# Patient Record
Sex: FEMALE | Race: WHITE | NOT HISPANIC OR LATINO | Employment: OTHER | URBAN - METROPOLITAN AREA
[De-identification: names, ages, dates, MRNs, and addresses within clinical notes are randomized per-mention and may not be internally consistent; named-entity substitution may affect disease eponyms.]

---

## 2013-02-03 LAB — HBA1C MFR BLD: 5.8 % (ref 4.2–6.1)

## 2017-01-13 ENCOUNTER — COMMUNICATION - HEALTHEAST (OUTPATIENT)
Dept: FAMILY MEDICINE | Facility: CLINIC | Age: 77
End: 2017-01-13

## 2017-03-22 ENCOUNTER — COMMUNICATION - HEALTHEAST (OUTPATIENT)
Dept: FAMILY MEDICINE | Facility: CLINIC | Age: 77
End: 2017-03-22

## 2017-03-22 DIAGNOSIS — M85.80 OSTEOPENIA: ICD-10-CM

## 2017-05-01 ENCOUNTER — COMMUNICATION - HEALTHEAST (OUTPATIENT)
Dept: FAMILY MEDICINE | Facility: CLINIC | Age: 77
End: 2017-05-01

## 2017-05-01 DIAGNOSIS — G47.00 INSOMNIA: ICD-10-CM

## 2017-05-24 ENCOUNTER — OFFICE VISIT - HEALTHEAST (OUTPATIENT)
Dept: FAMILY MEDICINE | Facility: CLINIC | Age: 77
End: 2017-05-24

## 2017-05-24 DIAGNOSIS — J06.9 VIRAL UPPER RESPIRATORY TRACT INFECTION: ICD-10-CM

## 2017-05-24 ASSESSMENT — MIFFLIN-ST. JEOR: SCORE: 1198

## 2017-05-26 ENCOUNTER — RECORDS - HEALTHEAST (OUTPATIENT)
Dept: ADMINISTRATIVE | Facility: OTHER | Age: 77
End: 2017-05-26

## 2017-06-10 ENCOUNTER — COMMUNICATION - HEALTHEAST (OUTPATIENT)
Dept: FAMILY MEDICINE | Facility: CLINIC | Age: 77
End: 2017-06-10

## 2017-06-10 DIAGNOSIS — M85.80 OSTEOPENIA: ICD-10-CM

## 2017-06-26 ENCOUNTER — COMMUNICATION - HEALTHEAST (OUTPATIENT)
Dept: FAMILY MEDICINE | Facility: CLINIC | Age: 77
End: 2017-06-26

## 2017-06-26 DIAGNOSIS — Z79.890 POSTMENOPAUSAL HRT (HORMONE REPLACEMENT THERAPY): ICD-10-CM

## 2017-06-29 ENCOUNTER — HOSPITAL ENCOUNTER (OUTPATIENT)
Dept: MAMMOGRAPHY | Facility: CLINIC | Age: 77
Discharge: HOME | End: 2017-06-29
Attending: FAMILY MEDICINE

## 2017-06-29 DIAGNOSIS — Z12.31 VISIT FOR SCREENING MAMMOGRAM: ICD-10-CM

## 2017-07-06 ENCOUNTER — COMMUNICATION - HEALTHEAST (OUTPATIENT)
Dept: FAMILY MEDICINE | Facility: CLINIC | Age: 77
End: 2017-07-06

## 2017-07-06 ENCOUNTER — HOSPITAL ENCOUNTER (OUTPATIENT)
Dept: MAMMOGRAPHY | Facility: CLINIC | Age: 77
Discharge: HOME | End: 2017-07-06
Attending: FAMILY MEDICINE

## 2017-07-06 ENCOUNTER — HOSPITAL ENCOUNTER (OUTPATIENT)
Dept: ULTRASOUND IMAGING | Facility: CLINIC | Age: 77
Discharge: HOME | End: 2017-07-06
Attending: FAMILY MEDICINE

## 2017-07-06 DIAGNOSIS — N64.89 BREAST ASYMMETRY: ICD-10-CM

## 2017-07-11 ENCOUNTER — COMMUNICATION - HEALTHEAST (OUTPATIENT)
Dept: FAMILY MEDICINE | Facility: CLINIC | Age: 77
End: 2017-07-11

## 2017-07-12 ENCOUNTER — HOSPITAL ENCOUNTER (OUTPATIENT)
Dept: ULTRASOUND IMAGING | Facility: CLINIC | Age: 77
Discharge: HOME | End: 2017-07-12
Attending: FAMILY MEDICINE

## 2017-07-12 ENCOUNTER — HOSPITAL ENCOUNTER (OUTPATIENT)
Dept: MAMMOGRAPHY | Facility: CLINIC | Age: 77
Discharge: HOME | End: 2017-07-12
Attending: FAMILY MEDICINE

## 2017-07-12 DIAGNOSIS — N63.10 BREAST MASS, RIGHT: ICD-10-CM

## 2017-07-13 ENCOUNTER — COMMUNICATION - HEALTHEAST (OUTPATIENT)
Dept: MAMMOGRAPHY | Facility: HOSPITAL | Age: 77
End: 2017-07-13

## 2017-07-14 ENCOUNTER — COMMUNICATION - HEALTHEAST (OUTPATIENT)
Dept: MAMMOGRAPHY | Facility: HOSPITAL | Age: 77
End: 2017-07-14

## 2017-07-14 LAB
LAB AP CHARGES (HE HISTORICAL CONVERSION): NORMAL
LAB AP IHC ER/PR AND HER2/NEU REPORT,ADDENDUM (HE HISTORICAL CONVERSION): NORMAL
PATH REPORT.COMMENTS IMP SPEC: NORMAL
PATH REPORT.COMMENTS IMP SPEC: NORMAL
PATH REPORT.FINAL DX SPEC: NORMAL
PATH REPORT.GROSS SPEC: NORMAL
PATH REPORT.MICROSCOPIC SPEC OTHER STN: NORMAL
PATH REPORT.RELEVANT HX SPEC: NORMAL
RESULT FLAG (HE HISTORICAL CONVERSION): NORMAL

## 2017-07-28 ENCOUNTER — COMMUNICATION - HEALTHEAST (OUTPATIENT)
Dept: FAMILY MEDICINE | Facility: CLINIC | Age: 77
End: 2017-07-28

## 2017-08-01 ENCOUNTER — OFFICE VISIT - HEALTHEAST (OUTPATIENT)
Dept: SURGERY | Facility: CLINIC | Age: 77
End: 2017-08-01

## 2017-08-01 DIAGNOSIS — C50.919 BREAST CANCER (H): ICD-10-CM

## 2017-08-01 ASSESSMENT — MIFFLIN-ST. JEOR: SCORE: 1181.22

## 2017-08-07 ENCOUNTER — AMBULATORY - HEALTHEAST (OUTPATIENT)
Dept: SURGERY | Facility: CLINIC | Age: 77
End: 2017-08-07

## 2017-08-08 ENCOUNTER — COMMUNICATION - HEALTHEAST (OUTPATIENT)
Dept: FAMILY MEDICINE | Facility: CLINIC | Age: 77
End: 2017-08-08

## 2017-08-08 ENCOUNTER — OFFICE VISIT - HEALTHEAST (OUTPATIENT)
Dept: FAMILY MEDICINE | Facility: CLINIC | Age: 77
End: 2017-08-08

## 2017-08-08 DIAGNOSIS — G47.00 INSOMNIA: ICD-10-CM

## 2017-08-08 DIAGNOSIS — J45.40 MODERATE PERSISTENT ASTHMA WITHOUT COMPLICATION: ICD-10-CM

## 2017-08-08 DIAGNOSIS — G43.909 MIGRAINE: ICD-10-CM

## 2017-08-08 DIAGNOSIS — C50.911 INVASIVE DUCTAL CARCINOMA OF RIGHT BREAST (H): ICD-10-CM

## 2017-08-08 DIAGNOSIS — Z01.818 PREOPERATIVE EXAMINATION: ICD-10-CM

## 2017-08-08 DIAGNOSIS — D64.9 ANEMIA: ICD-10-CM

## 2017-08-08 LAB
ATRIAL RATE - MUSE: 64 BPM
DIASTOLIC BLOOD PRESSURE - MUSE: NORMAL MMHG
INTERPRETATION ECG - MUSE: NORMAL
P AXIS - MUSE: 13 DEGREES
PR INTERVAL - MUSE: 146 MS
QRS DURATION - MUSE: 80 MS
QT - MUSE: 370 MS
QTC - MUSE: 381 MS
R AXIS - MUSE: -8 DEGREES
SYSTOLIC BLOOD PRESSURE - MUSE: NORMAL MMHG
T AXIS - MUSE: 29 DEGREES
VENTRICULAR RATE- MUSE: 64 BPM

## 2017-08-08 ASSESSMENT — MIFFLIN-ST. JEOR: SCORE: 1192.57

## 2017-08-09 ENCOUNTER — RECORDS - HEALTHEAST (OUTPATIENT)
Dept: ADMINISTRATIVE | Facility: OTHER | Age: 77
End: 2017-08-09

## 2017-08-10 ENCOUNTER — HOSPITAL ENCOUNTER (OUTPATIENT)
Dept: MAMMOGRAPHY | Facility: HOSPITAL | Age: 77
Discharge: HOME | End: 2017-08-10
Attending: SPECIALIST

## 2017-08-10 ENCOUNTER — RECORDS - HEALTHEAST (OUTPATIENT)
Dept: ADMINISTRATIVE | Facility: OTHER | Age: 77
End: 2017-08-10

## 2017-08-10 ENCOUNTER — HOSPITAL ENCOUNTER (OUTPATIENT)
Dept: NUCLEAR MEDICINE | Facility: HOSPITAL | Age: 77
Discharge: HOME | End: 2017-08-10
Attending: SPECIALIST

## 2017-08-10 DIAGNOSIS — C50.911 MALIGNANT NEOPLASM OF RIGHT FEMALE BREAST, UNSPECIFIED SITE OF BREAST: ICD-10-CM

## 2017-08-10 DIAGNOSIS — C50.919 BREAST CANCER (H): ICD-10-CM

## 2017-08-11 ENCOUNTER — COMMUNICATION - HEALTHEAST (OUTPATIENT)
Dept: FAMILY MEDICINE | Facility: CLINIC | Age: 77
End: 2017-08-11

## 2017-08-22 ENCOUNTER — OFFICE VISIT - HEALTHEAST (OUTPATIENT)
Dept: SURGERY | Facility: CLINIC | Age: 77
End: 2017-08-22

## 2017-08-22 DIAGNOSIS — C50.411 MALIGNANT NEOPLASM OF UPPER-OUTER QUADRANT OF RIGHT FEMALE BREAST (H): ICD-10-CM

## 2017-08-23 ENCOUNTER — COMMUNICATION - HEALTHEAST (OUTPATIENT)
Dept: ONCOLOGY | Facility: HOSPITAL | Age: 77
End: 2017-08-23

## 2017-08-24 ENCOUNTER — COMMUNICATION - HEALTHEAST (OUTPATIENT)
Dept: ONCOLOGY | Facility: HOSPITAL | Age: 77
End: 2017-08-24

## 2017-09-04 ENCOUNTER — COMMUNICATION - HEALTHEAST (OUTPATIENT)
Dept: SCHEDULING | Facility: CLINIC | Age: 77
End: 2017-09-04

## 2017-09-08 ENCOUNTER — OFFICE VISIT - HEALTHEAST (OUTPATIENT)
Dept: ONCOLOGY | Facility: HOSPITAL | Age: 77
End: 2017-09-08

## 2017-09-08 ENCOUNTER — AMBULATORY - HEALTHEAST (OUTPATIENT)
Dept: INFUSION THERAPY | Facility: HOSPITAL | Age: 77
End: 2017-09-08

## 2017-09-08 ENCOUNTER — AMBULATORY - HEALTHEAST (OUTPATIENT)
Dept: ONCOLOGY | Facility: HOSPITAL | Age: 77
End: 2017-09-08

## 2017-09-08 ENCOUNTER — COMMUNICATION - HEALTHEAST (OUTPATIENT)
Dept: FAMILY MEDICINE | Facility: CLINIC | Age: 77
End: 2017-09-08

## 2017-09-08 DIAGNOSIS — M85.9 DISORDER OF BONE DENSITY AND STRUCTURE, UNSPECIFIED: ICD-10-CM

## 2017-09-08 DIAGNOSIS — M89.9 DISORDER OF BONE: ICD-10-CM

## 2017-09-08 DIAGNOSIS — M85.9 LOW BONE DENSITY: ICD-10-CM

## 2017-09-08 DIAGNOSIS — R45.86 MOOD CHANGES: ICD-10-CM

## 2017-09-08 DIAGNOSIS — D50.9 MICROCYTIC ANEMIA: ICD-10-CM

## 2017-09-08 DIAGNOSIS — C50.411 MALIGNANT NEOPLASM OF UPPER-OUTER QUADRANT OF RIGHT FEMALE BREAST (H): ICD-10-CM

## 2017-09-08 LAB
BASOPHILS # BLD AUTO: 0.1 THOU/UL (ref 0–0.2)
BASOPHILS NFR BLD AUTO: 1 % (ref 0–2)
EOSINOPHIL # BLD AUTO: 0.5 THOU/UL (ref 0–0.4)
EOSINOPHIL NFR BLD AUTO: 5 % (ref 0–6)
ERYTHROCYTE [DISTWIDTH] IN BLOOD BY AUTOMATED COUNT: 17.1 % (ref 11–14.5)
HCT VFR BLD AUTO: 30.2 % (ref 35–47)
HGB BLD-MCNC: 9.2 G/DL (ref 12–16)
LAB AP CHARGES (HE HISTORICAL CONVERSION): NORMAL
LYMPHOCYTES # BLD AUTO: 1.7 THOU/UL (ref 0.8–4.4)
LYMPHOCYTES NFR BLD AUTO: 19 % (ref 20–40)
MCH RBC QN AUTO: 23.1 PG (ref 27–34)
MCHC RBC AUTO-ENTMCNC: 30.5 G/DL (ref 32–36)
MCV RBC AUTO: 76 FL (ref 80–100)
MONOCYTES # BLD AUTO: 0.7 THOU/UL (ref 0–0.9)
MONOCYTES NFR BLD AUTO: 9 % (ref 2–10)
NEUTROPHILS # BLD AUTO: 5.8 THOU/UL (ref 2–7.7)
NEUTROPHILS NFR BLD AUTO: 66 % (ref 50–70)
PATH REPORT.COMMENTS IMP SPEC: NORMAL
PATH REPORT.COMMENTS IMP SPEC: NORMAL
PATH REPORT.FINAL DX SPEC: NORMAL
PATH REPORT.MICROSCOPIC SPEC OTHER STN: ABNORMAL
PATH REPORT.MICROSCOPIC SPEC OTHER STN: NORMAL
PATH REPORT.RELEVANT HX SPEC: NORMAL
PLATELET # BLD AUTO: 468 THOU/UL (ref 140–440)
PMV BLD AUTO: 8.4 FL (ref 8.5–12.5)
RBC # BLD AUTO: 3.99 MILL/UL (ref 3.8–5.4)
WBC: 8.7 THOU/UL (ref 4–11)

## 2017-09-08 ASSESSMENT — MIFFLIN-ST. JEOR: SCORE: 1199.36

## 2017-09-11 ENCOUNTER — COMMUNICATION - HEALTHEAST (OUTPATIENT)
Dept: ONCOLOGY | Facility: CLINIC | Age: 77
End: 2017-09-11

## 2017-09-11 ENCOUNTER — AMBULATORY - HEALTHEAST (OUTPATIENT)
Dept: ONCOLOGY | Facility: HOSPITAL | Age: 77
End: 2017-09-11

## 2017-09-11 DIAGNOSIS — D50.9 IRON DEFICIENCY ANEMIA: ICD-10-CM

## 2017-09-13 ENCOUNTER — COMMUNICATION - HEALTHEAST (OUTPATIENT)
Dept: ONCOLOGY | Facility: HOSPITAL | Age: 77
End: 2017-09-13

## 2017-09-13 ENCOUNTER — COMMUNICATION - HEALTHEAST (OUTPATIENT)
Dept: ONCOLOGY | Facility: CLINIC | Age: 77
End: 2017-09-13

## 2017-09-15 ENCOUNTER — COMMUNICATION - HEALTHEAST (OUTPATIENT)
Dept: ONCOLOGY | Facility: HOSPITAL | Age: 77
End: 2017-09-15

## 2017-09-18 ENCOUNTER — COMMUNICATION - HEALTHEAST (OUTPATIENT)
Dept: ONCOLOGY | Facility: CLINIC | Age: 77
End: 2017-09-18

## 2017-09-18 ENCOUNTER — COMMUNICATION - HEALTHEAST (OUTPATIENT)
Dept: ONCOLOGY | Facility: HOSPITAL | Age: 77
End: 2017-09-18

## 2017-09-19 ENCOUNTER — COMMUNICATION - HEALTHEAST (OUTPATIENT)
Dept: ONCOLOGY | Facility: CLINIC | Age: 77
End: 2017-09-19

## 2017-09-20 ENCOUNTER — COMMUNICATION - HEALTHEAST (OUTPATIENT)
Dept: ONCOLOGY | Facility: CLINIC | Age: 77
End: 2017-09-20

## 2017-09-21 ENCOUNTER — RECORDS - HEALTHEAST (OUTPATIENT)
Dept: BONE DENSITY | Facility: CLINIC | Age: 77
End: 2017-09-21

## 2017-09-21 ENCOUNTER — RECORDS - HEALTHEAST (OUTPATIENT)
Dept: ADMINISTRATIVE | Facility: OTHER | Age: 77
End: 2017-09-21

## 2017-09-21 DIAGNOSIS — C50.411 MALIGNANT NEOPLASM OF UPPER-OUTER QUADRANT OF RIGHT FEMALE BREAST (H): ICD-10-CM

## 2017-09-21 DIAGNOSIS — M85.80 OTHER SPECIFIED DISORDERS OF BONE DENSITY AND STRUCTURE, UNSPECIFIED SITE: ICD-10-CM

## 2017-09-21 DIAGNOSIS — M85.9 DISORDER OF BONE DENSITY AND STRUCTURE, UNSPECIFIED: ICD-10-CM

## 2017-09-21 DIAGNOSIS — M89.9 DISORDER OF BONE, UNSPECIFIED: ICD-10-CM

## 2017-09-25 ENCOUNTER — COMMUNICATION - HEALTHEAST (OUTPATIENT)
Dept: FAMILY MEDICINE | Facility: CLINIC | Age: 77
End: 2017-09-25

## 2017-09-25 DIAGNOSIS — M85.80 OSTEOPENIA: ICD-10-CM

## 2017-10-05 ENCOUNTER — AMBULATORY - HEALTHEAST (OUTPATIENT)
Dept: ONCOLOGY | Facility: CLINIC | Age: 77
End: 2017-10-05

## 2017-10-05 ENCOUNTER — OFFICE VISIT - HEALTHEAST (OUTPATIENT)
Dept: ONCOLOGY | Facility: CLINIC | Age: 77
End: 2017-10-05

## 2017-10-05 DIAGNOSIS — Z17.0 MALIGNANT NEOPLASM OF UPPER-OUTER QUADRANT OF RIGHT BREAST IN FEMALE, ESTROGEN RECEPTOR POSITIVE (H): ICD-10-CM

## 2017-10-05 DIAGNOSIS — C50.411 MALIGNANT NEOPLASM OF UPPER-OUTER QUADRANT OF RIGHT BREAST IN FEMALE, ESTROGEN RECEPTOR POSITIVE (H): ICD-10-CM

## 2017-10-24 ENCOUNTER — OFFICE VISIT - HEALTHEAST (OUTPATIENT)
Dept: FAMILY MEDICINE | Facility: CLINIC | Age: 77
End: 2017-10-24

## 2017-10-24 DIAGNOSIS — J45.909 ASTHMA: ICD-10-CM

## 2017-10-24 DIAGNOSIS — C50.411 MALIGNANT NEOPLASM OF UPPER-OUTER QUADRANT OF RIGHT BREAST IN FEMALE, ESTROGEN RECEPTOR POSITIVE (H): ICD-10-CM

## 2017-10-24 DIAGNOSIS — Z17.0 MALIGNANT NEOPLASM OF UPPER-OUTER QUADRANT OF RIGHT BREAST IN FEMALE, ESTROGEN RECEPTOR POSITIVE (H): ICD-10-CM

## 2017-10-24 DIAGNOSIS — M94.9 DISORDER OF BONE AND CARTILAGE: ICD-10-CM

## 2017-10-24 DIAGNOSIS — R05.9 COUGH: ICD-10-CM

## 2017-10-24 DIAGNOSIS — M89.9 DISORDER OF BONE AND CARTILAGE: ICD-10-CM

## 2017-10-24 DIAGNOSIS — F32.5 MAJOR DEPRESSIVE DISORDER, SINGLE EPISODE IN FULL REMISSION (H): ICD-10-CM

## 2017-10-27 ENCOUNTER — RECORDS - HEALTHEAST (OUTPATIENT)
Dept: ADMINISTRATIVE | Facility: OTHER | Age: 77
End: 2017-10-27

## 2017-11-08 ENCOUNTER — RECORDS - HEALTHEAST (OUTPATIENT)
Dept: ADMINISTRATIVE | Facility: OTHER | Age: 77
End: 2017-11-08

## 2017-11-14 ENCOUNTER — OFFICE VISIT - HEALTHEAST (OUTPATIENT)
Dept: ONCOLOGY | Facility: CLINIC | Age: 77
End: 2017-11-14

## 2017-11-14 DIAGNOSIS — C50.411 MALIGNANT NEOPLASM OF UPPER-OUTER QUADRANT OF RIGHT BREAST IN FEMALE, ESTROGEN RECEPTOR POSITIVE (H): ICD-10-CM

## 2017-11-14 DIAGNOSIS — D50.9 IRON DEFICIENCY ANEMIA, UNSPECIFIED IRON DEFICIENCY ANEMIA TYPE: ICD-10-CM

## 2017-11-14 DIAGNOSIS — Z17.0 MALIGNANT NEOPLASM OF UPPER-OUTER QUADRANT OF RIGHT BREAST IN FEMALE, ESTROGEN RECEPTOR POSITIVE (H): ICD-10-CM

## 2017-11-15 ENCOUNTER — AMBULATORY - HEALTHEAST (OUTPATIENT)
Dept: ONCOLOGY | Facility: CLINIC | Age: 77
End: 2017-11-15

## 2017-11-15 DIAGNOSIS — D50.9 IRON DEFICIENCY ANEMIA, UNSPECIFIED IRON DEFICIENCY ANEMIA TYPE: ICD-10-CM

## 2017-11-17 ENCOUNTER — OFFICE VISIT - HEALTHEAST (OUTPATIENT)
Dept: FAMILY MEDICINE | Facility: CLINIC | Age: 77
End: 2017-11-17

## 2017-11-17 DIAGNOSIS — R30.0 DYSURIA: ICD-10-CM

## 2017-11-17 DIAGNOSIS — N30.01 ACUTE CYSTITIS WITH HEMATURIA: ICD-10-CM

## 2017-11-19 ENCOUNTER — AMBULATORY - HEALTHEAST (OUTPATIENT)
Dept: FAMILY MEDICINE | Facility: CLINIC | Age: 77
End: 2017-11-19

## 2017-11-19 ENCOUNTER — COMMUNICATION - HEALTHEAST (OUTPATIENT)
Dept: FAMILY MEDICINE | Facility: CLINIC | Age: 77
End: 2017-11-19

## 2017-11-19 DIAGNOSIS — R30.0 DYSURIA: ICD-10-CM

## 2017-11-24 ENCOUNTER — COMMUNICATION - HEALTHEAST (OUTPATIENT)
Dept: ONCOLOGY | Facility: CLINIC | Age: 77
End: 2017-11-24

## 2017-11-29 ENCOUNTER — COMMUNICATION - HEALTHEAST (OUTPATIENT)
Dept: ONCOLOGY | Facility: CLINIC | Age: 77
End: 2017-11-29

## 2017-12-06 ENCOUNTER — COMMUNICATION - HEALTHEAST (OUTPATIENT)
Dept: ONCOLOGY | Facility: CLINIC | Age: 77
End: 2017-12-06

## 2017-12-14 ENCOUNTER — RECORDS - HEALTHEAST (OUTPATIENT)
Dept: ADMINISTRATIVE | Facility: OTHER | Age: 77
End: 2017-12-14

## 2017-12-19 ENCOUNTER — COMMUNICATION - HEALTHEAST (OUTPATIENT)
Dept: ONCOLOGY | Facility: CLINIC | Age: 77
End: 2017-12-19

## 2018-01-04 ENCOUNTER — AMBULATORY - HEALTHEAST (OUTPATIENT)
Dept: FAMILY MEDICINE | Facility: CLINIC | Age: 78
End: 2018-01-04

## 2018-01-04 ENCOUNTER — COMMUNICATION - HEALTHEAST (OUTPATIENT)
Dept: FAMILY MEDICINE | Facility: CLINIC | Age: 78
End: 2018-01-04

## 2018-01-05 ENCOUNTER — OFFICE VISIT - HEALTHEAST (OUTPATIENT)
Dept: FAMILY MEDICINE | Facility: CLINIC | Age: 78
End: 2018-01-05

## 2018-01-05 DIAGNOSIS — B02.9 SHINGLES: ICD-10-CM

## 2018-01-05 ASSESSMENT — MIFFLIN-ST. JEOR: SCORE: 1193.01

## 2018-01-08 LAB
HSV 1, PCR - HISTORICAL: NEGATIVE
HSV TYPE 2 PCR: NEGATIVE
SPECIMEN SOURCE: NORMAL

## 2018-01-10 ENCOUNTER — COMMUNICATION - HEALTHEAST (OUTPATIENT)
Dept: SCHEDULING | Facility: CLINIC | Age: 78
End: 2018-01-10

## 2018-01-16 ENCOUNTER — COMMUNICATION - HEALTHEAST (OUTPATIENT)
Dept: SCHEDULING | Facility: CLINIC | Age: 78
End: 2018-01-16

## 2018-01-26 ENCOUNTER — COMMUNICATION - HEALTHEAST (OUTPATIENT)
Dept: FAMILY MEDICINE | Facility: CLINIC | Age: 78
End: 2018-01-26

## 2018-02-12 ENCOUNTER — COMMUNICATION - HEALTHEAST (OUTPATIENT)
Dept: FAMILY MEDICINE | Facility: CLINIC | Age: 78
End: 2018-02-12

## 2018-02-20 ENCOUNTER — COMMUNICATION - HEALTHEAST (OUTPATIENT)
Dept: FAMILY MEDICINE | Facility: CLINIC | Age: 78
End: 2018-02-20

## 2018-02-20 ENCOUNTER — OFFICE VISIT - HEALTHEAST (OUTPATIENT)
Dept: ONCOLOGY | Facility: CLINIC | Age: 78
End: 2018-02-20

## 2018-02-20 DIAGNOSIS — T50.995A ADVERSE EFFECT OF OTHER DRUGS, MEDICAMENTS AND BIOLOGICAL SUBSTANCES, INITIAL ENCOUNTER: ICD-10-CM

## 2018-02-20 DIAGNOSIS — C50.411 MALIGNANT NEOPLASM OF UPPER-OUTER QUADRANT OF RIGHT BREAST IN FEMALE, ESTROGEN RECEPTOR POSITIVE (H): ICD-10-CM

## 2018-02-20 DIAGNOSIS — Z17.0 MALIGNANT NEOPLASM OF UPPER-OUTER QUADRANT OF RIGHT BREAST IN FEMALE, ESTROGEN RECEPTOR POSITIVE (H): ICD-10-CM

## 2018-02-20 DIAGNOSIS — D50.0 IRON DEFICIENCY ANEMIA DUE TO CHRONIC BLOOD LOSS: ICD-10-CM

## 2018-02-20 DIAGNOSIS — G47.00 INSOMNIA: ICD-10-CM

## 2018-02-23 ENCOUNTER — INFUSION - HEALTHEAST (OUTPATIENT)
Dept: INFUSION THERAPY | Facility: CLINIC | Age: 78
End: 2018-02-23

## 2018-02-23 DIAGNOSIS — K25.7: ICD-10-CM

## 2018-02-23 DIAGNOSIS — D50.9 IRON DEFICIENCY ANEMIA, UNSPECIFIED IRON DEFICIENCY ANEMIA TYPE: ICD-10-CM

## 2018-02-23 LAB
ERYTHROCYTE [DISTWIDTH] IN BLOOD BY AUTOMATED COUNT: 17 % (ref 11–14.5)
HCT VFR BLD AUTO: 33 % (ref 35–47)
HGB BLD-MCNC: 10.7 G/DL (ref 12–16)
MCH RBC QN AUTO: 25.7 PG (ref 27–34)
MCHC RBC AUTO-ENTMCNC: 32.4 G/DL (ref 32–36)
MCV RBC AUTO: 79 FL (ref 80–100)
PLATELET # BLD AUTO: 444 THOU/UL (ref 140–440)
PMV BLD AUTO: 8.4 FL (ref 8.5–12.5)
RBC # BLD AUTO: 4.16 MILL/UL (ref 3.8–5.4)
WBC: 10 THOU/UL (ref 4–11)

## 2018-03-02 ENCOUNTER — INFUSION - HEALTHEAST (OUTPATIENT)
Dept: INFUSION THERAPY | Facility: CLINIC | Age: 78
End: 2018-03-02

## 2018-03-02 DIAGNOSIS — K25.7: ICD-10-CM

## 2018-03-02 DIAGNOSIS — D50.9 IRON DEFICIENCY ANEMIA, UNSPECIFIED IRON DEFICIENCY ANEMIA TYPE: ICD-10-CM

## 2018-03-02 LAB
ERYTHROCYTE [DISTWIDTH] IN BLOOD BY AUTOMATED COUNT: 17.2 % (ref 11–14.5)
HCT VFR BLD AUTO: 34.7 % (ref 35–47)
HGB BLD-MCNC: 11.1 G/DL (ref 12–16)
MCH RBC QN AUTO: 26.3 PG (ref 27–34)
MCHC RBC AUTO-ENTMCNC: 32 G/DL (ref 32–36)
MCV RBC AUTO: 82 FL (ref 80–100)
PLATELET # BLD AUTO: 443 THOU/UL (ref 140–440)
PMV BLD AUTO: 8.9 FL (ref 8.5–12.5)
RBC # BLD AUTO: 4.22 MILL/UL (ref 3.8–5.4)
WBC: 10.5 THOU/UL (ref 4–11)

## 2018-03-06 ENCOUNTER — COMMUNICATION - HEALTHEAST (OUTPATIENT)
Dept: ONCOLOGY | Facility: CLINIC | Age: 78
End: 2018-03-06

## 2018-03-26 ENCOUNTER — COMMUNICATION - HEALTHEAST (OUTPATIENT)
Dept: FAMILY MEDICINE | Facility: CLINIC | Age: 78
End: 2018-03-26

## 2018-04-18 ENCOUNTER — RECORDS - HEALTHEAST (OUTPATIENT)
Dept: ADMINISTRATIVE | Facility: OTHER | Age: 78
End: 2018-04-18

## 2018-04-26 ENCOUNTER — AMBULATORY - HEALTHEAST (OUTPATIENT)
Dept: FAMILY MEDICINE | Facility: CLINIC | Age: 78
End: 2018-04-26

## 2018-04-26 ENCOUNTER — AMBULATORY - HEALTHEAST (OUTPATIENT)
Dept: INFUSION THERAPY | Facility: CLINIC | Age: 78
End: 2018-04-26

## 2018-04-26 DIAGNOSIS — D50.0 IRON DEFICIENCY ANEMIA DUE TO CHRONIC BLOOD LOSS: ICD-10-CM

## 2018-04-26 LAB
ERYTHROCYTE [DISTWIDTH] IN BLOOD BY AUTOMATED COUNT: 15.4 % (ref 11–14.5)
FERRITIN SERPL-MCNC: 73 NG/ML (ref 10–130)
HCT VFR BLD AUTO: 40.8 % (ref 35–47)
HGB BLD-MCNC: 13.5 G/DL (ref 12–16)
MCH RBC QN AUTO: 29.3 PG (ref 27–34)
MCHC RBC AUTO-ENTMCNC: 33.1 G/DL (ref 32–36)
MCV RBC AUTO: 89 FL (ref 80–100)
PLATELET # BLD AUTO: 376 THOU/UL (ref 140–440)
PMV BLD AUTO: 8.8 FL (ref 8.5–12.5)
RBC # BLD AUTO: 4.6 MILL/UL (ref 3.8–5.4)
WBC: 8.1 THOU/UL (ref 4–11)

## 2018-04-30 ENCOUNTER — COMMUNICATION - HEALTHEAST (OUTPATIENT)
Dept: ONCOLOGY | Facility: CLINIC | Age: 78
End: 2018-04-30

## 2018-05-01 ENCOUNTER — OFFICE VISIT - HEALTHEAST (OUTPATIENT)
Dept: FAMILY MEDICINE | Facility: CLINIC | Age: 78
End: 2018-05-01

## 2018-05-01 ENCOUNTER — AMBULATORY - HEALTHEAST (OUTPATIENT)
Dept: FAMILY MEDICINE | Facility: CLINIC | Age: 78
End: 2018-05-01

## 2018-05-01 DIAGNOSIS — B02.9 SHINGLES: ICD-10-CM

## 2018-05-01 DIAGNOSIS — K25.7: ICD-10-CM

## 2018-05-01 DIAGNOSIS — F32.5 MAJOR DEPRESSIVE DISORDER, SINGLE EPISODE IN FULL REMISSION (H): ICD-10-CM

## 2018-05-01 DIAGNOSIS — D50.9 IRON DEFICIENCY ANEMIA, UNSPECIFIED IRON DEFICIENCY ANEMIA TYPE: ICD-10-CM

## 2018-05-01 DIAGNOSIS — M89.9 DISORDER OF BONE AND CARTILAGE: ICD-10-CM

## 2018-05-01 DIAGNOSIS — M94.9 DISORDER OF BONE AND CARTILAGE: ICD-10-CM

## 2018-05-16 ENCOUNTER — COMMUNICATION - HEALTHEAST (OUTPATIENT)
Dept: ONCOLOGY | Facility: CLINIC | Age: 78
End: 2018-05-16

## 2018-06-19 ENCOUNTER — AMBULATORY - HEALTHEAST (OUTPATIENT)
Dept: INFUSION THERAPY | Facility: CLINIC | Age: 78
End: 2018-06-19

## 2018-06-19 ENCOUNTER — OFFICE VISIT - HEALTHEAST (OUTPATIENT)
Dept: ONCOLOGY | Facility: CLINIC | Age: 78
End: 2018-06-19

## 2018-06-19 DIAGNOSIS — D50.9 IRON DEFICIENCY ANEMIA, UNSPECIFIED IRON DEFICIENCY ANEMIA TYPE: ICD-10-CM

## 2018-06-19 LAB
ALBUMIN SERPL-MCNC: 3.3 G/DL (ref 3.5–5)
ALP SERPL-CCNC: 80 U/L (ref 45–120)
ALT SERPL W P-5'-P-CCNC: 29 U/L (ref 0–45)
ANION GAP SERPL CALCULATED.3IONS-SCNC: 10 MMOL/L (ref 5–18)
AST SERPL W P-5'-P-CCNC: 19 U/L (ref 0–40)
BASOPHILS # BLD AUTO: 0.1 THOU/UL (ref 0–0.2)
BASOPHILS NFR BLD AUTO: 2 % (ref 0–2)
BILIRUB SERPL-MCNC: 0.5 MG/DL (ref 0–1)
BUN SERPL-MCNC: 14 MG/DL (ref 8–28)
CALCIUM SERPL-MCNC: 9.2 MG/DL (ref 8.5–10.5)
CHLORIDE BLD-SCNC: 107 MMOL/L (ref 98–107)
CO2 SERPL-SCNC: 23 MMOL/L (ref 22–31)
CREAT SERPL-MCNC: 0.92 MG/DL (ref 0.6–1.1)
EOSINOPHIL # BLD AUTO: 0.8 THOU/UL (ref 0–0.4)
EOSINOPHIL NFR BLD AUTO: 8 % (ref 0–6)
ERYTHROCYTE [DISTWIDTH] IN BLOOD BY AUTOMATED COUNT: 12.5 % (ref 11–14.5)
FERRITIN SERPL-MCNC: 107 NG/ML (ref 10–130)
GFR SERPL CREATININE-BSD FRML MDRD: 59 ML/MIN/1.73M2
GLUCOSE BLD-MCNC: 112 MG/DL (ref 70–125)
HCT VFR BLD AUTO: 39.4 % (ref 35–47)
HGB BLD-MCNC: 13.3 G/DL (ref 12–16)
LYMPHOCYTES # BLD AUTO: 1.2 THOU/UL (ref 0.8–4.4)
LYMPHOCYTES NFR BLD AUTO: 13 % (ref 20–40)
MCH RBC QN AUTO: 30.2 PG (ref 27–34)
MCHC RBC AUTO-ENTMCNC: 33.8 G/DL (ref 32–36)
MCV RBC AUTO: 89 FL (ref 80–100)
MONOCYTES # BLD AUTO: 0.7 THOU/UL (ref 0–0.9)
MONOCYTES NFR BLD AUTO: 8 % (ref 2–10)
NEUTROPHILS # BLD AUTO: 6.3 THOU/UL (ref 2–7.7)
NEUTROPHILS NFR BLD AUTO: 69 % (ref 50–70)
PLATELET # BLD AUTO: 424 THOU/UL (ref 140–440)
PMV BLD AUTO: 8.5 FL (ref 8.5–12.5)
POTASSIUM BLD-SCNC: 4.2 MMOL/L (ref 3.5–5)
PROT SERPL-MCNC: 6.3 G/DL (ref 6–8)
RBC # BLD AUTO: 4.41 MILL/UL (ref 3.8–5.4)
RETICS # AUTO: 0.12 MILL/UL (ref 0.01–0.11)
SODIUM SERPL-SCNC: 140 MMOL/L (ref 136–145)
WBC: 9.2 THOU/UL (ref 4–11)

## 2018-07-17 ENCOUNTER — HOSPITAL ENCOUNTER (OUTPATIENT)
Dept: MAMMOGRAPHY | Facility: CLINIC | Age: 78
Discharge: HOME | End: 2018-07-17
Attending: SPECIALIST

## 2018-07-17 DIAGNOSIS — C50.411 MALIGNANT NEOPLASM OF UPPER-OUTER QUADRANT OF RIGHT FEMALE BREAST (H): ICD-10-CM

## 2018-08-27 ENCOUNTER — COMMUNICATION - HEALTHEAST (OUTPATIENT)
Dept: FAMILY MEDICINE | Facility: CLINIC | Age: 78
End: 2018-08-27

## 2018-08-27 ENCOUNTER — COMMUNICATION - HEALTHEAST (OUTPATIENT)
Dept: PHARMACY | Facility: CLINIC | Age: 78
End: 2018-08-27

## 2018-08-27 DIAGNOSIS — G47.00 INSOMNIA: ICD-10-CM

## 2018-09-11 ENCOUNTER — OFFICE VISIT - HEALTHEAST (OUTPATIENT)
Dept: FAMILY MEDICINE | Facility: CLINIC | Age: 78
End: 2018-09-11

## 2018-09-11 DIAGNOSIS — M54.9 BACK PAIN: ICD-10-CM

## 2018-09-11 DIAGNOSIS — M19.041 OSTEOARTHRITIS OF BOTH HANDS, UNSPECIFIED OSTEOARTHRITIS TYPE: ICD-10-CM

## 2018-09-11 DIAGNOSIS — M79.672 LEFT FOOT PAIN: ICD-10-CM

## 2018-09-11 DIAGNOSIS — G43.909 MIGRAINE HEADACHE: ICD-10-CM

## 2018-09-11 DIAGNOSIS — M19.042 OSTEOARTHRITIS OF BOTH HANDS, UNSPECIFIED OSTEOARTHRITIS TYPE: ICD-10-CM

## 2018-09-27 ENCOUNTER — OFFICE VISIT - HEALTHEAST (OUTPATIENT)
Dept: PHARMACY | Facility: CLINIC | Age: 78
End: 2018-09-27

## 2018-09-27 DIAGNOSIS — Z17.0 MALIGNANT NEOPLASM OF UPPER-OUTER QUADRANT OF RIGHT BREAST IN FEMALE, ESTROGEN RECEPTOR POSITIVE (H): ICD-10-CM

## 2018-09-27 DIAGNOSIS — G43.109 MIGRAINE WITH AURA AND WITHOUT STATUS MIGRAINOSUS, NOT INTRACTABLE: ICD-10-CM

## 2018-09-27 DIAGNOSIS — F32.5 MAJOR DEPRESSIVE DISORDER, SINGLE EPISODE IN FULL REMISSION (H): ICD-10-CM

## 2018-09-27 DIAGNOSIS — J45.41 MODERATE PERSISTENT ASTHMA WITH ACUTE EXACERBATION: ICD-10-CM

## 2018-09-27 DIAGNOSIS — M94.9 DISORDER OF BONE AND CARTILAGE: ICD-10-CM

## 2018-09-27 DIAGNOSIS — M89.9 DISORDER OF BONE AND CARTILAGE: ICD-10-CM

## 2018-09-27 DIAGNOSIS — K21.9 GASTROESOPHAGEAL REFLUX DISEASE WITHOUT ESOPHAGITIS: ICD-10-CM

## 2018-09-27 DIAGNOSIS — M19.90 ARTHRITIS: ICD-10-CM

## 2018-09-27 DIAGNOSIS — C50.411 MALIGNANT NEOPLASM OF UPPER-OUTER QUADRANT OF RIGHT BREAST IN FEMALE, ESTROGEN RECEPTOR POSITIVE (H): ICD-10-CM

## 2018-09-28 ENCOUNTER — COMMUNICATION - HEALTHEAST (OUTPATIENT)
Dept: FAMILY MEDICINE | Facility: CLINIC | Age: 78
End: 2018-09-28

## 2018-10-02 ENCOUNTER — COMMUNICATION - HEALTHEAST (OUTPATIENT)
Dept: FAMILY MEDICINE | Facility: CLINIC | Age: 78
End: 2018-10-02

## 2018-10-04 ENCOUNTER — OFFICE VISIT - HEALTHEAST (OUTPATIENT)
Dept: FAMILY MEDICINE | Facility: CLINIC | Age: 78
End: 2018-10-04

## 2018-10-04 DIAGNOSIS — M70.62 TROCHANTERIC BURSITIS OF LEFT HIP: ICD-10-CM

## 2018-10-04 DIAGNOSIS — F10.21 HISTORY OF ALCOHOLISM (H): ICD-10-CM

## 2018-10-04 DIAGNOSIS — G43.109 MIGRAINE WITH AURA AND WITHOUT STATUS MIGRAINOSUS, NOT INTRACTABLE: ICD-10-CM

## 2018-10-05 ENCOUNTER — COMMUNICATION - HEALTHEAST (OUTPATIENT)
Dept: ONCOLOGY | Facility: CLINIC | Age: 78
End: 2018-10-05

## 2018-10-10 ENCOUNTER — COMMUNICATION - HEALTHEAST (OUTPATIENT)
Dept: SCHEDULING | Facility: CLINIC | Age: 78
End: 2018-10-10

## 2018-10-12 ENCOUNTER — OFFICE VISIT - HEALTHEAST (OUTPATIENT)
Dept: FAMILY MEDICINE | Facility: CLINIC | Age: 78
End: 2018-10-12

## 2018-10-12 DIAGNOSIS — M70.62 TROCHANTERIC BURSITIS OF LEFT HIP: ICD-10-CM

## 2018-10-19 ENCOUNTER — COMMUNICATION - HEALTHEAST (OUTPATIENT)
Dept: SCHEDULING | Facility: CLINIC | Age: 78
End: 2018-10-19

## 2018-10-22 ENCOUNTER — RECORDS - HEALTHEAST (OUTPATIENT)
Dept: GENERAL RADIOLOGY | Facility: CLINIC | Age: 78
End: 2018-10-22

## 2018-10-22 ENCOUNTER — OFFICE VISIT - HEALTHEAST (OUTPATIENT)
Dept: FAMILY MEDICINE | Facility: CLINIC | Age: 78
End: 2018-10-22

## 2018-10-22 DIAGNOSIS — M25.552 HIP PAIN, LEFT: ICD-10-CM

## 2018-10-22 DIAGNOSIS — M25.552 PAIN IN LEFT HIP: ICD-10-CM

## 2018-10-29 ENCOUNTER — RECORDS - HEALTHEAST (OUTPATIENT)
Dept: ADMINISTRATIVE | Facility: OTHER | Age: 78
End: 2018-10-29

## 2018-11-01 ENCOUNTER — RECORDS - HEALTHEAST (OUTPATIENT)
Dept: ADMINISTRATIVE | Facility: OTHER | Age: 78
End: 2018-11-01

## 2018-11-10 ENCOUNTER — RECORDS - HEALTHEAST (OUTPATIENT)
Dept: ADMINISTRATIVE | Facility: OTHER | Age: 78
End: 2018-11-10

## 2018-11-13 ENCOUNTER — COMMUNICATION - HEALTHEAST (OUTPATIENT)
Dept: FAMILY MEDICINE | Facility: CLINIC | Age: 78
End: 2018-11-13

## 2018-11-13 ENCOUNTER — RECORDS - HEALTHEAST (OUTPATIENT)
Dept: ADMINISTRATIVE | Facility: OTHER | Age: 78
End: 2018-11-13

## 2018-11-15 ENCOUNTER — COMMUNICATION - HEALTHEAST (OUTPATIENT)
Dept: FAMILY MEDICINE | Facility: CLINIC | Age: 78
End: 2018-11-15

## 2018-11-19 ENCOUNTER — RECORDS - HEALTHEAST (OUTPATIENT)
Dept: ADMINISTRATIVE | Facility: OTHER | Age: 78
End: 2018-11-19
Payer: COMMERCIAL

## 2018-12-05 ENCOUNTER — COMMUNICATION - HEALTHEAST (OUTPATIENT)
Dept: FAMILY MEDICINE | Facility: CLINIC | Age: 78
End: 2018-12-05

## 2018-12-05 DIAGNOSIS — F32.5 MAJOR DEPRESSIVE DISORDER, SINGLE EPISODE IN FULL REMISSION (H): ICD-10-CM

## 2018-12-06 ENCOUNTER — OFFICE VISIT - HEALTHEAST (OUTPATIENT)
Dept: PHARMACY | Facility: CLINIC | Age: 78
End: 2018-12-06

## 2018-12-06 ENCOUNTER — COMMUNICATION - HEALTHEAST (OUTPATIENT)
Dept: FAMILY MEDICINE | Facility: CLINIC | Age: 78
End: 2018-12-06

## 2018-12-06 DIAGNOSIS — F32.5 MAJOR DEPRESSIVE DISORDER, SINGLE EPISODE IN FULL REMISSION (H): ICD-10-CM

## 2018-12-06 DIAGNOSIS — J45.41 MODERATE PERSISTENT ASTHMA WITH ACUTE EXACERBATION: ICD-10-CM

## 2018-12-06 DIAGNOSIS — M19.90 ARTHRITIS: ICD-10-CM

## 2018-12-11 ENCOUNTER — OFFICE VISIT - HEALTHEAST (OUTPATIENT)
Dept: ONCOLOGY | Facility: CLINIC | Age: 78
End: 2018-12-11

## 2018-12-11 DIAGNOSIS — M85.89 OSTEOPENIA OF MULTIPLE SITES: ICD-10-CM

## 2018-12-11 DIAGNOSIS — Z51.81 ENCOUNTER FOR MONITORING TAMOXIFEN THERAPY: ICD-10-CM

## 2018-12-11 DIAGNOSIS — Z17.0 MALIGNANT NEOPLASM OF UPPER-OUTER QUADRANT OF RIGHT BREAST IN FEMALE, ESTROGEN RECEPTOR POSITIVE (H): ICD-10-CM

## 2018-12-11 DIAGNOSIS — C50.411 MALIGNANT NEOPLASM OF UPPER-OUTER QUADRANT OF RIGHT BREAST IN FEMALE, ESTROGEN RECEPTOR POSITIVE (H): ICD-10-CM

## 2018-12-11 DIAGNOSIS — Z79.810 ENCOUNTER FOR MONITORING TAMOXIFEN THERAPY: ICD-10-CM

## 2018-12-11 DIAGNOSIS — D50.9 IRON DEFICIENCY ANEMIA, UNSPECIFIED IRON DEFICIENCY ANEMIA TYPE: ICD-10-CM

## 2018-12-18 ENCOUNTER — COMMUNICATION - HEALTHEAST (OUTPATIENT)
Dept: ONCOLOGY | Facility: CLINIC | Age: 78
End: 2018-12-18

## 2018-12-28 ENCOUNTER — RECORDS - HEALTHEAST (OUTPATIENT)
Dept: BONE DENSITY | Facility: CLINIC | Age: 78
End: 2018-12-28

## 2018-12-28 ENCOUNTER — RECORDS - HEALTHEAST (OUTPATIENT)
Dept: ADMINISTRATIVE | Facility: OTHER | Age: 78
End: 2018-12-28

## 2018-12-28 DIAGNOSIS — M85.89 OTHER SPECIFIED DISORDERS OF BONE DENSITY AND STRUCTURE, MULTIPLE SITES: ICD-10-CM

## 2018-12-31 ENCOUNTER — COMMUNICATION - HEALTHEAST (OUTPATIENT)
Dept: FAMILY MEDICINE | Facility: CLINIC | Age: 78
End: 2018-12-31

## 2019-01-10 ENCOUNTER — OFFICE VISIT - HEALTHEAST (OUTPATIENT)
Dept: PHARMACY | Facility: CLINIC | Age: 79
End: 2019-01-10

## 2019-01-10 DIAGNOSIS — F32.5 MAJOR DEPRESSIVE DISORDER, SINGLE EPISODE IN FULL REMISSION (H): ICD-10-CM

## 2019-01-10 DIAGNOSIS — C50.411 MALIGNANT NEOPLASM OF UPPER-OUTER QUADRANT OF RIGHT BREAST IN FEMALE, ESTROGEN RECEPTOR POSITIVE (H): ICD-10-CM

## 2019-01-10 DIAGNOSIS — G47.00 INSOMNIA: ICD-10-CM

## 2019-01-10 DIAGNOSIS — M19.90 ARTHRITIS: ICD-10-CM

## 2019-01-10 DIAGNOSIS — Z17.0 MALIGNANT NEOPLASM OF UPPER-OUTER QUADRANT OF RIGHT BREAST IN FEMALE, ESTROGEN RECEPTOR POSITIVE (H): ICD-10-CM

## 2019-01-10 DIAGNOSIS — J45.41 MODERATE PERSISTENT ASTHMA WITH ACUTE EXACERBATION: ICD-10-CM

## 2019-01-10 DIAGNOSIS — G43.109 MIGRAINE WITH AURA AND WITHOUT STATUS MIGRAINOSUS, NOT INTRACTABLE: ICD-10-CM

## 2019-01-10 DIAGNOSIS — K21.9 GASTROESOPHAGEAL REFLUX DISEASE WITHOUT ESOPHAGITIS: ICD-10-CM

## 2019-01-10 DIAGNOSIS — M94.9 DISORDER OF BONE AND CARTILAGE: ICD-10-CM

## 2019-01-10 DIAGNOSIS — M89.9 DISORDER OF BONE AND CARTILAGE: ICD-10-CM

## 2019-01-10 DIAGNOSIS — L30.1 DYSHIDROTIC ECZEMA: ICD-10-CM

## 2019-01-25 ENCOUNTER — COMMUNICATION - HEALTHEAST (OUTPATIENT)
Dept: FAMILY MEDICINE | Facility: CLINIC | Age: 79
End: 2019-01-25

## 2019-02-12 ENCOUNTER — RECORDS - HEALTHEAST (OUTPATIENT)
Dept: RADIOLOGY | Facility: CLINIC | Age: 79
End: 2019-02-12

## 2019-02-22 ENCOUNTER — HOSPITAL ENCOUNTER (OUTPATIENT)
Dept: CARDIOLOGY | Facility: CLINIC | Age: 79
Discharge: HOME | End: 2019-02-22
Attending: INTERNAL MEDICINE

## 2019-02-22 DIAGNOSIS — Z51.81 ENCOUNTER FOR MONITORING TAMOXIFEN THERAPY: ICD-10-CM

## 2019-02-22 DIAGNOSIS — Z79.810 ENCOUNTER FOR MONITORING TAMOXIFEN THERAPY: ICD-10-CM

## 2019-02-22 LAB
ATRIAL RATE - MUSE: 94 BPM
DIASTOLIC BLOOD PRESSURE - MUSE: NORMAL MMHG
INTERPRETATION ECG - MUSE: NORMAL
P AXIS - MUSE: 8 DEGREES
PR INTERVAL - MUSE: 162 MS
QRS DURATION - MUSE: 78 MS
QT - MUSE: 326 MS
QTC - MUSE: 407 MS
R AXIS - MUSE: -7 DEGREES
SYSTOLIC BLOOD PRESSURE - MUSE: NORMAL MMHG
T AXIS - MUSE: 13 DEGREES
VENTRICULAR RATE- MUSE: 94 BPM

## 2019-05-06 ENCOUNTER — OFFICE VISIT - HEALTHEAST (OUTPATIENT)
Dept: PHARMACY | Facility: CLINIC | Age: 79
End: 2019-05-06

## 2019-05-06 DIAGNOSIS — F32.5 MAJOR DEPRESSIVE DISORDER, SINGLE EPISODE IN FULL REMISSION (H): ICD-10-CM

## 2019-05-06 DIAGNOSIS — J45.909 ASTHMA: ICD-10-CM

## 2019-05-06 DIAGNOSIS — M89.9 DISORDER OF BONE AND CARTILAGE: ICD-10-CM

## 2019-05-06 DIAGNOSIS — J45.909 UNCOMPLICATED ASTHMA, UNSPECIFIED ASTHMA SEVERITY, UNSPECIFIED WHETHER PERSISTENT: ICD-10-CM

## 2019-05-06 DIAGNOSIS — C50.411 MALIGNANT NEOPLASM OF UPPER-OUTER QUADRANT OF RIGHT BREAST IN FEMALE, ESTROGEN RECEPTOR POSITIVE (H): ICD-10-CM

## 2019-05-06 DIAGNOSIS — M94.9 DISORDER OF BONE AND CARTILAGE: ICD-10-CM

## 2019-05-06 DIAGNOSIS — M19.90 ARTHRITIS: ICD-10-CM

## 2019-05-06 DIAGNOSIS — Z17.0 MALIGNANT NEOPLASM OF UPPER-OUTER QUADRANT OF RIGHT BREAST IN FEMALE, ESTROGEN RECEPTOR POSITIVE (H): ICD-10-CM

## 2019-05-07 ENCOUNTER — COMMUNICATION - HEALTHEAST (OUTPATIENT)
Dept: ONCOLOGY | Facility: CLINIC | Age: 79
End: 2019-05-07

## 2019-06-03 ENCOUNTER — AMBULATORY - HEALTHEAST (OUTPATIENT)
Dept: LAB | Facility: CLINIC | Age: 79
End: 2019-06-03

## 2019-06-03 DIAGNOSIS — D50.9 IRON DEFICIENCY ANEMIA, UNSPECIFIED IRON DEFICIENCY ANEMIA TYPE: ICD-10-CM

## 2019-06-03 LAB
ERYTHROCYTE [DISTWIDTH] IN BLOOD BY AUTOMATED COUNT: 11 % (ref 11–14.5)
FERRITIN SERPL-MCNC: 32 NG/ML (ref 10–130)
HCT VFR BLD AUTO: 40.1 % (ref 35–47)
HGB BLD-MCNC: 13.5 G/DL (ref 12–16)
MCH RBC QN AUTO: 30.3 PG (ref 27–34)
MCHC RBC AUTO-ENTMCNC: 33.6 G/DL (ref 32–36)
MCV RBC AUTO: 90 FL (ref 80–100)
PLATELET # BLD AUTO: 410 THOU/UL (ref 140–440)
PMV BLD AUTO: 7.3 FL (ref 7–10)
RBC # BLD AUTO: 4.44 MILL/UL (ref 3.8–5.4)
WBC: 7.7 THOU/UL (ref 4–11)

## 2019-06-19 ENCOUNTER — OFFICE VISIT - HEALTHEAST (OUTPATIENT)
Dept: ONCOLOGY | Facility: CLINIC | Age: 79
End: 2019-06-19

## 2019-06-19 DIAGNOSIS — D50.0 IRON DEFICIENCY ANEMIA DUE TO CHRONIC BLOOD LOSS: ICD-10-CM

## 2019-06-19 DIAGNOSIS — M85.89 OSTEOPENIA OF MULTIPLE SITES: ICD-10-CM

## 2019-06-19 DIAGNOSIS — Z12.31 ENCOUNTER FOR SCREENING MAMMOGRAM FOR BREAST CANCER: ICD-10-CM

## 2019-06-19 DIAGNOSIS — C50.411 MALIGNANT NEOPLASM OF UPPER-OUTER QUADRANT OF RIGHT BREAST IN FEMALE, ESTROGEN RECEPTOR POSITIVE (H): ICD-10-CM

## 2019-06-19 DIAGNOSIS — Z17.0 MALIGNANT NEOPLASM OF UPPER-OUTER QUADRANT OF RIGHT BREAST IN FEMALE, ESTROGEN RECEPTOR POSITIVE (H): ICD-10-CM

## 2019-06-29 ENCOUNTER — COMMUNICATION - HEALTHEAST (OUTPATIENT)
Dept: FAMILY MEDICINE | Facility: CLINIC | Age: 79
End: 2019-06-29

## 2019-06-29 DIAGNOSIS — K21.9 GASTROESOPHAGEAL REFLUX DISEASE WITHOUT ESOPHAGITIS: ICD-10-CM

## 2019-06-29 DIAGNOSIS — Z17.0 MALIGNANT NEOPLASM OF UPPER-OUTER QUADRANT OF RIGHT BREAST IN FEMALE, ESTROGEN RECEPTOR POSITIVE (H): ICD-10-CM

## 2019-06-29 DIAGNOSIS — J45.41 MODERATE PERSISTENT ASTHMA WITH ACUTE EXACERBATION: ICD-10-CM

## 2019-06-29 DIAGNOSIS — F32.5 MAJOR DEPRESSIVE DISORDER, SINGLE EPISODE IN FULL REMISSION (H): ICD-10-CM

## 2019-06-29 DIAGNOSIS — C50.411 MALIGNANT NEOPLASM OF UPPER-OUTER QUADRANT OF RIGHT BREAST IN FEMALE, ESTROGEN RECEPTOR POSITIVE (H): ICD-10-CM

## 2019-06-29 DIAGNOSIS — G47.00 INSOMNIA: ICD-10-CM

## 2019-07-12 ENCOUNTER — OFFICE VISIT - HEALTHEAST (OUTPATIENT)
Dept: PHARMACY | Facility: CLINIC | Age: 79
End: 2019-07-12

## 2019-07-12 DIAGNOSIS — L29.9 ITCHING: ICD-10-CM

## 2019-07-12 DIAGNOSIS — Z17.0 MALIGNANT NEOPLASM OF UPPER-OUTER QUADRANT OF RIGHT BREAST IN FEMALE, ESTROGEN RECEPTOR POSITIVE (H): ICD-10-CM

## 2019-07-12 DIAGNOSIS — F32.5 MAJOR DEPRESSIVE DISORDER, SINGLE EPISODE IN FULL REMISSION (H): ICD-10-CM

## 2019-07-12 DIAGNOSIS — J45.41 MODERATE PERSISTENT ASTHMA WITH ACUTE EXACERBATION: ICD-10-CM

## 2019-07-12 DIAGNOSIS — C50.411 MALIGNANT NEOPLASM OF UPPER-OUTER QUADRANT OF RIGHT BREAST IN FEMALE, ESTROGEN RECEPTOR POSITIVE (H): ICD-10-CM

## 2019-07-15 ENCOUNTER — COMMUNICATION - HEALTHEAST (OUTPATIENT)
Dept: PHARMACY | Facility: CLINIC | Age: 79
End: 2019-07-15

## 2019-07-24 ENCOUNTER — COMMUNICATION - HEALTHEAST (OUTPATIENT)
Dept: PHARMACY | Facility: CLINIC | Age: 79
End: 2019-07-24

## 2019-08-12 ENCOUNTER — COMMUNICATION - HEALTHEAST (OUTPATIENT)
Dept: FAMILY MEDICINE | Facility: CLINIC | Age: 79
End: 2019-08-12

## 2019-08-13 ENCOUNTER — COMMUNICATION - HEALTHEAST (OUTPATIENT)
Dept: FAMILY MEDICINE | Facility: CLINIC | Age: 79
End: 2019-08-13

## 2019-08-13 ENCOUNTER — RECORDS - HEALTHEAST (OUTPATIENT)
Dept: ADMINISTRATIVE | Facility: OTHER | Age: 79
End: 2019-08-13

## 2019-08-21 ENCOUNTER — COMMUNICATION - HEALTHEAST (OUTPATIENT)
Dept: FAMILY MEDICINE | Facility: CLINIC | Age: 79
End: 2019-08-21

## 2019-09-10 ENCOUNTER — RECORDS - HEALTHEAST (OUTPATIENT)
Dept: ADMINISTRATIVE | Facility: OTHER | Age: 79
End: 2019-09-10

## 2019-09-24 ENCOUNTER — COMMUNICATION - HEALTHEAST (OUTPATIENT)
Dept: FAMILY MEDICINE | Facility: CLINIC | Age: 79
End: 2019-09-24

## 2019-09-27 ENCOUNTER — COMMUNICATION - HEALTHEAST (OUTPATIENT)
Dept: FAMILY MEDICINE | Facility: CLINIC | Age: 79
End: 2019-09-27

## 2019-09-27 DIAGNOSIS — K21.9 GASTROESOPHAGEAL REFLUX DISEASE WITHOUT ESOPHAGITIS: ICD-10-CM

## 2019-09-27 DIAGNOSIS — F32.5 MAJOR DEPRESSIVE DISORDER, SINGLE EPISODE IN FULL REMISSION (H): ICD-10-CM

## 2019-09-27 DIAGNOSIS — G47.00 INSOMNIA: ICD-10-CM

## 2019-10-01 ENCOUNTER — OFFICE VISIT - HEALTHEAST (OUTPATIENT)
Dept: FAMILY MEDICINE | Facility: CLINIC | Age: 79
End: 2019-10-01

## 2019-10-01 ENCOUNTER — COMMUNICATION - HEALTHEAST (OUTPATIENT)
Dept: FAMILY MEDICINE | Facility: CLINIC | Age: 79
End: 2019-10-01

## 2019-10-01 ENCOUNTER — AMBULATORY - HEALTHEAST (OUTPATIENT)
Dept: FAMILY MEDICINE | Facility: CLINIC | Age: 79
End: 2019-10-01

## 2019-10-01 DIAGNOSIS — M70.62 TROCHANTERIC BURSITIS OF LEFT HIP: ICD-10-CM

## 2019-10-01 DIAGNOSIS — G43.109 MIGRAINE WITH AURA AND WITHOUT STATUS MIGRAINOSUS, NOT INTRACTABLE: ICD-10-CM

## 2019-10-01 DIAGNOSIS — M54.50 CHRONIC BILATERAL LOW BACK PAIN WITHOUT SCIATICA: ICD-10-CM

## 2019-10-01 DIAGNOSIS — G89.29 CHRONIC BILATERAL LOW BACK PAIN WITHOUT SCIATICA: ICD-10-CM

## 2019-10-01 DIAGNOSIS — N39.0 URINARY TRACT INFECTION WITHOUT HEMATURIA, SITE UNSPECIFIED: ICD-10-CM

## 2019-10-06 ENCOUNTER — RECORDS - HEALTHEAST (OUTPATIENT)
Dept: ADMINISTRATIVE | Facility: OTHER | Age: 79
End: 2019-10-06

## 2019-10-07 ENCOUNTER — COMMUNICATION - HEALTHEAST (OUTPATIENT)
Dept: ONCOLOGY | Facility: CLINIC | Age: 79
End: 2019-10-07

## 2019-10-29 ENCOUNTER — COMMUNICATION - HEALTHEAST (OUTPATIENT)
Dept: FAMILY MEDICINE | Facility: CLINIC | Age: 79
End: 2019-10-29

## 2019-11-01 ENCOUNTER — AMBULATORY - HEALTHEAST (OUTPATIENT)
Dept: FAMILY MEDICINE | Facility: CLINIC | Age: 79
End: 2019-11-01

## 2019-11-01 ENCOUNTER — OFFICE VISIT - HEALTHEAST (OUTPATIENT)
Dept: FAMILY MEDICINE | Facility: CLINIC | Age: 79
End: 2019-11-01

## 2019-11-01 DIAGNOSIS — M54.50 CHRONIC BILATERAL LOW BACK PAIN WITHOUT SCIATICA: ICD-10-CM

## 2019-11-01 DIAGNOSIS — J45.40 MODERATE PERSISTENT ASTHMA WITHOUT COMPLICATION: ICD-10-CM

## 2019-11-01 DIAGNOSIS — H61.23 BILATERAL IMPACTED CERUMEN: ICD-10-CM

## 2019-11-01 DIAGNOSIS — Z87.440 HISTORY OF RECURRENT UTI (URINARY TRACT INFECTION): ICD-10-CM

## 2019-11-01 DIAGNOSIS — G89.29 CHRONIC BILATERAL LOW BACK PAIN WITHOUT SCIATICA: ICD-10-CM

## 2019-12-26 ENCOUNTER — COMMUNICATION - HEALTHEAST (OUTPATIENT)
Dept: FAMILY MEDICINE | Facility: CLINIC | Age: 79
End: 2019-12-26

## 2019-12-26 DIAGNOSIS — G47.00 INSOMNIA: ICD-10-CM

## 2019-12-26 DIAGNOSIS — K21.9 GASTROESOPHAGEAL REFLUX DISEASE WITHOUT ESOPHAGITIS: ICD-10-CM

## 2019-12-26 DIAGNOSIS — F32.5 MAJOR DEPRESSIVE DISORDER, SINGLE EPISODE IN FULL REMISSION (H): ICD-10-CM

## 2019-12-26 DIAGNOSIS — J45.41 MODERATE PERSISTENT ASTHMA WITH ACUTE EXACERBATION: ICD-10-CM

## 2020-01-27 ENCOUNTER — COMMUNICATION - HEALTHEAST (OUTPATIENT)
Dept: FAMILY MEDICINE | Facility: CLINIC | Age: 80
End: 2020-01-27

## 2020-01-27 DIAGNOSIS — L30.1 DYSHIDROTIC ECZEMA: ICD-10-CM

## 2020-01-27 DIAGNOSIS — J45.41 MODERATE PERSISTENT ASTHMA WITH ACUTE EXACERBATION: ICD-10-CM

## 2020-01-27 DIAGNOSIS — J45.40 MODERATE PERSISTENT ASTHMA WITHOUT COMPLICATION: ICD-10-CM

## 2020-02-18 ENCOUNTER — RECORDS - HEALTHEAST (OUTPATIENT)
Dept: ADMINISTRATIVE | Facility: OTHER | Age: 80
End: 2020-02-18

## 2020-02-18 ENCOUNTER — COMMUNICATION - HEALTHEAST (OUTPATIENT)
Dept: TELEHEALTH | Facility: CLINIC | Age: 80
End: 2020-02-18

## 2020-02-18 ENCOUNTER — HOSPITAL ENCOUNTER (OUTPATIENT)
Dept: MAMMOGRAPHY | Facility: CLINIC | Age: 80
Setting detail: RADIATION/ONCOLOGY SERIES
Discharge: STILL ADMITTED OR ADMINISTRATIVE | End: 2020-02-18
Attending: INTERNAL MEDICINE

## 2020-02-18 ENCOUNTER — RECORDS - HEALTHEAST (OUTPATIENT)
Dept: BONE DENSITY | Facility: CLINIC | Age: 80
End: 2020-02-18

## 2020-02-18 DIAGNOSIS — Z12.31 ENCOUNTER FOR SCREENING MAMMOGRAM FOR BREAST CANCER: ICD-10-CM

## 2020-02-18 DIAGNOSIS — M85.89 OTHER SPECIFIED DISORDERS OF BONE DENSITY AND STRUCTURE, MULTIPLE SITES: ICD-10-CM

## 2020-03-03 ENCOUNTER — OFFICE VISIT - HEALTHEAST (OUTPATIENT)
Dept: ONCOLOGY | Facility: CLINIC | Age: 80
End: 2020-03-03

## 2020-03-03 DIAGNOSIS — D50.0 IRON DEFICIENCY ANEMIA DUE TO CHRONIC BLOOD LOSS: ICD-10-CM

## 2020-03-03 DIAGNOSIS — C50.411 MALIGNANT NEOPLASM OF UPPER-OUTER QUADRANT OF RIGHT BREAST IN FEMALE, ESTROGEN RECEPTOR POSITIVE (H): ICD-10-CM

## 2020-03-03 DIAGNOSIS — Z17.0 MALIGNANT NEOPLASM OF UPPER-OUTER QUADRANT OF RIGHT BREAST IN FEMALE, ESTROGEN RECEPTOR POSITIVE (H): ICD-10-CM

## 2020-03-03 LAB
ALBUMIN SERPL-MCNC: 3.4 G/DL (ref 3.5–5)
ALP SERPL-CCNC: 67 U/L (ref 45–120)
ALT SERPL W P-5'-P-CCNC: 9 U/L (ref 0–45)
ANION GAP SERPL CALCULATED.3IONS-SCNC: 7 MMOL/L (ref 5–18)
AST SERPL W P-5'-P-CCNC: 13 U/L (ref 0–40)
BILIRUB SERPL-MCNC: 0.4 MG/DL (ref 0–1)
BUN SERPL-MCNC: 16 MG/DL (ref 8–28)
CALCIUM SERPL-MCNC: 9 MG/DL (ref 8.5–10.5)
CHLORIDE BLD-SCNC: 107 MMOL/L (ref 98–107)
CO2 SERPL-SCNC: 27 MMOL/L (ref 22–31)
CREAT SERPL-MCNC: 1.06 MG/DL (ref 0.6–1.1)
ERYTHROCYTE [DISTWIDTH] IN BLOOD BY AUTOMATED COUNT: 12.9 % (ref 11–14.5)
FERRITIN SERPL-MCNC: 11 NG/ML (ref 10–130)
GFR SERPL CREATININE-BSD FRML MDRD: 50 ML/MIN/1.73M2
GLUCOSE BLD-MCNC: 91 MG/DL (ref 70–125)
HCT VFR BLD AUTO: 38.2 % (ref 35–47)
HGB BLD-MCNC: 12.3 G/DL (ref 12–16)
IRON SATN MFR SERPL: 22 % (ref 20–50)
IRON SERPL-MCNC: 79 UG/DL (ref 42–175)
MCH RBC QN AUTO: 29 PG (ref 27–34)
MCHC RBC AUTO-ENTMCNC: 32.2 G/DL (ref 32–36)
MCV RBC AUTO: 90 FL (ref 80–100)
PLATELET # BLD AUTO: 351 THOU/UL (ref 140–440)
PMV BLD AUTO: 8.8 FL (ref 8.5–12.5)
POTASSIUM BLD-SCNC: 4.1 MMOL/L (ref 3.5–5)
PROT SERPL-MCNC: 6.2 G/DL (ref 6–8)
RBC # BLD AUTO: 4.24 MILL/UL (ref 3.8–5.4)
SODIUM SERPL-SCNC: 141 MMOL/L (ref 136–145)
TIBC SERPL-MCNC: 366 UG/DL (ref 313–563)
TRANSFERRIN SERPL-MCNC: 292 MG/DL (ref 212–360)
WBC: 9.7 THOU/UL (ref 4–11)

## 2020-03-05 ENCOUNTER — COMMUNICATION - HEALTHEAST (OUTPATIENT)
Dept: ONCOLOGY | Facility: CLINIC | Age: 80
End: 2020-03-05

## 2020-04-29 ENCOUNTER — OFFICE VISIT - HEALTHEAST (OUTPATIENT)
Dept: FAMILY MEDICINE | Facility: CLINIC | Age: 80
End: 2020-04-29

## 2020-04-29 ENCOUNTER — COMMUNICATION - HEALTHEAST (OUTPATIENT)
Dept: SCHEDULING | Facility: CLINIC | Age: 80
End: 2020-04-29

## 2020-04-29 DIAGNOSIS — N30.00 ACUTE CYSTITIS WITHOUT HEMATURIA: ICD-10-CM

## 2020-05-04 ENCOUNTER — AMBULATORY - HEALTHEAST (OUTPATIENT)
Dept: PHARMACY | Facility: CLINIC | Age: 80
End: 2020-05-04

## 2020-05-04 DIAGNOSIS — C50.411 MALIGNANT NEOPLASM OF UPPER-OUTER QUADRANT OF RIGHT BREAST IN FEMALE, ESTROGEN RECEPTOR POSITIVE (H): ICD-10-CM

## 2020-05-04 DIAGNOSIS — L29.9 ITCHING: ICD-10-CM

## 2020-05-04 DIAGNOSIS — M89.9 DISORDER OF BONE AND CARTILAGE: ICD-10-CM

## 2020-05-04 DIAGNOSIS — J45.41 MODERATE PERSISTENT ASTHMA WITH ACUTE EXACERBATION: ICD-10-CM

## 2020-05-04 DIAGNOSIS — M94.9 DISORDER OF BONE AND CARTILAGE: ICD-10-CM

## 2020-05-04 DIAGNOSIS — D50.9 IRON DEFICIENCY ANEMIA, UNSPECIFIED IRON DEFICIENCY ANEMIA TYPE: ICD-10-CM

## 2020-05-04 DIAGNOSIS — N39.0 URINARY TRACT INFECTION WITHOUT HEMATURIA, SITE UNSPECIFIED: ICD-10-CM

## 2020-05-04 DIAGNOSIS — Z17.0 MALIGNANT NEOPLASM OF UPPER-OUTER QUADRANT OF RIGHT BREAST IN FEMALE, ESTROGEN RECEPTOR POSITIVE (H): ICD-10-CM

## 2020-05-04 DIAGNOSIS — F33.3 SEVERE RECURRENT MAJOR DEPRESSIVE DISORDER WITH PSYCHOTIC FEATURES (H): ICD-10-CM

## 2020-05-19 ENCOUNTER — COMMUNICATION - HEALTHEAST (OUTPATIENT)
Dept: FAMILY MEDICINE | Facility: CLINIC | Age: 80
End: 2020-05-19

## 2020-05-19 DIAGNOSIS — C50.411 MALIGNANT NEOPLASM OF UPPER-OUTER QUADRANT OF RIGHT BREAST IN FEMALE, ESTROGEN RECEPTOR POSITIVE (H): ICD-10-CM

## 2020-05-19 DIAGNOSIS — Z17.0 MALIGNANT NEOPLASM OF UPPER-OUTER QUADRANT OF RIGHT BREAST IN FEMALE, ESTROGEN RECEPTOR POSITIVE (H): ICD-10-CM

## 2020-05-20 ENCOUNTER — COMMUNICATION - HEALTHEAST (OUTPATIENT)
Dept: ONCOLOGY | Facility: CLINIC | Age: 80
End: 2020-05-20

## 2020-05-26 ENCOUNTER — AMBULATORY - HEALTHEAST (OUTPATIENT)
Dept: PHARMACY | Facility: CLINIC | Age: 80
End: 2020-05-26

## 2020-05-26 DIAGNOSIS — F32.5 MAJOR DEPRESSIVE DISORDER, SINGLE EPISODE IN FULL REMISSION (H): ICD-10-CM

## 2020-05-26 DIAGNOSIS — M94.9 DISORDER OF BONE AND CARTILAGE: ICD-10-CM

## 2020-05-26 DIAGNOSIS — E55.9 VITAMIN D DEFICIENCY: ICD-10-CM

## 2020-05-26 DIAGNOSIS — M89.9 DISORDER OF BONE AND CARTILAGE: ICD-10-CM

## 2020-05-26 PROCEDURE — 99606 MTMS BY PHARM EST 15 MIN: CPT | Performed by: PHARMACIST

## 2020-07-04 ENCOUNTER — COMMUNICATION - HEALTHEAST (OUTPATIENT)
Dept: FAMILY MEDICINE | Facility: CLINIC | Age: 80
End: 2020-07-04

## 2020-07-04 DIAGNOSIS — J45.41 MODERATE PERSISTENT ASTHMA WITH ACUTE EXACERBATION: ICD-10-CM

## 2020-07-06 ENCOUNTER — RECORDS - HEALTHEAST (OUTPATIENT)
Dept: ADMINISTRATIVE | Facility: OTHER | Age: 80
End: 2020-07-06

## 2020-07-17 ENCOUNTER — COMMUNICATION - HEALTHEAST (OUTPATIENT)
Dept: FAMILY MEDICINE | Facility: CLINIC | Age: 80
End: 2020-07-17

## 2020-07-17 DIAGNOSIS — J45.41 MODERATE PERSISTENT ASTHMA WITH ACUTE EXACERBATION: ICD-10-CM

## 2020-07-17 DIAGNOSIS — J45.40 MODERATE PERSISTENT ASTHMA WITHOUT COMPLICATION: ICD-10-CM

## 2020-07-21 ENCOUNTER — COMMUNICATION - HEALTHEAST (OUTPATIENT)
Dept: ADMINISTRATIVE | Facility: HOSPITAL | Age: 80
End: 2020-07-21

## 2020-07-27 ENCOUNTER — COMMUNICATION - HEALTHEAST (OUTPATIENT)
Dept: SCHEDULING | Facility: CLINIC | Age: 80
End: 2020-07-27

## 2020-07-28 ENCOUNTER — OFFICE VISIT - HEALTHEAST (OUTPATIENT)
Dept: FAMILY MEDICINE | Facility: CLINIC | Age: 80
End: 2020-07-28

## 2020-07-28 DIAGNOSIS — R19.7 DIARRHEA, UNSPECIFIED TYPE: ICD-10-CM

## 2020-07-29 ENCOUNTER — AMBULATORY - HEALTHEAST (OUTPATIENT)
Dept: LAB | Facility: CLINIC | Age: 80
End: 2020-07-29

## 2020-07-29 DIAGNOSIS — R19.7 DIARRHEA, UNSPECIFIED TYPE: ICD-10-CM

## 2020-07-29 LAB
ALBUMIN SERPL-MCNC: 3.1 G/DL (ref 3.5–5)
ALP SERPL-CCNC: 64 U/L (ref 45–120)
ALT SERPL W P-5'-P-CCNC: 15 U/L (ref 0–45)
ANION GAP SERPL CALCULATED.3IONS-SCNC: 8 MMOL/L (ref 5–18)
AST SERPL W P-5'-P-CCNC: 17 U/L (ref 0–40)
BASOPHILS # BLD AUTO: 0 THOU/UL (ref 0–0.2)
BASOPHILS NFR BLD AUTO: 0 % (ref 0–2)
BILIRUB SERPL-MCNC: 0.4 MG/DL (ref 0–1)
BUN SERPL-MCNC: 11 MG/DL (ref 8–28)
CALCIUM SERPL-MCNC: 8.6 MG/DL (ref 8.5–10.5)
CHLORIDE BLD-SCNC: 105 MMOL/L (ref 98–107)
CO2 SERPL-SCNC: 28 MMOL/L (ref 22–31)
CREAT SERPL-MCNC: 1.03 MG/DL (ref 0.6–1.1)
EOSINOPHIL # BLD AUTO: 0.8 THOU/UL (ref 0–0.4)
EOSINOPHIL NFR BLD AUTO: 9 % (ref 0–6)
ERYTHROCYTE [DISTWIDTH] IN BLOOD BY AUTOMATED COUNT: 12.2 % (ref 11–14.5)
GFR SERPL CREATININE-BSD FRML MDRD: 52 ML/MIN/1.73M2
GLUCOSE BLD-MCNC: 122 MG/DL (ref 70–125)
HCT VFR BLD AUTO: 35.1 % (ref 35–47)
HGB BLD-MCNC: 11.8 G/DL (ref 12–16)
LYMPHOCYTES # BLD AUTO: 1.5 THOU/UL (ref 0.8–4.4)
LYMPHOCYTES NFR BLD AUTO: 17 % (ref 20–40)
MCH RBC QN AUTO: 29.8 PG (ref 27–34)
MCHC RBC AUTO-ENTMCNC: 33.6 G/DL (ref 32–36)
MCV RBC AUTO: 89 FL (ref 80–100)
MONOCYTES # BLD AUTO: 0.5 THOU/UL (ref 0–0.9)
MONOCYTES NFR BLD AUTO: 6 % (ref 2–10)
NEUTROPHILS # BLD AUTO: 6.1 THOU/UL (ref 2–7.7)
NEUTROPHILS NFR BLD AUTO: 68 % (ref 50–70)
PLATELET # BLD AUTO: 436 THOU/UL (ref 140–440)
PMV BLD AUTO: 6.7 FL (ref 7–10)
POTASSIUM BLD-SCNC: 4.1 MMOL/L (ref 3.5–5)
PROT SERPL-MCNC: 5.7 G/DL (ref 6–8)
RBC # BLD AUTO: 3.96 MILL/UL (ref 3.8–5.4)
SODIUM SERPL-SCNC: 141 MMOL/L (ref 136–145)
TSH SERPL DL<=0.005 MIU/L-ACNC: 2.61 UIU/ML (ref 0.3–5)
WBC: 8.9 THOU/UL (ref 4–11)

## 2020-07-31 ENCOUNTER — AMBULATORY - HEALTHEAST (OUTPATIENT)
Dept: LAB | Facility: CLINIC | Age: 80
End: 2020-07-31

## 2020-07-31 DIAGNOSIS — R19.7 DIARRHEA, UNSPECIFIED TYPE: ICD-10-CM

## 2020-07-31 LAB
GLIADIN IGA SER-ACNC: 1.7 U/ML
GLIADIN IGG SER-ACNC: <0.4 U/ML
IGA SERPL-MCNC: 183 MG/DL (ref 65–400)
TTG IGA SER-ACNC: 0.4 U/ML
TTG IGG SER-ACNC: <0.6 U/ML

## 2020-08-01 LAB
SHIGA TOXIN 1: NEGATIVE
SHIGA TOXIN 2: NEGATIVE

## 2020-08-03 LAB
BACTERIA SPEC CULT: NORMAL
G LAMBLIA AG STL QL IA: NORMAL
O+P STL MICRO: NORMAL

## 2020-08-04 ENCOUNTER — COMMUNICATION - HEALTHEAST (OUTPATIENT)
Dept: FAMILY MEDICINE | Facility: CLINIC | Age: 80
End: 2020-08-04

## 2020-08-04 DIAGNOSIS — F33.3 SEVERE RECURRENT MAJOR DEPRESSIVE DISORDER WITH PSYCHOTIC FEATURES (H): ICD-10-CM

## 2020-08-21 ENCOUNTER — COMMUNICATION - HEALTHEAST (OUTPATIENT)
Dept: FAMILY MEDICINE | Facility: CLINIC | Age: 80
End: 2020-08-21

## 2020-08-25 ENCOUNTER — OFFICE VISIT - HEALTHEAST (OUTPATIENT)
Dept: FAMILY MEDICINE | Facility: CLINIC | Age: 80
End: 2020-08-25

## 2020-08-25 DIAGNOSIS — Z87.440 HISTORY OF RECURRENT UTI (URINARY TRACT INFECTION): ICD-10-CM

## 2020-08-25 DIAGNOSIS — Z01.818 PREOP GENERAL PHYSICAL EXAM: ICD-10-CM

## 2020-08-25 DIAGNOSIS — G89.29 CHRONIC BILATERAL LOW BACK PAIN WITHOUT SCIATICA: ICD-10-CM

## 2020-08-25 DIAGNOSIS — Z85.3 HX: BREAST CANCER: ICD-10-CM

## 2020-08-25 DIAGNOSIS — G43.109 MIGRAINE WITH AURA AND WITHOUT STATUS MIGRAINOSUS, NOT INTRACTABLE: ICD-10-CM

## 2020-08-25 DIAGNOSIS — F32.5 MAJOR DEPRESSIVE DISORDER, SINGLE EPISODE IN FULL REMISSION (H): ICD-10-CM

## 2020-08-25 DIAGNOSIS — D80.3 IGG SUBCLASS DEFICIENCY (H): ICD-10-CM

## 2020-08-25 DIAGNOSIS — J45.40 MODERATE PERSISTENT ASTHMA WITHOUT COMPLICATION: ICD-10-CM

## 2020-08-25 DIAGNOSIS — S62.101A WRIST FRACTURE, RIGHT, CLOSED, INITIAL ENCOUNTER: ICD-10-CM

## 2020-08-25 DIAGNOSIS — M54.50 CHRONIC BILATERAL LOW BACK PAIN WITHOUT SCIATICA: ICD-10-CM

## 2020-08-25 LAB
ERYTHROCYTE [DISTWIDTH] IN BLOOD BY AUTOMATED COUNT: 12.2 % (ref 11–14.5)
HCT VFR BLD AUTO: 35.3 % (ref 35–47)
HGB BLD-MCNC: 11.7 G/DL (ref 12–16)
MCH RBC QN AUTO: 29.2 PG (ref 27–34)
MCHC RBC AUTO-ENTMCNC: 33.3 G/DL (ref 32–36)
MCV RBC AUTO: 88 FL (ref 80–100)
PLATELET # BLD AUTO: 381 THOU/UL (ref 140–440)
PMV BLD AUTO: 7.8 FL (ref 7–10)
RBC # BLD AUTO: 4.03 MILL/UL (ref 3.8–5.4)
WBC: 8.9 THOU/UL (ref 4–11)

## 2020-08-25 ASSESSMENT — MIFFLIN-ST. JEOR: SCORE: 1260.6

## 2020-08-30 ENCOUNTER — COMMUNICATION - HEALTHEAST (OUTPATIENT)
Dept: FAMILY MEDICINE | Facility: CLINIC | Age: 80
End: 2020-08-30

## 2020-09-03 ENCOUNTER — COMMUNICATION - HEALTHEAST (OUTPATIENT)
Dept: FAMILY MEDICINE | Facility: CLINIC | Age: 80
End: 2020-09-03

## 2020-09-03 DIAGNOSIS — F32.5 MAJOR DEPRESSIVE DISORDER, SINGLE EPISODE IN FULL REMISSION (H): ICD-10-CM

## 2020-09-06 ENCOUNTER — COMMUNICATION - HEALTHEAST (OUTPATIENT)
Dept: FAMILY MEDICINE | Facility: CLINIC | Age: 80
End: 2020-09-06

## 2020-09-06 DIAGNOSIS — J45.41 MODERATE PERSISTENT ASTHMA WITH ACUTE EXACERBATION: ICD-10-CM

## 2020-10-19 ENCOUNTER — RECORDS - HEALTHEAST (OUTPATIENT)
Dept: ADMINISTRATIVE | Facility: OTHER | Age: 80
End: 2020-10-19

## 2020-10-21 ENCOUNTER — AMBULATORY - HEALTHEAST (OUTPATIENT)
Dept: INFUSION THERAPY | Facility: CLINIC | Age: 80
End: 2020-10-21

## 2020-10-21 ENCOUNTER — OFFICE VISIT - HEALTHEAST (OUTPATIENT)
Dept: ONCOLOGY | Facility: CLINIC | Age: 80
End: 2020-10-21

## 2020-10-21 DIAGNOSIS — Z17.0 MALIGNANT NEOPLASM OF UPPER-OUTER QUADRANT OF RIGHT BREAST IN FEMALE, ESTROGEN RECEPTOR POSITIVE (H): ICD-10-CM

## 2020-10-21 DIAGNOSIS — C50.411 MALIGNANT NEOPLASM OF UPPER-OUTER QUADRANT OF RIGHT BREAST IN FEMALE, ESTROGEN RECEPTOR POSITIVE (H): ICD-10-CM

## 2020-10-21 DIAGNOSIS — Z12.31 VISIT FOR SCREENING MAMMOGRAM: ICD-10-CM

## 2020-12-04 ENCOUNTER — COMMUNICATION - HEALTHEAST (OUTPATIENT)
Dept: FAMILY MEDICINE | Facility: CLINIC | Age: 80
End: 2020-12-04

## 2020-12-04 DIAGNOSIS — K21.9 GASTROESOPHAGEAL REFLUX DISEASE WITHOUT ESOPHAGITIS: ICD-10-CM

## 2020-12-04 DIAGNOSIS — G47.00 INSOMNIA: ICD-10-CM

## 2021-01-07 ENCOUNTER — HOSPITAL ENCOUNTER (INPATIENT)
Facility: CLINIC | Age: 81
LOS: 3 days | Discharge: HOME | DRG: 247 | End: 2021-01-10
Attending: EMERGENCY MEDICINE | Admitting: INTERNAL MEDICINE
Payer: COMMERCIAL

## 2021-01-07 ENCOUNTER — APPOINTMENT (OUTPATIENT)
Dept: CARDIOLOGY | Facility: CLINIC | Age: 81
DRG: 247 | End: 2021-01-07
Attending: INTERNAL MEDICINE
Payer: COMMERCIAL

## 2021-01-07 ENCOUNTER — APPOINTMENT (OUTPATIENT)
Dept: GENERAL RADIOLOGY | Facility: CLINIC | Age: 81
DRG: 247 | End: 2021-01-07
Attending: EMERGENCY MEDICINE
Payer: COMMERCIAL

## 2021-01-07 ENCOUNTER — TRANSFERRED RECORDS (OUTPATIENT)
Dept: HEALTH INFORMATION MANAGEMENT | Facility: CLINIC | Age: 81
End: 2021-01-07

## 2021-01-07 DIAGNOSIS — Z11.52 ENCOUNTER FOR SCREENING LABORATORY TESTING FOR SEVERE ACUTE RESPIRATORY SYNDROME CORONAVIRUS 2 (SARS-COV-2): ICD-10-CM

## 2021-01-07 DIAGNOSIS — N39.0 URINARY TRACT INFECTION WITHOUT HEMATURIA, SITE UNSPECIFIED: ICD-10-CM

## 2021-01-07 DIAGNOSIS — I21.21 ST ELEVATION MYOCARDIAL INFARCTION INVOLVING LEFT CIRCUMFLEX CORONARY ARTERY (H): Primary | ICD-10-CM

## 2021-01-07 DIAGNOSIS — I21.3 ST ELEVATION MI (STEMI) (H): ICD-10-CM

## 2021-01-07 LAB
ALBUMIN SERPL-MCNC: 3.2 G/DL (ref 3.4–5)
ALP SERPL-CCNC: 86 U/L (ref 40–150)
ALT SERPL W P-5'-P-CCNC: 16 U/L (ref 0–50)
ANION GAP SERPL CALCULATED.3IONS-SCNC: 7 MMOL/L (ref 3–14)
APTT PPP: 28 SEC (ref 22–37)
APTT PPP: 53 SEC (ref 22–37)
AST SERPL W P-5'-P-CCNC: 23 U/L (ref 0–45)
BASOPHILS # BLD AUTO: 0.1 10E9/L (ref 0–0.2)
BASOPHILS NFR BLD AUTO: 1.1 %
BILIRUB DIRECT SERPL-MCNC: 0.1 MG/DL (ref 0–0.2)
BILIRUB SERPL-MCNC: 0.4 MG/DL (ref 0.2–1.3)
BUN SERPL-MCNC: 17 MG/DL (ref 7–30)
CALCIUM SERPL-MCNC: 9 MG/DL (ref 8.5–10.1)
CHLORIDE SERPL-SCNC: 109 MMOL/L (ref 94–109)
CHOLEST SERPL-MCNC: 158 MG/DL
CHOLEST SERPL-MCNC: 160 MG/DL
CHOLEST SERPL-MCNC: 186 MG/DL
CO2 SERPL-SCNC: 24 MMOL/L (ref 20–32)
CREAT SERPL-MCNC: 1.32 MG/DL (ref 0.52–1.04)
DIFFERENTIAL METHOD BLD: ABNORMAL
EOSINOPHIL # BLD AUTO: 0.4 10E9/L (ref 0–0.7)
EOSINOPHIL NFR BLD AUTO: 3.5 %
ERYTHROCYTE [DISTWIDTH] IN BLOOD BY AUTOMATED COUNT: 12.5 % (ref 10–15)
ERYTHROCYTE [DISTWIDTH] IN BLOOD BY AUTOMATED COUNT: 12.5 % (ref 10–15)
ERYTHROCYTE [DISTWIDTH] IN BLOOD BY AUTOMATED COUNT: 12.6 % (ref 10–15)
GFR SERPL CREATININE-BSD FRML MDRD: 38 ML/MIN/{1.73_M2}
GLUCOSE BLDC GLUCOMTR-MCNC: 90 MG/DL (ref 70–99)
GLUCOSE SERPL-MCNC: 114 MG/DL (ref 70–99)
HCT VFR BLD AUTO: 34.7 % (ref 35–47)
HCT VFR BLD AUTO: 35 % (ref 35–47)
HCT VFR BLD AUTO: 38.5 % (ref 35–47)
HDLC SERPL-MCNC: 62 MG/DL
HDLC SERPL-MCNC: 67 MG/DL
HDLC SERPL-MCNC: 73 MG/DL
HGB BLD-MCNC: 10.7 G/DL (ref 11.7–15.7)
HGB BLD-MCNC: 10.8 G/DL (ref 11.7–15.7)
HGB BLD-MCNC: 12.1 G/DL (ref 11.7–15.7)
IMM GRANULOCYTES # BLD: 0.1 10E9/L (ref 0–0.4)
IMM GRANULOCYTES NFR BLD: 0.4 %
INR PPP: 0.98 (ref 0.86–1.14)
INTERPRETATION ECG - MUSE: NORMAL
KCT BLD-ACNC: 148 SEC (ref 75–150)
KCT BLD-ACNC: 266 SEC (ref 75–150)
LABORATORY COMMENT REPORT: NORMAL
LDLC SERPL CALC-MCNC: 61 MG/DL
LDLC SERPL CALC-MCNC: 75 MG/DL
LDLC SERPL CALC-MCNC: 83 MG/DL
LYMPHOCYTES # BLD AUTO: 1.7 10E9/L (ref 0.8–5.3)
LYMPHOCYTES NFR BLD AUTO: 14.1 %
MCH RBC QN AUTO: 29.2 PG (ref 26.5–33)
MCH RBC QN AUTO: 29.5 PG (ref 26.5–33)
MCH RBC QN AUTO: 29.5 PG (ref 26.5–33)
MCHC RBC AUTO-ENTMCNC: 30.6 G/DL (ref 31.5–36.5)
MCHC RBC AUTO-ENTMCNC: 31.1 G/DL (ref 31.5–36.5)
MCHC RBC AUTO-ENTMCNC: 31.4 G/DL (ref 31.5–36.5)
MCV RBC AUTO: 93 FL (ref 78–100)
MCV RBC AUTO: 95 FL (ref 78–100)
MCV RBC AUTO: 96 FL (ref 78–100)
MONOCYTES # BLD AUTO: 0.7 10E9/L (ref 0–1.3)
MONOCYTES NFR BLD AUTO: 6.2 %
NEUTROPHILS # BLD AUTO: 8.7 10E9/L (ref 1.6–8.3)
NEUTROPHILS NFR BLD AUTO: 74.7 %
NONHDLC SERPL-MCNC: 113 MG/DL
NONHDLC SERPL-MCNC: 92 MG/DL
NONHDLC SERPL-MCNC: 99 MG/DL
NRBC # BLD AUTO: 0 10*3/UL
NRBC BLD AUTO-RTO: 0 /100
NT-PROBNP SERPL-MCNC: 1564 PG/ML (ref 0–1800)
PLATELET # BLD AUTO: 286 10E9/L (ref 150–450)
PLATELET # BLD AUTO: 302 10E9/L (ref 150–450)
PLATELET # BLD AUTO: 362 10E9/L (ref 150–450)
POTASSIUM SERPL-SCNC: 4 MMOL/L (ref 3.4–5.3)
PROT SERPL-MCNC: 6.6 G/DL (ref 6.8–8.8)
RBC # BLD AUTO: 3.63 10E12/L (ref 3.8–5.2)
RBC # BLD AUTO: 3.66 10E12/L (ref 3.8–5.2)
RBC # BLD AUTO: 4.14 10E12/L (ref 3.8–5.2)
SARS-COV-2 RNA RESP QL NAA+PROBE: NEGATIVE
SODIUM SERPL-SCNC: 140 MMOL/L (ref 133–144)
SPECIMEN SOURCE: NORMAL
TRIGL SERPL-MCNC: 149 MG/DL
TRIGL SERPL-MCNC: 188 MG/DL
TRIGL SERPL-MCNC: 83 MG/DL
TROPONIN I BLD-MCNC: 0.16 UG/L (ref 0–0.08)
TROPONIN I SERPL-MCNC: 0.3 UG/L (ref 0–0.04)
TROPONIN I SERPL-MCNC: >200 UG/L (ref 0–0.04)
WBC # BLD AUTO: 11.7 10E9/L (ref 4–11)
WBC # BLD AUTO: 12.7 10E9/L (ref 4–11)
WBC # BLD AUTO: 13.4 10E9/L (ref 4–11)

## 2021-01-07 PROCEDURE — 80048 BASIC METABOLIC PNL TOTAL CA: CPT | Performed by: EMERGENCY MEDICINE

## 2021-01-07 PROCEDURE — 999N001017 HC STATISTIC GLUCOSE BY METER IP

## 2021-01-07 PROCEDURE — 99285 EMERGENCY DEPT VISIT HI MDM: CPT | Mod: 25 | Performed by: INTERNAL MEDICINE

## 2021-01-07 PROCEDURE — 36415 COLL VENOUS BLD VENIPUNCTURE: CPT | Performed by: INTERNAL MEDICINE

## 2021-01-07 PROCEDURE — 250N000013 HC RX MED GY IP 250 OP 250 PS 637: Performed by: INTERNAL MEDICINE

## 2021-01-07 PROCEDURE — 83880 ASSAY OF NATRIURETIC PEPTIDE: CPT | Performed by: EMERGENCY MEDICINE

## 2021-01-07 PROCEDURE — 92941 PRQ TRLML REVSC TOT OCCL AMI: CPT | Mod: LC | Performed by: INTERNAL MEDICINE

## 2021-01-07 PROCEDURE — 80061 LIPID PANEL: CPT | Performed by: INTERNAL MEDICINE

## 2021-01-07 PROCEDURE — 71045 X-RAY EXAM CHEST 1 VIEW: CPT | Mod: 26 | Performed by: RADIOLOGY

## 2021-01-07 PROCEDURE — 93005 ELECTROCARDIOGRAM TRACING: CPT | Performed by: INTERNAL MEDICINE

## 2021-01-07 PROCEDURE — 71045 X-RAY EXAM CHEST 1 VIEW: CPT

## 2021-01-07 PROCEDURE — 99291 CRITICAL CARE FIRST HOUR: CPT | Mod: 25 | Performed by: EMERGENCY MEDICINE

## 2021-01-07 PROCEDURE — 93308 TTE F-UP OR LMTD: CPT | Mod: 26 | Performed by: INTERNAL MEDICINE

## 2021-01-07 PROCEDURE — C9803 HOPD COVID-19 SPEC COLLECT: HCPCS

## 2021-01-07 PROCEDURE — 93005 ELECTROCARDIOGRAM TRACING: CPT

## 2021-01-07 PROCEDURE — 93010 ELECTROCARDIOGRAM REPORT: CPT | Performed by: EMERGENCY MEDICINE

## 2021-01-07 PROCEDURE — 85610 PROTHROMBIN TIME: CPT | Performed by: EMERGENCY MEDICINE

## 2021-01-07 PROCEDURE — 80076 HEPATIC FUNCTION PANEL: CPT | Performed by: EMERGENCY MEDICINE

## 2021-01-07 PROCEDURE — C9606 PERC D-E COR REVASC W AMI S: HCPCS | Mod: LC | Performed by: INTERNAL MEDICINE

## 2021-01-07 PROCEDURE — U0003 INFECTIOUS AGENT DETECTION BY NUCLEIC ACID (DNA OR RNA); SEVERE ACUTE RESPIRATORY SYNDROME CORONAVIRUS 2 (SARS-COV-2) (CORONAVIRUS DISEASE [COVID-19]), AMPLIFIED PROBE TECHNIQUE, MAKING USE OF HIGH THROUGHPUT TECHNOLOGIES AS DESCRIBED BY CMS-2020-01-R: HCPCS | Performed by: EMERGENCY MEDICINE

## 2021-01-07 PROCEDURE — B2111ZZ FLUOROSCOPY OF MULTIPLE CORONARY ARTERIES USING LOW OSMOLAR CONTRAST: ICD-10-PCS | Performed by: INTERNAL MEDICINE

## 2021-01-07 PROCEDURE — 84484 ASSAY OF TROPONIN QUANT: CPT | Performed by: STUDENT IN AN ORGANIZED HEALTH CARE EDUCATION/TRAINING PROGRAM

## 2021-01-07 PROCEDURE — C1769 GUIDE WIRE: HCPCS | Performed by: INTERNAL MEDICINE

## 2021-01-07 PROCEDURE — 93454 CORONARY ARTERY ANGIO S&I: CPT | Mod: 26 | Performed by: INTERNAL MEDICINE

## 2021-01-07 PROCEDURE — 85027 COMPLETE CBC AUTOMATED: CPT | Performed by: STUDENT IN AN ORGANIZED HEALTH CARE EDUCATION/TRAINING PROGRAM

## 2021-01-07 PROCEDURE — 99153 MOD SED SAME PHYS/QHP EA: CPT | Performed by: INTERNAL MEDICINE

## 2021-01-07 PROCEDURE — 99291 CRITICAL CARE FIRST HOUR: CPT | Mod: 25 | Performed by: INTERNAL MEDICINE

## 2021-01-07 PROCEDURE — 80061 LIPID PANEL: CPT | Performed by: STUDENT IN AN ORGANIZED HEALTH CARE EDUCATION/TRAINING PROGRAM

## 2021-01-07 PROCEDURE — 99152 MOD SED SAME PHYS/QHP 5/>YRS: CPT | Mod: 59 | Performed by: INTERNAL MEDICINE

## 2021-01-07 PROCEDURE — 85347 COAGULATION TIME ACTIVATED: CPT

## 2021-01-07 PROCEDURE — 84484 ASSAY OF TROPONIN QUANT: CPT

## 2021-01-07 PROCEDURE — 85730 THROMBOPLASTIN TIME PARTIAL: CPT | Performed by: STUDENT IN AN ORGANIZED HEALTH CARE EDUCATION/TRAINING PROGRAM

## 2021-01-07 PROCEDURE — C1760 CLOSURE DEV, VASC: HCPCS | Performed by: INTERNAL MEDICINE

## 2021-01-07 PROCEDURE — 85025 COMPLETE CBC W/AUTO DIFF WBC: CPT | Performed by: EMERGENCY MEDICINE

## 2021-01-07 PROCEDURE — 93308 TTE F-UP OR LMTD: CPT

## 2021-01-07 PROCEDURE — 80061 LIPID PANEL: CPT | Performed by: EMERGENCY MEDICINE

## 2021-01-07 PROCEDURE — 36415 COLL VENOUS BLD VENIPUNCTURE: CPT | Performed by: STUDENT IN AN ORGANIZED HEALTH CARE EDUCATION/TRAINING PROGRAM

## 2021-01-07 PROCEDURE — 200N000002 HC R&B ICU UMMC

## 2021-01-07 PROCEDURE — C1887 CATHETER, GUIDING: HCPCS | Performed by: INTERNAL MEDICINE

## 2021-01-07 PROCEDURE — C1874 STENT, COATED/COV W/DEL SYS: HCPCS | Performed by: INTERNAL MEDICINE

## 2021-01-07 PROCEDURE — 250N000011 HC RX IP 250 OP 636: Performed by: INTERNAL MEDICINE

## 2021-01-07 PROCEDURE — 027034Z DILATION OF CORONARY ARTERY, ONE ARTERY WITH DRUG-ELUTING INTRALUMINAL DEVICE, PERCUTANEOUS APPROACH: ICD-10-PCS | Performed by: INTERNAL MEDICINE

## 2021-01-07 PROCEDURE — 250N000009 HC RX 250: Performed by: INTERNAL MEDICINE

## 2021-01-07 PROCEDURE — 99152 MOD SED SAME PHYS/QHP 5/>YRS: CPT | Performed by: INTERNAL MEDICINE

## 2021-01-07 PROCEDURE — C1725 CATH, TRANSLUMIN NON-LASER: HCPCS | Performed by: INTERNAL MEDICINE

## 2021-01-07 PROCEDURE — 85730 THROMBOPLASTIN TIME PARTIAL: CPT | Performed by: EMERGENCY MEDICINE

## 2021-01-07 PROCEDURE — 99285 EMERGENCY DEPT VISIT HI MDM: CPT | Mod: 25

## 2021-01-07 PROCEDURE — 85027 COMPLETE CBC AUTOMATED: CPT | Performed by: INTERNAL MEDICINE

## 2021-01-07 PROCEDURE — 84484 ASSAY OF TROPONIN QUANT: CPT | Performed by: EMERGENCY MEDICINE

## 2021-01-07 PROCEDURE — 250N000011 HC RX IP 250 OP 636: Performed by: STUDENT IN AN ORGANIZED HEALTH CARE EDUCATION/TRAINING PROGRAM

## 2021-01-07 PROCEDURE — 272N000001 HC OR GENERAL SUPPLY STERILE: Performed by: INTERNAL MEDICINE

## 2021-01-07 PROCEDURE — 96374 THER/PROPH/DIAG INJ IV PUSH: CPT | Performed by: INTERNAL MEDICINE

## 2021-01-07 PROCEDURE — 93010 ELECTROCARDIOGRAM REPORT: CPT | Mod: 76 | Performed by: INTERNAL MEDICINE

## 2021-01-07 PROCEDURE — 250N000013 HC RX MED GY IP 250 OP 250 PS 637: Performed by: STUDENT IN AN ORGANIZED HEALTH CARE EDUCATION/TRAINING PROGRAM

## 2021-01-07 PROCEDURE — C1894 INTRO/SHEATH, NON-LASER: HCPCS | Performed by: INTERNAL MEDICINE

## 2021-01-07 PROCEDURE — 93454 CORONARY ARTERY ANGIO S&I: CPT | Performed by: INTERNAL MEDICINE

## 2021-01-07 PROCEDURE — U0005 INFEC AGEN DETEC AMPLI PROBE: HCPCS | Performed by: EMERGENCY MEDICINE

## 2021-01-07 PROCEDURE — C9803 HOPD COVID-19 SPEC COLLECT: HCPCS | Performed by: INTERNAL MEDICINE

## 2021-01-07 PROCEDURE — 250N000011 HC RX IP 250 OP 636

## 2021-01-07 DEVICE — STENT SYNERGY DRUG ELUTING 2.75X28MM  H7493926028270: Type: IMPLANTABLE DEVICE | Status: FUNCTIONAL

## 2021-01-07 RX ORDER — ACETAMINOPHEN 650 MG/1
650 SUPPOSITORY RECTAL EVERY 4 HOURS PRN
Status: DISCONTINUED | OUTPATIENT
Start: 2021-01-07 | End: 2021-01-07

## 2021-01-07 RX ORDER — HEPARIN SODIUM 1000 [USP'U]/ML
INJECTION, SOLUTION INTRAVENOUS; SUBCUTANEOUS
Status: DISCONTINUED | OUTPATIENT
Start: 2021-01-07 | End: 2021-01-07

## 2021-01-07 RX ORDER — FLUMAZENIL 0.1 MG/ML
0.2 INJECTION, SOLUTION INTRAVENOUS
Status: ACTIVE | OUTPATIENT
Start: 2021-01-07 | End: 2021-01-07

## 2021-01-07 RX ORDER — FENTANYL CITRATE 50 UG/ML
25-50 INJECTION, SOLUTION INTRAMUSCULAR; INTRAVENOUS
Status: ACTIVE | OUTPATIENT
Start: 2021-01-07 | End: 2021-01-07

## 2021-01-07 RX ORDER — ONDANSETRON 2 MG/ML
4 INJECTION INTRAMUSCULAR; INTRAVENOUS EVERY 6 HOURS PRN
Status: DISCONTINUED | OUTPATIENT
Start: 2021-01-07 | End: 2021-01-07 | Stop reason: CLARIF

## 2021-01-07 RX ORDER — SODIUM CHLORIDE 9 MG/ML
INJECTION, SOLUTION INTRAVENOUS CONTINUOUS
Status: DISCONTINUED | OUTPATIENT
Start: 2021-01-07 | End: 2021-01-07

## 2021-01-07 RX ORDER — ONDANSETRON 2 MG/ML
4 INJECTION INTRAMUSCULAR; INTRAVENOUS EVERY 6 HOURS PRN
Status: DISCONTINUED | OUTPATIENT
Start: 2021-01-07 | End: 2021-01-10 | Stop reason: HOSPADM

## 2021-01-07 RX ORDER — ACETAMINOPHEN 325 MG/1
650 TABLET ORAL EVERY 4 HOURS PRN
Status: DISCONTINUED | OUTPATIENT
Start: 2021-01-07 | End: 2021-01-10 | Stop reason: HOSPADM

## 2021-01-07 RX ORDER — NALOXONE HYDROCHLORIDE 0.4 MG/ML
0.2 INJECTION, SOLUTION INTRAMUSCULAR; INTRAVENOUS; SUBCUTANEOUS
Status: DISCONTINUED | OUTPATIENT
Start: 2021-01-07 | End: 2021-01-10 | Stop reason: HOSPADM

## 2021-01-07 RX ORDER — FENTANYL CITRATE 50 UG/ML
INJECTION, SOLUTION INTRAMUSCULAR; INTRAVENOUS
Status: DISCONTINUED | OUTPATIENT
Start: 2021-01-07 | End: 2021-01-07

## 2021-01-07 RX ORDER — NALOXONE HYDROCHLORIDE 0.4 MG/ML
0.4 INJECTION, SOLUTION INTRAMUSCULAR; INTRAVENOUS; SUBCUTANEOUS
Status: DISCONTINUED | OUTPATIENT
Start: 2021-01-07 | End: 2021-01-07

## 2021-01-07 RX ORDER — ROSUVASTATIN CALCIUM 40 MG/1
40 TABLET, COATED ORAL DAILY
Status: DISCONTINUED | OUTPATIENT
Start: 2021-01-07 | End: 2021-01-10 | Stop reason: HOSPADM

## 2021-01-07 RX ORDER — PROTAMINE SULFATE 10 MG/ML
INJECTION, SOLUTION INTRAVENOUS
Status: DISCONTINUED | OUTPATIENT
Start: 2021-01-07 | End: 2021-01-07

## 2021-01-07 RX ORDER — NITROGLYCERIN 0.4 MG/1
0.4 TABLET SUBLINGUAL EVERY 5 MIN PRN
Status: DISCONTINUED | OUTPATIENT
Start: 2021-01-07 | End: 2021-01-07 | Stop reason: CLARIF

## 2021-01-07 RX ORDER — ASPIRIN 81 MG/1
81 TABLET, CHEWABLE ORAL ONCE
Status: DISCONTINUED | OUTPATIENT
Start: 2021-01-07 | End: 2021-01-07

## 2021-01-07 RX ORDER — NALOXONE HYDROCHLORIDE 0.4 MG/ML
0.2 INJECTION, SOLUTION INTRAMUSCULAR; INTRAVENOUS; SUBCUTANEOUS
Status: DISCONTINUED | OUTPATIENT
Start: 2021-01-07 | End: 2021-01-07

## 2021-01-07 RX ORDER — ONDANSETRON 4 MG/1
4 TABLET, ORALLY DISINTEGRATING ORAL EVERY 6 HOURS PRN
Status: DISCONTINUED | OUTPATIENT
Start: 2021-01-07 | End: 2021-01-07 | Stop reason: CLARIF

## 2021-01-07 RX ORDER — AMOXICILLIN 250 MG
2 CAPSULE ORAL 2 TIMES DAILY
Status: DISCONTINUED | OUTPATIENT
Start: 2021-01-07 | End: 2021-01-10 | Stop reason: HOSPADM

## 2021-01-07 RX ORDER — AMOXICILLIN 250 MG
1 CAPSULE ORAL 2 TIMES DAILY
Status: DISCONTINUED | OUTPATIENT
Start: 2021-01-07 | End: 2021-01-10 | Stop reason: HOSPADM

## 2021-01-07 RX ORDER — ASPIRIN 81 MG/1
81 TABLET ORAL DAILY
Status: DISCONTINUED | OUTPATIENT
Start: 2021-01-08 | End: 2021-01-07

## 2021-01-07 RX ORDER — ATROPINE SULFATE 0.1 MG/ML
0.5 INJECTION INTRAVENOUS
Status: ACTIVE | OUTPATIENT
Start: 2021-01-07 | End: 2021-01-07

## 2021-01-07 RX ORDER — METOPROLOL SUCCINATE 25 MG/1
25 TABLET, EXTENDED RELEASE ORAL DAILY
Status: DISCONTINUED | OUTPATIENT
Start: 2021-01-07 | End: 2021-01-10 | Stop reason: HOSPADM

## 2021-01-07 RX ORDER — OXYCODONE HYDROCHLORIDE 5 MG/1
5 TABLET ORAL EVERY 4 HOURS PRN
Status: DISCONTINUED | OUTPATIENT
Start: 2021-01-07 | End: 2021-01-08

## 2021-01-07 RX ORDER — HYDRALAZINE HYDROCHLORIDE 20 MG/ML
10 INJECTION INTRAMUSCULAR; INTRAVENOUS EVERY 4 HOURS PRN
Status: DISCONTINUED | OUTPATIENT
Start: 2021-01-07 | End: 2021-01-08

## 2021-01-07 RX ORDER — NALOXONE HYDROCHLORIDE 0.4 MG/ML
0.4 INJECTION, SOLUTION INTRAMUSCULAR; INTRAVENOUS; SUBCUTANEOUS
Status: DISCONTINUED | OUTPATIENT
Start: 2021-01-07 | End: 2021-01-10 | Stop reason: HOSPADM

## 2021-01-07 RX ORDER — ATROPINE SULFATE 0.1 MG/ML
0.5 INJECTION INTRAVENOUS
Status: DISCONTINUED | OUTPATIENT
Start: 2021-01-07 | End: 2021-01-07

## 2021-01-07 RX ORDER — METOPROLOL TARTRATE 1 MG/ML
5-10 INJECTION, SOLUTION INTRAVENOUS
Status: DISCONTINUED | OUTPATIENT
Start: 2021-01-07 | End: 2021-01-08

## 2021-01-07 RX ORDER — ASPIRIN 81 MG/1
81 TABLET ORAL DAILY
Status: DISCONTINUED | OUTPATIENT
Start: 2021-01-08 | End: 2021-01-10 | Stop reason: HOSPADM

## 2021-01-07 RX ORDER — OXYCODONE HYDROCHLORIDE 10 MG/1
10 TABLET ORAL EVERY 4 HOURS PRN
Status: DISCONTINUED | OUTPATIENT
Start: 2021-01-07 | End: 2021-01-08

## 2021-01-07 RX ORDER — POLYETHYLENE GLYCOL 3350 17 G/17G
17 POWDER, FOR SOLUTION ORAL DAILY
Status: DISCONTINUED | OUTPATIENT
Start: 2021-01-07 | End: 2021-01-10 | Stop reason: HOSPADM

## 2021-01-07 RX ORDER — NITROGLYCERIN 0.4 MG/1
0.4 TABLET SUBLINGUAL EVERY 5 MIN PRN
Status: DISCONTINUED | OUTPATIENT
Start: 2021-01-07 | End: 2021-01-10 | Stop reason: HOSPADM

## 2021-01-07 RX ORDER — ONDANSETRON 2 MG/ML
4 INJECTION INTRAMUSCULAR; INTRAVENOUS ONCE
Status: DISCONTINUED | OUTPATIENT
Start: 2021-01-07 | End: 2021-01-07 | Stop reason: CLARIF

## 2021-01-07 RX ORDER — IOPAMIDOL 755 MG/ML
INJECTION, SOLUTION INTRAVASCULAR
Status: DISCONTINUED | OUTPATIENT
Start: 2021-01-07 | End: 2021-01-10 | Stop reason: HOSPADM

## 2021-01-07 RX ORDER — ONDANSETRON 4 MG/1
4 TABLET, ORALLY DISINTEGRATING ORAL EVERY 6 HOURS PRN
Status: DISCONTINUED | OUTPATIENT
Start: 2021-01-07 | End: 2021-01-10 | Stop reason: HOSPADM

## 2021-01-07 RX ORDER — LIDOCAINE 40 MG/G
CREAM TOPICAL
Status: DISCONTINUED | OUTPATIENT
Start: 2021-01-07 | End: 2021-01-10 | Stop reason: HOSPADM

## 2021-01-07 RX ORDER — MAGNESIUM HYDROXIDE/ALUMINUM HYDROXICE/SIMETHICONE 120; 1200; 1200 MG/30ML; MG/30ML; MG/30ML
30 SUSPENSION ORAL EVERY 4 HOURS PRN
Status: DISCONTINUED | OUTPATIENT
Start: 2021-01-07 | End: 2021-01-10 | Stop reason: HOSPADM

## 2021-01-07 RX ADMIN — OXYCODONE HYDROCHLORIDE 5 MG: 5 TABLET ORAL at 22:36

## 2021-01-07 RX ADMIN — METOPROLOL SUCCINATE 25 MG: 25 TABLET, EXTENDED RELEASE ORAL at 20:01

## 2021-01-07 RX ADMIN — ROSUVASTATIN CALCIUM 40 MG: 40 TABLET, FILM COATED ORAL at 19:54

## 2021-01-07 RX ADMIN — ACETAMINOPHEN 650 MG: 325 TABLET, FILM COATED ORAL at 19:58

## 2021-01-07 RX ADMIN — ONDANSETRON 4 MG: 2 INJECTION INTRAMUSCULAR; INTRAVENOUS at 16:58

## 2021-01-07 ASSESSMENT — ENCOUNTER SYMPTOMS
SHORTNESS OF BREATH: 0
NUMBNESS: 1
NAUSEA: 1
BACK PAIN: 0
DIAPHORESIS: 1
ABDOMINAL PAIN: 0
LIGHT-HEADEDNESS: 1

## 2021-01-07 ASSESSMENT — ACTIVITIES OF DAILY LIVING (ADL)
ADLS_ACUITY_SCORE: 18
ADLS_ACUITY_SCORE: 19

## 2021-01-07 ASSESSMENT — MIFFLIN-ST. JEOR: SCORE: 1215.24

## 2021-01-07 NOTE — H&P
Critical Care Cardiology   History and Physical  Krystal Virgen MRN: [id number]  Age: 80 year old, : 1940  Date: 2021      History of Present Illness   Krystal Virgen is a 80 year old female who's being admitted on 2021.    The patient is a 80/F with CV risk factors of HLD/obesity, PMHx of asthma, anemia and chronic sinusitis. She presented to the ER on 2021 with complaints of chest pressure since this AM (~11 am) and left upper limb tingling for the past 2 days. She reported radiation of the chest pressure to the jaw, reported worsening w/ activity and association with nausea. She denied SOB, pedal edema, PND, orthopnea. No reported palpitations, presyncope or syncope.     On arrival to the ER, patient hemodynamically stable. Stat EKG concerning for ST elevation in I, aVL w/ reciprocal changes. Troponin 0.3. Cath lab emergently activated for STEMI >> revealed 100% stenosis in the LCx >> s/p deployment of JUAN in prox LCx. Intra-procedure, concern for coronary perforation, for which a stat TTE was done. TTE revealed LVEF 55-60% with inferolateral hypokinesis, normal RV function and no pericardial effusion. The patient was also noted to have mild bleeding w/ a small hematoma development in the right groin, for which manual pressure was held and protamine was administered. Thereafter, she was transferred in a hemodynamically stable condition to the CICU for further monitoring.        Medications   I have reviewed this patient's current medications    Past Medical History:   Diagnosis Date     Asthma      Chronic sinusitis      Depression      GERD (gastroesophageal reflux disease)      Migraines      Osteopenia      Overactive bladder      Pneumococcal infection      Spinal headache        No family history on file.  Social History     Socioeconomic History     Marital status:      Spouse name: Not on file     Number of children: Not on file     Years of education: Not on file     Highest  "education level: Not on file   Occupational History     Not on file   Social Needs     Financial resource strain: Not on file     Food insecurity     Worry: Not on file     Inability: Not on file     Transportation needs     Medical: Not on file     Non-medical: Not on file   Tobacco Use     Smoking status: Former Smoker     Smokeless tobacco: Never Used   Substance and Sexual Activity     Alcohol use: No     Drug use: No     Sexual activity: Not on file   Lifestyle     Physical activity     Days per week: Not on file     Minutes per session: Not on file     Stress: Not on file   Relationships     Social connections     Talks on phone: Not on file     Gets together: Not on file     Attends Christian service: Not on file     Active member of club or organization: Not on file     Attends meetings of clubs or organizations: Not on file     Relationship status: Not on file     Intimate partner violence     Fear of current or ex partner: Not on file     Emotionally abused: Not on file     Physically abused: Not on file     Forced sexual activity: Not on file   Other Topics Concern     Parent/sibling w/ CABG, MI or angioplasty before 65F 55M? Not Asked   Social History Narrative     Not on file          Physical Exam   @Vitals: /74   Pulse 80   Temp 97.7  F (36.5  C) (Axillary)   Resp 16   Ht 1.6 m (5' 3\")   Wt 77.1 kg (170 lb)   SpO2 95%   BMI 30.11 kg/m    BMI= Body mass index is 30.11 kg/m .   GENERAL APPEARANCE: Well appearing.  HEENT: No icterus, PERRA  CARDIOVASCULAR: Regular rate and rhythm, normal S1/S2, no S3/S4 and no murmur, click or rub. Normal PMI.   RESP: Clear air entry bilaterally.  EXTREMITIES: Warm, no edema.  NEURO: AO times three.     Resp: 16 SpO2: 95 % O2 Device: None (Room air)      Arterial Blood Gas: No lab results found in last 7 days.    Vitals:    01/07/21 1348   Weight: 77.1 kg (170 lb)   No intake/output data recorded.  Recent Labs   Lab 01/07/21  1400      POTASSIUM 4.0 "   CHLORIDE 109   CO2 24   ANIONGAP 7   *   BUN 17   CR 1.32*   ALINE 9.0     No components found for: URINE   No lab results found in last 7 days.  Temp: 97.7  F (36.5  C) Temp src: AxillaryTemp  Min: 97.7  F (36.5  C)  Max: 97.7  F (36.5  C)   Recent Labs   Lab 01/07/21  1400   WBC 11.7*   HGB 12.1   HCT 38.5   MCV 93   RDW 12.6        Recent Labs   Lab 01/07/21  1400   INR 0.98   PTT 28     Recent Labs   Lab 01/07/21  1400   *       Assessment and Plan   Krystal Virgen is a 80 year old female who was admitted on 1/7/2021.    Neurology:   No acute issues.      Cardiovascular:   # Lateral STEMI s/p JUAN to LCx:     TTE 1/7/21: LVEF 55-60% with inferolateral hypokinesis, normal RV function and no pericardial effusion.     Plan:  -continue ASA and ticagrelor.  -start rosuva 40 nightly.  -start toprol XL 25 daily.   -hold ACE/ARB given mildly reduced renal fxn, will try to initiate tomorrow.    Pulmonary:  On RA, no acute pathology.     GI and Nutrition:   Plan:   -monitor daily LFTs  -On PO diet.    Renal, Fluid, and Electrolytes:   Creatinine 1.32 mg/dL, BUN 17.   Plan:   -monitor urine output  -maintain K>4 and Mg>2    Infectious Disease:   WBC 11-12k, afebrile. No signs of infection. Leukocytosis c/w arrest.   Plan:   -continue to monitor temp and leukocytosis.    Hematology and Oncology:   # Right groin hematoma    Hgb 12 >> 10.8, plt 300k, INR WNL. Receiving ASA/ticagrelor for JUAN.     Plan:   -q6 CBC. Monitor hematoma size.   -transfuse for hgb <8  -DVT prophylaxis: heparin subcut.    Endocrine:   No known medical history. BG could get possibly elevated in setting of critical illness.  Plan:   -POC glucose checks.     Lines:   PIVs    Code Status: Full    The pt was discussed and evaluated with Dr. Almeida, Steven Philip, *, attending physician, who agrees with the assessment and plan above.     Stacie Lopes MD,   Cardiovascular Disease Fellow  Pager [phone]

## 2021-01-07 NOTE — Clinical Note
The first balloon was inserted into the circumflex.Max pressure = 12 queenie. Total duration = 10 seconds.

## 2021-01-07 NOTE — PROGRESS NOTES
Admitted/transferred from: cath lab at 1600  Reason for admission/transfer: STEMI  Patient status upon admission/transfer: vss, 2LPM NC  Interventions & Plan: EKG, CXR, f/u labs  2 RN skin assessment: completed by Wendi MARTÍNEZ  Result of skin assessment and interventions/actions: groin hematoma on R groin - MDs aware and looked at hematoma with cardiac cath lab RN  Height, weight, drug calc weight: Done  Patient belongings (see Flowsheet - Adult Profile for details): cell phone, jewelry, wallet at pt bedside  MDRO education (if applicable): done

## 2021-01-07 NOTE — ED PROVIDER NOTES
[place] EMERGENCY DEPARTMENT ([facility])  January 7, 2021  History     Chief Complaint   Patient presents with     Chest Pain     Chest pressure 4/10. NSR 12-lead EKG. 168/92. ASA 324mg and nitro given. Independent living resident.     HPI  Krystal Virgen is a 80 year old female with a PMH of moderate persistent asthma, COPD, GERD, migraines, pneumococcal infection, S/P cholecystectomy, chronic John lesion, diverticulosis, hiatal hernia, diaphragmatic hernia, obesity, h/o breast cancer, anemia, and chronic rhinitis she presents the ED today complaining of chest pain.  Patient denies a history of heart problems, diabetes, or high blood pressure.  Patient reports her symptoms started 2 days ago with left hand tingling, and chest pressure starting this morning around 11 AM.  Patient states it is more of a pressure than a pain, but does endorse it radiating up into her left jaw.  Patient reports that she first noticed the pressure while in the bathroom this morning, went back to bed, felt nauseous but did not vomit, and went back to bed again.  Patient reports he lives independently.  She reports that while in the bathroom this morning she felt lightheaded, had cold sweats, and felt as though she was going to faint.  She denies the pain being worse with activity.  She states that her pressure is now better after receiving nitroglycerin, stating it is a 2 or 3 out of 10 on the pain scale.  Patient denies use of blood thinners, abdominal pain, back pain, leg swelling, or shortness of breath.    I have reviewed the Medications, Allergies, Past Medical and Surgical History, and Social History in the [facility] system.  PAST MEDICAL HISTORY:   Past Medical History:   Diagnosis Date     Asthma      Chronic sinusitis      Depression      GERD (gastroesophageal reflux disease)      Migraines      Osteopenia      Overactive bladder      Pneumococcal infection      Spinal headache        PAST SURGICAL HISTORY:  "  Past Surgical History:   Procedure Laterality Date     ARTHRODESIS FOOT  11/11/2011    Procedure:ARTHRODESIS FOOT; Right First Metatarsophalangeal Joint Fusion with Second and Third Clawtoe Repairs; Surgeon:SARAH CASTRO; Location:[facility] OR     CHOLECYSTECTOMY       ENT SURGERY      sinus surgery x1     GYN SURGERY      benign hysterectomy age 43     ORTHOPEDIC SURGERY      fusion of grest toe right       Past medical history, past surgical history, medications, and allergies were reviewed with the patient. Additional pertinent items: {NONE:005929::\"None\"}    FAMILY HISTORY: No family history on file.    SOCIAL HISTORY:   Social History     Tobacco Use     Smoking status: Former Smoker     Smokeless tobacco: Never Used   Substance Use Topics     Alcohol use: No     Social history was reviewed with the patient. Additional pertinent items: {NONE:423082::\"None\"}      Patient's Medications   New Prescriptions    No medications on file   Previous Medications    ALBUTEROL (PROAIR HFA) 108 (90 BASE) MCG/ACT INHALER    Inhale  into the lungs.    BUDESONIDE-FORMOTEROL (SYMBICORT) 160-4.5 MCG/ACT INHALER    Inhale 2 puffs into the lungs 2 times daily.    BUPROPION (WELLBUTRIN SR) 100 MG 12 HR TABLET    Take 100 mg by mouth. Per H&P, no information  given     CITALOPRAM (CELEXA) 20 MG TABLET    Take 20 mg by mouth daily.    ESTRADIOL (VIVELLE-DOT) 0.05 MG/24HR PATCH    Place 1 patch onto the skin twice a week.    FLUTICASONE (FLONASE) 50 MCG/ACT NASAL SPRAY        MONTELUKAST (SINGULAIR) 10 MG TABLET    10 mg.    NAPROXEN SODIUM (ANAPROX) 550 MG TABLET    Take 550 mg by mouth 2 times daily (with meals).    OMEPRAZOLE (PRILOSEC PO)    Take 1 tablet by mouth 2 times daily.    OXYCODONE-ACETAMINOPHEN (PERCOCET) 5-325 MG PER TABLET    Take 1-2 tablets by mouth every 4 hours as needed.    OXYCODONE-ACETAMINOPHEN (PERCOCET) 5-325 MG PER TABLET    Take 1 tablet by mouth every 6 hours as needed.    SUMATRIPTAN (IMITREX) 100 MG " "TABLET    Take 100 mg by mouth at onset of headache. May repeat in 2 hours if needed: max 2/day; average number of headaches monthly ***     TRAZODONE (DESYREL) 50 MG TABLET    Take 50 mg by mouth 2 times daily.   Modified Medications    No medications on file   Discontinued Medications    No medications on file          Allergies   Allergen Reactions     Penicillins      Amoxicillin doesn't work for her     Sulfa Drugs         Review of Systems   Constitutional: Positive for diaphoresis (cold).   Respiratory: Negative for shortness of breath.    Cardiovascular: Positive for chest pain (pressure, radites up into left jaw).   Gastrointestinal: Positive for nausea. Negative for abdominal pain.   Musculoskeletal: Negative for back pain.   Neurological: Positive for light-headedness and numbness (left hand \"tingling\").   All other systems reviewed and are negative.    {Complete vs limited ROS:814142::\"A complete review of systems was performed with pertinent positives and negatives noted in the HPI, and all other systems negative.\"}    Physical Exam   BP: (!) 142/90  Pulse: 80  Temp: 97.7  F (36.5  C)  Resp: 16  Height: 160 cm (5' 3\")  Weight: 77.1 kg (170 lb)  SpO2: 95 %      Physical Exam    ED Course   2:00 PM  The patient was seen and examined by Ramila Jenkins MD in Room ED03.        Procedures        {EKG done?:897044::\" \"}    {ed addendum:591028::\" \"}  {trauma activation or Fall?:465274::\" \"}  {Sepsis/Septic Shock/Stemi/Stroke:586572::\" \"}       No results found for this or any previous visit (from the past 24 hour(s)).  Medications - No data to display          Assessments & Plan (with Medical Decision Making)   ***    I have reviewed the nursing notes.    I have reviewed the findings, diagnosis, plan and need for follow up with the patient.    New Prescriptions    No medications on file       Final diagnoses:   None   I, Brent Forde, am serving as a trained medical scribe to document services personally performed " by Ramila Jenkins MD, based on the provider's statements to me.     I, Ramila Jenkins MD, was physically present and have reviewed and verified the accuracy of this note documented by Brent Forde.      1/7/2021   [facility] EMERGENCY DEPARTMENT

## 2021-01-07 NOTE — ED TRIAGE NOTES
Chest pressure 4/10. NSR 12-lead EKG. 168/92. ASA 324mg and nitro given. Independent living resident.

## 2021-01-08 ENCOUNTER — APPOINTMENT (OUTPATIENT)
Dept: CARDIOLOGY | Facility: CLINIC | Age: 81
DRG: 247 | End: 2021-01-08
Attending: INTERNAL MEDICINE
Payer: COMMERCIAL

## 2021-01-08 LAB
ALBUMIN SERPL-MCNC: 2.8 G/DL (ref 3.4–5)
ALP SERPL-CCNC: 74 U/L (ref 40–150)
ALT SERPL W P-5'-P-CCNC: 63 U/L (ref 0–50)
ANION GAP SERPL CALCULATED.3IONS-SCNC: 9 MMOL/L (ref 3–14)
AST SERPL W P-5'-P-CCNC: 430 U/L (ref 0–45)
BILIRUB SERPL-MCNC: 0.4 MG/DL (ref 0.2–1.3)
BUN SERPL-MCNC: 16 MG/DL (ref 7–30)
CALCIUM SERPL-MCNC: 8.7 MG/DL (ref 8.5–10.1)
CHLORIDE SERPL-SCNC: 108 MMOL/L (ref 94–109)
CO2 SERPL-SCNC: 22 MMOL/L (ref 20–32)
CREAT SERPL-MCNC: 1.33 MG/DL (ref 0.52–1.04)
ERYTHROCYTE [DISTWIDTH] IN BLOOD BY AUTOMATED COUNT: 12.6 % (ref 10–15)
GFR SERPL CREATININE-BSD FRML MDRD: 38 ML/MIN/{1.73_M2}
GLUCOSE SERPL-MCNC: 134 MG/DL (ref 70–99)
HBA1C MFR BLD: 5.3 % (ref 0–5.6)
HCT VFR BLD AUTO: 35.3 % (ref 35–47)
HGB BLD-MCNC: 11.1 G/DL (ref 11.7–15.7)
INTERPRETATION ECG - MUSE: NORMAL
INTERPRETATION ECG - MUSE: NORMAL
MAGNESIUM SERPL-MCNC: 2.2 MG/DL (ref 1.6–2.3)
MCH RBC QN AUTO: 29.8 PG (ref 26.5–33)
MCHC RBC AUTO-ENTMCNC: 31.4 G/DL (ref 31.5–36.5)
MCV RBC AUTO: 95 FL (ref 78–100)
PLATELET # BLD AUTO: 351 10E9/L (ref 150–450)
POTASSIUM SERPL-SCNC: 4.2 MMOL/L (ref 3.4–5.3)
PROT SERPL-MCNC: 5.8 G/DL (ref 6.8–8.8)
RBC # BLD AUTO: 3.72 10E12/L (ref 3.8–5.2)
SODIUM SERPL-SCNC: 138 MMOL/L (ref 133–144)
WBC # BLD AUTO: 11.6 10E9/L (ref 4–11)

## 2021-01-08 PROCEDURE — 36415 COLL VENOUS BLD VENIPUNCTURE: CPT | Performed by: INTERNAL MEDICINE

## 2021-01-08 PROCEDURE — 80053 COMPREHEN METABOLIC PANEL: CPT | Performed by: INTERNAL MEDICINE

## 2021-01-08 PROCEDURE — 999N000208 ECHOCARDIOGRAM COMPLETE

## 2021-01-08 PROCEDURE — 255N000002 HC RX 255 OP 636: Performed by: INTERNAL MEDICINE

## 2021-01-08 PROCEDURE — 83036 HEMOGLOBIN GLYCOSYLATED A1C: CPT | Performed by: INTERNAL MEDICINE

## 2021-01-08 PROCEDURE — 85027 COMPLETE CBC AUTOMATED: CPT | Performed by: INTERNAL MEDICINE

## 2021-01-08 PROCEDURE — 250N000013 HC RX MED GY IP 250 OP 250 PS 637: Performed by: STUDENT IN AN ORGANIZED HEALTH CARE EDUCATION/TRAINING PROGRAM

## 2021-01-08 PROCEDURE — 93306 TTE W/DOPPLER COMPLETE: CPT | Mod: 26 | Performed by: INTERNAL MEDICINE

## 2021-01-08 PROCEDURE — 200N000002 HC R&B ICU UMMC

## 2021-01-08 PROCEDURE — 250N000011 HC RX IP 250 OP 636: Performed by: INTERNAL MEDICINE

## 2021-01-08 PROCEDURE — 93005 ELECTROCARDIOGRAM TRACING: CPT

## 2021-01-08 PROCEDURE — 99233 SBSQ HOSP IP/OBS HIGH 50: CPT | Mod: GC | Performed by: INTERNAL MEDICINE

## 2021-01-08 PROCEDURE — 83735 ASSAY OF MAGNESIUM: CPT | Performed by: INTERNAL MEDICINE

## 2021-01-08 PROCEDURE — 250N000013 HC RX MED GY IP 250 OP 250 PS 637: Performed by: INTERNAL MEDICINE

## 2021-01-08 RX ORDER — OXYCODONE AND ACETAMINOPHEN 10; 325 MG/1; MG/1
1 TABLET ORAL EVERY 4 HOURS PRN
Status: DISCONTINUED | OUTPATIENT
Start: 2021-01-08 | End: 2021-01-10 | Stop reason: HOSPADM

## 2021-01-08 RX ORDER — MONTELUKAST SODIUM 10 MG/1
10 TABLET ORAL AT BEDTIME
Status: DISCONTINUED | OUTPATIENT
Start: 2021-01-08 | End: 2021-01-10

## 2021-01-08 RX ORDER — TRAZODONE HYDROCHLORIDE 50 MG/1
50 TABLET, FILM COATED ORAL 2 TIMES DAILY
Status: DISCONTINUED | OUTPATIENT
Start: 2021-01-08 | End: 2021-01-10 | Stop reason: HOSPADM

## 2021-01-08 RX ORDER — OXYCODONE AND ACETAMINOPHEN 5; 325 MG/1; MG/1
1 TABLET ORAL EVERY 4 HOURS PRN
Status: DISCONTINUED | OUTPATIENT
Start: 2021-01-08 | End: 2021-01-10 | Stop reason: HOSPADM

## 2021-01-08 RX ORDER — ALBUTEROL SULFATE 90 UG/1
1 AEROSOL, METERED RESPIRATORY (INHALATION) EVERY 6 HOURS PRN
Status: DISCONTINUED | OUTPATIENT
Start: 2021-01-08 | End: 2021-01-10 | Stop reason: HOSPADM

## 2021-01-08 RX ORDER — CALCIUM CARBONATE 500 MG/1
500 TABLET, CHEWABLE ORAL 2 TIMES DAILY PRN
Status: DISCONTINUED | OUTPATIENT
Start: 2021-01-08 | End: 2021-01-09

## 2021-01-08 RX ADMIN — MONTELUKAST 10 MG: 10 TABLET, FILM COATED ORAL at 21:10

## 2021-01-08 RX ADMIN — POLYETHYLENE GLYCOL 3350 17 G: 17 POWDER, FOR SOLUTION ORAL at 08:02

## 2021-01-08 RX ADMIN — ONDANSETRON 4 MG: 4 TABLET, ORALLY DISINTEGRATING ORAL at 21:10

## 2021-01-08 RX ADMIN — ACETAMINOPHEN 650 MG: 325 TABLET, FILM COATED ORAL at 10:32

## 2021-01-08 RX ADMIN — TRAZODONE HYDROCHLORIDE 50 MG: 50 TABLET ORAL at 21:10

## 2021-01-08 RX ADMIN — DOCUSATE SODIUM 50 MG AND SENNOSIDES 8.6 MG 1 TABLET: 8.6; 5 TABLET, FILM COATED ORAL at 08:03

## 2021-01-08 RX ADMIN — TICAGRELOR 90 MG: 90 TABLET ORAL at 03:41

## 2021-01-08 RX ADMIN — ACETAMINOPHEN 650 MG: 325 TABLET, FILM COATED ORAL at 03:41

## 2021-01-08 RX ADMIN — ASPIRIN 81 MG: 81 TABLET, DELAYED RELEASE ORAL at 08:02

## 2021-01-08 RX ADMIN — OMEPRAZOLE 40 MG: 20 CAPSULE, DELAYED RELEASE ORAL at 18:37

## 2021-01-08 RX ADMIN — HUMAN ALBUMIN MICROSPHERES AND PERFLUTREN 6 ML: 10; .22 INJECTION, SOLUTION INTRAVENOUS at 08:00

## 2021-01-08 RX ADMIN — ROSUVASTATIN CALCIUM 40 MG: 40 TABLET, FILM COATED ORAL at 09:32

## 2021-01-08 RX ADMIN — OXYCODONE HYDROCHLORIDE AND ACETAMINOPHEN 1 TABLET: 10; 325 TABLET ORAL at 18:15

## 2021-01-08 RX ADMIN — TICAGRELOR 90 MG: 90 TABLET ORAL at 16:48

## 2021-01-08 RX ADMIN — METOPROLOL SUCCINATE 25 MG: 25 TABLET, EXTENDED RELEASE ORAL at 08:03

## 2021-01-08 RX ADMIN — OXYCODONE HYDROCHLORIDE 10 MG: 10 TABLET ORAL at 10:32

## 2021-01-08 ASSESSMENT — ACTIVITIES OF DAILY LIVING (ADL)
ADLS_ACUITY_SCORE: 21
ADLS_ACUITY_SCORE: 17
ADLS_ACUITY_SCORE: 21

## 2021-01-08 ASSESSMENT — MIFFLIN-ST. JEOR: SCORE: 1235.13

## 2021-01-08 NOTE — PROGRESS NOTES
CLINICAL NUTRITION SERVICES    Reason for Assessment:  Heart-healthy nutrition education, received consult.    Diet History:  Pt reports no history of receiving heart-healthy nutrition education in the past.      Nutrition Diagnosis:  Food- and nutrition-related knowledge deficit r/t no previous knowledge of heart-healthy diet AEB pt report of no previous formal heart-healthy nutrition education.    Nutrition Prescription/Recs:  Continue heart-healthy diet.      Interventions:  Nutrition Education:    1.  Provided verbal instruction on a heart-healthy diet.  2.  Provided handouts:  Heart Health Guidelines (includes Heart Healthy Food Choices), Your Heart-Healthy Diet (Words You Will Hear), 10 Tips for Heart-Healthy Cooking, Dining Out With Your Heart In Mind, Recipe Changes for Heart-Healthy Cooking,  How to Read Nutrition Labels, Low-Sodium Foods and Drinks, Tips for a Low-Sodium Diet, and Seasoning Your Foods Without Adding Salt.      Goals:    1.  Pt will verbalize at least four foods high in saturated or trans-fats and five foods high in sodium.    2.  Pt will list at least two interventions to make current meal plan more heart-healthy.     Follow-up:   Patient to ask any further nutrition-related questions before discharge.  In addition, pt may request outpatient RD appointment      Rosario Michel RD, MS, LD  SICU: [phone]

## 2021-01-08 NOTE — ED PROVIDER NOTES
[place] EMERGENCY DEPARTMENT ([facility])  January 7, 2021  History     Chief Complaint   Patient presents with     Chest Pain     Chest pressure 4/10. NSR 12-lead EKG. 168/92. ASA 324mg and nitro given. Independent living resident.     HPI  Krystal Virgen is a 80 year old female with a PMH of moderate persistent asthma, COPD, GERD, migraines, pneumococcal infection, S/P cholecystectomy, chronic John lesion, diverticulosis, hiatal hernia, diaphragmatic hernia, obesity, h/o breast cancer, anemia, and chronic rhinitis she presents the ED today complaining of chest pain.  Patient denies a history of heart problems, diabetes, or high blood pressure.  Patient reports her symptoms started 2 days ago with left hand tingling, and chest pressure starting this morning around 11 AM.  Patient states it is more of a pressure than a pain, but does endorse it radiating up into her left jaw.  Patient reports that she first noticed the pressure while in the bathroom this morning, went back to bed, felt nauseous but did not vomit, and went back to bed again.  Patient reports he lives independently.  She reports that while in the bathroom this morning she felt lightheaded, had cold sweats, and felt as though she was going to faint.  She denies the pain being worse with activity.  She states that her pressure is now better after receiving nitroglycerin, stating it is a 2 or 3 out of 10 on the pain scale.  Patient denies use of blood thinners, abdominal pain, back pain, leg swelling, or shortness of breath.    I have reviewed the Medications, Allergies, Past Medical and Surgical History, and Social History in the [facility] system.  PAST MEDICAL HISTORY:   Past Medical History:   Diagnosis Date     Asthma      Chronic sinusitis      Depression      GERD (gastroesophageal reflux disease)      Migraines      Osteopenia      Overactive bladder      Pneumococcal infection      Spinal headache        PAST SURGICAL HISTORY:    Past Surgical History:   Procedure Laterality Date     ARTHRODESIS FOOT  11/11/2011    Procedure:ARTHRODESIS FOOT; Right First Metatarsophalangeal Joint Fusion with Second and Third Clawtoe Repairs; Surgeon:SARAH CASTRO; Location:[facility] OR     CHOLECYSTECTOMY       ENT SURGERY      sinus surgery x1     GYN SURGERY      benign hysterectomy age 43     ORTHOPEDIC SURGERY      fusion of grest toe right       Past medical history, past surgical history, medications, and allergies were reviewed with the patient. Additional pertinent items: None    FAMILY HISTORY: No family history on file.    SOCIAL HISTORY:   Social History     Tobacco Use     Smoking status: Former Smoker     Smokeless tobacco: Never Used   Substance Use Topics     Alcohol use: No     Social history was reviewed with the patient. Additional pertinent items: None      Current Discharge Medication List      CONTINUE these medications which have NOT CHANGED    Details   albuterol (PROAIR HFA) 108 (90 BASE) MCG/ACT inhaler Inhale  into the lungs.      budesonide-formoterol (SYMBICORT) 160-4.5 MCG/ACT inhaler Inhale 2 puffs into the lungs 2 times daily.      buPROPion (WELLBUTRIN SR) 100 MG 12 hr tablet Take 100 mg by mouth. Per H&P, no information  given       citalopram (CELEXA) 20 MG tablet Take 20 mg by mouth daily.      montelukast (SINGULAIR) 10 MG tablet 10 mg.      Omeprazole (PRILOSEC PO) Take 1 tablet by mouth 2 times daily.      TRAZodone (DESYREL) 50 MG tablet Take 50 mg by mouth 2 times daily.      estradiol (VIVELLE-DOT) 0.05 MG/24HR patch Place 1 patch onto the skin twice a week.      fluticasone (FLONASE) 50 MCG/ACT nasal spray       naproxen sodium (ANAPROX) 550 MG tablet Take 550 mg by mouth 2 times daily (with meals).      !! oxycodone-acetaminophen (PERCOCET) 5-325 MG per tablet Take 1-2 tablets by mouth every 4 hours as needed.  Qty: 60 tablet, Refills: 0    Associated Diagnoses: Hallux rigidus      !! oxycodone-acetaminophen  "(PERCOCET) 5-325 MG per tablet Take 1 tablet by mouth every 6 hours as needed.  Qty: 30 tablet    Associated Diagnoses: Hallux rigidus      sumatriptan (IMITREX) 100 MG tablet Take 100 mg by mouth at onset of headache. May repeat in 2 hours if needed: max 2/day; average number of headaches monthly        !! - Potential duplicate medications found. Please discuss with provider.             Allergies   Allergen Reactions     Penicillins      Amoxicillin doesn't work for her     Sulfa Drugs         Review of Systems   Constitutional: Positive for diaphoresis (cold).   Respiratory: Negative for shortness of breath.    Cardiovascular: Positive for chest pain (pressure, radites up into left jaw).   Gastrointestinal: Positive for nausea. Negative for abdominal pain.   Musculoskeletal: Negative for back pain.   Neurological: Positive for light-headedness and numbness (left hand \"tingling\").   All other systems reviewed and are negative.    A complete review of systems was performed with pertinent positives and negatives noted in the HPI, and all other systems negative.    Physical Exam   BP: (!) 142/90  Pulse: 80  Temp: 97.7  F (36.5  C)  Resp: 16  Height: 160 cm (5' 3\")  Weight: 77.1 kg (170 lb)  SpO2: 95 %    Physical Exam  Vitals signs and nursing note reviewed.   Constitutional:       General: She is not in acute distress.     Appearance: She is well-developed. She is obese. She is not ill-appearing or diaphoretic.   HENT:      Head: Normocephalic and atraumatic.      Nose: Nose normal.   Eyes:      General: No scleral icterus.     Conjunctiva/sclera: Conjunctivae normal.   Neck:      Musculoskeletal: Normal range of motion and neck supple. No neck rigidity.   Cardiovascular:      Rate and Rhythm: Normal rate and regular rhythm.   Pulmonary:      Effort: Pulmonary effort is normal. No respiratory distress.      Breath sounds: No stridor.   Abdominal:      General: There is no distension.      Palpations: Abdomen is " soft.      Tenderness: There is no abdominal tenderness.   Musculoskeletal: Normal range of motion.         General: No deformity or signs of injury.      Right lower leg: No edema.      Left lower leg: No edema.   Skin:     General: Skin is warm and dry.      Coloration: Skin is not jaundiced or pale.   Neurological:      General: No focal deficit present.      Mental Status: She is alert and oriented to person, place, and time.   Psychiatric:         Attention and Perception: She is inattentive.         Mood and Affect: Mood is anxious. Affect is labile.         Behavior: Behavior normal.           ED Course   2:00 PM  The patient was seen and examined by Ramila Jenkins MD in Room ED03.        Procedures             EKG Interpretation:      Interpreted by Ramila Jenkins MD  Time reviewed: 1355  Symptoms at time of EKG: Chest pain  Rhythm: normal sinus   Rate: Normal  Axis: Left Axis Deviation  Ectopy: premature ventricular contractions (unifocal)  Conduction: normal  ST Segments/ T Waves: ST Segement Elevation I and aVL and ST Segment Depression III, aVF, V2, V3, V5 and V6  Q Waves: none  Comparison to prior: No old EKG available    Clinical Impression: myocardial infarction  posterior and lateral                   Critical Care Addendum    My initial assessment, based on my review of nursing observations, review of vital signs, focused history, physical exam, review of cardiac rhythm monitor and 12 lead ECG analysis, established that Krystal Virgen has and STEMI, which requires immediate intervention, and therefore she is critically ill.     After the initial assessment, the care team initiated multiple lab tests, initiated IV fluid administration, initiated medication therapy with Brilinta, ASA and consulted with Cath Lab to provide stabilization care. Due to the critical nature of this patient, I reassessed nursing observations, vital signs, physical exam, review of cardiac rhythm monitor, mental status and  respiratory status multiple times prior to her disposition.     Time also spent performing documentation, reviewing test results, discussion with consultants and coordination of care.     Critical care time (excluding teaching time and procedures): 37 minutes.      The patient has a STEMI     The patient was evaluated at 1350    Cath lab transfer at 1425 for cardiac intervention   ASA given in the ER                     Results for orders placed or performed during the hospital encounter of 01/07/21 (from the past 24 hour(s))   EKG 12 lead   Result Value Ref Range    Interpretation ECG Click View Image link to view waveform and result    CBC with platelets differential   Result Value Ref Range    WBC 11.7 (H) 4.0 - 11.0 10e9/L    RBC Count 4.14 3.8 - 5.2 10e12/L    Hemoglobin 12.1 11.7 - 15.7 g/dL    Hematocrit 38.5 35.0 - 47.0 %    MCV 93 78 - 100 fl    MCH 29.2 26.5 - 33.0 pg    MCHC 31.4 (L) 31.5 - 36.5 g/dL    RDW 12.6 10.0 - 15.0 %    Platelet Count 362 150 - 450 10e9/L    Diff Method Automated Method     % Neutrophils 74.7 %    % Lymphocytes 14.1 %    % Monocytes 6.2 %    % Eosinophils 3.5 %    % Basophils 1.1 %    % Immature Granulocytes 0.4 %    Nucleated RBCs 0 0 /100    Absolute Neutrophil 8.7 (H) 1.6 - 8.3 10e9/L    Absolute Lymphocytes 1.7 0.8 - 5.3 10e9/L    Absolute Monocytes 0.7 0.0 - 1.3 10e9/L    Absolute Eosinophils 0.4 0.0 - 0.7 10e9/L    Absolute Basophils 0.1 0.0 - 0.2 10e9/L    Abs Immature Granulocytes 0.1 0 - 0.4 10e9/L    Absolute Nucleated RBC 0.0    INR   Result Value Ref Range    INR 0.98 0.86 - 1.14   Partial thromboplastin time   Result Value Ref Range    PTT 28 22 - 37 sec   Basic metabolic panel   Result Value Ref Range    Sodium 140 133 - 144 mmol/L    Potassium 4.0 3.4 - 5.3 mmol/L    Chloride 109 94 - 109 mmol/L    Carbon Dioxide 24 20 - 32 mmol/L    Anion Gap 7 3 - 14 mmol/L    Glucose 114 (H) 70 - 99 mg/dL    Urea Nitrogen 17 7 - 30 mg/dL    Creatinine 1.32 (H) 0.52 - 1.04 mg/dL     GFR Estimate 38 (L) >60 mL/min/[1.73_m2]    GFR Estimate If Black 44 (L) >60 mL/min/[1.73_m2]    Calcium 9.0 8.5 - 10.1 mg/dL   Troponin I   Result Value Ref Range    Troponin I ES 0.304 (HH) 0.000 - 0.045 ug/L   Hepatic panel   Result Value Ref Range    Bilirubin Direct 0.1 0.0 - 0.2 mg/dL    Bilirubin Total 0.4 0.2 - 1.3 mg/dL    Albumin 3.2 (L) 3.4 - 5.0 g/dL    Protein Total 6.6 (L) 6.8 - 8.8 g/dL    Alkaline Phosphatase 86 40 - 150 U/L    ALT 16 0 - 50 U/L    AST 23 0 - 45 U/L   Lipid panel reflex to direct LDL   Result Value Ref Range    Cholesterol 186 <200 mg/dL    Triglycerides 149 <150 mg/dL    HDL Cholesterol 73 >49 mg/dL    LDL Cholesterol Calculated 83 <100 mg/dL    Non HDL Cholesterol 113 <130 mg/dL   Nt probnp inpatient   Result Value Ref Range    N-Terminal Pro BNP Inpatient 1,564 0 - 1,800 pg/mL   Asymptomatic SARS-CoV-2 COVID-19 Virus (Coronavirus) by PCR    Specimen: Nasopharyngeal   Result Value Ref Range    SARS-CoV-2 Virus Specimen Source Nasopharyngeal     SARS-CoV-2 PCR Result NEGATIVE     SARS-CoV-2 PCR Comment       Testing was performed using the Xpert Xpress SARS-CoV-2 Assay on the Cepheid Gene-Xpert   Instrument Systems. Additional information about this Emergency Use Authorization (EUA)   assay can be found via the Lab Guide.     Troponin POCT   Result Value Ref Range    Troponin I 0.16 (HH) 0.00 - 0.08 ug/L   Activated clotting time POCT   Result Value Ref Range    Activated Clot Time 266 (H) 75 - 150 sec   Activated clotting time POCT   Result Value Ref Range    Activated Clot Time 148 75 - 150 sec   Echo Limited    Narrative    [id number]  [id number]  [id number]  [id number]^JUAN ALBERTO^[facility]OSSt. [facility],[place]  [facility]  [address] [place] [id number]     Name: MERY VAUGHN  MRN: [id number]  : 1940  Study Date: 2021 03:07 PM  Age: 80 yrs  Gender: Female  Patient Location: [facility]  Reason For Study: Pericardial  Effusion  Ordering Physician: CALOS AVENDANO  Performed By: OLEKSANDR Gil     BSA: 1.8 m2  Height: 63 in  Weight: 170 lb  _____________________________________________________________________________  __        Procedure  Limited Echocardiogram with portions of two-dimensional, color and spectral  Doppler performed.  _____________________________________________________________________________  __        Interpretation Summary  Limited Echocardiogram.  No pericardial effusion is present.  Global right ventricular function is normal.  Left ventricular function is normal.The EF is 55-60%.  Inferolateral (posterior) wall akinesis is present.  There is no prior study for direct comparison.  _____________________________________________________________________________  __        Left Ventricle  Left ventricular function is normal.The EF is 55-60%. Inferolateral  (posterior) wall akinesis is present.     Right Ventricle  The right ventricle is normal size. Global right ventricular function is  normal.     Pericardium  No pericardial effusion is present.        Compared to Previous Study  There is no prior study for direct comparison.  _____________________________________________________________________________  __                          Report approved by: Hermann Dowd 01/07/2021 04:22 PM              _____________________________________________________________________________  __      Cardiac Catheterization    Narrative      Ost Cx to Prox Cx lesion is 100% stenosed.     Posterior STEMI with successful PCI and stent placement in the proximal   LCx.   Initial contained small wire perforation but without any evidence of   pericardial accumulation on ECHO.  Moderate  right groin hematoma contained and stable.      Glucose by meter   Result Value Ref Range    Glucose 90 70 - 99 mg/dL   EKG 12-lead, tracing only    Result Value Ref Range    Interpretation ECG Click View Image link to view waveform and result     XR Chest Port 1 View    Narrative         Impression    IMPRESSION:  1. Enlarged cardiac silhouette with perihilar prominence and  interstitial opacities likely represents vascular congestion/edema.  2. Probable hiatal hernia.   Lipid Profile   Result Value Ref Range    Cholesterol 158 <200 mg/dL    Triglycerides 83 <150 mg/dL    HDL Cholesterol 67 >49 mg/dL    LDL Cholesterol Calculated 75 <100 mg/dL    Non HDL Cholesterol 92 <130 mg/dL   CBC with platelets   Result Value Ref Range    WBC 12.7 (H) 4.0 - 11.0 10e9/L    RBC Count 3.66 (L) 3.8 - 5.2 10e12/L    Hemoglobin 10.8 (L) 11.7 - 15.7 g/dL    Hematocrit 34.7 (L) 35.0 - 47.0 %    MCV 95 78 - 100 fl    MCH 29.5 26.5 - 33.0 pg    MCHC 31.1 (L) 31.5 - 36.5 g/dL    RDW 12.5 10.0 - 15.0 %    Platelet Count 302 150 - 450 10e9/L   Partial thromboplastin time   Result Value Ref Range    PTT 53 (H) 22 - 37 sec   Troponin I   Result Value Ref Range    Troponin I ES >200.000 (HH) 0.000 - 0.045 ug/L     Medications   lidocaine 1 % (10 mLs  Given 1/7/21 1436)   lidocaine 1 % 0.1-1 mL (has no administration in time range)   lidocaine (LMX4) cream (has no administration in time range)   sodium chloride (PF) 0.9% PF flush 3 mL (3 mLs Intracatheter Not Given 1/7/21 1548)   sodium chloride (PF) 0.9% PF flush 3 mL (has no administration in time range)   medication instruction (has no administration in time range)   nitroGLYcerin (NITROSTAT) sublingual tablet 0.4 mg (has no administration in time range)   alum & mag hydroxide-simethicone (MAALOX) suspension 30 mL (has no administration in time range)   senna-docusate (SENOKOT-S/PERICOLACE) 8.6-50 MG per tablet 1 tablet (has no administration in time range)     Or   senna-docusate (SENOKOT-S/PERICOLACE) 8.6-50 MG per tablet 2 tablet (has no administration in time range)   polyethylene glycol (MIRALAX) Packet 17 g (17 g Oral Not Given 1/7/21 6227)   acetaminophen (TYLENOL) tablet 650 mg (has no administration in time range)   0.9%  sodium chloride BOLUS (has no administration in time range)   atropine injection 0.5 mg (has no administration in time range)   fentaNYL (PF) (SUBLIMAZE) injection 25-50 mcg (has no administration in time range)   midazolam (VERSED) injection 0.5-1 mg (has no administration in time range)   naloxone (NARCAN) injection 0.2 mg (has no administration in time range)     Or   naloxone (NARCAN) injection 0.4 mg (has no administration in time range)     Or   naloxone (NARCAN) injection 0.2 mg (has no administration in time range)     Or   naloxone (NARCAN) injection 0.4 mg (has no administration in time range)   flumazenil (ROMAZICON) injection 0.2 mg (has no administration in time range)   HOLD:  Metformin and metformin containing medications if patient received IV contrast with acute kidney injury or severe chronic kidney disease (stage IV or stage V; i.e., eGFR less than 30) (has no administration in time range)   Patient is already receiving anticoagulation with heparin, enoxaparin (LOVENOX), warfarin (COUMADIN)  or other anticoagulant medication (has no administration in time range)   aspirin EC tablet 81 mg (has no administration in time range)   ondansetron (ZOFRAN-ODT) ODT tab 4 mg ( Oral See Alternative 1/7/21 1703)     Or   ondansetron (ZOFRAN) injection 4 mg ( Intravenous Canceled Entry 1/7/21 1703)   iopamidol (ISOVUE-370) solution (130 mLs Intravenous Given 1/7/21 1515)   0.9% sodium chloride BOLUS (has no administration in time range)   fentaNYL (PF) (SUBLIMAZE) injection 25-50 mcg (has no administration in time range)   flumazenil (ROMAZICON) injection 0.2 mg (has no administration in time range)   hydrALAZINE (APRESOLINE) injection 10 mg (has no administration in time range)   metoprolol (LOPRESSOR) injection 5-10 mg (has no administration in time range)   oxyCODONE (ROXICODONE) tablet 5 mg (has no administration in time range)     Or   oxyCODONE (ROXICODONE) tablet 10 mg (has no administration in time  range)   Percutaneous Coronary Intervention orders placed (this is information for BPA alerting) (has no administration in time range)   ticagrelor (BRILINTA) tablet 90 mg (has no administration in time range)   Continuing beta blocker from home medication list OR beta blocker order already placed during this visit (has no administration in time range)   Continuing ACE inhibitor/ARB/ARNI from home medication list OR ACE inhibitor/ARB order already placed during this visit (has no administration in time range)   Continuing statin from home medication list OR statin order already placed during this visit (has no administration in time range)   rosuvastatin (CRESTOR) tablet 40 mg (has no administration in time range)   metoprolol succinate ER (TOPROL-XL) 24 hr tablet 25 mg (has no administration in time range)             Assessments & Plan (with Medical Decision Making)   Krsytal Virgen is a 80 year old female with a PMH of moderate persistent asthma, COPD, GERD, migraines, pneumococcal infection, S/P cholecystectomy, chronic John lesion, diverticulosis, hiatal hernia, diaphragmatic hernia, obesity, h/o breast cancer, anemia, and chronic rhinitis she presents the ED today complaining of chest pain.    Ddx: Posterior lateral STEMI, CAD, CHF, GERD, PE    EKG consistent with STEMI.  Given aspirin and nitro in the triage area.  She does report improvement of the chest pressure after nitro.  Point-of-care troponin 0.16.  Cath Lab activated.  Cath Lab staff agreed with giving patient Brilinta.  Patient transferred to Cath Lab for definitive management.    I have reviewed the nursing notes.    I have reviewed the findings, diagnosis, plan and need for follow up with the patient.    Current Discharge Medication List          Final diagnoses:   ST elevation MI (STEMI) (H)   IBrent, am serving as a trained medical scribe to document services personally performed by Ramila Jenkins MD, based on the provider's  statements to me.     I, Ramila Jenkins MD, was physically present and have reviewed and verified the accuracy of this note documented by Brent Forde.      1/7/2021   [facility] EMERGENCY DEPARTMENT     Ramila Jenkins MD  01/07/21 1931

## 2021-01-08 NOTE — PROGRESS NOTES
Critical Care Cardiology   Progress Note  Krystal Virgen MRN: [id number]  Age: 80 year old, : 1940      History of Present Illness   Krystal Virgen is a 80 year old female who was admitted on 2021 for chest pain; EKG concerning for lateral STEMI; s/p JUAN to prox LCx.     Subjective/Interval Events   - No recurrent episodes of chest pain.   - Infrequent ectopics seen on tele; no NSVT/VT episodes.  - Complained of mild groin pain. No significant induration, clinical exam improved since yday.    Assessment and Plan   Today's plan:    - Continue DAPT & statin.  - Continue Toprol Xl 25 mg daily, uptitrate as hemodynamically tolerated.   - Consider starting ACEI tomorrow.   - Transfer out of the ICU.     Neurology:   No acute issues.       Cardiovascular:   # Lateral STEMI s/p JUAN to LCx:      TTE 21: LVEF 55-60% with inferolateral hypokinesis, normal RV function and no pericardial effusion.      Plan:  -continue ASA and ticagrelor.  -continue rosuva 40 nightly.  -continue toprol XL 25 daily.   -hold ACE/ARB given mildly reduced renal fxn, will try to initiate tomorrow.     Pulmonary:  On RA, no acute pathology.      GI and Nutrition:   Plan:   -monitor daily LFTs  -On PO diet.     Renal, Fluid, and Electrolytes:   Creatinine 1.3 mg/dL, BUN 16.   Plan:   -monitor urine output  -maintain K>4 and Mg>2     Infectious Disease:   WBC 11-12k, afebrile. No signs of infection. Leukocytosis c/w arrest.   Plan:   -continue to monitor temp and leukocytosis.     Hematology and Oncology:   # Right groin hematoma     Hgb 12 >> 10.8 >> 11.6, plt 350k, INR WNL. Receiving ASA/ticagrelor for JUAN.      Plan:   -daily CBC. Monitor hematoma size.   -transfuse for hgb <8  -DVT prophylaxis: heparin subcut.     Endocrine:   No known medical history. BG could get possibly elevated in setting of critical illness.  Plan:   -POC glucose checks.      Lines:   PIVs     Code Status: Full     The pt was discussed and evaluated with  "Dr. Almeida, Steven Philip, attending physician, who agrees with the assessment and plan above.     Stacie Lopes MD,   Cardiovascular Disease Fellow  Pager [phone]       Objective     /63   Pulse 74   Temp 98.7  F (37.1  C) (Oral)   Resp 17   Ht 1.6 m (5' 3\")   Wt 79.1 kg (174 lb 6.1 oz)   SpO2 94%   BMI 30.89 kg/m    Temp:  [97.7  F (36.5  C)-98.7  F (37.1  C)] 98.7  F (37.1  C)  Pulse:  [66-86] 74  Resp:  [12-21] 17  BP: ()/(59-96) 103/63  SpO2:  [92 %-98 %] 94 %  Wt Readings from Last 2 Encounters:   01/08/21 79.1 kg (174 lb 6.1 oz)   11/11/11 83.7 kg (184 lb 6.6 oz)     I/O last 3 completed shifts:  In: 420 [P.O.:420]  Out: 675 [Urine:675]    GENERAL APPEARANCE: Well appearing.  HEENT: No icterus, PERRA  CARDIOVASCULAR: Regular rate and rhythm, normal S1/S2, no S3/S4 and no murmur, click or rub. Normal PMI.   RESP: Clear air entry bilaterally.  EXTREMITIES: Warm, no edema.  Right groin: superficial bruising noted, stable. No induration. Stable/improving hematoma (margins marked).   NEURO: AO times three.           Data     Vent Settings:  Resp: 17 SpO2: 94 % O2 Device: None (Room air) Oxygen Delivery: 2 LPM    Arterial Blood Gas: No lab results found in last 7 days.    Vitals:    01/07/21 1348 01/08/21 0000   Weight: 77.1 kg (170 lb) 79.1 kg (174 lb 6.1 oz)   I/O last 3 completed shifts:  In: 420 [P.O.:420]  Out: 675 [Urine:675]  Recent Labs   Lab 01/08/21  0351 01/07/21  1400    140   POTASSIUM 4.2 4.0   CHLORIDE 108 109   CO2 22 24   ANIONGAP 9 7   * 114*   BUN 16 17   CR 1.33* 1.32*   ALINE 8.7 9.0     No components found for: URINE   Recent Labs   Lab 01/08/21  0351 01/07/21  1400   * 23   ALT 63* 16   BILITOTAL 0.4 0.4   ALBUMIN 2.8* 3.2*   PROTTOTAL 5.8* 6.6*   ALKPHOS 74 86     Temp: 98.7  F (37.1  C) Temp src: OralTemp  Min: 97.7  F (36.5  C)  Max: 98.7  F (37.1  C)   Recent Labs   Lab 01/08/21  0351 01/07/21 2015 01/07/21  1642 01/07/21  1400   WBC 11.6* 13.4* " 12.7* 11.7*   HGB 11.1* 10.7* 10.8* 12.1   HCT 35.3 35.0 34.7* 38.5   MCV 95 96 95 93   RDW 12.6 12.5 12.5 12.6    286 302 362     Recent Labs   Lab 21  1642 21  1400   INR  --  0.98   PTT 53* 28     Recent Labs   Lab 21  0351 21  1400   * 114*       All imaging personally reviewed:  Recent Results (from the past 24 hour(s))   Echo Limited    Narrative    [id number]  [id number]  [id number]  [id number]^JUAN ALBERTO^CALOS           [facility]  Echocardiography Laboratory  [address] [place] [id number]     Name: MERY VAUGHN  MRN: [id number]  : 1940  Study Date: 2021 03:07 PM  Age: 80 yrs  Gender: Female  Patient Location: [facility]  Reason For Study: Pericardial Effusion  Ordering Physician: CALOS AVENDANO  Performed By: OLEKSANDR Gil     BSA: 1.8 m2  Height: 63 in  Weight: 170 lb  _____________________________________________________________________________  __        Procedure  Limited Echocardiogram with portions of two-dimensional, color and spectral  Doppler performed.  _____________________________________________________________________________  __        Interpretation Summary  Limited Echocardiogram.  No pericardial effusion is present.  Global right ventricular function is normal.  Left ventricular function is normal.The EF is 55-60%.  Inferolateral (posterior) wall akinesis is present.  There is no prior study for direct comparison.  _____________________________________________________________________________  __        Left Ventricle  Left ventricular function is normal.The EF is 55-60%. Inferolateral  (posterior) wall akinesis is present.     Right Ventricle  The right ventricle is normal size. Global right ventricular function is  normal.     Pericardium  No pericardial effusion is present.        Compared to Previous Study  There is no prior study for direct  comparison.  _____________________________________________________________________________  __                          Report approved by: Hermann Dowd 01/07/2021 04:22 PM              _____________________________________________________________________________  __      Cardiac Catheterization    Narrative      Ost Cx to Prox Cx lesion is 100% stenosed.     Posterior STEMI with successful PCI and stent placement in the proximal   LCx.   Initial contained small wire perforation but without any evidence of   pericardial accumulation on ECHO.  Moderate  right groin hematoma contained and stable.      XR Chest Port 1 View    Narrative    EXAM: XR CHEST PORT 1 VW  1/7/2021 4:38 PM     HISTORY:  Chest Pain       COMPARISON:  None    FINDINGS:     The trachea is midline. The cardiomediastinal silhouette is enlarged.  The pulmonary vasculature are obscured.     The included osseous thorax, soft tissues and upper abdomen and are  within normal limits.     Bilateral perihilar prominence/opacities. Retrocardiac lucency likely  represents hiatal hernia. Peripheral basilar interstitial opacities.         Impression    IMPRESSION:  1. Enlarged cardiac silhouette with perihilar prominence and  interstitial opacities likely represents vascular congestion/edema.  Infection is also in the differential.  2. Probable hiatal hernia.    I have personally reviewed the examination and initial interpretation  and I agree with the findings.    MAYKEL HUGHES MD          Medications     Current Facility-Administered Medications   Medication     acetaminophen (TYLENOL) tablet 650 mg     alum & mag hydroxide-simethicone (MAALOX) suspension 30 mL     aspirin EC tablet 81 mg     Continuing beta blocker from home medication list OR beta blocker order already placed during this visit     Continuing statin from home medication list OR statin order already placed during this visit     HOLD:  Metformin and metformin containing medications if  patient received IV contrast with acute kidney injury or severe chronic kidney disease (stage IV or stage V; i.e., eGFR less than 30)     hydrALAZINE (APRESOLINE) injection 10 mg     iopamidol (ISOVUE-370) solution     lidocaine (LMX4) cream     lidocaine 1 % 0.1-1 mL     lidocaine 1 %     medication instruction     metoprolol (LOPRESSOR) injection 5-10 mg     metoprolol succinate ER (TOPROL-XL) 24 hr tablet 25 mg     midazolam (VERSED) injection 0.5-1 mg     naloxone (NARCAN) injection 0.2 mg    Or     naloxone (NARCAN) injection 0.4 mg    Or     naloxone (NARCAN) injection 0.2 mg    Or     naloxone (NARCAN) injection 0.4 mg     nitroGLYcerin (NITROSTAT) sublingual tablet 0.4 mg     ondansetron (ZOFRAN-ODT) ODT tab 4 mg    Or     ondansetron (ZOFRAN) injection 4 mg     oxyCODONE (ROXICODONE) tablet 5 mg    Or     oxyCODONE (ROXICODONE) tablet 10 mg     Percutaneous Coronary Intervention orders placed (this is information for BPA alerting)     polyethylene glycol (MIRALAX) Packet 17 g     rosuvastatin (CRESTOR) tablet 40 mg     senna-docusate (SENOKOT-S/PERICOLACE) 8.6-50 MG per tablet 1 tablet    Or     senna-docusate (SENOKOT-S/PERICOLACE) 8.6-50 MG per tablet 2 tablet     sodium chloride (PF) 0.9% PF flush 3 mL     sodium chloride (PF) 0.9% PF flush 3 mL     ticagrelor (BRILINTA) tablet 90 mg

## 2021-01-08 NOTE — PLAN OF CARE
Neuro: Pt A&Ox4. Pt c/o R groin pain and a headache - PRN Tylenol given x2 and PRN Oxycodone given x1. Bedrest completed at 2200. Pt up to bedside commode with SBA.    Cardiac: SR. HR 60s-80s. MAPS>65.    Respiratory: On RA. Lung sounds clear.    GI/: Denied nausea. Pt incontinent/dribbles urine and uses bedside commode. LBM 1/4.     Incisons: R groin hematoma remains unchanged and tender to palpation.    Lines: PIVx1.     Pt updated daughter; Daughter did not call RN for update.  Continue to monitor and update MD as needed. Continue plan of care.       Problem: Adult Inpatient Plan of Care  Goal: Plan of Care Review  Outcome: Improving

## 2021-01-09 ENCOUNTER — APPOINTMENT (OUTPATIENT)
Dept: OCCUPATIONAL THERAPY | Facility: CLINIC | Age: 81
DRG: 247 | End: 2021-01-09
Payer: COMMERCIAL

## 2021-01-09 LAB
ALBUMIN SERPL-MCNC: 2.5 G/DL (ref 3.4–5)
ALBUMIN UR-MCNC: 30 MG/DL
ALP SERPL-CCNC: 62 U/L (ref 40–150)
ALT SERPL W P-5'-P-CCNC: 43 U/L (ref 0–50)
ANION GAP SERPL CALCULATED.3IONS-SCNC: 6 MMOL/L (ref 3–14)
APPEARANCE UR: CLEAR
AST SERPL W P-5'-P-CCNC: 184 U/L (ref 0–45)
BACTERIA #/AREA URNS HPF: ABNORMAL /HPF
BASOPHILS # BLD AUTO: 0.1 10E9/L (ref 0–0.2)
BASOPHILS # BLD AUTO: 0.1 10E9/L (ref 0–0.2)
BASOPHILS NFR BLD AUTO: 0.7 %
BASOPHILS NFR BLD AUTO: 0.8 %
BILIRUB SERPL-MCNC: 0.4 MG/DL (ref 0.2–1.3)
BILIRUB UR QL STRIP: NEGATIVE
BUN SERPL-MCNC: 18 MG/DL (ref 7–30)
CALCIUM SERPL-MCNC: 8.6 MG/DL (ref 8.5–10.1)
CHLORIDE SERPL-SCNC: 110 MMOL/L (ref 94–109)
CO2 SERPL-SCNC: 24 MMOL/L (ref 20–32)
COLOR UR AUTO: YELLOW
CREAT SERPL-MCNC: 1.47 MG/DL (ref 0.52–1.04)
DIFFERENTIAL METHOD BLD: ABNORMAL
DIFFERENTIAL METHOD BLD: ABNORMAL
EOSINOPHIL # BLD AUTO: 0.3 10E9/L (ref 0–0.7)
EOSINOPHIL # BLD AUTO: 0.3 10E9/L (ref 0–0.7)
EOSINOPHIL NFR BLD AUTO: 2.7 %
EOSINOPHIL NFR BLD AUTO: 2.7 %
ERYTHROCYTE [DISTWIDTH] IN BLOOD BY AUTOMATED COUNT: 12.6 % (ref 10–15)
ERYTHROCYTE [DISTWIDTH] IN BLOOD BY AUTOMATED COUNT: 12.7 % (ref 10–15)
GFR SERPL CREATININE-BSD FRML MDRD: 34 ML/MIN/{1.73_M2}
GLUCOSE SERPL-MCNC: 103 MG/DL (ref 70–99)
GLUCOSE UR STRIP-MCNC: NEGATIVE MG/DL
HCT VFR BLD AUTO: 30 % (ref 35–47)
HCT VFR BLD AUTO: 30.2 % (ref 35–47)
HGB BLD-MCNC: 9.5 G/DL (ref 11.7–15.7)
HGB BLD-MCNC: 9.5 G/DL (ref 11.7–15.7)
HGB UR QL STRIP: NEGATIVE
HYALINE CASTS #/AREA URNS LPF: 4 /LPF (ref 0–2)
IMM GRANULOCYTES # BLD: 0.1 10E9/L (ref 0–0.4)
IMM GRANULOCYTES # BLD: 0.1 10E9/L (ref 0–0.4)
IMM GRANULOCYTES NFR BLD: 0.5 %
IMM GRANULOCYTES NFR BLD: 0.5 %
KETONES UR STRIP-MCNC: NEGATIVE MG/DL
LEUKOCYTE ESTERASE UR QL STRIP: ABNORMAL
LYMPHOCYTES # BLD AUTO: 1.3 10E9/L (ref 0.8–5.3)
LYMPHOCYTES # BLD AUTO: 1.4 10E9/L (ref 0.8–5.3)
LYMPHOCYTES NFR BLD AUTO: 13.2 %
LYMPHOCYTES NFR BLD AUTO: 13.5 %
MCH RBC QN AUTO: 29.3 PG (ref 26.5–33)
MCH RBC QN AUTO: 29.7 PG (ref 26.5–33)
MCHC RBC AUTO-ENTMCNC: 31.5 G/DL (ref 31.5–36.5)
MCHC RBC AUTO-ENTMCNC: 31.7 G/DL (ref 31.5–36.5)
MCV RBC AUTO: 93 FL (ref 78–100)
MCV RBC AUTO: 94 FL (ref 78–100)
MONOCYTES # BLD AUTO: 1 10E9/L (ref 0–1.3)
MONOCYTES # BLD AUTO: 1.1 10E9/L (ref 0–1.3)
MONOCYTES NFR BLD AUTO: 11.5 %
MONOCYTES NFR BLD AUTO: 9.5 %
MUCOUS THREADS #/AREA URNS LPF: PRESENT /LPF
NEUTROPHILS # BLD AUTO: 6.6 10E9/L (ref 1.6–8.3)
NEUTROPHILS # BLD AUTO: 7.5 10E9/L (ref 1.6–8.3)
NEUTROPHILS NFR BLD AUTO: 71 %
NEUTROPHILS NFR BLD AUTO: 73.4 %
NITRATE UR QL: NEGATIVE
NRBC # BLD AUTO: 0 10*3/UL
NRBC # BLD AUTO: 0 10*3/UL
NRBC BLD AUTO-RTO: 0 /100
NRBC BLD AUTO-RTO: 0 /100
PH UR STRIP: 5.5 PH (ref 5–7)
PLATELET # BLD AUTO: 288 10E9/L (ref 150–450)
PLATELET # BLD AUTO: 313 10E9/L (ref 150–450)
POTASSIUM SERPL-SCNC: 4 MMOL/L (ref 3.4–5.3)
PROT SERPL-MCNC: 5.1 G/DL (ref 6.8–8.8)
RBC # BLD AUTO: 3.2 10E12/L (ref 3.8–5.2)
RBC # BLD AUTO: 3.24 10E12/L (ref 3.8–5.2)
RBC #/AREA URNS AUTO: 1 /HPF (ref 0–2)
SODIUM SERPL-SCNC: 140 MMOL/L (ref 133–144)
SOURCE: ABNORMAL
SP GR UR STRIP: 1.02 (ref 1–1.03)
SQUAMOUS #/AREA URNS AUTO: 3 /HPF (ref 0–1)
UROBILINOGEN UR STRIP-MCNC: NORMAL MG/DL (ref 0–2)
WBC # BLD AUTO: 10.2 10E9/L (ref 4–11)
WBC # BLD AUTO: 9.3 10E9/L (ref 4–11)
WBC #/AREA URNS AUTO: 14 /HPF (ref 0–5)

## 2021-01-09 PROCEDURE — 200N000002 HC R&B ICU UMMC

## 2021-01-09 PROCEDURE — 87186 SC STD MICRODIL/AGAR DIL: CPT | Performed by: INTERNAL MEDICINE

## 2021-01-09 PROCEDURE — 250N000013 HC RX MED GY IP 250 OP 250 PS 637: Performed by: INTERNAL MEDICINE

## 2021-01-09 PROCEDURE — 81001 URINALYSIS AUTO W/SCOPE: CPT | Performed by: STUDENT IN AN ORGANIZED HEALTH CARE EDUCATION/TRAINING PROGRAM

## 2021-01-09 PROCEDURE — 36415 COLL VENOUS BLD VENIPUNCTURE: CPT | Performed by: INTERNAL MEDICINE

## 2021-01-09 PROCEDURE — 85025 COMPLETE CBC W/AUTO DIFF WBC: CPT | Performed by: STUDENT IN AN ORGANIZED HEALTH CARE EDUCATION/TRAINING PROGRAM

## 2021-01-09 PROCEDURE — 36415 COLL VENOUS BLD VENIPUNCTURE: CPT | Performed by: STUDENT IN AN ORGANIZED HEALTH CARE EDUCATION/TRAINING PROGRAM

## 2021-01-09 PROCEDURE — 97530 THERAPEUTIC ACTIVITIES: CPT | Mod: GO

## 2021-01-09 PROCEDURE — 250N000013 HC RX MED GY IP 250 OP 250 PS 637: Performed by: STUDENT IN AN ORGANIZED HEALTH CARE EDUCATION/TRAINING PROGRAM

## 2021-01-09 PROCEDURE — 99233 SBSQ HOSP IP/OBS HIGH 50: CPT | Mod: GC | Performed by: INTERNAL MEDICINE

## 2021-01-09 PROCEDURE — 87088 URINE BACTERIA CULTURE: CPT | Performed by: INTERNAL MEDICINE

## 2021-01-09 PROCEDURE — 85025 COMPLETE CBC W/AUTO DIFF WBC: CPT | Performed by: INTERNAL MEDICINE

## 2021-01-09 PROCEDURE — 80053 COMPREHEN METABOLIC PANEL: CPT | Performed by: INTERNAL MEDICINE

## 2021-01-09 PROCEDURE — 87086 URINE CULTURE/COLONY COUNT: CPT | Performed by: INTERNAL MEDICINE

## 2021-01-09 PROCEDURE — 97165 OT EVAL LOW COMPLEX 30 MIN: CPT | Mod: GO

## 2021-01-09 RX ORDER — NITROFURANTOIN 25; 75 MG/1; MG/1
100 CAPSULE ORAL EVERY 12 HOURS SCHEDULED
Status: DISCONTINUED | OUTPATIENT
Start: 2021-01-09 | End: 2021-01-09

## 2021-01-09 RX ORDER — CIPROFLOXACIN 250 MG/1
250 TABLET, FILM COATED ORAL EVERY 12 HOURS SCHEDULED
Status: DISCONTINUED | OUTPATIENT
Start: 2021-01-09 | End: 2021-01-10 | Stop reason: HOSPADM

## 2021-01-09 RX ADMIN — ACETAMINOPHEN 325 MG: 325 TABLET, FILM COATED ORAL at 02:20

## 2021-01-09 RX ADMIN — TICAGRELOR 90 MG: 90 TABLET ORAL at 17:05

## 2021-01-09 RX ADMIN — OXYCODONE HYDROCHLORIDE AND ACETAMINOPHEN 1 TABLET: 10; 325 TABLET ORAL at 08:25

## 2021-01-09 RX ADMIN — METOPROLOL SUCCINATE 25 MG: 25 TABLET, EXTENDED RELEASE ORAL at 08:25

## 2021-01-09 RX ADMIN — MONTELUKAST 10 MG: 10 TABLET, FILM COATED ORAL at 22:19

## 2021-01-09 RX ADMIN — TRAZODONE HYDROCHLORIDE 50 MG: 50 TABLET ORAL at 20:30

## 2021-01-09 RX ADMIN — ROSUVASTATIN CALCIUM 40 MG: 40 TABLET, FILM COATED ORAL at 12:30

## 2021-01-09 RX ADMIN — ASPIRIN 81 MG: 81 TABLET, DELAYED RELEASE ORAL at 08:25

## 2021-01-09 RX ADMIN — TICAGRELOR 90 MG: 90 TABLET ORAL at 04:26

## 2021-01-09 RX ADMIN — OMEPRAZOLE 40 MG: 20 CAPSULE, DELAYED RELEASE ORAL at 08:25

## 2021-01-09 RX ADMIN — ACETAMINOPHEN 650 MG: 325 TABLET, FILM COATED ORAL at 20:30

## 2021-01-09 RX ADMIN — CIPROFLOXACIN 250 MG: 250 TABLET, FILM COATED ORAL at 20:30

## 2021-01-09 ASSESSMENT — ACTIVITIES OF DAILY LIVING (ADL)
ADLS_ACUITY_SCORE: 23

## 2021-01-09 ASSESSMENT — MIFFLIN-ST. JEOR: SCORE: 1245.13

## 2021-01-09 NOTE — PROGRESS NOTES
Critical Care Cardiology   Progress Note  Krystal Virgen MRN: [id number]  Age: 80 year old, : 1940      History of Present Illness   Krystal Virgen is a 80 year old female who was admitted on 2021 for chest pain; EKG concerning for lateral STEMI; s/p JUAN to prox LCx.     Subjective/Interval Events   - NAEON  - Mild dysuria     Assessment and Plan   Today's plan:  - transfer to floor  - PT/OT for conditioning  - If muscle pain, worsens can obtain CPK  - Dysuria-->will obtain UA-->If + can start nitrofurtantoin      Neurology:   No acute issues.       Cardiovascular:   # Lateral STEMI s/p JUAN to LCx:      TTE 21: LVEF 55-60% with inferolateral hypokinesis, normal RV function and no pericardial effusion.      Plan:  -continue ASA and ticagrelor.  -continue rosuva 40 nightly.  -continue toprol XL 25 daily.   -hold ACE/ARB given mild RAMON      Pulmonary:  On RA, no acute pathology.      GI and Nutrition:   Plan:   -monitor daily LFTs  -On PO diet.     Renal, Fluid, and Electrolytes:   Creatinine 1.47 today  Plan:   -monitor urine output  -maintain K>4 and Mg>2     Infectious Disease:   WBC 11-12k, afebrile. No signs of infection. Leukocytosis c/w arrest.   Plan:   -continue to monitor temp and leukocytosis.    Dysuria, suspcion for UTI  Complains of dysuria this morning  - Obtain UA/UC  - If +, will start nitrofurtantoin      Hematology and Oncology:   # Right groin hematoma     HGB:9.3 today, continues to slowly downtrend. Receiving ASA/ticagrelor for JUAN.      Plan:   -daily CBC. Monitor hematoma size.   -transfuse for hgb <8  -DVT prophylaxis: heparin subcut.     Endocrine:   No known medical history. BG could get possibly elevated in setting of critical illness.  Plan:   -POC glucose checks.      Lines:   PIVs     Code Status: Full     The pt was discussed and evaluated with Steven Perry, attending physician, who agrees with the assessment and plan above.     Tone Agarwal,  "MD  Cardiology Fellow  PGY5       Objective     /77   Pulse 68   Temp 98.1  F (36.7  C) (Oral)   Resp 17   Ht 1.6 m (5' 3\")   Wt 80.1 kg (176 lb 9.4 oz)   SpO2 94%   BMI 31.28 kg/m    Temp:  [98  F (36.7  C)-98.5  F (36.9  C)] 98.1  F (36.7  C)  Pulse:  [60-87] 68  Resp:  [12-27] 17  BP: ()/(54-80) 122/77  SpO2:  [84 %-98 %] 94 %  Wt Readings from Last 2 Encounters:   01/09/21 80.1 kg (176 lb 9.4 oz)   11/11/11 83.7 kg (184 lb 6.6 oz)     I/O last 3 completed shifts:  In: 885 [P.O.:885]  Out: -     GENERAL APPEARANCE: Well appearing.  HEENT: No icterus, PERRA  CARDIOVASCULAR: Regular rate and rhythm, normal S1/S2, no S3/S4 and no murmur, click or rub. Normal PMI.   RESP: Clear air entry bilaterally.  EXTREMITIES: Warm, no edema.  Right groin: superficial bruising noted, stable. No induration. Stable/improving hematoma (margins marked).   NEURO: AO times three.           Data     Vent Settings:  Resp: 17 SpO2: 94 % O2 Device: None (Room air)      Arterial Blood Gas: No lab results found in last 7 days.    Vitals:    01/07/21 1348 01/08/21 0000 01/09/21 0100   Weight: 77.1 kg (170 lb) 79.1 kg (174 lb 6.1 oz) 80.1 kg (176 lb 9.4 oz)   I/O last 3 completed shifts:  In: 885 [P.O.:885]  Out: -   Recent Labs   Lab 01/09/21  0501 01/08/21  0351    138   POTASSIUM 4.0 4.2   CHLORIDE 110* 108   CO2 24 22   ANIONGAP 6 9   * 134*   BUN 18 16   CR 1.47* 1.33*   ALINE 8.6 8.7     No components found for: URINE   Recent Labs   Lab 01/09/21  0501 01/08/21  0351 01/07/21  1400   * 430* 23   ALT 43 63* 16   BILITOTAL 0.4 0.4 0.4   ALBUMIN 2.5* 2.8* 3.2*   PROTTOTAL 5.1* 5.8* 6.6*   ALKPHOS 62 74 86     Temp: 98.1  F (36.7  C) Temp src: OralTemp  Min: 98  F (36.7  C)  Max: 98.5  F (36.9  C)   Recent Labs   Lab 01/09/21  1414 01/09/21  0501 01/08/21  0351 01/07/21 2015 01/07/21  1642   WBC 9.3 10.2 11.6* 13.4* 12.7*   HGB 9.5* 9.5* 11.1* 10.7* 10.8*   HCT 30.2* 30.0* 35.3 35.0 34.7*   MCV 93 94 95 " 96 95   RDW 12.7 12.6 12.6 12.5 12.5    288 351 286 302     Recent Labs   Lab 21  1642 21  1400   INR  --  0.98   PTT 53* 28     Recent Labs   Lab 21  0501 21  0351 21  1400   * 134* 114*       All imaging personally reviewed:  Recent Results (from the past 24 hour(s))   Echo Limited    Narrative    [id number]  [id number]  [id number]  [id number]^JUAN ALBERTO^CALOS           [facility],[place]  Echocardiography Laboratory  [address] [place] [address]     Name: MERY VAUGHN  MRN: [id number]  : 1940  Study Date: 2021 03:07 PM  Age: 80 yrs  Gender: Female  Patient [facility]: [facility]  Reason For Study: Pericardial Effusion  Ordering Physician: CALOS AVENDANO  Performed By: OLEKSANDR Gil     BSA: 1.8 m2  Height: 63 in  Weight: 170 lb  _____________________________________________________________________________  __        Procedure  Limited Echocardiogram with portions of two-dimensional, color and spectral  Doppler performed.  _____________________________________________________________________________  __        Interpretation Summary  Limited Echocardiogram.  No pericardial effusion is present.  Global right ventricular function is normal.  Left ventricular function is normal.The EF is 55-60%.  Inferolateral (posterior) wall akinesis is present.  There is no prior study for direct comparison.  _____________________________________________________________________________  __        Left Ventricle  Left ventricular function is normal.The EF is 55-60%. Inferolateral  (posterior) wall akinesis is present.     Right Ventricle  The right ventricle is normal size. Global right ventricular function is  normal.     Pericardium  No pericardial effusion is present.        Compared to Previous Study  There is no prior study for direct comparison.  _____________________________________________________________________________  __                           Report approved by: Hermann Dowd 01/07/2021 04:22 PM              _____________________________________________________________________________  __      Cardiac Catheterization    Narrative      Ost Cx to Prox Cx lesion is 100% stenosed.     Posterior STEMI with successful PCI and stent placement in the proximal   LCx.   Initial contained small wire perforation but without any evidence of   pericardial accumulation on ECHO.  Moderate  right groin hematoma contained and stable.      XR Chest Port 1 View    Narrative    EXAM: XR CHEST PORT 1 VW  1/7/2021 4:38 PM     HISTORY:  Chest Pain       COMPARISON:  None    FINDINGS:     The trachea is midline. The cardiomediastinal silhouette is enlarged.  The pulmonary vasculature are obscured.     The included osseous thorax, soft tissues and upper abdomen and are  within normal limits.     Bilateral perihilar prominence/opacities. Retrocardiac lucency likely  represents hiatal hernia. Peripheral basilar interstitial opacities.         Impression    IMPRESSION:  1. Enlarged cardiac silhouette with perihilar prominence and  interstitial opacities likely represents vascular congestion/edema.  Infection is also in the differential.  2. Probable hiatal hernia.    I have personally reviewed the examination and initial interpretation  and I agree with the findings.    MAYKEL HUGHES MD          Medications     Current Facility-Administered Medications   Medication     acetaminophen (TYLENOL) tablet 650 mg     albuterol (PROAIR HFA/PROVENTIL HFA/VENTOLIN HFA) 108 (90 Base) MCG/ACT inhaler 1 puff     alum & mag hydroxide-simethicone (MAALOX) suspension 30 mL     aspirin EC tablet 81 mg     Continuing beta blocker from home medication list OR beta blocker order already placed during this visit     Continuing statin from home medication list OR statin order already placed during this visit     fluticasone-vilanterol (BREO ELLIPTA) 200-25 MCG/INH inhaler 1 puff      HOLD:  Metformin and metformin containing medications if patient received IV contrast with acute kidney injury or severe chronic kidney disease (stage IV or stage V; i.e., eGFR less than 30)     iopamidol (ISOVUE-370) solution     lidocaine (LMX4) cream     lidocaine 1 % 0.1-1 mL     medication instruction     metoprolol succinate ER (TOPROL-XL) 24 hr tablet 25 mg     midazolam (VERSED) injection 0.5-1 mg     montelukast (SINGULAIR) tablet 10 mg     naloxone (NARCAN) injection 0.2 mg    Or     naloxone (NARCAN) injection 0.4 mg    Or     naloxone (NARCAN) injection 0.2 mg    Or     naloxone (NARCAN) injection 0.4 mg     nitroGLYcerin (NITROSTAT) sublingual tablet 0.4 mg     omeprazole (priLOSEC) CR capsule 40 mg     ondansetron (ZOFRAN-ODT) ODT tab 4 mg    Or     ondansetron (ZOFRAN) injection 4 mg     oxyCODONE-acetaminophen (PERCOCET) 5-325 MG per tablet 1 tablet    Or     oxyCODONE-acetaminophen (PERCOCET)  MG per tablet 1 tablet     Percutaneous Coronary Intervention orders placed (this is information for BPA alerting)     polyethylene glycol (MIRALAX) Packet 17 g     rosuvastatin (CRESTOR) tablet 40 mg     senna-docusate (SENOKOT-S/PERICOLACE) 8.6-50 MG per tablet 1 tablet    Or     senna-docusate (SENOKOT-S/PERICOLACE) 8.6-50 MG per tablet 2 tablet     sodium chloride (PF) 0.9% PF flush 3 mL     sodium chloride (PF) 0.9% PF flush 3 mL     ticagrelor (BRILINTA) tablet 90 mg     traZODone (DESYREL) tablet 50 mg

## 2021-01-09 NOTE — PROGRESS NOTES
01/09/21 1400   Quick Adds   Type of Visit Initial Occupational Therapy Evaluation   Living Environment   People in home alone   Current Living Arrangements independent living facility   Home Accessibility no concerns   Transportation Anticipated family or friend will provide;agency;car, drives self   Living Environment Comments Pt lives alone at [facility], pt reports supportive neighbors and staff - daughter assists with IADLs as needed. walk in shower present, elevator access   Self-Care   Usual Activity Tolerance good   Current Activity Tolerance fair   Regular Exercise No   Equipment Currently Used at Home grab bar, toilet;grab bar, tub/shower;raised toilet seat   Activity/Exercise/Self-Care Comment Pt reports IND with ADLs and functional mobility   Disability/Function   Hearing Difficulty or Deaf no   Wear Glasses or Blind yes   Vision Management glasses   Concentrating, Remembering or Making Decisions Difficulty no   Difficulty Communicating no   Difficulty Eating/Swallowing no   Walking or Climbing Stairs Difficulty no   Dressing/Bathing Difficulty no   Toileting issues no   Doing Errands Independently Difficulty (such as shopping) no   Fall history within last six months yes   Number of times patient has fallen within last six months 1   Change in Functional Status Since Onset of Current Illness/Injury yes   General Information   Onset of Illness/Injury or Date of Surgery 01/07/21   Referring Physician Von Luevano MD   Patient/Family Therapy Goal Statement (OT) to gain endurance and go home   Additional Occupational Profile Info/Pertinent History of Current Problem Krystal Virgen is a 80 year old female who was admitted on 1/7/2021 for chest pain; EKG concerning for lateral STEMI; s/p JUAN to prox LCx.    Performance Patterns (Routines, Roles, Habits) Pt lives alone at [facility], enjoys reading in freetime.    Existing Precautions/Restrictions cardiac;fall   Left Upper Extremity (Weight-bearing Status) full  weight-bearing (FWB)   Right Upper Extremity (Weight-bearing Status) full weight-bearing (FWB)   Left Lower Extremity (Weight-bearing Status) full weight-bearing (FWB)   Right Lower Extremity (Weight-bearing Status) full weight-bearing (FWB)   Heart Disease Risk Factors Age;Medical history;Lack of physical activity   General Observations and Info Activity: ambulate   Cognitive Status Examination   Orientation Status orientation to person, place and time   Affect/Mental Status (Cognitive) WNL   Follows Commands WNL   Cognitive Status Comments Pt oriented x 4, alert and appropriate during conversation. No deficits identified   Visual Perception   Visual Impairment/Limitations corrective lenses full-time   Sensory   Sensory Quick Adds No deficits were identified   Pain Assessment   Patient Currently in Pain No   Range of Motion Comprehensive   General Range of Motion no range of motion deficits identified   Strength Comprehensive (MMT)   Comment, General Manual Muscle Testing (MMT) Assessment  B UEs grossly 4/5   Instrumental Activities of Daily Living (IADL)   Previous Responsibilities meal prep;housekeeping;laundry;finances;medication management;driving   IADL Comments Pt IND with IADLs at baseline, daughter and staff help with cleaning and meal prep as needed   Clinical Impression   Criteria for Skilled Therapeutic Interventions Met (OT) yes;meets criteria   OT Diagnosis decreased IND with ADL/IADLs   OT Problem List-Impairments impacting ADL problems related to;activity tolerance impaired;strength   Assessment of Occupational Performance 1-3 Performance Deficits   Identified Performance Deficits home management, bathing, functional mobility   Planned Therapy Interventions (OT) ADL retraining;IADL retraining;risk factor education;progressive activity/exercise;home program guidelines;strengthening   Clinical Decision Making Complexity (OT) low complexity   Therapy Frequency (OT) 5x/week   Predicted Duration of  Therapy 1 week   Anticipated Equipment Needs Upon Discharge (OT)   (TBD)   Risks and Benefits of Treatment have been explained. Yes   Patient, Family & other staff in agreement with plan of care Yes   OT Discharge Planning    OT Discharge Recommendation (DC Rec) Home with assist   OT Rationale for DC Rec Pt limited mostly by activity tolerance, anticipate will be safe to d/c home with assist for heavy ADL/IADLs as needed   OT Brief overview of current status  SBA for functional mobility and transfers   Total Evaluation Time (Minutes)   Total Evaluation Time (Minutes) 5

## 2021-01-09 NOTE — PLAN OF CARE
D: Jody states she has had past UTI's and feels she may have one, urgency and frequency with urination, specimen collected, await UA/UC order.     Neuro: Alert and oriented x 4, tongue midline, pupils 3/brisk/tracks/conjugate . CERON strongly, all warm/normal pulses/normal sensation except for L 3rd,4th fingertips.   VS: Afeb, RR 18-24, HR 72-96, BP /60-79, maps 71-90.  CV: Afib at start of shift, SR after an hour with frequent PAC/PVC's.   Pulm: LS clear throughout. Sats O2 > 91% on room air.   GI: BS +, last liquid stool 1/8.   : voids frequently in small amounts. Bladder scan for 4 ml.   Lines/Gtts:  L AC piv saline locked.   Skin:   Intact with large, painful hematoma to the right groin.   Drains: 0  Labs: pending  P: Maintain safety with activity, urgency to void. Continue to attempt to obtain disposable briefs. Monitor cardiac function, plan for discharge. Pt states she has been in her independent living home, lives alone. Cell phone at bedside.

## 2021-01-10 ENCOUNTER — APPOINTMENT (OUTPATIENT)
Dept: ULTRASOUND IMAGING | Facility: CLINIC | Age: 81
DRG: 247 | End: 2021-01-10
Attending: PHYSICIAN ASSISTANT
Payer: COMMERCIAL

## 2021-01-10 ENCOUNTER — PATIENT OUTREACH (OUTPATIENT)
Dept: CARE COORDINATION | Facility: CLINIC | Age: 81
End: 2021-01-10

## 2021-01-10 ENCOUNTER — APPOINTMENT (OUTPATIENT)
Dept: OCCUPATIONAL THERAPY | Facility: CLINIC | Age: 81
DRG: 247 | End: 2021-01-10
Payer: COMMERCIAL

## 2021-01-10 VITALS
TEMPERATURE: 97.8 F | HEIGHT: 63 IN | OXYGEN SATURATION: 92 % | RESPIRATION RATE: 28 BRPM | SYSTOLIC BLOOD PRESSURE: 111 MMHG | WEIGHT: 177.47 LBS | HEART RATE: 81 BPM | BODY MASS INDEX: 31.45 KG/M2 | DIASTOLIC BLOOD PRESSURE: 78 MMHG

## 2021-01-10 LAB
ALBUMIN SERPL-MCNC: 2.4 G/DL (ref 3.4–5)
ALP SERPL-CCNC: 65 U/L (ref 40–150)
ALT SERPL W P-5'-P-CCNC: 34 U/L (ref 0–50)
ANION GAP SERPL CALCULATED.3IONS-SCNC: 5 MMOL/L (ref 3–14)
AST SERPL W P-5'-P-CCNC: 97 U/L (ref 0–45)
BACTERIA SPEC CULT: ABNORMAL
BILIRUB DIRECT SERPL-MCNC: 0.1 MG/DL (ref 0–0.2)
BILIRUB SERPL-MCNC: 0.3 MG/DL (ref 0.2–1.3)
BUN SERPL-MCNC: 18 MG/DL (ref 7–30)
CALCIUM SERPL-MCNC: 8.7 MG/DL (ref 8.5–10.1)
CHLORIDE SERPL-SCNC: 112 MMOL/L (ref 94–109)
CO2 SERPL-SCNC: 25 MMOL/L (ref 20–32)
CREAT SERPL-MCNC: 1.41 MG/DL (ref 0.52–1.04)
ERYTHROCYTE [DISTWIDTH] IN BLOOD BY AUTOMATED COUNT: 12.7 % (ref 10–15)
GFR SERPL CREATININE-BSD FRML MDRD: 35 ML/MIN/{1.73_M2}
GLUCOSE SERPL-MCNC: 95 MG/DL (ref 70–99)
HCT VFR BLD AUTO: 28.3 % (ref 35–47)
HGB BLD-MCNC: 8.6 G/DL (ref 11.7–15.7)
HGB BLD-MCNC: 9 G/DL (ref 11.7–15.7)
Lab: ABNORMAL
MAGNESIUM SERPL-MCNC: 2.2 MG/DL (ref 1.6–2.3)
MCH RBC QN AUTO: 28.6 PG (ref 26.5–33)
MCHC RBC AUTO-ENTMCNC: 30.4 G/DL (ref 31.5–36.5)
MCV RBC AUTO: 94 FL (ref 78–100)
PHOSPHATE SERPL-MCNC: 3.8 MG/DL (ref 2.5–4.5)
PLATELET # BLD AUTO: 279 10E9/L (ref 150–450)
POTASSIUM SERPL-SCNC: 4 MMOL/L (ref 3.4–5.3)
PROT SERPL-MCNC: 5.6 G/DL (ref 6.8–8.8)
RBC # BLD AUTO: 3.01 10E12/L (ref 3.8–5.2)
SODIUM SERPL-SCNC: 142 MMOL/L (ref 133–144)
SPECIMEN SOURCE: ABNORMAL
WBC # BLD AUTO: 8.4 10E9/L (ref 4–11)

## 2021-01-10 PROCEDURE — 36415 COLL VENOUS BLD VENIPUNCTURE: CPT | Performed by: INTERNAL MEDICINE

## 2021-01-10 PROCEDURE — 93926 LOWER EXTREMITY STUDY: CPT | Mod: RT

## 2021-01-10 PROCEDURE — 99239 HOSP IP/OBS DSCHRG MGMT >30: CPT | Performed by: INTERNAL MEDICINE

## 2021-01-10 PROCEDURE — 999N000147 HC STATISTIC PT IP EVAL DEFER

## 2021-01-10 PROCEDURE — 250N000013 HC RX MED GY IP 250 OP 250 PS 637: Performed by: PHYSICIAN ASSISTANT

## 2021-01-10 PROCEDURE — 85027 COMPLETE CBC AUTOMATED: CPT | Performed by: INTERNAL MEDICINE

## 2021-01-10 PROCEDURE — 84100 ASSAY OF PHOSPHORUS: CPT | Performed by: INTERNAL MEDICINE

## 2021-01-10 PROCEDURE — 93926 LOWER EXTREMITY STUDY: CPT | Mod: 26 | Performed by: RADIOLOGY

## 2021-01-10 PROCEDURE — 80076 HEPATIC FUNCTION PANEL: CPT | Performed by: PHYSICIAN ASSISTANT

## 2021-01-10 PROCEDURE — 97530 THERAPEUTIC ACTIVITIES: CPT | Mod: GO

## 2021-01-10 PROCEDURE — 85018 HEMOGLOBIN: CPT | Performed by: PHYSICIAN ASSISTANT

## 2021-01-10 PROCEDURE — 250N000013 HC RX MED GY IP 250 OP 250 PS 637: Performed by: STUDENT IN AN ORGANIZED HEALTH CARE EDUCATION/TRAINING PROGRAM

## 2021-01-10 PROCEDURE — 99207 PR APP CREDIT; MD BILLING SHARED VISIT: CPT | Performed by: PHYSICIAN ASSISTANT

## 2021-01-10 PROCEDURE — 80048 BASIC METABOLIC PNL TOTAL CA: CPT | Performed by: INTERNAL MEDICINE

## 2021-01-10 PROCEDURE — 83735 ASSAY OF MAGNESIUM: CPT | Performed by: INTERNAL MEDICINE

## 2021-01-10 PROCEDURE — 36415 COLL VENOUS BLD VENIPUNCTURE: CPT | Performed by: PHYSICIAN ASSISTANT

## 2021-01-10 PROCEDURE — 80076 HEPATIC FUNCTION PANEL: CPT | Performed by: INTERNAL MEDICINE

## 2021-01-10 PROCEDURE — 250N000013 HC RX MED GY IP 250 OP 250 PS 637: Performed by: INTERNAL MEDICINE

## 2021-01-10 RX ORDER — MONTELUKAST SODIUM 10 MG/1
10 TABLET ORAL AT BEDTIME
Status: DISCONTINUED | OUTPATIENT
Start: 2021-01-10 | End: 2021-01-10 | Stop reason: HOSPADM

## 2021-01-10 RX ORDER — CITALOPRAM HYDROBROMIDE 20 MG/1
20 TABLET ORAL DAILY
Status: DISCONTINUED | OUTPATIENT
Start: 2021-01-10 | End: 2021-01-10 | Stop reason: HOSPADM

## 2021-01-10 RX ORDER — METOPROLOL SUCCINATE 25 MG/1
25 TABLET, EXTENDED RELEASE ORAL DAILY
Qty: 90 TABLET | Refills: 3 | Status: SHIPPED | OUTPATIENT
Start: 2021-01-11 | End: 2021-01-21

## 2021-01-10 RX ORDER — BUPROPION HYDROCHLORIDE 100 MG/1
100 TABLET, EXTENDED RELEASE ORAL DAILY
Status: DISCONTINUED | OUTPATIENT
Start: 2021-01-10 | End: 2021-01-10 | Stop reason: HOSPADM

## 2021-01-10 RX ORDER — NITROGLYCERIN 0.4 MG/1
TABLET SUBLINGUAL
Qty: 25 TABLET | Refills: 0 | Status: SHIPPED | OUTPATIENT
Start: 2021-01-10

## 2021-01-10 RX ORDER — ROSUVASTATIN CALCIUM 40 MG/1
40 TABLET, COATED ORAL DAILY
Qty: 90 TABLET | Refills: 1 | Status: SHIPPED | OUTPATIENT
Start: 2021-01-11 | End: 2021-01-21

## 2021-01-10 RX ORDER — NITROFURANTOIN 25; 75 MG/1; MG/1
100 CAPSULE ORAL 2 TIMES DAILY
Qty: 9 CAPSULE | Refills: 0 | Status: SHIPPED | OUTPATIENT
Start: 2021-01-10 | End: 2021-01-15

## 2021-01-10 RX ADMIN — ROSUVASTATIN CALCIUM 40 MG: 40 TABLET, FILM COATED ORAL at 09:24

## 2021-01-10 RX ADMIN — METOPROLOL SUCCINATE 25 MG: 25 TABLET, EXTENDED RELEASE ORAL at 07:36

## 2021-01-10 RX ADMIN — TICAGRELOR 90 MG: 90 TABLET ORAL at 04:34

## 2021-01-10 RX ADMIN — BUPROPION HYDROCHLORIDE 100 MG: 100 TABLET, FILM COATED, EXTENDED RELEASE ORAL at 09:25

## 2021-01-10 RX ADMIN — ASPIRIN 81 MG: 81 TABLET, DELAYED RELEASE ORAL at 07:36

## 2021-01-10 RX ADMIN — TICAGRELOR 90 MG: 90 TABLET ORAL at 17:29

## 2021-01-10 RX ADMIN — CIPROFLOXACIN 250 MG: 250 TABLET, FILM COATED ORAL at 07:36

## 2021-01-10 RX ADMIN — OMEPRAZOLE 40 MG: 20 CAPSULE, DELAYED RELEASE ORAL at 07:36

## 2021-01-10 ASSESSMENT — ACTIVITIES OF DAILY LIVING (ADL)
ADLS_ACUITY_SCORE: 19

## 2021-01-10 ASSESSMENT — MIFFLIN-ST. JEOR: SCORE: 1249.13

## 2021-01-10 NOTE — PLAN OF CARE
Some discomfort to right groin site, declined any pain meds. Right groin site intact. Numbness/tingling to jaya fingers. Ambulated with therapy. Vitally stable. Room air. Cardiac diet with good appetite. Ultra sound down to RLE. Cleared to go home by CSI. Discharge ppwrk signed and reviewed with pt. Provider saw pt prior to discharge. All personal belongings and medications sent with pt. Daughter picking up pt tonight.

## 2021-01-10 NOTE — CONSULTS
Care Management Initial Consult    General Information  Assessment completed with: Patient,    Type of CM/SW Visit: CM Role Introduction    Primary Care Provider verified and updated as needed:     Readmission within the last 30 days:        Reason for Consult: insurance concerns  Advance Care Planning: Advance Care Planning Reviewed: questions answered     General Information Comments: Medicare questions    Communication Assessment  Patient's communication style: spoken language (English or Bilingual)    Hearing Difficulty or Deaf: no   Wear Glasses or Blind: yes    Cognitive  Cognitive/Neuro/Behavioral: WDL  Level of Consciousness: alert  Arousal Level: opens eyes spontaneously  Orientation: oriented x 4  Mood/Behavior: cooperative, behavior appropriate to situation  Best Language: 0 - No aphasia  Speech: clear, spontaneous, logical    Living Environment:   People in home: alone     Current living Arrangements: apartment(Senior independent apartment)      Able to return to prior arrangements: yes       Family/Social Support:  Care provided by: self  Provides care for: no one  Marital Status: Single  Children, Sibling(s), Other (specify)(Other residents at [facility])          Description of Support System: Supportive, Involved    Support Assessment: Adequate family and caregiver support, Adequate social supports    Current Resources:   Skilled Home Care Services:    Community Resources:    Equipment currently used at home: grab bar, toilet, grab bar, tub/shower, raised toilet seat  Supplies currently used at home:      Employment/Financial:  Employment Status: retired        Financial Concerns: No concerns identified   Pt. Voiced questions about Medicare upon admission - pt. Stated she called a [facility] senior helpline and was able to get questions answered  Referral to Financial Counselor: No       Lifestyle & Psychosocial Needs:        Socioeconomic History     Marital status:      Spouse name:  "Not on file     Number of children: Not on file     Years of education: Not on file     Highest education level: Not on file     Tobacco Use     Smoking status: Former Smoker     Smokeless tobacco: Never Used   Substance and Sexual Activity     Alcohol use: No     Drug use: No         Mental Health Status:  Mental Health Status: No Current Concerns       Chemical Dependency Status:  Chemical Dependency Status: No Current Concerns             Values/Beliefs:  Spiritual, Cultural Beliefs, Confucianist Practices, Values that affect care: other (see comments)(did not discuss)               Additional Information:  SW called pt. In room and she was agreeable to talk. Pt.'s biggest concern was wanting to discharge home. She feels she will recover better at home. She is able to have 1 visitor a day in her apt. D/t covid restrictions where she lives. It can be a different person each day. Pt. stated she has 2 daughters close by and a sister who will \"hover.\" Also, the other residents at the apt. Building will check on her. Pt. Had no concerns about her safety and ability to be at home at this time. This writer passed this onto the RN Care Coordinator to f/u. Pt. Stated she has voiced this to her nurse but also stated she has not seen a doctor since Friday to be able to discuss this with.      PATRICK Pinon, LORESW  Text paging available through [employer] - search SOCIAL WORK   ON CALL PAGER [phone]   UNITS 4A, 4C, 4E, 5A, 5B [phone]  UNITS 6A, 6B, 6C, 6D [phone]  UNITS 7A, 7B, 7C, 7D, 5C [phone]  ON CALL COVERAGE AFTER 1600 [phone]        "

## 2021-01-10 NOTE — PLAN OF CARE
Major Shift Events:  Alert and oriented times 4, pleasant and cooperative, complains of some minor pain in right groin, controlled adequately with tylenol. Patient stated she is trying to avoid narcotic pain management. States having bilateral numbness and tingling in fingers. PERR, 3mm. Remains in sinus rhythm with frequent PAC's. Normotensive. Afebrile. Pulses 2+ throughout. Lung sounds clear, remains on room air and maintaining sats. Tolerating cardiac sodium diet with fair appetite. Urgency when urinating and voids small amounts in commode. No BM overnight. Up with a standby assist, appears a bit weaker on RLE.   Plan: Anticipate transfer to floor with telemetry. Anticipate discharge home.   For vital signs and complete assessments, please see documentation flowsheets.

## 2021-01-10 NOTE — PLAN OF CARE
Major shift events: AOX4. Pain to groin site more tolerable today than yesterday. Anxious to go home and easily irritable. Occasionally afib. Occasional PVCs and PACs. Normotensive. Reg diet. Fair appetite. Voiding. BMx1. Pt feels like she has a UTI. Increase urgency. UA sent. Pt requesting home meds. Refer to sticky note.     Plan: Order home meds per pt request. Continue POC. Notify MD for any changes or concern.

## 2021-01-10 NOTE — DISCHARGE INSTRUCTIONS
Medication Changes:  1. Start 81mg aspirin daily. Take this every day  2. Please begin taking 90mg Brilinta twice daily.  This is a stent to maintain the patency of your stent. If you start to experience worsening shortness of breath, please call our clinic before stopping this medication  3. Please start taking 40mg Crestor daily.  This is to lower your cholesterol.  4. Please start 25mg metoprolol succinate daily.  This is to help heal your heart after the heart attack.  5. We have given you a prescription for sublingual nitroglycerin.  This is an as needed medication if you were to begin experiencing chest pain.    UTI: I have given you 9 additional doses of Macrobid.  If you urinary frequency, pain worsens, please contact your PCP.    Follow Up:  1. We will plan to see you in clinic in about 1 week.  We would like you to obtain labs prior to this visit. The clinic will reach out to schedule this appointment with you.  If you have not heard from them by Wednesday, please call the number below to ensure that this gets scheduled.  2. We will plan to see you once more in about 1 month in clinic with one of our cardiology MDs      Going Home after Coronary Angioplasty or Stent Placement  FOR 24 HOURS:         Have an adult stay with you for 24 hours.         Relax and take it easy.         Drink plenty of fluids.         Do NOT make any important or legal decisions.         Do NOT drive or operate machines at home or at work.         Do NOT drink alcohol.     PROCEDURE SITE:  Care of groin site:         Remove the Band-Aid after 24 hours. If there is minor oozing, apply another Band-aid and remove it after 12 hours.          Do NOT take a bath, or use a hot tub or pool for at least 3 days. You may shower.          It is normal to have a small bruise or lump at the site.         Do not scrub the site.         Do not use lotion or powder near the puncture site for 3 days.         For the first 2 days: Do not stoop or  squat. When you cough, sneeze or move your bowels, hold your hand over the puncture site and press gently.         Do not lift more than 10 pounds for at least 3 to 5 days.         For 2 days, do NOT have sex or do any heavy exercise.     If you start bleeding from the site in your groin:  Lie down flat and press firmly on the site.  Call your physician immediately, or, come to the emergency room.  Call 911 right away if you have bleeding that is heavy or does not stop.      MEDICATIONS:   1. You have begun Brilinta (ticagrelor), do not stop taking it until you talk to your heart doctor (cardiologist). This medication is essential for your stents. Do not stop it without speaking first to your heart doctor or their nurse- this includes if you have concerns about side effects or cost. Also, if another health provider tells you to stop it, let them know they need to discuss it with your heart doctor.   2.If you are on metformin (Glucophage), do not restart it until you have blood tests (within 2 to 3 days after discharge). When your doctor tells you it is safe, you may restart the metformin.   3. If you have not been continued on other medications you were previously taking at home it is probably for reason though please discuss your concerns with any of your doctors.     CARDIAC REHAB:  After the initial healing process of the procedure site, we recommend cardiac rehabilitation for all heart attack and stent patients. Cardiac rehabilitation will help you:  - Rebuild stamina, strength and balance.  - Learn how to participate in activities safely, as well as help you regain confidence to do so.  - Return to activities of daily living and leisure.  You should receive a call from them within the next week, but if you do not or you would like to call you can contact their central scheduling at [phone]    DIET:  We recommend a diet low in saturated fat, trans fat and cholesterol. In addition it will be helpful to be  cautious of sodium intake, sugar and carbohydrates. Try to increase the amount of lean meats you eat like fish and chicken, but avoid frying; and reduce the amount of red meat you eat. Eat more fresh fruits and vegetables and try to avoid canned and processed food. Please reference the handouts you received for more specific information.    CALL YOUR DOCTOR IF:  -You have a large or growing lump/bump around the procedure site  -The site is red, swollen, hot, tender or has drainage  -You have hives, a rash or unusual itching  -You have increasing or worsening shortness of breath or chest pain    Should you need to contact us:  Cardiology clinic for scheduling or triage nurse questions/concerns:  [phone]

## 2021-01-10 NOTE — DISCHARGE SUMMARY
[facility]   Cardiology Discharge Summary    Date of Admission:  1/7/2021  Date of Discharge:  1/10/2021   Discharging Provider: Maryjane Mauricio  Date of Service: 1/10/2021     Primary Care     Juan C Fairchild  [address] [place] [id number]  [facility] [id number]      Identification and Chief Complaint: Krystal Virgen is a 80 year old female who presented on 1/7/2021 with complaint of chest pain.    Discharge Diagnoses     ST elevation myocardial infarction involving left circumflex coronary artery (H)    UTI    Discharge Disposition   Discharged to home    Discharge Orders      Comprehensive metabolic panel     CBC with platelets    Last Lab Result: Hemoglobin (g/dL)       Date                     Value                 01/10/2021               9.0 (L)          ----------     Follow-Up with Cardiac Advanced Practice Provider      Follow-Up with Cardiac Advanced Practice Provider      CARDIAC REHAB REFERRAL      Reason for your hospital stay    Heart attack     Follow Up and recommended labs and tests    Follow up in 1 week with labs (CMP, CBC)  Follow up in 1 month with cardiology MD     Activity    Your activity upon discharge: activity as tolerated     Full Code     Diet    Follow this diet upon discharge: Orders Placed This Encounter      Low Saturated Fat Na <2400 mg     Discharge Medications   Current Discharge Medication List      START taking these medications    Details   aspirin (ASA) 81 MG EC tablet Take 1 tablet (81 mg) by mouth daily  Qty:      Associated Diagnoses: ST elevation myocardial infarction involving left circumflex coronary artery (H)      metoprolol succinate ER (TOPROL-XL) 25 MG 24 hr tablet Take 1 tablet (25 mg) by mouth daily  Qty: 90 tablet, Refills: 3    Associated Diagnoses: ST elevation myocardial infarction involving left circumflex coronary artery (H)      nitroglycerin (NITROSTAT) 0.4 MG sublingual tablet For chest pain place 1 tablet under the tongue  every 5 minutes for 3 doses. If symptoms persist 5 minutes after 1st dose call 911.  Qty: 25 tablet, Refills: 0    Associated Diagnoses: ST elevation myocardial infarction involving left circumflex coronary artery (H)      rosuvastatin (CRESTOR) 40 MG tablet Take 1 tablet (40 mg) by mouth daily  Qty: 90 tablet, Refills: 1    Associated Diagnoses: ST elevation myocardial infarction involving left circumflex coronary artery (H)      ticagrelor (BRILINTA) 90 MG tablet Take 1 tablet (90 mg) by mouth every 12 hours  Qty: 180 tablet, Refills: 3    Associated Diagnoses: ST elevation myocardial infarction involving left circumflex coronary artery (H)         CONTINUE these medications which have NOT CHANGED    Details   albuterol (PROAIR HFA) 108 (90 BASE) MCG/ACT inhaler Inhale  into the lungs.      budesonide-formoterol (SYMBICORT) 160-4.5 MCG/ACT inhaler Inhale 2 puffs into the lungs 2 times daily.      buPROPion (WELLBUTRIN SR) 100 MG 12 hr tablet Take 100 mg by mouth. Per H&P, no information  given       citalopram (CELEXA) 20 MG tablet Take 20 mg by mouth daily.      montelukast (SINGULAIR) 10 MG tablet 10 mg.      Omeprazole (PRILOSEC PO) Take 1 tablet by mouth 2 times daily.      TRAZodone (DESYREL) 50 MG tablet Take 50 mg by mouth 2 times daily.      estradiol (VIVELLE-DOT) 0.05 MG/24HR patch Place 1 patch onto the skin twice a week.      fluticasone (FLONASE) 50 MCG/ACT nasal spray       naproxen sodium (ANAPROX) 550 MG tablet Take 550 mg by mouth 2 times daily (with meals).      !! oxycodone-acetaminophen (PERCOCET) 5-325 MG per tablet Take 1-2 tablets by mouth every 4 hours as needed.  Qty: 60 tablet, Refills: 0    Associated Diagnoses: Hallux rigidus      !! oxycodone-acetaminophen (PERCOCET) 5-325 MG per tablet Take 1 tablet by mouth every 6 hours as needed.  Qty: 30 tablet    Associated Diagnoses: Hallux rigidus      sumatriptan (IMITREX) 100 MG tablet Take 100 mg by mouth at onset of headache. May repeat in  2 hours if needed: max 2/day; average number of headaches monthly         !! - Potential duplicate medications found. Please discuss with provider.        Allergies   Allergies   Allergen Reactions     Penicillins      Amoxicillin doesn't work for her     Sulfa Drugs        Consultations This Hospital Stay  PHARMACY IP CONSULT  NUTRITION SERVICES ADULT IP CONSULT  CARDIAC REHAB IP CONSULT  SOCIAL WORK IP CONSULT  PHYSICAL THERAPY ADULT IP CONSULT  VASCULAR ACCESS CARE ADULT IP CONSULT  SMOKING CESSATION PROGRAM IP CONSULT    Significant Results and Procedures   Procedures:  Coronary Angiogram:    Ost Cx to Prox Cx lesion is 100% stenosed.  Posterior STEMI with successful PCI and stent placement in the proximal LCx.   Initial contained small wire perforation but without any evidence of pericardial accumulation on ECHO.  Moderate  right groin hematoma contained and stable.      Imaging:  Echocardiogram:  Borderline (EF 50-55%) reduced left ventricular function is present.  Hypokinesis of inferolateral and base to mid anterolateral walls noted.  Global right ventricular function is normal.  IVC is normal.  No significant valve disease.  This study was compared with the study from 1/7/2021 .  There has been no change in regional wall motion. EF appear unchanged side to  side comparison.    History of Present Illness   (From H&P) Krystal Virgen is a 80 year old female who's being admitted on 1/7/2021.     The patient is a 80/F with CV risk factors of HLD/obesity, PMHx of asthma, anemia and chronic sinusitis. She presented to the ER on 1/7/2021 with complaints of chest pressure since this AM (~11 am) and left upper limb tingling for the past 2 days. She reported radiation of the chest pressure to the jaw, reported worsening w/ activity and association with nausea. She denied SOB, pedal edema, PND, orthopnea. No reported palpitations, presyncope or syncope.      On arrival to the ER, patient hemodynamically stable. Stat  EKG concerning for ST elevation in I, aVL w/ reciprocal changes. Troponin 0.3. Cath lab emergently activated for STEMI >> revealed 100% stenosis in the LCx >> s/p deployment of JUAN in prox LCx. Intra-procedure, concern for coronary perforation, for which a stat TTE was done. TTE revealed LVEF 55-60% with inferolateral hypokinesis, normal RV function and no pericardial effusion. The patient was also noted to have mild bleeding w/ a small hematoma development in the right groin, for which manual pressure was held and protamine was administered. Thereafter, she was transferred in a hemodynamically stable condition to the CICU for further monitoring.    Hospital Course   Krystal Virgen was admitted on 1/7/2021.  The following problems were addressed during her hospitalization:    # Lateral STEMI s/p JUAN to LCx c/b perforation  TTE 1/7/21: LVEF 55-60% with inferolateral hypokinesis, normal RV function and no pericardial effusion.  Bedside TTE 1/10 without effusion  -continue ASA and ticagrelor.  -continue rosuva 40 nightly.  -continue toprol XL 25 daily.   -hold ACE/ARB given mild RAMON     # Right groin hematoma  Hbg stabilized ~9. WIll discharge with in person follow up and labs in approximately 1 week. US on day of discharge did not show pseudoaneurysm.    # ?RAMON  Creatinine 1.3 on discharge.  Will obtain labs in 1 week.  Should follow up with PCP    # UTI  Urine culture growing > 100,000 gram negative rods.  Will discharge home with Rx for 4 additional days of antibiotic (Macrobid) therapy    Pending Results   Unresulted Labs Ordered in the Past 30 Days of this Admission     Date and Time Order Name Status Description    1/9/2021 1752 Urine Culture Aerobic Bacterial Preliminary           Physical Exam   Temp:  [97.6  F (36.4  C)-98.7  F (37.1  C)] 97.8  F (36.6  C)  Pulse:  [50-88] 81  Resp:  [16-28] 28  BP: (111-132)/(59-78) 111/78  SpO2:  [91 %-96 %] 92 %    Constitutional: alert and oriented  CV: Regular  rate/rhythm. No murmur, rub, gallop  Respiratory: CTA bilaterally  GI: Soft, nontender  Skin: Warm and dry  Right Groin: large ecchymosis. Notable hematoma subcutaneous. Tender to palpation.     The discharge plan was discussed with the patient and patient agrees to plan    Total time on this discharge was greater than 30 minutes.    Maryjane Mauricio PA-C  Cardiology - [facility]

## 2021-01-11 ENCOUNTER — TELEPHONE (OUTPATIENT)
Dept: CARDIOLOGY | Facility: CLINIC | Age: 81
End: 2021-01-11

## 2021-01-11 DIAGNOSIS — N30.00 ACUTE CYSTITIS WITHOUT HEMATURIA: ICD-10-CM

## 2021-01-11 RX ORDER — CIPROFLOXACIN 250 MG/1
250 TABLET, FILM COATED ORAL 2 TIMES DAILY
Qty: 6 TABLET | Refills: 0 | Status: SHIPPED | OUTPATIENT
Start: 2021-01-11 | End: 2021-01-14

## 2021-01-11 NOTE — PLAN OF CARE
Occupational Therapy Discharge Summary    Reason for therapy discharge:    Discharged to home.    Progress towards therapy goal(s). See goals on Care Plan in [facility] electronic health record for goal details.  Goals partially met.  Barriers to achieving goals:   discharge from facility.    Therapy recommendation(s):    Recommend discharge to home with assist for higher level ADL/IADLs.

## 2021-01-11 NOTE — PROGRESS NOTES
The patient was contacted by another PA-C for post-hospital follow up. Will close encounter at this time.    Jennifer Farrar CMA, [facility]  Post Hospital Discharge Team

## 2021-01-11 NOTE — TELEPHONE ENCOUNTER
[facility]  Brief Cardiology Note  Date of Service: 1/11/2021       Patient discharged yesterday afternoon.  Given Rx of Macrobid (4 days therapy for total of 5 days therapy) for treatment of UTI.  Yesterday evening, sensitivities returned and bacterium is resistant to Macrobid.  As such, will transition to cipro 250mg BID x3 days.    Called patient and informed her of this change.  Rx sent to [facility].      Maryjane Mauricio PA-C

## 2021-01-12 ENCOUNTER — COMMUNICATION - HEALTHEAST (OUTPATIENT)
Dept: FAMILY MEDICINE | Facility: CLINIC | Age: 81
End: 2021-01-12

## 2021-01-15 ENCOUNTER — OFFICE VISIT - HEALTHEAST (OUTPATIENT)
Dept: FAMILY MEDICINE | Facility: CLINIC | Age: 81
End: 2021-01-15

## 2021-01-15 DIAGNOSIS — I25.5 ISCHEMIC CARDIOMYOPATHY: ICD-10-CM

## 2021-01-15 DIAGNOSIS — I21.21 ST ELEVATION MYOCARDIAL INFARCTION INVOLVING LEFT CIRCUMFLEX CORONARY ARTERY (H): ICD-10-CM

## 2021-01-15 DIAGNOSIS — J45.40 MODERATE PERSISTENT ASTHMA WITHOUT COMPLICATION: ICD-10-CM

## 2021-01-15 DIAGNOSIS — D80.3 IGG SUBCLASS DEFICIENCY (H): ICD-10-CM

## 2021-01-15 DIAGNOSIS — N39.0 URINARY TRACT INFECTION WITHOUT HEMATURIA, SITE UNSPECIFIED: ICD-10-CM

## 2021-01-15 DIAGNOSIS — Z85.3 HX: BREAST CANCER: ICD-10-CM

## 2021-01-15 ASSESSMENT — PATIENT HEALTH QUESTIONNAIRE - PHQ9: SUM OF ALL RESPONSES TO PHQ QUESTIONS 1-9: 4

## 2021-01-20 NOTE — PROGRESS NOTES
"  [facility]alth Cardiology - [facility]  Cardiovascular Clinic Note      Referring Provider: Referred Self   Primary Care Provider: Juan C Fairchild     Patient Name: Krystal Virgen   MRN: [id number]       History of Present Illness:  Krystal Virgen is a 80 year old female with PMH of asthma, and HLD who was recently presented to [facility] ED and was found to have STEMI.      Patient presented to the hospital with chest pressure; on EKG found to have STEMI.  Underwent coronary angiogram.  Hospital course was complicated by right groin hematoma, contained perforation of LCx.  Additionally, was found to have a UTI and was discharged to home with antibiotics.  Since discharge, the patient notes that she has been doing \"okay\".  She has developed persistent shortness of breath that she feels is new status post discharge, but \"perhaps\" began while at the hospital.  She notes very mild/sometimes unnoticeable shortness of breath at rest, but notes that with any degree of exertion, she has profound worsened shortness of breath.  She states that she had some shortness of breath at baseline due to her diagnosis of asthma, but that this is certainly worsened.  She denies chest pain.  She denies paroxysmal nocturnal dyspnea, new lower extremity swelling, palpitations, and dizziness/lightheadedness.  She has been compliant with her medications.  She does not check her blood pressure regularly.    In regards to the patient's UTI symptoms, she feels that things were getting better.  She wonders if they may be \"getting worse again\", but will discuss with her primary care if she is concerned.    The patient had a small right groin hematoma at discharge.  This is grossly unchanged in size.  Ecchymoses has spread to the superficial skin.  Hematoma is mobile and non pulsatile.  She notes that there is mild tenderness with palpation, but that her groin is otherwise nontender.  She feels that her pain and the size of her hematoma are " slowly improving.    Pertinent Cardiac Medications:  81mg ASA  25mg metoprolol succinate ER  40mg Crestor daily  PRN nitroglycerin  90mg Brilinta BID      Past Medical History:  Pertinent past medical history as above.      Past Surgical History:  Past Surgical History:   Procedure Laterality Date     ARTHRODESIS FOOT  11/11/2011    Procedure:ARTHRODESIS FOOT; Right First Metatarsophalangeal Joint Fusion with Second and Third Clawtoe Repairs; Surgeon:SARAH CASTRO; Location:[facility] OR     CHOLECYSTECTOMY       CV CORONARY ANGIOGRAM N/A 1/7/2021    Procedure: Coronary Angiogram;  Surgeon: Slade Yu MD;  Location: [facility] CARDIAC CATH LAB     CV PCI STENT DRUG ELUTING N/A 1/7/2021    Procedure: Percutaneous Coronary Intervention Stent Drug Eluting;  Surgeon: Slade Yu MD;  Location: [facility] CARDIAC CATH LAB     ENT SURGERY      sinus surgery x1     GYN SURGERY      benign hysterectomy age 43     ORTHOPEDIC SURGERY      fusion of grest toe right         Family History:  Noncontributory      Current Medications:  Current Outpatient Medications   Medication Sig Dispense Refill     albuterol (PROAIR HFA) 108 (90 BASE) MCG/ACT inhaler Inhale  into the lungs.       aspirin (ASA) 81 MG EC tablet Take 1 tablet (81 mg) by mouth daily       budesonide-formoterol (SYMBICORT) 160-4.5 MCG/ACT inhaler Inhale 2 puffs into the lungs 2 times daily.       buPROPion (WELLBUTRIN SR) 100 MG 12 hr tablet Take 100 mg by mouth. Per H&P, no information  given        citalopram (CELEXA) 20 MG tablet Take 20 mg by mouth daily.       estradiol (VIVELLE-DOT) 0.05 MG/24HR patch Place 1 patch onto the skin twice a week.       fluticasone (FLONASE) 50 MCG/ACT nasal spray        metoprolol succinate ER (TOPROL-XL) 25 MG 24 hr tablet Take 1 tablet (25 mg) by mouth daily 90 tablet 3     montelukast (SINGULAIR) 10 MG tablet 10 mg.       naproxen sodium (ANAPROX) 550 MG tablet Take 550 mg by mouth 2 times daily (with meals).    "    nitroglycerin (NITROSTAT) 0.4 MG sublingual tablet For chest pain place 1 tablet under the tongue every 5 minutes for 3 doses. If symptoms persist 5 minutes after 1st dose call 911. 25 tablet 0     Omeprazole (PRILOSEC PO) Take 1 tablet by mouth 2 times daily.       oxycodone-acetaminophen (PERCOCET) 5-325 MG per tablet Take 1-2 tablets by mouth every 4 hours as needed. 60 tablet 0     oxycodone-acetaminophen (PERCOCET) 5-325 MG per tablet Take 1 tablet by mouth every 6 hours as needed. 30 tablet      rosuvastatin (CRESTOR) 40 MG tablet Take 1 tablet (40 mg) by mouth daily 90 tablet 1     sumatriptan (IMITREX) 100 MG tablet Take 100 mg by mouth at onset of headache. May repeat in 2 hours if needed: max 2/day; average number of headaches monthly         ticagrelor (BRILINTA) 90 MG tablet Take 1 tablet (90 mg) by mouth every 12 hours 180 tablet 3     TRAZodone (DESYREL) 50 MG tablet Take 50 mg by mouth 2 times daily.           Social History:  Recently moved to the [facility].  She has multiple friends in the area that help her.  Living facility.      Review of Systems:  A comprehensive review of systems was performed and negative unless otherwise noted in the HPI above.      Physical Exam:  /80 (BP Location: Left arm, Patient Position: Chair, Cuff Size: Adult Regular)   Pulse 74   Ht 1.6 m (5' 3\")   Wt 80.3 kg (177 lb)   SpO2 97%   BMI 31.35 kg/m    Body mass index is 31.35 kg/m .  Wt Readings from Last 2 Encounters:   01/21/21 80.3 kg (177 lb)   01/10/21 80.5 kg (177 lb 7.5 oz)     Constitutional: no acute distress, pleasant and cooperative, appears overall well.  Eyes: sclera white, conjunctiva clear, without icterus or pallor   Cardiovascular: RRR. Normal S1/S2. JVP not elevated. Extremities with no edema or cyanosis  Respiratory: clear to auscultation posteriorly  Gastrointestinal: soft, nontender, non distended  Musculoskeletal: normal muscle bulk and tone, joints   Skin: normal skin appearance " without worrisome lesions.   Neurologic: AOx3, gait smooth and symmetric  Psychiatric: appropriate affect, eye contact, intact thought and speech  Right Groin: Small hematoma measuring approximately 3 cm x 4 cm.  Mild tenderness to palpation.  Hematoma is non pulsatile.  Superficial skin with diffuse ecchymoses; grossly nontender.      Laboratory Data:  LIPID RESULTS:  Recent Labs   Lab Test 01/07/21 2015 01/07/21  1642   CHOL 160 158   HDL 62 67   LDL 61 75   TRIG 188* 83        LIVER ENZYME RESULTS:  Recent Labs   Lab Test 01/10/21  0531 01/09/21  0501   AST 97* 184*   ALT 34 43       CBC RESULTS:  Recent Labs   Lab Test 01/10/21  0957 01/10/21  0531 01/09/21  1414   WBC  --  8.4 9.3   HGB 9.0* 8.6* 9.5*   HCT  --  28.3* 30.2*   PLT  --  279 313       BMP RESULTS:  Recent Labs   Lab Test 01/10/21  0531 01/09/21  0501    140   POTASSIUM 4.0 4.0   CHLORIDE 112* 110*   CO2 25 24   ANIONGAP 5 6   GLC 95 103*   BUN 18 18   CR 1.41* 1.47*   ALINE 8.7 8.6       A1C RESULTS:  Lab Results   Component Value Date    A1C 5.3 01/08/2021       INR RESULTS:  Recent Labs   Lab Test 01/07/21  1400   INR 0.98         Pertinent Procedures/Imaging:  Echocardiogram 1/7/2021.  Borderline (EF 50-55%) reduced left ventricular function is present.  Hypokinesis of inferolateral and base to mid anterolateral walls noted.  Global right ventricular function is normal.  IVC is normal.  No significant valve disease.  This study was compared with the study from 1/7/2021 .  There has been no change in regional wall motion. EF appear unchanged side to  side comparison.    Coronary Angiogram 1/7/2021    Ost Cx to Prox Cx lesion is 100% stenosed.  Posterior STEMI with successful PCI and stent placement in the proximal LCx.   Initial contained small wire perforation but without any evidence of pericardial accumulation on ECHO.  Moderate  right groin hematoma contained and stable.        Assessment:  Krystal Virgen is a 80 year old female with  PMH of asthma, and HLD who was recently presented to [facility] ED and was found to have STEMI.      80-year-old female with recent STEMI complicated by coronary artery perforation (small wire perforation) and right groin hematoma.  Hematoma appears stable and is slowly improving (per clinical assessment).  Has persistent shortness of breath without hypoxemia; question whether or not this may be secondary to Brilinta use.  EKG grossly unchanged as compared to post angiogram EKG     Plan:  # STEMI, s/p PCI to LCx c/b small wire perforation  # Right Groin Hematoma  # HLD  LDL 61 1/2021.  No symptoms of pericardial tamponade at present.  -New prescription sent for Plavix; will plan to load with 300 mg dose followed by 75 mg Plavix once daily.  Patient will continue 81 mg aspirin daily.  Continue 81mg ASA, 90mg Brilinta BID until patient is in possession of Plavix  -Continue 40 mg Crestor daily  -Continue 25 mg metoprolol succinate daily  -Additional referral for cardiac rehab sent today as no one has contacted the patient about this at this point in time  -No indication for repeat echo at this point in time; will consider in the future.  -Patient instructed that should right groin become more tender or if hematoma begins to expand in size, she must seek emergency medical treatment by ED.      Follow-up: February with Dr. Steven Amleida    A total of 15 minutes spent face to face with patient.  An additional 20 minutes was spent for chart review and coronation of care.    Maryjane Mauricio PA-C  Interventional/Critical Care Cardiology  [phone]        CC  Patient Care Team:  Juan C Fairchild, APRN CNP as PCP - General  SELF, REFERRED

## 2021-01-21 ENCOUNTER — OFFICE VISIT (OUTPATIENT)
Dept: CARDIOLOGY | Facility: CLINIC | Age: 81
End: 2021-01-21
Attending: PHYSICIAN ASSISTANT
Payer: COMMERCIAL

## 2021-01-21 VITALS
HEIGHT: 63 IN | BODY MASS INDEX: 31.36 KG/M2 | WEIGHT: 177 LBS | HEART RATE: 74 BPM | SYSTOLIC BLOOD PRESSURE: 137 MMHG | OXYGEN SATURATION: 97 % | DIASTOLIC BLOOD PRESSURE: 80 MMHG

## 2021-01-21 DIAGNOSIS — I21.21 ST ELEVATION MYOCARDIAL INFARCTION INVOLVING LEFT CIRCUMFLEX CORONARY ARTERY (H): ICD-10-CM

## 2021-01-21 DIAGNOSIS — E78.5 HYPERLIPIDEMIA, UNSPECIFIED HYPERLIPIDEMIA TYPE: ICD-10-CM

## 2021-01-21 DIAGNOSIS — T14.8XXA HEMATOMA OF SKIN: ICD-10-CM

## 2021-01-21 DIAGNOSIS — I21.21 ST ELEVATION MYOCARDIAL INFARCTION INVOLVING LEFT CIRCUMFLEX CORONARY ARTERY (H): Primary | ICD-10-CM

## 2021-01-21 LAB
ALBUMIN SERPL-MCNC: 3 G/DL (ref 3.4–5)
ALP SERPL-CCNC: 81 U/L (ref 40–150)
ALT SERPL W P-5'-P-CCNC: 13 U/L (ref 0–50)
ANION GAP SERPL CALCULATED.3IONS-SCNC: 4 MMOL/L (ref 3–14)
AST SERPL W P-5'-P-CCNC: 10 U/L (ref 0–45)
BILIRUB SERPL-MCNC: 0.5 MG/DL (ref 0.2–1.3)
BUN SERPL-MCNC: 19 MG/DL (ref 7–30)
CALCIUM SERPL-MCNC: 8.8 MG/DL (ref 8.5–10.1)
CHLORIDE SERPL-SCNC: 111 MMOL/L (ref 94–109)
CO2 SERPL-SCNC: 24 MMOL/L (ref 20–32)
CREAT SERPL-MCNC: 1.29 MG/DL (ref 0.52–1.04)
ERYTHROCYTE [DISTWIDTH] IN BLOOD BY AUTOMATED COUNT: 13.2 % (ref 10–15)
GFR SERPL CREATININE-BSD FRML MDRD: 39 ML/MIN/{1.73_M2}
GLUCOSE SERPL-MCNC: 85 MG/DL (ref 70–99)
HCT VFR BLD AUTO: 33.1 % (ref 35–47)
HGB BLD-MCNC: 10.4 G/DL (ref 11.7–15.7)
MCH RBC QN AUTO: 29.4 PG (ref 26.5–33)
MCHC RBC AUTO-ENTMCNC: 31.4 G/DL (ref 31.5–36.5)
MCV RBC AUTO: 94 FL (ref 78–100)
PLATELET # BLD AUTO: 501 10E9/L (ref 150–450)
POTASSIUM SERPL-SCNC: 4.3 MMOL/L (ref 3.4–5.3)
PROT SERPL-MCNC: 6.6 G/DL (ref 6.8–8.8)
RBC # BLD AUTO: 3.54 10E12/L (ref 3.8–5.2)
SODIUM SERPL-SCNC: 138 MMOL/L (ref 133–144)
WBC # BLD AUTO: 10.3 10E9/L (ref 4–11)

## 2021-01-21 PROCEDURE — 80053 COMPREHEN METABOLIC PANEL: CPT | Performed by: PATHOLOGY

## 2021-01-21 PROCEDURE — 99214 OFFICE O/P EST MOD 30 MIN: CPT | Performed by: PHYSICIAN ASSISTANT

## 2021-01-21 PROCEDURE — 36415 COLL VENOUS BLD VENIPUNCTURE: CPT | Performed by: PATHOLOGY

## 2021-01-21 PROCEDURE — 85027 COMPLETE CBC AUTOMATED: CPT | Performed by: PATHOLOGY

## 2021-01-21 PROCEDURE — 93005 ELECTROCARDIOGRAM TRACING: CPT

## 2021-01-21 PROCEDURE — G0463 HOSPITAL OUTPT CLINIC VISIT: HCPCS

## 2021-01-21 RX ORDER — CLOPIDOGREL BISULFATE 75 MG/1
300 TABLET ORAL ONCE
Qty: 4 TABLET | Refills: 0 | Status: SHIPPED | OUTPATIENT
Start: 2021-01-21 | End: 2021-02-10

## 2021-01-21 RX ORDER — METOPROLOL SUCCINATE 25 MG/1
25 TABLET, EXTENDED RELEASE ORAL DAILY
Qty: 90 TABLET | Refills: 3 | Status: SHIPPED | OUTPATIENT
Start: 2021-01-21 | End: 2021-08-24

## 2021-01-21 RX ORDER — TAMOXIFEN CITRATE 20 MG/1
TABLET ORAL
COMMUNITY
Start: 2020-05-20 | End: 2021-08-16

## 2021-01-21 RX ORDER — ROSUVASTATIN CALCIUM 40 MG/1
40 TABLET, COATED ORAL DAILY
Qty: 90 TABLET | Refills: 3 | Status: SHIPPED | OUTPATIENT
Start: 2021-01-21 | End: 2022-03-25

## 2021-01-21 RX ORDER — CLOPIDOGREL BISULFATE 75 MG/1
75 TABLET ORAL DAILY
Qty: 90 TABLET | Refills: 3 | Status: SHIPPED | OUTPATIENT
Start: 2021-01-21 | End: 2022-01-28

## 2021-01-21 ASSESSMENT — PAIN SCALES - GENERAL: PAINLEVEL: NO PAIN (0)

## 2021-01-21 ASSESSMENT — MIFFLIN-ST. JEOR: SCORE: 1247

## 2021-01-21 NOTE — LETTER
"1/21/2021      RE: Krystal Virgen  [address]  [place] [address]       Dear Colleague,    Thank you for the opportunity to participate in the care of your patient, Krystal Virgen, at the [facility] HEART [facility]. Please see a copy of my visit note below.      ealth Cardiology - [facility]  Cardiovascular Clinic Note      Referring Provider: Referred Self   Primary Care Provider: Juan C Fairchild     Patient Name: Krystal Virgen   MRN: [id number]       History of Present Illness:  Krystal Virgen is a 80 year old female with PMH of asthma, and HLD who was recently presented to [facility] ED and was found to have STEMI.      Patient presented to the hospital with chest pressure; on EKG found to have STEMI.  Underwent coronary angiogram.  Hospital course was complicated by right groin hematoma, contained perforation of LCx.  Additionally, was found to have a UTI and was discharged to home with antibiotics.  Since discharge, the patient notes that she has been doing \"okay\".  She has developed persistent shortness of breath that she feels is new status post discharge, but \"perhaps\" began while at the hospital.  She notes very mild/sometimes unnoticeable shortness of breath at rest, but notes that with any degree of exertion, she has profound worsened shortness of breath.  She states that she had some shortness of breath at baseline due to her diagnosis of asthma, but that this is certainly worsened.  She denies chest pain.  She denies paroxysmal nocturnal dyspnea, new lower extremity swelling, palpitations, and dizziness/lightheadedness.  She has been compliant with her medications.  She does not check her blood pressure regularly.    In regards to the patient's UTI symptoms, she feels that things were getting better.  She wonders if they may be \"getting worse again\", but will discuss with her primary care if she is " concerned.    The patient had a small right groin hematoma at discharge.  This is grossly unchanged in size.  Ecchymoses has spread to the superficial skin.  Hematoma is mobile and non pulsatile.  She notes that there is mild tenderness with palpation, but that her groin is otherwise nontender.  She feels that her pain and the size of her hematoma are slowly improving.    Pertinent Cardiac Medications:  81mg ASA  25mg metoprolol succinate ER  40mg Crestor daily  PRN nitroglycerin  90mg Brilinta BID      Past Medical History:  Pertinent past medical history as above.      Past Surgical History:  Past Surgical History:   Procedure Laterality Date     ARTHRODESIS FOOT  11/11/2011    Procedure:ARTHRODESIS FOOT; Right First Metatarsophalangeal Joint Fusion with Second and Third Clawtoe Repairs; Surgeon:SARAH CASTRO; Location:[facility] OR     CHOLECYSTECTOMY       CV CORONARY ANGIOGRAM N/A 1/7/2021    Procedure: Coronary Angiogram;  Surgeon: Slade Yu MD;  Location: [facility] CARDIAC CATH LAB     CV PCI STENT DRUG ELUTING N/A 1/7/2021    Procedure: Percutaneous Coronary Intervention Stent Drug Eluting;  Surgeon: Slade Yu MD;  Location: [facility] CARDIAC CATH LAB     ENT SURGERY      sinus surgery x1     GYN SURGERY      benign hysterectomy age 43     ORTHOPEDIC SURGERY      fusion of grest toe right         Family History:  Noncontributory      Current Medications:  Current Outpatient Medications   Medication Sig Dispense Refill     albuterol (PROAIR HFA) 108 (90 BASE) MCG/ACT inhaler Inhale  into the lungs.       aspirin (ASA) 81 MG EC tablet Take 1 tablet (81 mg) by mouth daily       budesonide-formoterol (SYMBICORT) 160-4.5 MCG/ACT inhaler Inhale 2 puffs into the lungs 2 times daily.       buPROPion (WELLBUTRIN SR) 100 MG 12 hr tablet Take 100 mg by mouth. Per H&P, no information  given        citalopram (CELEXA) 20 MG tablet Take 20 mg by mouth daily.       estradiol (VIVELLE-DOT) 0.05 MG/24HR  "patch Place 1 patch onto the skin twice a week.       fluticasone (FLONASE) 50 MCG/ACT nasal spray        metoprolol succinate ER (TOPROL-XL) 25 MG 24 hr tablet Take 1 tablet (25 mg) by mouth daily 90 tablet 3     montelukast (SINGULAIR) 10 MG tablet 10 mg.       naproxen sodium (ANAPROX) 550 MG tablet Take 550 mg by mouth 2 times daily (with meals).       nitroglycerin (NITROSTAT) 0.4 MG sublingual tablet For chest pain place 1 tablet under the tongue every 5 minutes for 3 doses. If symptoms persist 5 minutes after 1st dose call 911. 25 tablet 0     Omeprazole (PRILOSEC PO) Take 1 tablet by mouth 2 times daily.       oxycodone-acetaminophen (PERCOCET) 5-325 MG per tablet Take 1-2 tablets by mouth every 4 hours as needed. 60 tablet 0     oxycodone-acetaminophen (PERCOCET) 5-325 MG per tablet Take 1 tablet by mouth every 6 hours as needed. 30 tablet      rosuvastatin (CRESTOR) 40 MG tablet Take 1 tablet (40 mg) by mouth daily 90 tablet 1     sumatriptan (IMITREX) 100 MG tablet Take 100 mg by mouth at onset of headache. May repeat in 2 hours if needed: max 2/day; average number of headaches monthly         ticagrelor (BRILINTA) 90 MG tablet Take 1 tablet (90 mg) by mouth every 12 hours 180 tablet 3     TRAZodone (DESYREL) 50 MG tablet Take 50 mg by mouth 2 times daily.           Social History:  Recently moved to the [facility].  She has multiple friends in the area that help her.  Living facility.      Review of Systems:  A comprehensive review of systems was performed and negative unless otherwise noted in the HPI above.      Physical Exam:  /80 (BP Location: Left arm, Patient Position: Chair, Cuff Size: Adult Regular)   Pulse 74   Ht 1.6 m (5' 3\")   Wt 80.3 kg (177 lb)   SpO2 97%   BMI 31.35 kg/m    Body mass index is 31.35 kg/m .  Wt Readings from Last 2 Encounters:   01/21/21 80.3 kg (177 lb)   01/10/21 80.5 kg (177 lb 7.5 oz)     Constitutional: no acute distress, pleasant and cooperative, appears " overall well.  Eyes: sclera white, conjunctiva clear, without icterus or pallor   Cardiovascular: RRR. Normal S1/S2. JVP not elevated. Extremities with no edema or cyanosis  Respiratory: clear to auscultation posteriorly  Gastrointestinal: soft, nontender, non distended  Musculoskeletal: normal muscle bulk and tone, joints   Skin: normal skin appearance without worrisome lesions.   Neurologic: AOx3, gait smooth and symmetric  Psychiatric: appropriate affect, eye contact, intact thought and speech  Right Groin: Small hematoma measuring approximately 3 cm x 4 cm.  Mild tenderness to palpation.  Hematoma is non pulsatile.  Superficial skin with diffuse ecchymoses; grossly nontender.      Laboratory Data:  LIPID RESULTS:  Recent Labs   Lab Test 01/07/21  2015 01/07/21  1642   CHOL 160 158   HDL 62 67   LDL 61 75   TRIG 188* 83        LIVER ENZYME RESULTS:  Recent Labs   Lab Test 01/10/21  0531 01/09/21  0501   AST 97* 184*   ALT 34 43       CBC RESULTS:  Recent Labs   Lab Test 01/10/21  0957 01/10/21  0531 01/09/21  1414   WBC  --  8.4 9.3   HGB 9.0* 8.6* 9.5*   HCT  --  28.3* 30.2*   PLT  --  279 313       BMP RESULTS:  Recent Labs   Lab Test 01/10/21  0531 01/09/21  0501    140   POTASSIUM 4.0 4.0   CHLORIDE 112* 110*   CO2 25 24   ANIONGAP 5 6   GLC 95 103*   BUN 18 18   CR 1.41* 1.47*   ALINE 8.7 8.6       A1C RESULTS:  Lab Results   Component Value Date    A1C 5.3 01/08/2021       INR RESULTS:  Recent Labs   Lab Test 01/07/21  1400   INR 0.98         Pertinent Procedures/Imaging:  Echocardiogram 1/7/2021.  Borderline (EF 50-55%) reduced left ventricular function is present.  Hypokinesis of inferolateral and base to mid anterolateral walls noted.  Global right ventricular function is normal.  IVC is normal.  No significant valve disease.  This study was compared with the study from 1/7/2021 .  There has been no change in regional wall motion. EF appear unchanged side to  side comparison.    Coronary Angiogram  1/7/2021    Ost Cx to Prox Cx lesion is 100% stenosed.  Posterior STEMI with successful PCI and stent placement in the proximal LCx.   Initial contained small wire perforation but without any evidence of pericardial accumulation on ECHO.  Moderate  right groin hematoma contained and stable.        Assessment:  Krystal Virgen is a 80 year old female with PMH of asthma, and HLD who was recently presented to [facility] ED and was found to have STEMI.      80-year-old female with recent STEMI complicated by coronary artery perforation (small wire perforation) and right groin hematoma.  Hematoma appears stable and is slowly improving (per clinical assessment).  Has persistent shortness of breath without hypoxemia; question whether or not this may be secondary to Brilinta use.  EKG grossly unchanged as compared to post angiogram EKG     Plan:  # STEMI, s/p PCI to LCx c/b small wire perforation  # Right Groin Hematoma  # HLD  LDL 61 1/2021.  No symptoms of pericardial tamponade at present.  -New prescription sent for Plavix; will plan to load with 300 mg dose followed by 75 mg Plavix once daily.  Patient will continue 81 mg aspirin daily.  Continue 81mg ASA, 90mg Brilinta BID until patient is in possession of Plavix  -Continue 40 mg Crestor daily  -Continue 25 mg metoprolol succinate daily  -Additional referral for cardiac rehab sent today as no one has contacted the patient about this at this point in time  -No indication for repeat echo at this point in time; will consider in the future.  -Patient instructed that should right groin become more tender or if hematoma begins to expand in size, she must seek emergency medical treatment by ED.      Follow-up: February with Dr. Steven Almeida    A total of 15 minutes spent face to face with patient.  An additional 20 minutes was spent for chart review and coronation of care.    Maryjane Mauricio PA-C  Interventional/Critical Care Cardiology  [phone]        CC  Patient  Care Team:  Juan C Fairchild APRN CNP as PCP - General  SELF, REFERRED        Please do not hesitate to contact me if you have any questions/concerns.     Sincerely,     Maryjane Mauricio PA-C

## 2021-01-21 NOTE — PATIENT INSTRUCTIONS
You were seen today in the Cardiovascular Clinic at the [facility].  Today, you were seen by Maryjane Mauricio PA-C for your cardiovascular care.     Changes Made Today:  1. New prescription sent for Plavix.  Once this has arrived, please complete day's worth of Brilinta.  The following morning, you should take 300mg (or 4 tablets) of Plavix in the morning.  After that dose, start 75mg (or 1 tablet) of Plavix the following morning.  You will take this medication once per day.  2. New Rx sent for Crestor and Metoprolol to [facility]    Follow Up Appointment/Tests:  1. Follow up with Dr. Almeida on  at 2pm.      Questions and [phone].   First press #1 for the University and then press #3 for Medical Questions to reach the Cardiology triage nurse.     On Call Cardiologist for after hours or on weekends: [phone], press option #4 and ask to speak to the on-call Cardiologist.

## 2021-01-21 NOTE — NURSING NOTE
Chief Complaint   Patient presents with     Follow Up     Hospital follow up for STEMI on 1/7/21.      Britni Ku

## 2021-01-22 ENCOUNTER — COMMUNICATION - HEALTHEAST (OUTPATIENT)
Dept: FAMILY MEDICINE | Facility: CLINIC | Age: 81
End: 2021-01-22

## 2021-01-22 DIAGNOSIS — I21.3 STEMI (ST ELEVATION MYOCARDIAL INFARCTION) (H): ICD-10-CM

## 2021-01-22 LAB — INTERPRETATION ECG - MUSE: NORMAL

## 2021-01-28 ENCOUNTER — COMMUNICATION - HEALTHEAST (OUTPATIENT)
Dept: ADMINISTRATIVE | Facility: HOSPITAL | Age: 81
End: 2021-01-28

## 2021-02-01 ENCOUNTER — COMMUNICATION - HEALTHEAST (OUTPATIENT)
Dept: FAMILY MEDICINE | Facility: CLINIC | Age: 81
End: 2021-02-01

## 2021-02-01 DIAGNOSIS — I21.21 ST ELEVATION MYOCARDIAL INFARCTION INVOLVING LEFT CIRCUMFLEX CORONARY ARTERY (H): ICD-10-CM

## 2021-02-06 ENCOUNTER — COMMUNICATION - HEALTHEAST (OUTPATIENT)
Dept: FAMILY MEDICINE | Facility: CLINIC | Age: 81
End: 2021-02-06

## 2021-02-06 DIAGNOSIS — L30.1 DYSHIDROTIC ECZEMA: ICD-10-CM

## 2021-02-09 NOTE — PROGRESS NOTES
"Krystal Virgen is a 80 year old female who is being evaluated via a billable video visit.      The patient has been notified of following:   \"This video visit will be conducted via a call between you and your physician/provider. We have found that certain health care needs can be provided without the need for an in-person physical exam.  This service lets us provide the care you need with a video conversation.  If a prescription is necessary we can send it directly to your pharmacy.  If lab work is needed we can place an order for that and you can then stop by our lab to have the test done at a later time. Video visits are billed at different rates depending on your insurance coverage.  Please reach out to your insurance provider with any questions. If during the course of the call the physician/provider feels a video visit is not appropriate, you will not be charged for this service.\"    Patient has given verbal consent for Video visit? Yes      Video-Visit Details  Type of service:  Video Visit  Video start time: 147p  Video end time: 210p  Total video time: 23m  Originating Location (pt. Location):  Home  Distant Location (provider location):  Home  Mode of Communication: Video Conference via D[employer]: [phone]  ________________________________________________________________________________    Chief complaint: follow up after STEMI with JUAN to cx    HPI:   Krystal Virgen is a 80 year old female with history of asthma, HLD and recent STEMI to cx s/p JUAN.    As you well know, but for my records I will outline their known cardiac history briefly: Patient presented to the hospital with chest pressure 1/7/2021 ---> STEMI -->cath with prox Cx -->JUAN--> c/b small coronary perforation (no pericardial effusion) small right groin hematoma and UTI.   After discharge she developed mild persistent shortness of breath at rest that worsened with exertion that began while at the hospital. She was transitioned to " "plavix 1/21/21 at an out pt visit. Also notable for this visit, her groin hematoma was stable/resolving. Labs: hgb stable/increasing, cr down 1.29 (1.41).     Since transition to the Plavix, her shortness of breath has fully resolved. Today she has no complaints other than some \"brain fog\" she says all her companions have due to isolation with covid but is also peripherally worried it could be due to the statin or metoprolol. We spoke about the benefits of each and she agreed to continue for one year with re-evaluation at our next visit. Otherwise she specifically denies any chest pain, dyspnea at rest or with exertion (beyond her baseline for her asthma), PND, orthopnea, peripheral edema, palpitations, lightheadedness or syncope.       PAST MEDICAL HISTORY:  Past Medical History:   Diagnosis Date     Asthma      Chronic sinusitis      Depression      GERD (gastroesophageal reflux disease)      Migraines      Osteopenia      Overactive bladder      Pneumococcal infection      Spinal headache        CURRENT MEDICATIONS:  See chart for current meds, all reviewed with pt  Noted plavix 300mg was a one time med for loading, otherwise confirmed all other cardiac meds    PAST SURGICAL HISTORY:  Past Surgical History:   Procedure Laterality Date     ARTHRODESIS FOOT  11/11/2011    Procedure:ARTHRODESIS FOOT; Right First Metatarsophalangeal Joint Fusion with Second and Third Clawtoe Repairs; Surgeon:SARAH CASTRO; Location:[facility]     CHOLECYSTECTOMY       CV CORONARY ANGIOGRAM N/A 1/7/2021    Procedure: Coronary Angiogram;  Surgeon: Slade Yu MD;  Location:  HEART CARDIAC CATH LAB     CV PCI STENT DRUG ELUTING N/A 1/7/2021    Procedure: Percutaneous Coronary Intervention Stent Drug Eluting;  Surgeon: Slade Yu MD;  Location:  HEART CARDIAC CATH LAB     ENT SURGERY      sinus surgery x1     GYN SURGERY      benign hysterectomy age 43     ORTHOPEDIC SURGERY      fusion of grest toe right "       ALLERGIES:     Allergies   Allergen Reactions     Penicillins      Amoxicillin doesn't work for her     Sulfa Drugs        FAMILY HISTORY:  No family history of premature CAD or sudden death.    SOCIAL HISTORY:  Social History     Tobacco Use     Smoking status: Former Smoker     Smokeless tobacco: Never Used   Substance Use Topics     Alcohol use: No     Drug use: No       ROS:   A comprehensive 14 point review of systems is negative other than as mentioned in HPI.    Exam:  There were no vitals taken for this visit.  Limited exam due to phone conversation, pt oriented with robust voice.    The rest of a comprehensive physical examination is deferred due to PHE (public health emergency) video visit restrictions.    Labs:  Reviewed.   Recent Labs   Lab Test 01/21/21  1332 01/10/21  0531    142   POTASSIUM 4.3 4.0   CHLORIDE 111* 112*   CO2 24 25   BUN 19 18   CR 1.29* 1.41*     CBC RESULTS:   Recent Labs   Lab Test 01/21/21  1332   WBC 10.3   RBC 3.54*   HGB 10.4*   HCT 33.1*   MCV 94   MCH 29.4   MCHC 31.4*   RDW 13.2   *     Cholesterol   Date Value Ref Range Status   01/07/2021 160 <200 mg/dL Final   01/07/2021 158 <200 mg/dL Final     HDL Cholesterol   Date Value Ref Range Status   01/07/2021 62 >49 mg/dL Final   01/07/2021 67 >49 mg/dL Final     LDL Cholesterol Calculated   Date Value Ref Range Status   01/07/2021 61 <100 mg/dL Final     Comment:     Desirable:       <100 mg/dl   01/07/2021 75 <100 mg/dL Final     Comment:     Desirable:       <100 mg/dl     Triglycerides   Date Value Ref Range Status   01/07/2021 188 (H) <150 mg/dL Final     Comment:     Borderline high:  150-199 mg/dl  High:             200-499 mg/dl  Very high:       >499 mg/dl     01/07/2021 83 <150 mg/dL Final     No results found for: CHOLHDLRATIO  INR   Date Value Ref Range Status   01/07/2021 0.98 0.86 - 1.14 Final       No results found for: TSH  Recent Labs   Lab Test 01/08/21  0351   A1C 5.3        Testing/Procedures:  I personally reviewed all the following information:    EKG (Date 1/21/2021):  nsr with sinus arrhythmia, TWI avL and flat in I  TTE (Date 1/7/2021):  50-55% with HD of the inferolateral and anterolateral wall from base to mid, rv nl no sig valve disease.    LHC (Date 1/7/2021): 100% ostial to prox Cx s/p JUAN 0% rca and 40% LAD, thrombotic cx lesion.      Assessment and Plan:   Krystal Virgen is a 80 year old female with history of obesity, asthma HLD and stemi 1/2021 who presents to virtual cardiology clinic today for follow up after her STEMI about one month prior. Today she is feeling well with no complaints.      # STEMI s/p stent to ostial/prox circ:   -continue aspirin 81 mg life long  -continue Plavix 75 mg daily for 12m (1/2022)  -continue metoprolol XL 25 mg daily  -continue Crestor 40 mg at bedtime life long    # Prevention  -discussed recommendation of 150 min moderate intensity exercise /week   -discussed recommendation of moderation of etoh, nsaid and na use  - discussed recommendation of refrain from smoking as per above and all illicit drug    Follow up in cardiology clinic in 1 year    >50% of this 45-minute visit were spent with the patient on in-person counseling and discussion regarding the above issues.     The patient states understanding and is agreeable with plan.     Steven Almeida MD  Cardiology    CC  SELF, REFERRED

## 2021-02-10 ENCOUNTER — VIRTUAL VISIT (OUTPATIENT)
Dept: CARDIOLOGY | Facility: CLINIC | Age: 81
End: 2021-02-10
Attending: INTERNAL MEDICINE
Payer: COMMERCIAL

## 2021-02-10 DIAGNOSIS — I21.21 ST ELEVATION MYOCARDIAL INFARCTION INVOLVING LEFT CIRCUMFLEX CORONARY ARTERY (H): Primary | ICD-10-CM

## 2021-02-10 PROCEDURE — 99214 OFFICE O/P EST MOD 30 MIN: CPT | Mod: 95 | Performed by: INTERNAL MEDICINE

## 2021-02-10 RX ORDER — CLOPIDOGREL BISULFATE 75 MG/1
300 TABLET ORAL
COMMUNITY
Start: 2021-01-22 | End: 2021-08-24

## 2021-02-10 NOTE — LETTER
"2/10/2021      RE: Krystal Virgen  [address]  [place] [address]       Dear Colleague,    Thank you for the opportunity to participate in the care of your patient, Krystal Virgen, at the [facility] [place] [facility]. Please see a copy of my visit note below.    Krystal Virgen is a 80 year old female who is being evaluated via a billable video visit.      The patient has been notified of following:   \"This video visit will be conducted via a call between you and your physician/provider. We have found that certain health care needs can be provided without the need for an in-person physical exam.  This service lets us provide the care you need with a video conversation.  If a prescription is necessary we can send it directly to your pharmacy.  If lab work is needed we can place an order for that and you can then stop by our lab to have the test done at a later time. Video visits are billed at different rates depending on your insurance coverage.  Please reach out to your insurance provider with any questions. If during the course of the call the physician/provider feels a video visit is not appropriate, you will not be charged for this service.\"    Patient has given verbal consent for Video visit? Yes      Video-Visit Details  Type of service:  Video Visit  Video start time: 147p  Video end time: 210p  Total video time: 23m  Originating Location (pt. Location):  Home  Distant Location (provider location):  Home  Mode of Communication: Video Conference via Doximetry: [phone]  ________________________________________________________________________________    Chief complaint: follow up after STEMI with JUAN to cx    HPI:   Krystal Virgen is a 80 year old female with history of asthma, HLD and recent STEMI to cx s/p JUAN.    As you well know, but for my records I will outline their known cardiac history briefly: Patient " "presented to the hospital with chest pressure 1/7/2021 ---> STEMI -->cath with prox Cx -->JUAN--> c/b small coronary perforation (no pericardial effusion) small right groin hematoma and UTI.   After discharge she developed mild persistent shortness of breath at rest that worsened with exertion that began while at the hospital. She was transitioned to plavix 1/21/21 at an out pt visit. Also notable for this visit, her groin hematoma was stable/resolving. Labs: hgb stable/increasing, cr down 1.29 (1.41).     Since transition to the Plavix, her shortness of breath has fully resolved. Today she has no complaints other than some \"brain fog\" she says all her companions have due to isolation with covid but is also peripherally worried it could be due to the statin or metoprolol. We spoke about the benefits of each and she agreed to continue for one year with re-evaluation at our next visit. Otherwise she specifically denies any chest pain, dyspnea at rest or with exertion (beyond her baseline for her asthma), PND, orthopnea, peripheral edema, palpitations, lightheadedness or syncope.       PAST MEDICAL HISTORY:  Past Medical History:   Diagnosis Date     Asthma      Chronic sinusitis      Depression      GERD (gastroesophageal reflux disease)      Migraines      Osteopenia      Overactive bladder      Pneumococcal infection      Spinal headache        CURRENT MEDICATIONS:  See chart for current meds, all reviewed with pt  Noted plavix 300mg was a one time med for loading, otherwise confirmed all other cardiac meds    PAST SURGICAL HISTORY:  Past Surgical History:   Procedure Laterality Date     ARTHRODESIS FOOT  11/11/2011    Procedure:ARTHRODESIS FOOT; Right First Metatarsophalangeal Joint Fusion with Second and Third Clawtoe Repairs; Surgeon:SARAH CASTRO; Location: OR     CHOLECYSTECTOMY       CV CORONARY ANGIOGRAM N/A 1/7/2021    Procedure: Coronary Angiogram;  Surgeon: Slade Yu MD;  Location: [facility] HEART CARDIAC CATH LAB     CV PCI STENT DRUG ELUTING N/A 1/7/2021    Procedure: Percutaneous Coronary Intervention Stent Drug Eluting;  Surgeon: Slade Yu MD;  Location:  HEART CARDIAC CATH LAB     ENT SURGERY      sinus surgery x1     GYN SURGERY      benign hysterectomy age 43     ORTHOPEDIC SURGERY      fusion of grest toe right       ALLERGIES:     Allergies   Allergen Reactions     Penicillins      Amoxicillin doesn't work for her     Sulfa Drugs        FAMILY HISTORY:  No family history of premature CAD or sudden death.    SOCIAL HISTORY:  Social History     Tobacco Use     Smoking status: Former Smoker     Smokeless tobacco: Never Used   Substance Use Topics     Alcohol use: No     Drug use: No       ROS:   A comprehensive 14 point review of systems is negative other than as mentioned in HPI.    Exam:  There were no vitals taken for this visit.  Limited exam due to phone conversation, pt oriented with robust voice.    The rest of a comprehensive physical examination is deferred due to PHE (public health emergency) video visit restrictions.    Labs:  Reviewed.   Recent Labs   Lab Test 01/21/21  1332 01/10/21  0531    142   POTASSIUM 4.3 4.0   CHLORIDE 111* 112*   CO2 24 25   BUN 19 18   CR 1.29* 1.41*     CBC RESULTS:   Recent Labs   Lab Test 01/21/21  1332   WBC 10.3   RBC 3.54*   HGB 10.4*   HCT 33.1*   MCV 94   MCH 29.4   MCHC 31.4*   RDW 13.2   *     Cholesterol   Date Value Ref Range Status   01/07/2021 160 <200 mg/dL Final   01/07/2021 158 <200 mg/dL Final     HDL Cholesterol   Date Value Ref Range Status   01/07/2021 62 >49 mg/dL Final   01/07/2021 67 >49 mg/dL Final     LDL Cholesterol Calculated   Date Value Ref Range Status   01/07/2021 61 <100 mg/dL Final     Comment:     Desirable:       <100 mg/dl   01/07/2021 75 <100 mg/dL Final     Comment:     Desirable:       <100 mg/dl     Triglycerides   Date Value Ref Range Status   01/07/2021 188 (H) <150 mg/dL Final      Comment:     Borderline high:  150-199 mg/dl  High:             200-499 mg/dl  Very high:       >499 mg/dl     01/07/2021 83 <150 mg/dL Final     No results found for: CHOLHDLRATIO  INR   Date Value Ref Range Status   01/07/2021 0.98 0.86 - 1.14 Final       No results found for: TSH  Recent Labs   Lab Test 01/08/21  0351   A1C 5.3       Testing/Procedures:  I personally reviewed all the following information:    EKG (Date 1/21/2021):  nsr with sinus arrhythmia, TWI avL and flat in I  TTE (Date 1/7/2021):  50-55% with HD of the inferolateral and anterolateral wall from base to mid, rv nl no sig valve disease.    LHC (Date 1/7/2021): 100% ostial to prox Cx s/p JUAN 0% rca and 40% LAD, thrombotic cx lesion.      Assessment and Plan:   Krystal Virgen is a 80 year old female with history of obesity, asthma HLD and stemi 1/2021 who presents to virtual cardiology clinic today for follow up after her STEMI about one month prior. Today she is feeling well with no complaints.      # STEMI s/p stent to ostial/prox circ:   -continue aspirin 81 mg life long  -continue Plavix 75 mg daily for 12m (1/2022)  -continue metoprolol XL 25 mg daily  -continue Crestor 40 mg at bedtime life long    # Prevention  -discussed recommendation of 150 min moderate intensity exercise /week   -discussed recommendation of moderation of etoh, nsaid and na use  - discussed recommendation of refrain from smoking as per above and all illicit drug    Follow up in cardiology clinic in 1 year    >50% of this 45-minute visit were spent with the patient on in-person counseling and discussion regarding the above issues.     The patient states understanding and is agreeable with plan.     Steven Almeida MD  Cardiology        Please do not hesitate to contact me if you have any questions/concerns.     Sincerely,     Steven Almeida MD

## 2021-02-10 NOTE — PATIENT INSTRUCTIONS
You were seen at the [facility] clinic today.  You saw Dr. Steven Almeida in a virtual visit today:  Here are your Instructions:    1. Continue all medications as prescribed (metop, aspirin 81mg, plavix and crestor) until we speak again, your pcp can refill until that time for your convenience  2.follow up with me in 1 year (jan 2022) with tte  3. Work on increasing exercise capacity with a goal of 150 min a week (20 min a day)      If you have questions after this visit:  Send a [employer] message or contact Olga Pope LPN  Office:  [phone] option #1, then #4 & ask for Olga (nurse line)  Fax:  [phone]  After Hours:  [phone] option #4 ask to speak to he on-call Cardiologist  Appointments:  [phone] option #1, then option #1

## 2021-03-02 ENCOUNTER — AMBULATORY - HEALTHEAST (OUTPATIENT)
Dept: CARDIOLOGY | Facility: CLINIC | Age: 81
End: 2021-03-02

## 2021-03-03 ENCOUNTER — OFFICE VISIT - HEALTHEAST (OUTPATIENT)
Dept: CARDIOLOGY | Facility: CLINIC | Age: 81
End: 2021-03-03

## 2021-03-03 DIAGNOSIS — E78.5 HYPERLIPIDEMIA WITH TARGET LDL LESS THAN 70: ICD-10-CM

## 2021-03-03 DIAGNOSIS — I21.3 STEMI (ST ELEVATION MYOCARDIAL INFARCTION) (H): ICD-10-CM

## 2021-03-03 DIAGNOSIS — I25.10 CAD (CORONARY ARTERY DISEASE): ICD-10-CM

## 2021-03-03 DIAGNOSIS — I25.10 CORONARY ARTERY DISEASE INVOLVING NATIVE CORONARY ARTERY OF NATIVE HEART WITHOUT ANGINA PECTORIS: ICD-10-CM

## 2021-03-03 LAB
ATRIAL RATE - MUSE: 74 BPM
DIASTOLIC BLOOD PRESSURE - MUSE: NORMAL
INTERPRETATION ECG - MUSE: NORMAL
P AXIS - MUSE: 34 DEGREES
PR INTERVAL - MUSE: 156 MS
QRS DURATION - MUSE: 86 MS
QT - MUSE: 388 MS
QTC - MUSE: 430 MS
R AXIS - MUSE: -10 DEGREES
SYSTOLIC BLOOD PRESSURE - MUSE: NORMAL
T AXIS - MUSE: 106 DEGREES
VENTRICULAR RATE- MUSE: 74 BPM

## 2021-03-11 ENCOUNTER — COMMUNICATION - HEALTHEAST (OUTPATIENT)
Dept: CARDIOLOGY | Facility: CLINIC | Age: 81
End: 2021-03-11

## 2021-03-11 DIAGNOSIS — Z95.5 HISTORY OF CORONARY ARTERY STENT PLACEMENT: ICD-10-CM

## 2021-03-11 DIAGNOSIS — I21.21 ST ELEVATION MYOCARDIAL INFARCTION INVOLVING LEFT CIRCUMFLEX CORONARY ARTERY (H): ICD-10-CM

## 2021-03-17 ENCOUNTER — COMMUNICATION - HEALTHEAST (OUTPATIENT)
Dept: CARDIOLOGY | Facility: CLINIC | Age: 81
End: 2021-03-17

## 2021-04-09 ENCOUNTER — HOSPITAL ENCOUNTER (OUTPATIENT)
Dept: CARDIOLOGY | Facility: CLINIC | Age: 81
Discharge: HOME | End: 2021-04-09
Attending: INTERNAL MEDICINE

## 2021-04-09 DIAGNOSIS — I25.10 CAD (CORONARY ARTERY DISEASE): ICD-10-CM

## 2021-04-09 LAB
AORTIC ROOT: 2.2 CM
AORTIC VALVE MEAN VELOCITY: 84 CM/S
ASCENDING AORTA: 3.8 CM
AV DIMENSIONLESS INDEX VTI: 0.9
AV MEAN GRADIENT: 3 MMHG
AV PEAK GRADIENT: 7.4 MMHG
AV VALVE AREA: 2.5 CM2
AV VELOCITY RATIO: 0.8
BSA FOR ECHO PROCEDURE: 1.85 M2
CV BLOOD PRESSURE: ABNORMAL MMHG
CV ECHO HEIGHT: 63 IN
CV ECHO WEIGHT: 170 LBS
DOP CALC AO PEAK VEL: 136 CM/S
DOP CALC AO VTI: 26.7 CM
DOP CALC LVOT AREA: 2.83 CM2
DOP CALC LVOT DIAMETER: 1.9 CM
DOP CALC LVOT PEAK VEL: 105 CM/S
DOP CALC LVOT STROKE VOLUME: 65.7 CM3
DOP CALCLVOT PEAK VEL VTI: 23.2 CM
EJECTION FRACTION: 55 % (ref 55–75)
FRACTIONAL SHORTENING: 23.7 % (ref 28–44)
INTERVENTRICULAR SEPTUM IN END DIASTOLE: 1.16 CM (ref 0.6–0.9)
IVS/PW RATIO: 1.1
LA AREA 1: 24 CM2
LA AREA 2: 25.2 CM2
LEFT ATRIUM LENGTH: 5.87 CM
LEFT ATRIUM SIZE: 3.4 CM
LEFT ATRIUM VOLUME INDEX: 47.3 ML/M2
LEFT ATRIUM VOLUME: 87.6 ML
LEFT VENTRICLE CARDIAC INDEX: 2.3 L/MIN/M2
LEFT VENTRICLE CARDIAC OUTPUT: 4.2 L/MIN
LEFT VENTRICLE DIASTOLIC VOLUME INDEX: 65.4 CM3/M2 (ref 29–61)
LEFT VENTRICLE DIASTOLIC VOLUME: 121 CM3 (ref 46–106)
LEFT VENTRICLE HEART RATE: 64 BPM
LEFT VENTRICLE MASS INDEX: 113.8 G/M2
LEFT VENTRICLE SYSTOLIC VOLUME INDEX: 29.2 CM3/M2 (ref 8–24)
LEFT VENTRICLE SYSTOLIC VOLUME: 54 CM3 (ref 14–42)
LEFT VENTRICULAR INTERNAL DIMENSION IN DIASTOLE: 5.07 CM (ref 3.8–5.2)
LEFT VENTRICULAR INTERNAL DIMENSION IN SYSTOLE: 3.87 CM (ref 2.2–3.5)
LEFT VENTRICULAR MASS: 210.5 G
LEFT VENTRICULAR OUTFLOW TRACT MEAN GRADIENT: 2 MMHG
LEFT VENTRICULAR OUTFLOW TRACT MEAN VELOCITY: 69.7 CM/S
LEFT VENTRICULAR OUTFLOW TRACT PEAK GRADIENT: 4 MMHG
LEFT VENTRICULAR POSTERIOR WALL IN END DIASTOLE: 1.03 CM (ref 0.6–0.9)
LV STROKE VOLUME INDEX: 35.5 ML/M2
MITRAL REGURGITANT VELOCITY TIME INTEGRAL: 203 CM
MITRAL VALVE E/A RATIO: 1.2
MR MEAN GRADIENT: 80 MMHG
MR MEAN VELOCITY: 418 CM/S
MR PEAK GRADIENT: 125.4 MMHG
MV AVERAGE E/E' RATIO: 9.8 CM/S
MV DECELERATION TIME: 225 MS
MV E'TISSUE VEL-LAT: 11.4 CM/S
MV E'TISSUE VEL-MED: 8.09 CM/S
MV LATERAL E/E' RATIO: 8.4
MV MEDIAL E/E' RATIO: 11.8
MV PEAK A VELOCITY: 80.9 CM/S
MV PEAK E VELOCITY: 95.8 CM/S
NUC REST DIASTOLIC VOLUME INDEX: 2720 LBS
NUC REST SYSTOLIC VOLUME INDEX: 63 IN
PISA MR PEAK VEL: 560 CM/S
TRICUSPID REGURGITATION PEAK PRESSURE GRADIENT: 36.7 MMHG
TRICUSPID VALVE ANULAR PLANE SYSTOLIC EXCURSION: 2.8 CM
TRICUSPID VALVE PEAK REGURGITANT VELOCITY: 303 CM/S

## 2021-04-09 ASSESSMENT — MIFFLIN-ST. JEOR: SCORE: 1215.24

## 2021-04-22 ENCOUNTER — COMMUNICATION - HEALTHEAST (OUTPATIENT)
Dept: FAMILY MEDICINE | Facility: CLINIC | Age: 81
End: 2021-04-22

## 2021-04-22 DIAGNOSIS — I21.3 STEMI (ST ELEVATION MYOCARDIAL INFARCTION) (H): ICD-10-CM

## 2021-04-30 ENCOUNTER — AMBULATORY - HEALTHEAST (OUTPATIENT)
Dept: LAB | Facility: CLINIC | Age: 81
End: 2021-04-30

## 2021-04-30 ENCOUNTER — HOSPITAL ENCOUNTER (OUTPATIENT)
Dept: MAMMOGRAPHY | Facility: CLINIC | Age: 81
Setting detail: RADIATION/ONCOLOGY SERIES
Discharge: STILL ADMITTED OR ADMINISTRATIVE | End: 2021-04-30
Attending: INTERNAL MEDICINE
Payer: MEDICARE

## 2021-04-30 DIAGNOSIS — Z12.31 VISIT FOR SCREENING MAMMOGRAM: ICD-10-CM

## 2021-04-30 DIAGNOSIS — I25.10 CAD (CORONARY ARTERY DISEASE): ICD-10-CM

## 2021-04-30 LAB
ALBUMIN SERPL-MCNC: 3 G/DL (ref 3.5–5)
ALP SERPL-CCNC: 58 U/L (ref 45–120)
ALT SERPL W P-5'-P-CCNC: <9 U/L (ref 0–45)
ANION GAP SERPL CALCULATED.3IONS-SCNC: 7 MMOL/L (ref 5–18)
AST SERPL W P-5'-P-CCNC: 11 U/L (ref 0–40)
BILIRUB SERPL-MCNC: 0.5 MG/DL (ref 0–1)
BUN SERPL-MCNC: 14 MG/DL (ref 8–28)
CALCIUM SERPL-MCNC: 8.4 MG/DL (ref 8.5–10.5)
CHLORIDE BLD-SCNC: 110 MMOL/L (ref 98–107)
CHOLEST SERPL-MCNC: 120 MG/DL
CO2 SERPL-SCNC: 23 MMOL/L (ref 22–31)
CREAT SERPL-MCNC: 1.39 MG/DL (ref 0.6–1.1)
FASTING STATUS PATIENT QL REPORTED: YES
GFR SERPL CREATININE-BSD FRML MDRD: 37 ML/MIN/1.73M2
GLUCOSE BLD-MCNC: 89 MG/DL (ref 70–125)
HDLC SERPL-MCNC: 63 MG/DL
LDLC SERPL CALC-MCNC: 42 MG/DL
POTASSIUM BLD-SCNC: 4.1 MMOL/L (ref 3.5–5)
PROT SERPL-MCNC: 6.1 G/DL (ref 6–8)
SODIUM SERPL-SCNC: 140 MMOL/L (ref 136–145)
TRIGL SERPL-MCNC: 77 MG/DL

## 2021-05-04 ENCOUNTER — OFFICE VISIT - HEALTHEAST (OUTPATIENT)
Dept: ONCOLOGY | Facility: CLINIC | Age: 81
End: 2021-05-04

## 2021-05-04 DIAGNOSIS — C50.411 MALIGNANT NEOPLASM OF UPPER-OUTER QUADRANT OF RIGHT BREAST IN FEMALE, ESTROGEN RECEPTOR POSITIVE (H): ICD-10-CM

## 2021-05-04 DIAGNOSIS — Z17.0 MALIGNANT NEOPLASM OF UPPER-OUTER QUADRANT OF RIGHT BREAST IN FEMALE, ESTROGEN RECEPTOR POSITIVE (H): ICD-10-CM

## 2021-05-07 ENCOUNTER — COMMUNICATION - HEALTHEAST (OUTPATIENT)
Dept: CARDIOLOGY | Facility: CLINIC | Age: 81
End: 2021-05-07

## 2021-05-07 DIAGNOSIS — I21.3 STEMI (ST ELEVATION MYOCARDIAL INFARCTION) (H): ICD-10-CM

## 2021-05-08 ENCOUNTER — COMMUNICATION - HEALTHEAST (OUTPATIENT)
Dept: FAMILY MEDICINE | Facility: CLINIC | Age: 81
End: 2021-05-08

## 2021-05-08 DIAGNOSIS — F33.3 SEVERE RECURRENT MAJOR DEPRESSIVE DISORDER WITH PSYCHOTIC FEATURES (H): ICD-10-CM

## 2021-05-18 ENCOUNTER — COMMUNICATION - HEALTHEAST (OUTPATIENT)
Dept: FAMILY MEDICINE | Facility: CLINIC | Age: 81
End: 2021-05-18

## 2021-05-18 DIAGNOSIS — C50.411 MALIGNANT NEOPLASM OF UPPER-OUTER QUADRANT OF RIGHT BREAST IN FEMALE, ESTROGEN RECEPTOR POSITIVE (H): ICD-10-CM

## 2021-05-18 DIAGNOSIS — Z17.0 MALIGNANT NEOPLASM OF UPPER-OUTER QUADRANT OF RIGHT BREAST IN FEMALE, ESTROGEN RECEPTOR POSITIVE (H): ICD-10-CM

## 2021-05-19 ENCOUNTER — COMMUNICATION - HEALTHEAST (OUTPATIENT)
Dept: ONCOLOGY | Facility: CLINIC | Age: 81
End: 2021-05-19

## 2021-05-25 ENCOUNTER — RECORDS - HEALTHEAST (OUTPATIENT)
Dept: ADMINISTRATIVE | Facility: CLINIC | Age: 81
End: 2021-05-25

## 2021-05-26 VITALS
OXYGEN SATURATION: 97 % | TEMPERATURE: 97.8 F | SYSTOLIC BLOOD PRESSURE: 146 MMHG | DIASTOLIC BLOOD PRESSURE: 75 MMHG | HEART RATE: 97 BPM

## 2021-05-27 VITALS
HEART RATE: 50 BPM | OXYGEN SATURATION: 98 % | WEIGHT: 173.6 LBS | BODY MASS INDEX: 30.75 KG/M2 | TEMPERATURE: 98.2 F | SYSTOLIC BLOOD PRESSURE: 127 MMHG | DIASTOLIC BLOOD PRESSURE: 63 MMHG

## 2021-05-27 ASSESSMENT — PATIENT HEALTH QUESTIONNAIRE - PHQ9: SUM OF ALL RESPONSES TO PHQ QUESTIONS 1-9: 4

## 2021-05-28 ASSESSMENT — ASTHMA QUESTIONNAIRES: ACT_TOTALSCORE: 14

## 2021-05-28 NOTE — TELEPHONE ENCOUNTER
Patient returned TIFFANIE Potter's call.  She said she moved recently and couldn't remember when she was to follow-up with Dr. Zavala again.  I told her around 6/11/19 with labs (HM2, Ferritin).  She asked to schedule that appt today. I transferred her to MAGUI Frazier to isadora/JAZ Agiuar RN

## 2021-05-28 NOTE — PROGRESS NOTES
MTM Follow Up Encounter  Assessment & Plan                                                     Arthritis: Will have patient continue duloxetine and PRN medications for now as she was not interested in additional medications and is focusing on her move.      Depression: Stable. Recommended to continue current regimen.      Hx Breast Cancer: Continue tamoxifen and follow up with [facility] oncology.     Osteopenia: slight worsening in her most recent DEXA, but she is still in the osteopenia range. Last Vitamin D level WNL, but consider rechecking in the future. She will start the chewable calcium citrate from [employer]. In postmenopausal women, tamoxifen use is associated with a protective effect on BMD.     Asthma: Recommended that she let me know when she is on her last Symbicort inhaler and I will send in an equivalent.     Follow Up  As needed with MTM     Subjective & Objective                                                       Krystal Virgen is a 78 y.o. female coming in for a follow up visit for Medication Therapy Management.     Chief Complaint: is tired. Recently moved to [facility] in [place]. Planning on continuing to come to [facility] for now.     Arthritis: Reports low back pain, foot pain, hip. Reports she is only taking duloxetine right now, and nothing PRN unless pain is really pain. Therefore not taking naproxen 500 mg two times a day temporarily, diclofenac gel, scheduled APAP 1000 mg three times a day, and lidocaine 4% patch routinely.  Diclofenac gel did not work. Lidocaine patch did not do much, but used it when her pain was very bad.   Was on gabapentin for shingles - not helpful, stopped.   Chose new facility because it has a heated pool -- will plan on using and is more active now.       Depression: Currently taking duloxetine 60 mg daily  and bupropion  mg AM.  She would like to stay on her current medications. She thinks this has been a good combination.      Hx Breast  Cancer: Currently taking tamoxifen 20 mg daily -- switched from anastrozole 1 mg daily per [facility] oncology on 12/11 and still having the hot flashes. Plans on following up in June.      Osteopenia: [facility] ordered a DEXA which she completed on 12/28. T score -2.2. Continues on Vitamin D 2000 IU daily. Is having a hard time swallowing the calcium citrate petites and would prefer to go back to chewable - at last MTM appointment, different products were reviewed and she found one at [employer] that she is planning on purchasing.         Vitamin D, Total (25-Hydroxy)   Date Value Ref Range Status   06/10/2014 49.3 30.0 - 80.0 ng/mL Final      Asthma: Reports taking Symbicort 160-4.5 mcg 2 puffs two times a day. Has 2-3 inhalers left at home and was told it was not covered anymore. Reports breathing is very good and she believes taking Symbicort is why her breathing was so good this year for once.        PMH: reviewed in EPIC   Allergies/ADRs: reviewed in EPIC   Alcohol: reviewed in EPIC   Tobacco:   Social History     Tobacco Use   Smoking Status Never Smoker   Smokeless Tobacco Never Used     Today's Vitals: There were no vitals filed for this visit.  ----------------    Much or all of the text in this note was generated through the use of Dragon Dictate voice-to-text software. Errors in spelling or words which seem out of context are unintentional. Sound alike errors, in particular, may have escaped editing.    The patient declined an after visit summary    I spent 30 minutes with this patient today;   All changes were made via collaborative practice agreement with Juan C Fairchild MD. A copy of the visit note was provided to the patient's provider.     Mikala Hays, Pharm.D., [facility]  Medication Therapy Management Pharmacist  [facility]     Current Outpatient Medications   Medication Sig Dispense Refill     acetaminophen (TYLENOL) 325 MG tablet Take 325 mg by mouth as needed for pain.        albuterol (PROAIR HFA;PROVENTIL HFA;VENTOLIN HFA) 90 mcg/actuation inhaler Inhale 2 puffs every 6 (six) hours as needed. 3 Inhaler 1     albuterol (PROVENTIL) 2.5 mg /3 mL (0.083 %) nebulizer solution INHALE 1 VIAL VIA NEBULIZER Q 4 H PRF WHEEZING OR SOB  0     budesonide-formoterol (SYMBICORT) 160-4.5 mcg/actuation inhaler Inhale 2 puffs 2 (two) times a day. 3 Inhaler 1     buPROPion (WELLBUTRIN XL) 300 MG 24 hr tablet Take 1 tablet (300 mg total) by mouth every morning. 90 tablet 1     CHOLECALCIFEROL, VITAMIN D3, (VITAMIN D3 ORAL) Take 2,000 Units by mouth daily.       DULoxetine (CYMBALTA) 60 MG capsule Take 1 capsule (60 mg total) by mouth daily. 90 capsule 1     fluticasone (FLONASE) 50 mcg/actuation nasal spray 2 sprays into each nostril daily as needed.             lansoprazole (PREVACID) 30 MG capsule Take 1 capsule (30 mg total) by mouth daily. 90 capsule 1     montelukast (SINGULAIR) 10 mg tablet Take 1 tablet (10 mg total) by mouth at bedtime. 90 tablet 1     moxifloxacin (AVELOX) 400 mg tablet Take 1 tablet (400 mg total) by mouth 3 (three) times a week. 36 tablet 1     naproxen (NAPROSYN) 500 MG tablet Take 1 tablet (500 mg total) by mouth 2 (two) times a day with meals. 180 tablet 0     SUMAtriptan (IMITREX) 20 mg/actuation nasal spray 1 spray (20 mg total) into each nostril every 2 (two) hours as needed for migraine. 1 Inhaler 1     tamoxifen (NOLVADEX) 20 MG tablet Take 1 tablet (20 mg total) by mouth daily. 90 tablet 1     traZODone (DESYREL) 50 MG tablet Take 1 tablet (50 mg total) by mouth at bedtime. 90 tablet 1     triamcinolone (KENALOG) 0.5 % ointment Apply 1 application topically 2 (two) times a day as needed. Two tubes of 15 g 30 g 1     VIRTUSSIN AC  mg/5 mL liquid TK 10 ML PO Q 4 H PRN  0     No current facility-administered medications for this visit.

## 2021-05-29 NOTE — PROGRESS NOTES
[facility] Hematology and Oncology Progress Note    Patient: Krystal Virgen  MRN: [id number]  Date of Service: 6/19/2019        Reason for Visit    #.  Breast cancer, stage Ia, ER positive, HER-2 negative.  #.  Chronic microcytic anemia with iron deficiency.    Assessment and Plan  Cancer Staging  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)  Staging form: Breast, AJCC 7th Edition  - Clinical: No stage assigned - Unsigned  - Pathologic stage from 9/8/2017: Stage IA (T1c, N0, cM0) - Signed by Herbert Zavala MD on 9/8/2017  ER Status: Positive  AK Status: Positive  HER2 Status: Negative      ECOG Performance   ECOG Performance Status: 1     Distress Assessment  Distress Assessment Score: 5(life stress)    Pain  Currently in Pain: Yes  Pain Score (Initial OR Reassessment): 5  Location: muscles, joints-5/10; HA-2-3/10    #.  Invasive ductal carcinoma of the right breast.  We will open wall stage IA (pT1c, pN0, cM0), grade 1, ER/AK strongly positive, HER2/Uriel negative, with positive inferior margin. Associated DCIS. S/p right breast lumpectomy and right sentinel lymph node biopsy on 8/10/2017.  She declined reexcision or adjuvant radiation treatment.  She started anastrozole in October 2017, then switched to tamoxifen in December 2018 due to intolerable vasomotor symptoms and not to compromise her bone density.   No clear clinical evidence of breast cancer recurrence.  She has ongoing vasomotor symptoms with significant hot flushes but she decided to remain on tamoxifen.   Screening mammogram requested for July 2019.   Follow-up clinical exam in 6 months.     #.  Low bone density/osteopenia.     -DEXA scan from 9/21/2017 showed low bone density: L1-L3 T score-0.6, left femoral neck-2.1, right femoral neck-2.1.  DEXA scan from December 2018 showed L-spine T score-0.8, left hip T score-2.2, right hip T score-2.4 with significant decline from a year ago.     She was on Fosamax for about 25  years.  discontinued Fosamax in 10/2017 for a drug holiday.   Continue calcium and vitamin D.  She has not been able to participate in much activity due to her chronic cough/shortness of breath.     #.  History of microcytic anemia with iron deficiency.     Her hemoglobin from earlier this month showed normal hemoglobin with normal MCV.  Ferritin has gradually down to 32 but still in normal range. We will continue to monitor at this point.  She was getting IV iron periodically and last IV iron therapy was in March 2018.   -Colonoscopy on 11/8/17 showed diverticulosis without evidence of source of bleeding. EGD from 12/14/2017 showed a large hiatal hernia with John's erosion.    -She has a good understanding that she will need lifelong iron supplementation periodically.    Problem List    1. Encounter for screening mammogram for breast cancer  Mammo Screening Bilateral   2. Osteopenia of multiple sites  DXA Bone Density Scan   3. Malignant neoplasm of upper-outer quadrant of right breast in female, estrogen receptor positive (H)  tamoxifen (NOLVADEX) 20 MG tablet        ______________________________________________________________________________    History of Present Illness    K presents today are soft for follow-up.  She has progressive fatigue.  She has constant hot flashes, night sweats and they are not getting better with tamoxifen.  However she decided to stay on tamoxifen at this point.  She has ongoing cough and shortness of breath with exertion.  She is also exhausted as she is moving to assisted living facility and she has to part with a lot of stuff from her house and she is going to sell her house.     Pain Status  Currently in Pain: Yes    Review of Systems    Constitutional  Constitutional (WDL): Exceptions to WDL  Fatigue: Fatigue not relieved by rest - Limiting instrumental ADL(doesn't sleep well @ noc due to getting up to urinate and night sweats)  Fever: None  Chills:  None  Neurosensory  Neurosensory (WDL): Exceptions to WDL(c/o HA)  Peripheral Motor Neuropathy: None  Ataxia: None  Peripheral Sensory Neuropathy: None  Confusion: None  Syncope: None  Eye   Eye Disorder (WDL): Exceptions to WDL  Blurred Vision: None  Dry Eye: None  Eye Pain: None  Watering Eyes: Intervention indicated(left eye, constant)  Ear  Ear Disorder (WDL): All ear disorder elements are within defined limits  Cardiovascular  Cardiovascular (WDL): All cardiovascular elements are within defined limits  Pulmonary  Respiratory (WDL): Exceptions to WDL  Cough: Mild symptoms, nonprescription intervention indicated  Dyspnea: Shortness of breath with minimal exertion, limiting instrumental ADL  Hypoxia: None  Gastrointestinal  Gastrointestinal (WDL): Exceptions to WDL  Anorexia: None  Constipation: None(occ-after takes an aleve)  Diarrhea: None(occ-a couple days after taking aleve)  Dysphagia: None  Esophagitis: None  Nausea: None  Pharyngitis: None  Vomiting: None  Dysgeusia: None  Dry Mouth: Symptomatic (e.g., dry or thick saliva) without significant dietary alteration, unstimulated saliva flow >0.2 ml/min  Genitourinary  Genitourinary (WDL): Exceptions to WDL  Urinary Frequency: Present  Urinary Retention: None  Urinary Tract Pain: None  Lymphatic  Lymph (WDL): All lymph disorder elements are within defined limits  Musculoskeletal and Connective Tissue  Musculoskeletal and Connetive Tissue Disorders (WDL): Exceptions to WDL  Arthralgia: Moderate pain, limiting instrumental ADL  Bone Pain: None  Muscle Weakness : Symptomatic, perceived by patient but not evident on physical exam  Myalgia: Moderate pain, limiting instrumental ADL  Integumentary  Integumentary (WDL): All integumentary elements are within defined limits  Patient Coping  Patient Coping: Accepting;Open/discussion  Distress Assessment  Distress Assessment Score: 5(life stress)  Accompanied by  Accompanied by: Alone  Oral Chemo Adherence       Past  History  Past Medical History:   Diagnosis Date     Anemia      Asthma      Breast cancer (H)      Chronic bronchitis (H)      Osteoporosis      Pneumonia      Sinusitis        Physical Exam    Recent Vitals 6/19/2019   Weight (No Data)   BSA (m2) -   /91   Pulse 97   Temp 98.5   Temp src 1   SpO2 95   Some recent data might be hidden     General: alert, awake, not in acute distress  HEENT: Head: Normal, normocephalic, atraumatic.  Eye: Normal external eye, conjunctiva, lids cornea, RINKU.  Ears:  Non-tender.  Nose: Normal external nose, mucus membranes and septum.  Pharynx: Dental Hygiene adequate. Normal buccal mucosa. Normal pharynx.  Neck / Thyroid: Supple, no masses, nodes, nodules or enlargement.  Lymphatics: No abnormally enlarged lymph nodes.  Chest: Normal chest wall and respirations. Clear to auscultation.  Breasts: Fibroglandular breast bilaterally.  No discrete firm fixed mass in both breasts.  Heart: S1 S2 RRR, no murmur.   Abdomen: abdomen is soft without significant tenderness, masses, organomegaly or guarding  Extremities: normal strength, tone, and muscle mass  Skin: normal. no rash or abnormalities  CNS: non focal.    Lab Results    No results found for this or any previous visit (from the past 168 hour(s)).    Imaging    No results found.    Signed by: Herbert Zavala MD

## 2021-05-30 NOTE — TELEPHONE ENCOUNTER
Patient is requesting call back from Pharm D she said prescription that pharmacist wrote went to a pharmacy in [place].  Thanks!  Alexsandra Sanches, MTM coordinator,.

## 2021-05-30 NOTE — TELEPHONE ENCOUNTER
RN cannot approve Refill Request    RN can NOT refill this medication Protocol failed and NO refill given.       Janine Grajeda, [facility] Triage/Med Refill 6/29/2019    Requested Prescriptions   Pending Prescriptions Disp Refills     montelukast (SINGULAIR) 10 mg tablet [Pharmacy Med Name: MONTELUKAST  TAB 10MG] 90 tablet 1     Sig: TAKE 1 TABLET AT BEDTIME       There is no refill protocol information for this order      Signed Prescriptions Disp Refills    traZODone (DESYREL) 50 MG tablet 90 tablet 0     Sig: TAKE 1 TABLET AT BEDTIME       Tricyclics/Misc Antidepressant/Antianxiety Meds Refill Protocol Passed - 6/29/2019  1:09 AM        Passed - PCP or prescribing provider visit in last year     Last office visit with prescriber/PCP: 10/22/2018 Juan C Fairchild MD OR same dept: 10/22/2018 Juan C Fairchild MD OR same specialty: 10/22/2018 Juan C Fairchild MD  Last physical: 8/8/2017 Last MTM visit: Visit date not found   Next visit within 3 mo: Visit date not found  Next physical within 3 mo: Visit date not found  Prescriber OR PCP: Juan C Fairchild MD  Last diagnosis associated with med order: 1. Insomnia  - traZODone (DESYREL) 50 MG tablet; TAKE 1 TABLET AT BEDTIME  Dispense: 90 tablet; Refill: 0    2. Major Depression, Single Episode In Remission  - buPROPion (WELLBUTRIN XL) 300 MG 24 hr tablet; TAKE 1 TABLET EVERY MORNING  Dispense: 90 tablet; Refill: 0  - DULoxetine (CYMBALTA) 60 MG capsule; TAKE 1 CAPSULE DAILY  Dispense: 90 capsule; Refill: 0    3. Malignant neoplasm of upper-outer quadrant of right breast in female, estrogen receptor positive (H)  - tamoxifen (NOLVADEX) 20 MG tablet [Pharmacy Med Name: TAMOXIFEN TAB 20MG]; TAKE 1 TABLET DAILY  Dispense: 90 tablet; Refill: 1    4. Gastroesophageal reflux disease without esophagitis  - lansoprazole (PREVACID) 30 MG capsule; TAKE 1 CAPSULE DAILY  Dispense: 90 capsule; Refill: 0    5. Moderate persistent asthma with acute exacerbation  -  montelukast (SINGULAIR) 10 mg tablet [Pharmacy Med Name: MONTELUKAST  TAB 10MG]; TAKE 1 TABLET AT BEDTIME  Dispense: 90 tablet; Refill: 1    If protocol passes may refill for 12 months if within 3 months of last provider visit (or a total of 15 months).            buPROPion (WELLBUTRIN XL) 300 MG 24 hr tablet 90 tablet 0     Sig: TAKE 1 TABLET EVERY MORNING       Tricyclics/Misc Antidepressant/Antianxiety Meds Refill Protocol Passed - 6/29/2019  1:09 AM        Passed - PCP or prescribing provider visit in last year     Last office visit with prescriber/PCP: 10/22/2018 Juan C Fairchild MD OR same dept: 10/22/2018 Juan C Fairchild MD OR same specialty: 10/22/2018 Juan C Fairchild MD  Last physical: 8/8/2017 Last MTM visit: Visit date not found   Next visit within 3 mo: Visit date not found  Next physical within 3 mo: Visit date not found  Prescriber OR PCP: Juan C Fairchild MD  Last diagnosis associated with med order: 1. Insomnia  - traZODone (DESYREL) 50 MG tablet; TAKE 1 TABLET AT BEDTIME  Dispense: 90 tablet; Refill: 0    2. Major Depression, Single Episode In Remission  - buPROPion (WELLBUTRIN XL) 300 MG 24 hr tablet; TAKE 1 TABLET EVERY MORNING  Dispense: 90 tablet; Refill: 0  - DULoxetine (CYMBALTA) 60 MG capsule; TAKE 1 CAPSULE DAILY  Dispense: 90 capsule; Refill: 0    3. Malignant neoplasm of upper-outer quadrant of right breast in female, estrogen receptor positive (H)  - tamoxifen (NOLVADEX) 20 MG tablet [Pharmacy Med Name: TAMOXIFEN TAB 20MG]; TAKE 1 TABLET DAILY  Dispense: 90 tablet; Refill: 1    4. Gastroesophageal reflux disease without esophagitis  - lansoprazole (PREVACID) 30 MG capsule; TAKE 1 CAPSULE DAILY  Dispense: 90 capsule; Refill: 0    5. Moderate persistent asthma with acute exacerbation  - montelukast (SINGULAIR) 10 mg tablet [Pharmacy Med Name: MONTELUKAST  TAB 10MG]; TAKE 1 TABLET AT BEDTIME  Dispense: 90 tablet; Refill: 1    If protocol passes may refill for 12 months if within 3  months of last provider visit (or a total of 15 months).            lansoprazole (PREVACID) 30 MG capsule 90 capsule 0     Sig: TAKE 1 CAPSULE DAILY       GI Medications Refill Protocol Passed - 6/29/2019  1:09 AM        Passed - PCP or prescribing provider visit in last 12 or next 3 months.     Last office visit with prescriber/PCP: 10/22/2018 Juan C Fairchild MD OR same dept: 10/22/2018 Juan C Fairchild MD OR same specialty: 10/22/2018 Juan C Fairchild MD  Last physical: 8/8/2017 Last MTM visit: Visit date not found   Next visit within 3 mo: Visit date not found  Next physical within 3 mo: Visit date not found  Prescriber OR PCP: Juan C Fairchild MD  Last diagnosis associated with med order: 1. Insomnia  - traZODone (DESYREL) 50 MG tablet; TAKE 1 TABLET AT BEDTIME  Dispense: 90 tablet; Refill: 0    2. Major Depression, Single Episode In Remission  - buPROPion (WELLBUTRIN XL) 300 MG 24 hr tablet; TAKE 1 TABLET EVERY MORNING  Dispense: 90 tablet; Refill: 0  - DULoxetine (CYMBALTA) 60 MG capsule; TAKE 1 CAPSULE DAILY  Dispense: 90 capsule; Refill: 0    3. Malignant neoplasm of upper-outer quadrant of right breast in female, estrogen receptor positive (H)  - tamoxifen (NOLVADEX) 20 MG tablet [Pharmacy Med Name: TAMOXIFEN TAB 20MG]; TAKE 1 TABLET DAILY  Dispense: 90 tablet; Refill: 1    4. Gastroesophageal reflux disease without esophagitis  - lansoprazole (PREVACID) 30 MG capsule; TAKE 1 CAPSULE DAILY  Dispense: 90 capsule; Refill: 0    5. Moderate persistent asthma with acute exacerbation  - montelukast (SINGULAIR) 10 mg tablet [Pharmacy Med Name: MONTELUKAST  TAB 10MG]; TAKE 1 TABLET AT BEDTIME  Dispense: 90 tablet; Refill: 1    If protocol passes may refill for 12 months if within 3 months of last provider visit (or a total of 15 months).            DULoxetine (CYMBALTA) 60 MG capsule 90 capsule 0     Sig: TAKE 1 CAPSULE DAILY       Tricyclics/Misc Antidepressant/Antianxiety Meds Refill Protocol Passed -  6/29/2019  1:09 AM        Passed - PCP or prescribing provider visit in last year     Last office visit with prescriber/PCP: 10/22/2018 Juan C Fairchild MD OR same dept: 10/22/2018 Juan C Fairchild MD OR same specialty: 10/22/2018 Juan C Fairchild MD  Last physical: 8/8/2017 Last MTM visit: Visit date not found   Next visit within 3 mo: Visit date not found  Next physical within 3 mo: Visit date not found  Prescriber OR PCP: Juan C Fairchild MD  Last diagnosis associated with med order: 1. Insomnia  - traZODone (DESYREL) 50 MG tablet; TAKE 1 TABLET AT BEDTIME  Dispense: 90 tablet; Refill: 0    2. Major Depression, Single Episode In Remission  - buPROPion (WELLBUTRIN XL) 300 MG 24 hr tablet; TAKE 1 TABLET EVERY MORNING  Dispense: 90 tablet; Refill: 0  - DULoxetine (CYMBALTA) 60 MG capsule; TAKE 1 CAPSULE DAILY  Dispense: 90 capsule; Refill: 0    3. Malignant neoplasm of upper-outer quadrant of right breast in female, estrogen receptor positive (H)  - tamoxifen (NOLVADEX) 20 MG tablet [Pharmacy Med Name: TAMOXIFEN TAB 20MG]; TAKE 1 TABLET DAILY  Dispense: 90 tablet; Refill: 1    4. Gastroesophageal reflux disease without esophagitis  - lansoprazole (PREVACID) 30 MG capsule; TAKE 1 CAPSULE DAILY  Dispense: 90 capsule; Refill: 0    5. Moderate persistent asthma with acute exacerbation  - montelukast (SINGULAIR) 10 mg tablet [Pharmacy Med Name: MONTELUKAST  TAB 10MG]; TAKE 1 TABLET AT BEDTIME  Dispense: 90 tablet; Refill: 1    If protocol passes may refill for 12 months if within 3 months of last provider visit (or a total of 15 months).           Refused Prescriptions Disp Refills     tamoxifen (NOLVADEX) 20 MG tablet [Pharmacy Med Name: TAMOXIFEN TAB 20MG] 90 tablet 1     Sig: TAKE 1 TABLET DAILY       There is no refill protocol information for this order

## 2021-05-30 NOTE — TELEPHONE ENCOUNTER
Refill Approved    Rx renewed per Medication Renewal Policy. Medication was last renewed on 1/11/19.    Tamoxifen filled 6/19/19 90/3    Janine Grajeda, [facility] Triage/Med Refill 6/29/2019     Requested Prescriptions   Pending Prescriptions Disp Refills     traZODone (DESYREL) 50 MG tablet [Pharmacy Med Name: TRAZODONE TAB 50MG] 90 tablet 1     Sig: TAKE 1 TABLET AT BEDTIME       Tricyclics/Misc Antidepressant/Antianxiety Meds Refill Protocol Passed - 6/29/2019  1:09 AM        Passed - PCP or prescribing provider visit in last year     Last office visit with prescriber/PCP: 10/22/2018 Juan C Fairchild MD OR same dept: 10/22/2018 Juan C Fairchild MD OR same specialty: 10/22/2018 Juan C Fairchild MD  Last physical: 8/8/2017 Last MTM visit: Visit date not found   Next visit within 3 mo: Visit date not found  Next physical within 3 mo: Visit date not found  Prescriber OR PCP: Juan C Fairchild MD  Last diagnosis associated with med order: 1. Insomnia  - traZODone (DESYREL) 50 MG tablet [Pharmacy Med Name: TRAZODONE TAB 50MG]; TAKE 1 TABLET AT BEDTIME  Dispense: 90 tablet; Refill: 1    2. Major Depression, Single Episode In Remission  - buPROPion (WELLBUTRIN XL) 300 MG 24 hr tablet [Pharmacy Med Name: BUPROP 24 XL TAB 300MG]; TAKE 1 TABLET EVERY MORNING  Dispense: 90 tablet; Refill: 1  - DULoxetine (CYMBALTA) 60 MG capsule [Pharmacy Med Name: DULOXETINE CAP 60MG DR]; TAKE 1 CAPSULE DAILY  Dispense: 90 capsule; Refill: 1    3. Malignant neoplasm of upper-outer quadrant of right breast in female, estrogen receptor positive (H)  - tamoxifen (NOLVADEX) 20 MG tablet [Pharmacy Med Name: TAMOXIFEN TAB 20MG]; TAKE 1 TABLET DAILY  Dispense: 90 tablet; Refill: 1    4. Gastroesophageal reflux disease without esophagitis  - lansoprazole (PREVACID) 30 MG capsule [Pharmacy Med Name: LANSOPRAZ DR CAP 30MG RX]; TAKE 1 CAPSULE DAILY  Dispense: 90 capsule; Refill: 1    5. Moderate persistent asthma with acute exacerbation  -  montelukast (SINGULAIR) 10 mg tablet [Pharmacy Med Name: MONTELUKAST  TAB 10MG]; TAKE 1 TABLET AT BEDTIME  Dispense: 90 tablet; Refill: 1    If protocol passes may refill for 12 months if within 3 months of last provider visit (or a total of 15 months).             buPROPion (WELLBUTRIN XL) 300 MG 24 hr tablet [Pharmacy Med Name: BUPROP 24 XL TAB 300MG] 90 tablet 1     Sig: TAKE 1 TABLET EVERY MORNING       Tricyclics/Misc Antidepressant/Antianxiety Meds Refill Protocol Passed - 6/29/2019  1:09 AM        Passed - PCP or prescribing provider visit in last year     Last office visit with prescriber/PCP: 10/22/2018 Juan C Fairchild MD OR same dept: 10/22/2018 Juan C Fairchild MD OR same specialty: 10/22/2018 Juan C Fairchild MD  Last physical: 8/8/2017 Last MTM visit: Visit date not found   Next visit within 3 mo: Visit date not found  Next physical within 3 mo: Visit date not found  Prescriber OR PCP: Juan C Fairchild MD  Last diagnosis associated with med order: 1. Insomnia  - traZODone (DESYREL) 50 MG tablet [Pharmacy Med Name: TRAZODONE TAB 50MG]; TAKE 1 TABLET AT BEDTIME  Dispense: 90 tablet; Refill: 1    2. Major Depression, Single Episode In Remission  - buPROPion (WELLBUTRIN XL) 300 MG 24 hr tablet [Pharmacy Med Name: BUPROP 24 XL TAB 300MG]; TAKE 1 TABLET EVERY MORNING  Dispense: 90 tablet; Refill: 1  - DULoxetine (CYMBALTA) 60 MG capsule [Pharmacy Med Name: DULOXETINE CAP 60MG DR]; TAKE 1 CAPSULE DAILY  Dispense: 90 capsule; Refill: 1    3. Malignant neoplasm of upper-outer quadrant of right breast in female, estrogen receptor positive (H)  - tamoxifen (NOLVADEX) 20 MG tablet [Pharmacy Med Name: TAMOXIFEN TAB 20MG]; TAKE 1 TABLET DAILY  Dispense: 90 tablet; Refill: 1    4. Gastroesophageal reflux disease without esophagitis  - lansoprazole (PREVACID) 30 MG capsule [Pharmacy Med Name: LANSOPRAZ DR CAP 30MG RX]; TAKE 1 CAPSULE DAILY  Dispense: 90 capsule; Refill: 1    5. Moderate persistent asthma with  acute exacerbation  - montelukast (SINGULAIR) 10 mg tablet [Pharmacy Med Name: MONTELUKAST  TAB 10MG]; TAKE 1 TABLET AT BEDTIME  Dispense: 90 tablet; Refill: 1    If protocol passes may refill for 12 months if within 3 months of last provider visit (or a total of 15 months).             tamoxifen (NOLVADEX) 20 MG tablet [Pharmacy Med Name: TAMOXIFEN TAB 20MG] 90 tablet 1     Sig: TAKE 1 TABLET DAILY       There is no refill protocol information for this order        lansoprazole (PREVACID) 30 MG capsule [Pharmacy Med Name: LANSOPRAZ DR CAP 30MG RX] 90 capsule 1     Sig: TAKE 1 CAPSULE DAILY       GI Medications Refill Protocol Passed - 6/29/2019  1:09 AM        Passed - PCP or prescribing provider visit in last 12 or next 3 months.     Last office visit with prescriber/PCP: 10/22/2018 Juan C Fairchild MD OR same dept: 10/22/2018 Juan C Fairchild MD OR same specialty: 10/22/2018 Juan C Fairchild MD  Last physical: 8/8/2017 Last MTM visit: Visit date not found   Next visit within 3 mo: Visit date not found  Next physical within 3 mo: Visit date not found  Prescriber OR PCP: Juan C Fairchild MD  Last diagnosis associated with med order: 1. Insomnia  - traZODone (DESYREL) 50 MG tablet [Pharmacy Med Name: TRAZODONE TAB 50MG]; TAKE 1 TABLET AT BEDTIME  Dispense: 90 tablet; Refill: 1    2. Major Depression, Single Episode In Remission  - buPROPion (WELLBUTRIN XL) 300 MG 24 hr tablet [Pharmacy Med Name: BUPROP 24 XL TAB 300MG]; TAKE 1 TABLET EVERY MORNING  Dispense: 90 tablet; Refill: 1  - DULoxetine (CYMBALTA) 60 MG capsule [Pharmacy Med Name: DULOXETINE CAP 60MG DR]; TAKE 1 CAPSULE DAILY  Dispense: 90 capsule; Refill: 1    3. Malignant neoplasm of upper-outer quadrant of right breast in female, estrogen receptor positive (H)  - tamoxifen (NOLVADEX) 20 MG tablet [Pharmacy Med Name: TAMOXIFEN TAB 20MG]; TAKE 1 TABLET DAILY  Dispense: 90 tablet; Refill: 1    4. Gastroesophageal reflux disease without esophagitis  -  lansoprazole (PREVACID) 30 MG capsule [Pharmacy Med Name: LANSOPRAZ DR CAP 30MG RX]; TAKE 1 CAPSULE DAILY  Dispense: 90 capsule; Refill: 1    5. Moderate persistent asthma with acute exacerbation  - montelukast (SINGULAIR) 10 mg tablet [Pharmacy Med Name: MONTELUKAST  TAB 10MG]; TAKE 1 TABLET AT BEDTIME  Dispense: 90 tablet; Refill: 1    If protocol passes may refill for 12 months if within 3 months of last provider visit (or a total of 15 months).             DULoxetine (CYMBALTA) 60 MG capsule [Pharmacy Med Name: DULOXETINE CAP 60MG DR] 90 capsule 1     Sig: TAKE 1 CAPSULE DAILY       Tricyclics/Misc Antidepressant/Antianxiety Meds Refill Protocol Passed - 6/29/2019  1:09 AM        Passed - PCP or prescribing provider visit in last year     Last office visit with prescriber/PCP: 10/22/2018 Juan C Fairchild MD OR same dept: 10/22/2018 Juan C Fairchild MD OR same specialty: 10/22/2018 Juan C Fairchild MD  Last physical: 8/8/2017 Last MTM visit: Visit date not found   Next visit within 3 mo: Visit date not found  Next physical within 3 mo: Visit date not found  Prescriber OR PCP: Juan C Fairchild MD  Last diagnosis associated with med order: 1. Insomnia  - traZODone (DESYREL) 50 MG tablet [Pharmacy Med Name: TRAZODONE TAB 50MG]; TAKE 1 TABLET AT BEDTIME  Dispense: 90 tablet; Refill: 1    2. Major Depression, Single Episode In Remission  - buPROPion (WELLBUTRIN XL) 300 MG 24 hr tablet [Pharmacy Med Name: BUPROP 24 XL TAB 300MG]; TAKE 1 TABLET EVERY MORNING  Dispense: 90 tablet; Refill: 1  - DULoxetine (CYMBALTA) 60 MG capsule [Pharmacy Med Name: DULOXETINE CAP 60MG DR]; TAKE 1 CAPSULE DAILY  Dispense: 90 capsule; Refill: 1    3. Malignant neoplasm of upper-outer quadrant of right breast in female, estrogen receptor positive (H)  - tamoxifen (NOLVADEX) 20 MG tablet [Pharmacy Med Name: TAMOXIFEN TAB 20MG]; TAKE 1 TABLET DAILY  Dispense: 90 tablet; Refill: 1    4. Gastroesophageal reflux disease without  esophagitis  - lansoprazole (PREVACID) 30 MG capsule [Pharmacy Med Name: LANSOPRAZ DR CAP 30MG RX]; TAKE 1 CAPSULE DAILY  Dispense: 90 capsule; Refill: 1    5. Moderate persistent asthma with acute exacerbation  - montelukast (SINGULAIR) 10 mg tablet [Pharmacy Med Name: MONTELUKAST  TAB 10MG]; TAKE 1 TABLET AT BEDTIME  Dispense: 90 tablet; Refill: 1    If protocol passes may refill for 12 months if within 3 months of last provider visit (or a total of 15 months).             montelukast (SINGULAIR) 10 mg tablet [Pharmacy Med Name: MONTELUKAST  TAB 10MG] 90 tablet 1     Sig: TAKE 1 TABLET AT BEDTIME       There is no refill protocol information for this order

## 2021-05-30 NOTE — PATIENT INSTRUCTIONS - HE
Recommendations from today's PharmD medication management visit                                                       1. Things to try for itching:   At night: Benedryl 25 mg (diphenhydramine)  AND   During the day: Claritin (loratadine) OR Zyrtec (cetirizine)    If above does not work, then try ranitidine (Zantac) 150 mg at night.     2. I will send a prescription for Dulera to [facility] as a replacement to Symbicort.     Next pharmacist visit: phone call next week about Dulera    My Pharmacist's contact information:                                                       It was a pleasure seeing you today to discuss your medications!  Please feel free to contact me with any questions or concerns you have. I look forward to seeing you again to help you get the most benefit from your medications!    To reschedule your PharmD appointment, please call the clinic directly or you may call the MTM scheduling line at [phone] or toll-free at [phone]:   [facility] (available for appointments Mondays and Thursdays)  [facility] (available for appointments Tuesday, Wednesday & Friday)     Mikala Hays, PharmD, BCACP  Medication Therapy Management Pharmacist  [facility]    You may receive a survey about the MTM services you received by email and/or US Mail.  I would appreciate your feedback to help me serve you better in the future. Your comments will be anonymous.

## 2021-05-30 NOTE — PROGRESS NOTES
MTM Follow Up Encounter  Assessment & Plan                                                     Asthma: Patient would benefit from continuing controller inhaler, but Symbicort is no longer covered.  Reviewed options for her and she would prefer a HFA inhaler, therefore we will switch her to high-dose Dulera.  PLAN:   1.  I will send a prescription for Dulera to [facility] as a replacement to Symbicort.     Hives: Uncontrolled.  Recommended the patient try diphenhydramine 25 mg at night, and if not helpful then to initiate loratadine or cetirizine during the day.  Reviewed that if both of these antihistamines are not helpful, then try ranitidine 150 mg nightly.  PLAN:   1. Things to try for itching:   At night: Benedryl 25 mg (diphenhydramine)  AND   During the day: Claritin (loratadine) OR Zyrtec (cetirizine)  If above does not work, then try ranitidine (Zantac) 150 mg at night.     Depression: Uncontrolled.  Reviewed that patient has had a transition point in her life, but she is not interested right now in adjusting the medication.  Reviewed the idea of therapy, and she is interested in this.  Patient lives in [place] and I will see if [facility] therapist has availability there.  PLAN:   1. Therapy referral -- I reached out to [facility] to see about availability.     Follow Up  Phone call next week about Dulera    Subjective & Objective                                                       Krystal Virgen is a 78 y.o. female coming in for a follow up visit for Medication Therapy Management. She was referred to me from self    Chief Complaint: Needs an alternative inhaler.     Asthma: Reports taking Symbicort 160-4.5 mcg 2 puffs two times a day. Has 2 inhaler left at home and was told it was not covered anymore. Reports breathing is very good and she believes taking Symbicort is why her breathing was so good this year for once. First winter she has not gotten sick. Does not like DPI.   Sees  pulm.  Uses albuterol about once a week.     Hives: Since July 4th, she thinks she has hives -- itching all other. Nothing has changed -- no new meds, detergents, etc. Started on Sunday night. Not getting better which is why she thinks it is not bug bites. Hard to sleep because of the itching. Using calamine, but not antihistamines.    Depression: Currently taking duloxetine 60 mg daily  and bupropion  mg AM.  Reports she has been depressed, in transition since moved and still not closed on her house. Does not think her worse mood is related to decreasing bupropion. Frantic about money. Would be interested in therapy. Denies panic attacks or suicidal thoughts.       PMH: reviewed in EPIC   Allergies/ADRs: reviewed in EPIC   Alcohol: reviewed in EPIC  Tobacco:   Social History     Tobacco Use   Smoking Status Never Smoker   Smokeless Tobacco Never Used     Today's Vitals: There were no vitals filed for this visit.  ----------------    Much or all of the text in this note was generated through the use of Dragon Dictate voice-to-text software. Errors in spelling or words which seem out of context are unintentional. Sound alike errors, in particular, may have escaped editing.    The patient was given a summary of these recommendations as an after visit summary    I spent 30 minutes with this patient today;   All changes were made via collaborative practice agreement with Juan C Fairchild MD. A copy of the visit note was provided to the patient's provider.     Mikala Hays PharmSebasD., [facility] Center[facility]  Medication Therapy Management Pharmacist  [facility]     Current Outpatient Medications   Medication Sig Dispense Refill     acetaminophen (TYLENOL) 325 MG tablet Take 325 mg by mouth as needed for pain.       albuterol (PROAIR HFA;PROVENTIL HFA;VENTOLIN HFA) 90 mcg/actuation inhaler Inhale 2 puffs every 6 (six) hours as needed. 3 Inhaler 1     albuterol (PROVENTIL) 2.5 mg /3 mL (0.083 %) nebulizer solution  INHALE 1 VIAL VIA NEBULIZER Q 4 H PRF WHEEZING OR SOB  0     budesonide-formoterol (SYMBICORT) 160-4.5 mcg/actuation inhaler Inhale 2 puffs 2 (two) times a day. 3 Inhaler 1     buPROPion (WELLBUTRIN XL) 300 MG 24 hr tablet TAKE 1 TABLET EVERY MORNING 90 tablet 0     CHOLECALCIFEROL, VITAMIN D3, (VITAMIN D3 ORAL) Take 2,000 Units by mouth daily.       DULoxetine (CYMBALTA) 60 MG capsule TAKE 1 CAPSULE DAILY 90 capsule 0     fluticasone (FLONASE) 50 mcg/actuation nasal spray 2 sprays into each nostril daily as needed.             lansoprazole (PREVACID) 30 MG capsule TAKE 1 CAPSULE DAILY 90 capsule 0     montelukast (SINGULAIR) 10 mg tablet TAKE 1 TABLET AT BEDTIME 90 tablet 1     moxifloxacin (AVELOX) 400 mg tablet Take 1 tablet (400 mg total) by mouth 3 (three) times a week. 36 tablet 1     naproxen (NAPROSYN) 500 MG tablet Take 1 tablet (500 mg total) by mouth 2 (two) times a day with meals. 180 tablet 0     SUMAtriptan (IMITREX) 20 mg/actuation nasal spray 1 spray (20 mg total) into each nostril every 2 (two) hours as needed for migraine. 1 Inhaler 1     tamoxifen (NOLVADEX) 20 MG tablet Take 1 tablet (20 mg total) by mouth daily. 90 tablet 3     traZODone (DESYREL) 50 MG tablet TAKE 1 TABLET AT BEDTIME 90 tablet 0     triamcinolone (KENALOG) 0.5 % ointment Apply 1 application topically 2 (two) times a day as needed. Two tubes of 15 g 30 g 1     VIRTUSSIN AC  mg/5 mL liquid TK 10 ML PO Q 4 H PRN  0     No current facility-administered medications for this visit.

## 2021-05-30 NOTE — TELEPHONE ENCOUNTER
Chan Bach,    This patient left a message on our voicemail this morning for you to give her a call. We were having IT issues with our voicemail and could not retrieve them until now.    Thanks,  VALENCIA Ortiz Coordinator

## 2021-05-30 NOTE — TELEPHONE ENCOUNTER
Called patient. Reviewed that [place] is where the [facility] mail order is, they should arrive to her in the mail. She was understanding.

## 2021-05-31 VITALS — HEIGHT: 62 IN | BODY MASS INDEX: 30.91 KG/M2

## 2021-05-31 VITALS — HEIGHT: 62 IN | WEIGHT: 170 LBS | BODY MASS INDEX: 31.28 KG/M2

## 2021-05-31 VITALS — BODY MASS INDEX: 30.91 KG/M2 | WEIGHT: 169 LBS

## 2021-05-31 VITALS — BODY MASS INDEX: 30.18 KG/M2 | WEIGHT: 165 LBS

## 2021-05-31 VITALS — WEIGHT: 165 LBS | HEIGHT: 63 IN | BODY MASS INDEX: 29.23 KG/M2

## 2021-05-31 VITALS — BODY MASS INDEX: 30.55 KG/M2 | HEIGHT: 62 IN | WEIGHT: 166 LBS

## 2021-05-31 VITALS — HEIGHT: 62 IN | WEIGHT: 168.6 LBS | BODY MASS INDEX: 31.03 KG/M2

## 2021-05-31 VITALS — WEIGHT: 169.7 LBS | BODY MASS INDEX: 31.23 KG/M2 | HEIGHT: 62 IN

## 2021-05-31 NOTE — TELEPHONE ENCOUNTER
Orders being requested: Skilled Nursing- 2 times per week for 2 weeks and 1 time per week for 7 weeks, 1-6 PRN visits  Reason service is needed/diagnosis: Recent discharge on 8/8. Pulmonary assessment, oxygen education and medication management  When are orders needed by: Today  Where to send Orders: Phone:  [phone] Peterson  Okay to leave detailed message?  Yes

## 2021-05-31 NOTE — TELEPHONE ENCOUNTER
Reason contacted:  Orders request  Information relayed:  Notified ok for requested order per Dr. Fairchild. Please advise if this is not ok.   Additional questions:  No  Further follow-up needed:  No  Okay to leave a detailed message:  No

## 2021-05-31 NOTE — TELEPHONE ENCOUNTER
Orders being requested: Continued PT 2 x/wk for 4 wks.  Reason service is needed/diagnosis: Gait training, balance, fall prevention, and exercise  When are orders needed by: asap  Where to send Orders: Phone:  Leonila at [phone]  Okay to leave detailed message?  Yes

## 2021-05-31 NOTE — TELEPHONE ENCOUNTER
Who is calling:  [place] Nurse From [facility]  Reason for Call:  Caller states that they received referral for home care  from [facility] on 8/8/19 . They are going to see patient tomorrow 8/13/19 at noon is it ok .  Date of last appointment with primary care: 6/19/19  Okay to leave a detailed message: No

## 2021-05-31 NOTE — TELEPHONE ENCOUNTER
Reason contacted:  Orders request  Information relayed:  Notified ok to see patient on 8/13/2019 per Dr. Fairchild. Please advise if this is not ok.   Additional questions:  No  Further follow-up needed:  No  Okay to leave a detailed message:  No

## 2021-06-01 ENCOUNTER — RECORDS - HEALTHEAST (OUTPATIENT)
Dept: ADMINISTRATIVE | Facility: CLINIC | Age: 81
End: 2021-06-01

## 2021-06-01 VITALS — BODY MASS INDEX: 31.09 KG/M2 | WEIGHT: 170 LBS

## 2021-06-01 VITALS — HEIGHT: 62 IN | BODY MASS INDEX: 31.09 KG/M2

## 2021-06-01 NOTE — TELEPHONE ENCOUNTER
Reason contacted:  symptom  Information relayed:    Symptom started 3 days ago    Symptoms:  Frequent urination  Painful urination    Denies:   blood in urine, fever, flank pain, vaginal discharge       Patient currently has a prescription for Macrobid 100 mg and has 14 tablets available.        Additional questions:  No  Further follow-up needed:  Yes  Okay to leave a detailed message:  Yes

## 2021-06-01 NOTE — TELEPHONE ENCOUNTER
Based on her symptom description it would be reasonable to take the medication she has, Macrobid 100 mg twice daily.  For 5 to 7 days.  She should be sure the medication is not .  If there are further concerns let me know

## 2021-06-01 NOTE — PROGRESS NOTES
Patient ID: Krystal Virgen is a 79 y.o. female.  /74   Pulse 90   Wt 174 lb (78.9 kg) Comment: per pt did not want weight today  SpO2 98%   BMI 31.83 kg/m      Assessment/Plan:                Diagnoses and all orders for this visit:    Trochanteric bursitis of left hip    Migraine with aura and without status migrainosus, not intractable  -     SUMAtriptan (IMITREX) 20 mg/actuation nasal spray; 1 spray (20 mg total) into each nostril every 2 (two) hours as needed for migraine.  Dispense: 3 Inhaler; Refill: 3    Chronic bilateral low back pain without sciatica    Urinary tract infection without hematuria, site unspecified  -     nitrofurantoin, macrocrystal-monohydrate, (MACROBID) 100 MG capsule; Take 1 capsule (100 mg total) by mouth 2 (two) times a day for 5 days.  Dispense: 10 capsule; Refill: 1    Other orders  -     Influenza High Dose, Seasonal 65+ yrs  -     Pneumococcal polysaccharide vaccine 23-valent 1 yo or older, subq/IM      DISCUSSION  Update immunizations.  See additional discussion below.  Does get frequent urinary tract infections discussed having a prescription on hand to use.  No symptoms today to warrant any specific treatment.  She will continue to manage back pain hip pain and chronic headache pain using previous medications.  Subjective:     HPI    Krystal Virgen is a 79 y.o. female who has a history of moderate persistent asthma, chronic rhinitis, acid reflux, breast cancer, IgG 1 subclass deficiency is here today for routine follow-up.  She was hospitalized to this past summer with pneumonia and sepsis.  This was the first time in many months that she had significant issues with her respiratory system.  She has a history of recurrent respiratory infections in the past.  She sees her pulmonologist Dr. Thakkar at [facility] on a regular basis.  Recent records are reviewed from her follow-up there.  Hospital records are also reviewed.  Overall Jody reports she is doing  well  with her breathing and has no concerns.    She continues to struggle with migraine headaches.  We discussed that sumatriptan nasal spray seems to be the most effective option for managing this.    She continues to nieto hip pain but is much improved from previous.  She had previous corticosteroid injections.    Today we discussed updating immunizations given her immunodeficiency.  She should receive a Pneumovax about every 5 years and she would be due for this.  She is due for influenza vaccine.  She is also due for shingles vaccine but will obtain this at her local pharmacy.    Reviewed recent laboratory testing do not feel lab testing is indicated today.    She recently moved to assisted living.  Overall she seems to be thriving from a social standpoint.  No new or additional concerns are identified on her visit today.    Review of Systems  Complete review of systems is obtained.  Other than the specific considerations noted above complete review of systems is negative.        Objective:   Medications:  Current Outpatient Medications   Medication Sig Note     acetaminophen (TYLENOL) 325 MG tablet Take 325 mg by mouth as needed for pain.      albuterol (PROAIR HFA;PROVENTIL HFA;VENTOLIN HFA) 90 mcg/actuation inhaler Inhale 2 puffs every 6 (six) hours as needed.      albuterol (PROVENTIL) 2.5 mg /3 mL (0.083 %) nebulizer solution INHALE 1 VIAL VIA NEBULIZER Q 4 H PRF WHEEZING OR SOB 5/27/2016: Received from: External Pharmacy     budesonide-formoterol (SYMBICORT) 160-4.5 mcg/actuation inhaler Inhale 2 puffs 2 (two) times a day.      buPROPion (WELLBUTRIN XL) 300 MG 24 hr tablet TAKE 1 TABLET EVERY MORNING      CHOLECALCIFEROL, VITAMIN D3, (VITAMIN D3 ORAL) Take 2,000 Units by mouth daily.      DULoxetine (CYMBALTA) 60 MG capsule TAKE 1 CAPSULE DAILY      fluticasone (FLONASE) 50 mcg/actuation nasal spray 2 sprays into each nostril daily as needed.       4/26/2018: Received from: [facility] Received Sig: Place 2  Sprays into both nostrils daily.     lansoprazole (PREVACID) 30 MG capsule TAKE 1 CAPSULE DAILY      mometasone-formoterol (DULERA) 200-5 mcg/actuation HFAA inhaler Inhale 2 puffs 2 (two) times a day. Rinse mouth after      montelukast (SINGULAIR) 10 mg tablet TAKE 1 TABLET AT BEDTIME      naproxen (NAPROSYN) 500 MG tablet Take 1 tablet (500 mg total) by mouth 2 (two) times a day with meals.      oxyCODONE-acetaminophen (PERCOCET/ENDOCET) 5-325 mg per tablet Take 1-2 tablets by mouth. Take rare for migraine            predniSONE (DELTASONE) 10 mg tablet 40 mg daily x 3 days, then 30 mg daily x 3 days, then 20 mg daily 3 days, then 10 mg daily x 3 days      tamoxifen (NOLVADEX) 20 MG tablet Take 1 tablet (20 mg total) by mouth daily.      traZODone (DESYREL) 50 MG tablet TAKE 1 TABLET AT BEDTIME      triamcinolone (KENALOG) 0.5 % ointment Apply 1 application topically 2 (two) times a day as needed. Two tubes of 15 g      nitrofurantoin, macrocrystal-monohydrate, (MACROBID) 100 MG capsule Take 1 capsule (100 mg total) by mouth 2 (two) times a day for 5 days.      SUMAtriptan (IMITREX) 20 mg/actuation nasal spray 1 spray (20 mg total) into each nostril every 2 (two) hours as needed for migraine.      Allergies:  Allergies   Allergen Reactions     Sulfa (Sulfonamide Antibiotics) Hives     Levofloxacin Other (See Comments)     Near full body myalgias     Amoxicillin Other (See Comments)     Tobacco:   reports that she has never smoked. She has never used smokeless tobacco.     Physical Exam      /74   Pulse 90   Wt 174 lb (78.9 kg) Comment: per pt did not want weight today  SpO2 98%   BMI 31.83 kg/m      General Appearance:    Alert, cooperative, no distress   Eyes:   No scleral icterus or conjunctival irritation       Lungs:     Clear to auscultation bilaterally, respirations unlabored, no wheezes or crackles   Heart:    Regular rate and rhythm,  No murmur   Extremities:  No edema, no joint swelling or  redness, no evidence of any injuries   Skin:  No concerning skin findings, no suspicious moles, no rashes   Neurologic:  On gross examination there is no motor or sensory deficit.  Patient walks with a normal gait

## 2021-06-01 NOTE — TELEPHONE ENCOUNTER
Who is calling:  Patient  Reason for Call:    Patient states she left her thermos there at appointment with Provider.  Please leave thermos at .  She will be able to pick it up sometime next week.  Date of last appointment with primary care: 10/1/19  Okay to leave a detailed message: Yes

## 2021-06-01 NOTE — TELEPHONE ENCOUNTER
"Medication Question or Clarification  Who is calling: Patient  What medication are you calling about?  Patient states it is a \"Macrobid\" that she now has on hand ( could not find medication in historical) which she takes for bladder infection.  What is your question/concern?: Patient does have current bladder infection symptoms and is asking if ok to take this medication? She would  like to clear this with PCP before starting  Pharmacy: [facility], please verify this with patient  Okay to leave a detailed message?: Yes  Site CMT - Please call the pharmacy to obtain any additional needed information.  "

## 2021-06-01 NOTE — TELEPHONE ENCOUNTER
Refill Approved    Rx renewed per Medication Renewal Policy. Medication was last renewed on 6/29/19.    Janine Grajeda, [facility] Triage/Med Refill 9/27/2019     Requested Prescriptions   Pending Prescriptions Disp Refills     DULoxetine (CYMBALTA) 60 MG capsule [Pharmacy Med Name: DULOXETINE CAP 60MG DR] 90 capsule 0     Sig: TAKE 1 CAPSULE DAILY       Tricyclics/Misc Antidepressant/Antianxiety Meds Refill Protocol Passed - 9/27/2019 12:23 AM        Passed - PCP or prescribing provider visit in last year     Last office visit with prescriber/PCP: 10/22/2018 Juan C Fairchild MD OR same dept: 10/22/2018 Juan C Fairchild MD OR same specialty: 10/22/2018 Juan C Fairchild MD  Last physical: 8/8/2017 Last MTM visit: Visit date not found   Next visit within 3 mo: Visit date not found  Next physical within 3 mo: Visit date not found  Prescriber OR PCP: Juan C Fairchild MD  Last diagnosis associated with med order: 1. Major Depression, Single Episode In Remission  - DULoxetine (CYMBALTA) 60 MG capsule [Pharmacy Med Name: DULOXETINE CAP 60MG DR]; TAKE 1 CAPSULE DAILY  Dispense: 90 capsule; Refill: 0  - buPROPion (WELLBUTRIN XL) 300 MG 24 hr tablet [Pharmacy Med Name: BUPROP 24 XL TAB 300MG]; TAKE 1 TABLET EVERY MORNING  Dispense: 90 tablet; Refill: 0    2. Gastroesophageal reflux disease without esophagitis  - lansoprazole (PREVACID) 30 MG capsule [Pharmacy Med Name: LANSOPRAZ DR CAP 30MG RX]; TAKE 1 CAPSULE DAILY  Dispense: 90 capsule; Refill: 0    3. Insomnia  - traZODone (DESYREL) 50 MG tablet [Pharmacy Med Name: TRAZODONE TAB 50MG]; TAKE 1 TABLET AT BEDTIME  Dispense: 90 tablet; Refill: 0    If protocol passes may refill for 12 months if within 3 months of last provider visit (or a total of 15 months).             lansoprazole (PREVACID) 30 MG capsule [Pharmacy Med Name: LANSOPRAZ DR CAP 30MG RX] 90 capsule 0     Sig: TAKE 1 CAPSULE DAILY       GI Medications Refill Protocol Passed - 9/27/2019 12:23 AM         Passed - PCP or prescribing provider visit in last 12 or next 3 months.     Last office visit with prescriber/PCP: 10/22/2018 Juan C Fairchild MD OR same dept: 10/22/2018 Juan C Fairchild MD OR same specialty: 10/22/2018 Juan C Fairchild MD  Last physical: 8/8/2017 Last MTM visit: Visit date not found   Next visit within 3 mo: Visit date not found  Next physical within 3 mo: Visit date not found  Prescriber OR PCP: Juan C Fairchild MD  Last diagnosis associated with med order: 1. Major Depression, Single Episode In Remission  - DULoxetine (CYMBALTA) 60 MG capsule [Pharmacy Med Name: DULOXETINE CAP 60MG DR]; TAKE 1 CAPSULE DAILY  Dispense: 90 capsule; Refill: 0  - buPROPion (WELLBUTRIN XL) 300 MG 24 hr tablet [Pharmacy Med Name: BUPROP 24 XL TAB 300MG]; TAKE 1 TABLET EVERY MORNING  Dispense: 90 tablet; Refill: 0    2. Gastroesophageal reflux disease without esophagitis  - lansoprazole (PREVACID) 30 MG capsule [Pharmacy Med Name: LANSOPRAZ DR CAP 30MG RX]; TAKE 1 CAPSULE DAILY  Dispense: 90 capsule; Refill: 0    3. Insomnia  - traZODone (DESYREL) 50 MG tablet [Pharmacy Med Name: TRAZODONE TAB 50MG]; TAKE 1 TABLET AT BEDTIME  Dispense: 90 tablet; Refill: 0    If protocol passes may refill for 12 months if within 3 months of last provider visit (or a total of 15 months).             buPROPion (WELLBUTRIN XL) 300 MG 24 hr tablet [Pharmacy Med Name: BUPROP 24 XL TAB 300MG] 90 tablet 0     Sig: TAKE 1 TABLET EVERY MORNING       Tricyclics/Misc Antidepressant/Antianxiety Meds Refill Protocol Passed - 9/27/2019 12:23 AM        Passed - PCP or prescribing provider visit in last year     Last office visit with prescriber/PCP: 10/22/2018 Juan C Fairchild MD OR same dept: 10/22/2018 Juan C Fairchild MD OR same specialty: 10/22/2018 Juan C Fairchild MD  Last physical: 8/8/2017 Last MTM visit: Visit date not found   Next visit within 3 mo: Visit date not found  Next physical within 3 mo: Visit date not  found  Prescriber OR PCP: Juan C Fairchild MD  Last diagnosis associated with med order: 1. Major Depression, Single Episode In Remission  - DULoxetine (CYMBALTA) 60 MG capsule [Pharmacy Med Name: DULOXETINE CAP 60MG DR]; TAKE 1 CAPSULE DAILY  Dispense: 90 capsule; Refill: 0  - buPROPion (WELLBUTRIN XL) 300 MG 24 hr tablet [Pharmacy Med Name: BUPROP 24 XL TAB 300MG]; TAKE 1 TABLET EVERY MORNING  Dispense: 90 tablet; Refill: 0    2. Gastroesophageal reflux disease without esophagitis  - lansoprazole (PREVACID) 30 MG capsule [Pharmacy Med Name: LANSOPRAZ DR CAP 30MG RX]; TAKE 1 CAPSULE DAILY  Dispense: 90 capsule; Refill: 0    3. Insomnia  - traZODone (DESYREL) 50 MG tablet [Pharmacy Med Name: TRAZODONE TAB 50MG]; TAKE 1 TABLET AT BEDTIME  Dispense: 90 tablet; Refill: 0    If protocol passes may refill for 12 months if within 3 months of last provider visit (or a total of 15 months).             traZODone (DESYREL) 50 MG tablet [Pharmacy Med Name: TRAZODONE TAB 50MG] 90 tablet 0     Sig: TAKE 1 TABLET AT BEDTIME       Tricyclics/Misc Antidepressant/Antianxiety Meds Refill Protocol Passed - 9/27/2019 12:23 AM        Passed - PCP or prescribing provider visit in last year     Last office visit with prescriber/PCP: 10/22/2018 Juan C Fairchild MD OR same dept: 10/22/2018 Juan C Fairchild MD OR same specialty: 10/22/2018 Juan C Fairchild MD  Last physical: 8/8/2017 Last MTM visit: Visit date not found   Next visit within 3 mo: Visit date not found  Next physical within 3 mo: Visit date not found  Prescriber OR PCP: Juan C Fairchild MD  Last diagnosis associated with med order: 1. Major Depression, Single Episode In Remission  - DULoxetine (CYMBALTA) 60 MG capsule [Pharmacy Med Name: DULOXETINE CAP 60MG DR]; TAKE 1 CAPSULE DAILY  Dispense: 90 capsule; Refill: 0  - buPROPion (WELLBUTRIN XL) 300 MG 24 hr tablet [Pharmacy Med Name: BUPROP 24 XL TAB 300MG]; TAKE 1 TABLET EVERY MORNING  Dispense: 90 tablet; Refill:  0    2. Gastroesophageal reflux disease without esophagitis  - lansoprazole (PREVACID) 30 MG capsule [Pharmacy Med Name: LANSOPRAZ DR CAP 30MG RX]; TAKE 1 CAPSULE DAILY  Dispense: 90 capsule; Refill: 0    3. Insomnia  - traZODone (DESYREL) 50 MG tablet [Pharmacy Med Name: TRAZODONE TAB 50MG]; TAKE 1 TABLET AT BEDTIME  Dispense: 90 tablet; Refill: 0    If protocol passes may refill for 12 months if within 3 months of last provider visit (or a total of 15 months).

## 2021-06-02 ENCOUNTER — RECORDS - HEALTHEAST (OUTPATIENT)
Dept: ADMINISTRATIVE | Facility: CLINIC | Age: 81
End: 2021-06-02

## 2021-06-02 VITALS — BODY MASS INDEX: 31.83 KG/M2 | WEIGHT: 174 LBS

## 2021-06-02 VITALS — BODY MASS INDEX: 31.46 KG/M2 | WEIGHT: 172 LBS

## 2021-06-02 NOTE — TELEPHONE ENCOUNTER
Patient called in wanting confirm upcoming appointments.  She said she was sick and in the hospital in Aug and missed her mammogram.  She will get that rescheduled. She has her DXA scan scheduled for 12/18/19. She is wondering if that is too late? I told her that is when Dr. Zavala recommended, plus it will be a year since her last scan. I told her insurance may not cover more than 1 per year.  She verbalized understanding. [facility]. F/U with Dr. Zavala and mammogram/JAZ Aguiar RN

## 2021-06-02 NOTE — TELEPHONE ENCOUNTER
New Appointment Needed  What is the reason for the visit:    Same Date/Next Day Appt Request  What is the reason for your visit?: patient has plugged ear. Offered to schedule with a different provider but patient would like to see Dr. Fairchild and would like to know if he can see her on Friday.   Provider Preference: PCP only  How soon do you need to be seen?: asap  Waitlist offered?: No  Okay to leave a detailed message:  Yes

## 2021-06-02 NOTE — PROGRESS NOTES
Patient ID: Krystal Virgen is a 79 y.o. female.  /68   Pulse 77   Wt 182 lb (82.6 kg)   SpO2 98%   BMI 33.29 kg/m      Assessment/Plan:                   Diagnoses and all orders for this visit:    Bilateral impacted cerumen    Moderate persistent asthma without complication  -     budesonide-formoterol (SYMBICORT) 160-4.5 mcg/actuation inhaler; Inhale 2 puffs 2 (two) times a day.  Dispense: 3 Inhaler; Refill: 10    History of recurrent UTI (urinary tract infection)  -     nitrofurantoin, macrocrystal-monohydrate, (MACROBID) 100 MG capsule; Take 1 capsule (100 mg total) by mouth 2 (two) times a day for 5 days.  Dispense: 10 capsule; Refill: 1    Chronic bilateral low back pain without sciatica      DISCUSSION  Earwax removed ears appear normal.  Refilled anabolic medication see discussion below.  Referral to [place] medical marijuana program.  Refilled inhaler.  Subjective:     HPI    Krystal Virgen is a 79 y.o. female she is here today primarily concerned about bilateral impacted earwax.  Patient reports that wax is built up.  She denies any significant pain tinnitus or vertigo.  She denies significant congestion or other signs of illness.    She has a history of moderate persistent asthma and needs refill of controller inhaler.  Follows with pulmonology.  Reports no concerns currently.    She has a history of recurrent UTI.  Was recently treated reports some ongoing issues with incontinence which are more chronic.  Discussed the importance of avoiding unnecessary testing as we can simply identify bacterial colonization versus a true infection.  She certainly has no other signs or symptoms of an acute illness.  We discussed refilling her Macrobid to have on hand for self treatment if indicated for more significant symptoms.    She is chronic bilateral low back pain, chronic hip pain and other multisite osteoarthritis type pain.  She is difficulty being able to reasonably control pain with typical  medications.  We have discussed avoidance of opiate medications.  She wishes to consider [place] medical marijuana program.  We discussed the logistics of making a referral/certification.    Review of Systems  Complete review of systems is obtained.  Other than the specific considerations noted above complete review of systems is negative.        Objective:   Medications:  Current Outpatient Medications   Medication Sig Note     acetaminophen (TYLENOL) 325 MG tablet Take 325 mg by mouth as needed for pain.      albuterol (PROAIR HFA;PROVENTIL HFA;VENTOLIN HFA) 90 mcg/actuation inhaler Inhale 2 puffs every 6 (six) hours as needed.      albuterol (PROVENTIL) 2.5 mg /3 mL (0.083 %) nebulizer solution INHALE 1 VIAL VIA NEBULIZER Q 4 H PRF WHEEZING OR SOB 5/27/2016: Received from: External Pharmacy     budesonide-formoterol (SYMBICORT) 160-4.5 mcg/actuation inhaler Inhale 2 puffs 2 (two) times a day.      buPROPion (WELLBUTRIN XL) 300 MG 24 hr tablet TAKE 1 TABLET EVERY MORNING      CHOLECALCIFEROL, VITAMIN D3, (VITAMIN D3 ORAL) Take 2,000 Units by mouth daily.      DULoxetine (CYMBALTA) 60 MG capsule TAKE 1 CAPSULE DAILY      fluticasone (FLONASE) 50 mcg/actuation nasal spray 2 sprays into each nostril daily as needed.       4/26/2018: Received from: [facility] Received Sig: Place 2 Sprays into both nostrils daily.     ipratropium-albuterol (DUO-NEB) 0.5-2.5 mg/3 mL nebulizer Inhale 3 mL.      lansoprazole (PREVACID) 30 MG capsule TAKE 1 CAPSULE DAILY      mometasone-formoterol (DULERA) 200-5 mcg/actuation HFAA inhaler Inhale 2 puffs 2 (two) times a day. Rinse mouth after      montelukast (SINGULAIR) 10 mg tablet TAKE 1 TABLET AT BEDTIME      naproxen (NAPROSYN) 500 MG tablet Take 1 tablet (500 mg total) by mouth 2 (two) times a day with meals.      oxyCODONE-acetaminophen (PERCOCET/ENDOCET) 5-325 mg per tablet Take 1-2 tablets by mouth. Take rare for migraine            SUMAtriptan (IMITREX) 20 mg/actuation nasal  spray 1 spray (20 mg total) into each nostril every 2 (two) hours as needed for migraine.      tamoxifen (NOLVADEX) 20 MG tablet Take 1 tablet (20 mg total) by mouth daily.      traZODone (DESYREL) 50 MG tablet TAKE 1 TABLET AT BEDTIME      triamcinolone (KENALOG) 0.5 % ointment Apply 1 application topically 2 (two) times a day as needed. Two tubes of 15 g      nitrofurantoin, macrocrystal-monohydrate, (MACROBID) 100 MG capsule Take 1 capsule (100 mg total) by mouth 2 (two) times a day for 5 days.      Allergies:  Allergies   Allergen Reactions     Sulfa (Sulfonamide Antibiotics) Hives     Levofloxacin Other (See Comments)     Near full body myalgias     Amoxicillin Other (See Comments)     Tobacco:   reports that she has never smoked. She has never used smokeless tobacco.     Physical Exam      /68   Pulse 77   Wt 182 lb (82.6 kg)   SpO2 98%   BMI 33.29 kg/m        General: Patient alert no signs of distress    Examination of her ears reveals that initially she has impacted cerumen they are cleared out completely with normal-appearing eardrums and no residual wax or irritation.

## 2021-06-03 VITALS
WEIGHT: 174 LBS | DIASTOLIC BLOOD PRESSURE: 74 MMHG | HEART RATE: 90 BPM | SYSTOLIC BLOOD PRESSURE: 124 MMHG | OXYGEN SATURATION: 98 % | BODY MASS INDEX: 31.83 KG/M2

## 2021-06-03 VITALS — WEIGHT: 179.5 LBS | BODY MASS INDEX: 32.83 KG/M2

## 2021-06-03 VITALS
BODY MASS INDEX: 33.29 KG/M2 | DIASTOLIC BLOOD PRESSURE: 68 MMHG | HEART RATE: 77 BPM | WEIGHT: 182 LBS | OXYGEN SATURATION: 98 % | SYSTOLIC BLOOD PRESSURE: 126 MMHG

## 2021-06-03 VITALS — BODY MASS INDEX: 33.11 KG/M2 | WEIGHT: 181 LBS

## 2021-06-04 ENCOUNTER — RECORDS - HEALTHEAST (OUTPATIENT)
Dept: ADMINISTRATIVE | Facility: CLINIC | Age: 81
End: 2021-06-04

## 2021-06-04 VITALS
DIASTOLIC BLOOD PRESSURE: 78 MMHG | SYSTOLIC BLOOD PRESSURE: 128 MMHG | HEART RATE: 73 BPM | HEIGHT: 63 IN | TEMPERATURE: 97.7 F | WEIGHT: 180 LBS | OXYGEN SATURATION: 97 % | BODY MASS INDEX: 31.89 KG/M2

## 2021-06-04 NOTE — TELEPHONE ENCOUNTER
RN cannot approve Refill Request: montelukast (SINGULAIR) 10 mg tablet    RN can NOT refill this medication med is not covered by policy/route to provider. Last office visit: 11/1/2019 Juan C Fairchild MD Last Physical: 8/8/2017 Last MTM visit: Visit date not found Last visit same specialty: 11/1/2019 Juan C Fairchild MD.  Next visit within 3 mo: Visit date not found  Next physical within 3 mo: Visit date not found    Refills Approved: lansoprazole (PREVACID) 30 MG capsule, buPROPion (WELLBUTRIN XL) 300 MG 24 hr tablet, traZODone (DESYREL) 50 MG tablet and DULoxetine (CYMBALTA) 60 MG capsule    Rx renewed per Medication Renewal Policy. Medication was last renewed on 9/27/19.    Brianna Louise, [facility] Triage/Med Refill 12/27/2019     Requested Prescriptions   Pending Prescriptions Disp Refills     buPROPion (WELLBUTRIN XL) 300 MG 24 hr tablet [Pharmacy Med Name: BUPROP 24 XL TAB 300MG] 90 tablet 0     Sig: TAKE 1 TABLET EVERY MORNING       Tricyclics/Misc Antidepressant/Antianxiety Meds Refill Protocol Passed - 12/26/2019 12:30 AM        Passed - PCP or prescribing provider visit in last year     Last office visit with prescriber/PCP: 11/1/2019 Juan C Fairchild MD OR same dept: 11/1/2019 Juan C Fairchild MD OR same specialty: 11/1/2019 Juan C Fairchild MD  Last physical: 8/8/2017 Last MTM visit: Visit date not found   Next visit within 3 mo: Visit date not found  Next physical within 3 mo: Visit date not found  Prescriber OR PCP: Juan C Fairchild MD  Last diagnosis associated with med order: 1. Major Depression, Single Episode In Remission  - buPROPion (WELLBUTRIN XL) 300 MG 24 hr tablet [Pharmacy Med Name: BUPROP 24 XL TAB 300MG]; TAKE 1 TABLET EVERY MORNING  Dispense: 90 tablet; Refill: 0  - DULoxetine (CYMBALTA) 60 MG capsule [Pharmacy Med Name: DULOXETINE CAP 60MG DR]; TAKE 1 CAPSULE DAILY  Dispense: 90 capsule; Refill: 0    2. Moderate persistent asthma with acute exacerbation  - montelukast  (SINGULAIR) 10 mg tablet [Pharmacy Med Name: MONTELUKAST  TAB 10MG]; TAKE 1 TABLET AT BEDTIME  Dispense: 90 tablet; Refill: 1    3. Insomnia  - traZODone (DESYREL) 50 MG tablet [Pharmacy Med Name: TRAZODONE TAB 50MG]; TAKE 1 TABLET AT BEDTIME  Dispense: 90 tablet; Refill: 0    4. Gastroesophageal reflux disease without esophagitis  - lansoprazole (PREVACID) 30 MG capsule [Pharmacy Med Name: LANSOPRAZ DR CAP 30MG RX]; TAKE 1 CAPSULE DAILY  Dispense: 90 capsule; Refill: 0    If protocol passes may refill for 12 months if within 3 months of last provider visit (or a total of 15 months).             montelukast (SINGULAIR) 10 mg tablet [Pharmacy Med Name: MONTELUKAST  TAB 10MG] 90 tablet 1     Sig: TAKE 1 TABLET AT BEDTIME       There is no refill protocol information for this order        traZODone (DESYREL) 50 MG tablet [Pharmacy Med Name: TRAZODONE TAB 50MG] 90 tablet 0     Sig: TAKE 1 TABLET AT BEDTIME       Tricyclics/Misc Antidepressant/Antianxiety Meds Refill Protocol Passed - 12/26/2019 12:30 AM        Passed - PCP or prescribing provider visit in last year     Last office visit with prescriber/PCP: 11/1/2019 Juan C Fairchild MD OR same dept: 11/1/2019 Juan C Fairchild MD OR same specialty: 11/1/2019 Juan C Fairchild MD  Last physical: 8/8/2017 Last MTM visit: Visit date not found   Next visit within 3 mo: Visit date not found  Next physical within 3 mo: Visit date not found  Prescriber OR PCP: Juan C Fairchild MD  Last diagnosis associated with med order: 1. Major Depression, Single Episode In Remission  - buPROPion (WELLBUTRIN XL) 300 MG 24 hr tablet [Pharmacy Med Name: BUPROP 24 XL TAB 300MG]; TAKE 1 TABLET EVERY MORNING  Dispense: 90 tablet; Refill: 0  - DULoxetine (CYMBALTA) 60 MG capsule [Pharmacy Med Name: DULOXETINE CAP 60MG DR]; TAKE 1 CAPSULE DAILY  Dispense: 90 capsule; Refill: 0    2. Moderate persistent asthma with acute exacerbation  - montelukast (SINGULAIR) 10 mg tablet [Pharmacy Med Name:  MONTELUKAST  TAB 10MG]; TAKE 1 TABLET AT BEDTIME  Dispense: 90 tablet; Refill: 1    3. Insomnia  - traZODone (DESYREL) 50 MG tablet [Pharmacy Med Name: TRAZODONE TAB 50MG]; TAKE 1 TABLET AT BEDTIME  Dispense: 90 tablet; Refill: 0    4. Gastroesophageal reflux disease without esophagitis  - lansoprazole (PREVACID) 30 MG capsule [Pharmacy Med Name: LANSOPRAZ DR CAP 30MG RX]; TAKE 1 CAPSULE DAILY  Dispense: 90 capsule; Refill: 0    If protocol passes may refill for 12 months if within 3 months of last provider visit (or a total of 15 months).             DULoxetine (CYMBALTA) 60 MG capsule [Pharmacy Med Name: DULOXETINE CAP 60MG DR] 90 capsule 0     Sig: TAKE 1 CAPSULE DAILY       Tricyclics/Misc Antidepressant/Antianxiety Meds Refill Protocol Passed - 12/26/2019 12:30 AM        Passed - PCP or prescribing provider visit in last year     Last office visit with prescriber/PCP: 11/1/2019 Juan C Fairchild MD OR same dept: 11/1/2019 Juan C Fairchild MD OR same specialty: 11/1/2019 Juan C Fairchild MD  Last physical: 8/8/2017 Last MTM visit: Visit date not found   Next visit within 3 mo: Visit date not found  Next physical within 3 mo: Visit date not found  Prescriber OR PCP: Juan C Fairchild MD  Last diagnosis associated with med order: 1. Major Depression, Single Episode In Remission  - buPROPion (WELLBUTRIN XL) 300 MG 24 hr tablet [Pharmacy Med Name: BUPROP 24 XL TAB 300MG]; TAKE 1 TABLET EVERY MORNING  Dispense: 90 tablet; Refill: 0  - DULoxetine (CYMBALTA) 60 MG capsule [Pharmacy Med Name: DULOXETINE CAP 60MG DR]; TAKE 1 CAPSULE DAILY  Dispense: 90 capsule; Refill: 0    2. Moderate persistent asthma with acute exacerbation  - montelukast (SINGULAIR) 10 mg tablet [Pharmacy Med Name: MONTELUKAST  TAB 10MG]; TAKE 1 TABLET AT BEDTIME  Dispense: 90 tablet; Refill: 1    3. Insomnia  - traZODone (DESYREL) 50 MG tablet [Pharmacy Med Name: TRAZODONE TAB 50MG]; TAKE 1 TABLET AT BEDTIME  Dispense: 90 tablet; Refill:  0    4. Gastroesophageal reflux disease without esophagitis  - lansoprazole (PREVACID) 30 MG capsule [Pharmacy Med Name: LANSOPRAZ DR CAP 30MG RX]; TAKE 1 CAPSULE DAILY  Dispense: 90 capsule; Refill: 0    If protocol passes may refill for 12 months if within 3 months of last provider visit (or a total of 15 months).             lansoprazole (PREVACID) 30 MG capsule [Pharmacy Med Name: LANSOPRAZ DR CAP 30MG RX] 90 capsule 0     Sig: TAKE 1 CAPSULE DAILY       GI Medications Refill Protocol Passed - 12/26/2019 12:30 AM        Passed - PCP or prescribing provider visit in last 12 or next 3 months.     Last office visit with prescriber/PCP: 11/1/2019 Juan C Fairchild MD OR same dept: 11/1/2019 Juan C Fairchild MD OR same specialty: 11/1/2019 Juan C Fairchild MD  Last physical: 8/8/2017 Last MTM visit: Visit date not found   Next visit within 3 mo: Visit date not found  Next physical within 3 mo: Visit date not found  Prescriber OR PCP: Juan C Fairchild MD  Last diagnosis associated with med order: 1. Major Depression, Single Episode In Remission  - buPROPion (WELLBUTRIN XL) 300 MG 24 hr tablet [Pharmacy Med Name: BUPROP 24 XL TAB 300MG]; TAKE 1 TABLET EVERY MORNING  Dispense: 90 tablet; Refill: 0  - DULoxetine (CYMBALTA) 60 MG capsule [Pharmacy Med Name: DULOXETINE CAP 60MG DR]; TAKE 1 CAPSULE DAILY  Dispense: 90 capsule; Refill: 0    2. Moderate persistent asthma with acute exacerbation  - montelukast (SINGULAIR) 10 mg tablet [Pharmacy Med Name: MONTELUKAST  TAB 10MG]; TAKE 1 TABLET AT BEDTIME  Dispense: 90 tablet; Refill: 1    3. Insomnia  - traZODone (DESYREL) 50 MG tablet [Pharmacy Med Name: TRAZODONE TAB 50MG]; TAKE 1 TABLET AT BEDTIME  Dispense: 90 tablet; Refill: 0    4. Gastroesophageal reflux disease without esophagitis  - lansoprazole (PREVACID) 30 MG capsule [Pharmacy Med Name: LANSOPRAZ DR CAP 30MG RX]; TAKE 1 CAPSULE DAILY  Dispense: 90 capsule; Refill: 0    If protocol passes may refill for 12  months if within 3 months of last provider visit (or a total of 15 months).

## 2021-06-05 VITALS
TEMPERATURE: 98.6 F | DIASTOLIC BLOOD PRESSURE: 90 MMHG | SYSTOLIC BLOOD PRESSURE: 180 MMHG | OXYGEN SATURATION: 95 % | WEIGHT: 178.1 LBS | HEART RATE: 81 BPM | BODY MASS INDEX: 31.55 KG/M2

## 2021-06-05 VITALS
RESPIRATION RATE: 20 BRPM | DIASTOLIC BLOOD PRESSURE: 82 MMHG | WEIGHT: 170 LBS | OXYGEN SATURATION: 98 % | SYSTOLIC BLOOD PRESSURE: 138 MMHG | BODY MASS INDEX: 30.11 KG/M2 | HEART RATE: 81 BPM

## 2021-06-05 VITALS — HEIGHT: 63 IN | BODY MASS INDEX: 30.12 KG/M2 | WEIGHT: 170 LBS

## 2021-06-05 NOTE — TELEPHONE ENCOUNTER
RN cannot approve Refill Request    RN can NOT refill this medication med is not covered by policy/route to provider. Last office visit: 11/1/2019 Juan C Fairchild MD Last Physical: 8/8/2017 Last MTM visit: Visit date not found Last visit same specialty: 11/1/2019 Juan C Fairchild MD.  Next visit within 3 mo: Visit date not found  Next physical within 3 mo: Visit date not found      Analy Sorto, [facility] Triage/Med Refill 1/27/2020    Requested Prescriptions   Pending Prescriptions Disp Refills     moxifloxacin (AVELOX) 400 mg tablet [Pharmacy Med Name: MOXIFLOXACIN TAB 400MG] 12 tablet 1     Sig: TAKE 1 TABLET THREE TIMES  WEEKLY       There is no refill protocol information for this order        triamcinolone (KENALOG) 0.5 % ointment [Pharmacy Med Name: TRIAMCINOLON OIN 0.5%] 30 g 1     Sig: USE 1 APPLICATION TOPICALLYTWO TIMES A DAY AS NEEDED       There is no refill protocol information for this order

## 2021-06-05 NOTE — TELEPHONE ENCOUNTER
RN cannot approve Refill Request    RN can NOT refill this medication med is not covered by policy/route to provider.         Janine Grajeda, [facility] Triage/Med Refill 1/27/2020    Requested Prescriptions   Pending Prescriptions Disp Refills     budesonide-formoteroL (SYMBICORT) 160-4.5 mcg/actuation inhaler [Pharmacy Med Name: SYMBICORT -4.5] 3 Inhaler 1     Sig: USE 2 INHALATIONS ORALLY   TWICE DAILY       There is no refill protocol information for this order      Signed Prescriptions Disp Refills    VENTOLIN HFA 90 mcg/actuation inhaler 54 g 1     Sig: USE 2 INHALATIONS ORALLY   EVERY 6 HOURS AS NEEDED       Albuterol/Levalbuterol Refill Protocol Passed - 1/27/2020 11:54 AM        Passed - PCP or prescribing provider visit in last year     Last office visit with prescriber/PCP: 11/1/2019 Juan C Fairchild MD OR same dept: 11/1/2019 Juan C Fairchild MD OR same specialty: 11/1/2019 Juan C Fairchild MD Last physical: 8/8/2017       Next appt within 3 mo: Visit date not found  Next physical within 3 mo: Visit date not found  Prescriber OR PCP: Juan C Fairchidl MD  Last diagnosis associated with med order: 1. Moderate persistent asthma with acute exacerbation  - VENTOLIN HFA 90 mcg/actuation inhaler; USE 2 INHALATIONS ORALLY   EVERY 6 HOURS AS NEEDED  Dispense: 54 g; Refill: 1    2. Moderate persistent asthma without complication  - budesonide-formoteroL (SYMBICORT) 160-4.5 mcg/actuation inhaler [Pharmacy Med Name: SYMBICORT -4.5]; USE 2 INHALATIONS ORALLY   TWICE DAILY  Dispense: 3 Inhaler; Refill: 1    If protocol passes may refill for 6 months if within 3 months of last provider visit (or a total of 9 months). If patient requesting >1 inhaler per month refill x 6 months and have patient make appointment with provider.

## 2021-06-05 NOTE — TELEPHONE ENCOUNTER
Refill Approved    Rx renewed per Medication Renewal Policy. Medication was last renewed on 1/11/19.    Janine Grajeda, [facility] Triage/Med Refill 1/27/2020     Requested Prescriptions   Pending Prescriptions Disp Refills     VENTOLIN HFA 90 mcg/actuation inhaler [Pharmacy Med Name: VENTOLIN INH ] 54 g 1     Sig: USE 2 INHALATIONS ORALLY   EVERY 6 HOURS AS NEEDED       Albuterol/Levalbuterol Refill Protocol Passed - 1/27/2020 11:54 AM        Passed - PCP or prescribing provider visit in last year     Last office visit with prescriber/PCP: 11/1/2019 Juan C Fairchild MD OR same dept: 11/1/2019 Juan C Fairchild MD OR same specialty: 11/1/2019 Juan C Fairchild MD Last physical: 8/8/2017       Next appt within 3 mo: Visit date not found  Next physical within 3 mo: Visit date not found  Prescriber OR PCP: Juan C Fairchild MD  Last diagnosis associated with med order: 1. Moderate persistent asthma with acute exacerbation  - VENTOLIN HFA 90 mcg/actuation inhaler [Pharmacy Med Name: VENTOLIN INH ]; USE 2 INHALATIONS ORALLY   EVERY 6 HOURS AS NEEDED  Dispense: 54 g; Refill: 1    2. Moderate persistent asthma without complication  - budesonide-formoteroL (SYMBICORT) 160-4.5 mcg/actuation inhaler [Pharmacy Med Name: SYMBICORT -4.5]; USE 2 INHALATIONS ORALLY   TWICE DAILY  Dispense: 3 Inhaler; Refill: 1    If protocol passes may refill for 6 months if within 3 months of last provider visit (or a total of 9 months). If patient requesting >1 inhaler per month refill x 6 months and have patient make appointment with provider.          budesonide-formoteroL (SYMBICORT) 160-4.5 mcg/actuation inhaler [Pharmacy Med Name: SYMBICORT -4.5] 3 Inhaler 1     Sig: USE 2 INHALATIONS ORALLY   TWICE DAILY       There is no refill protocol information for this order

## 2021-06-06 NOTE — TELEPHONE ENCOUNTER
Called patient, per Dr. Zavala's request to let her know that her Hgb and Ferritin were on low side of normal.  Dr. Zavala doesn't want to start any oral iron right now and we will recheck labs at her next visit. Patient happy with results and verbalized understanding/JAZ Aguiar RN

## 2021-06-06 NOTE — PROGRESS NOTES
"[facility] Hematology and Oncology Progress Note    Patient: Krystal Virgen  MRN: [id number]  Date of Service: 3/3/2020        Reason for Visit    #.  Breast cancer, stage Ia, ER positive, HER-2 negative.  #.  Chronic microcytic anemia with iron deficiency.    Assessment and Plan  Cancer Staging  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)  Staging form: Breast, AJCC 7th Edition  - Clinical: No stage assigned - Unsigned  - Pathologic stage from 9/8/2017: Stage IA (T1c, N0, cM0) - Signed by Herbert Zavala MD on 9/8/2017  ER Status: Positive  ME Status: Positive  HER2 Status: Negative      ECOG Performance   ECOG Performance Status: 1     Distress Assessment  Distress Assessment Score: 4    Pain  Currently in Pain: Yes  Pain Score (Initial OR Reassessment): 8  Location: \"migraine\"    #.  Invasive ductal carcinoma of the right breast.  Stage IA (pT1c, pN0, cM0), grade 1, ER/ME strongly positive, HER2/Uriel negative, with positive inferior margin. Associated DCIS. S/p right breast lumpectomy and right sentinel lymph node biopsy on 8/10/2017.  She declined reexcision or adjuvant radiation treatment.  She started anastrozole in October 2017, then switched to tamoxifen in December 2018 due to intolerable vasomotor symptoms and not to compromise her bone density.      There is no clinical or mammographic evidence of breast cancer recurrence.  She is comfortable continuing tamoxifen at this point although she continues to have hot flashes.  She has been on endocrine therapy for about 2-1/2 years and reported that she know how to manage the side effects at this point. Follow-up clinical exam in 6 months (around October 2020).  She would like to avoid visit during the wintertime, therefore we decided to set up a follow-up for April and October.   Neck screening mammogram is due in February 2021.    #.  Low bone density/osteopenia with high fracture risk.     Compared to DEXA scan from 2018, her " bone does density showed a decline in lumbar spine, with stable bilateral hips.   Because of her periodic use of steroids, her bone density will be continue to monitor closely.  She was on Fosamax for about 25 years per her report.  She discontinued Fosamax in October 2017 for drug holiday.   Continue calcium and vitamin D.   Continue follow-up with Dr. Fairchild.     #.  History of microcytic anemia with iron deficiency.   She was getting IV iron periodically and last IV iron therapy was in March 2018.   Colonoscopy on 11/8/17 showed diverticulosis without evidence of source of bleeding. EGD from 12/14/2017 showed a large hiatal hernia with John's erosion.      Follow-up hemogram and iron studies completed today.  Her hemogram showed fairly good hemoglobin of 12.3 g/dL.  Iron studies are pending.  Will call with the result and will arrange for IV iron if needed.      Problem List    1. Malignant neoplasm of upper-outer quadrant of right breast in female, estrogen receptor positive (H)     2. Iron deficiency anemia due to chronic blood loss  HM2(CBC w/o Differential)    Comprehensive Metabolic Panel    Ferritin    Iron and Transferrin Iron Binding Capacity        ______________________________________________________________________________    History of Present Illness    Jody present herself today for follow-up.  She continues to have hot flashes.  She reported that she has been taking tamoxifen very regularly.  She denies any new bone pain, headache.  She does self breast examination and did not notice any concern.  She was hospitalized with pneumonia in August at [facility].  She recover very slowly and most recently she had bronchitis.  She has been on antibiotic for the entire winter.      Pain Status  Currently in Pain: Yes    Review of Systems    Constitutional  Constitutional (WDL): Exceptions to WDL  Fatigue: Fatigue not relieved by rest - Limiting instrumental ADL  Fever: None  Chills:  "None  Neurosensory  Neurosensory (WDL): Exceptions to WDL(HA-\"2nd day of migraine\")  Peripheral Motor Neuropathy: None  Ataxia: None  Peripheral Sensory Neuropathy: None  Confusion: None  Syncope: None  Eye   Eye Disorder (WDL): Exceptions to WDL  Blurred Vision: None  Dry Eye: None  Eye Pain: None  Watering Eyes: Intervention not indicated  Ear  Ear Disorder (WDL): All ear disorder elements are within defined limits  Cardiovascular  Cardiovascular (WDL): All cardiovascular elements are within defined limits  Pulmonary  Respiratory (WDL): Exceptions to WDL  Cough: Mild symptoms, nonprescription intervention indicated  Dyspnea: Shortness of breath with minimal exertion, limiting instrumental ADL  Hypoxia: None  Gastrointestinal  Gastrointestinal (WDL): All gastrointestinal elements are within defined limits  Genitourinary  Genitourinary (WDL): All genitourinary elements are within defined limits  Lymphatic  Lymph (WDL): All lymph disorder elements are within defined limits  Musculoskeletal and Connective Tissue  Musculoskeletal and Connetive Tissue Disorders (WDL): Exceptions to WDL  Arthralgia: Mild pain(R shoulder)  Bone Pain: None  Muscle Weakness : None  Myalgia: Mild pain(left hip)  Integumentary  Integumentary (WDL): All integumentary elements are within defined limits  Patient Coping  Patient Coping: Accepting;Open/discussion  Distress Assessment  Distress Assessment Score: 4  Accompanied by  Accompanied by: Alone  Oral Chemo Adherence       Past History  Past Medical History:   Diagnosis Date     Anemia      Asthma      Breast cancer (H) 2017     Chronic bronchitis (H)      Osteoporosis      Pneumonia      Sinusitis        Physical Exam    Recent Vitals 3/3/2020   Weight -   BSA (m2) -   /75   Pulse 97   Temp 97.8   Temp src 1   SpO2 97   Some recent data might be hidden     General: alert, awake, not in acute distress  HEENT: Head: Normal, normocephalic, atraumatic.  Eye: Normal external eye, " conjunctiva, lids cornea, RINKU.  Ears:  Non-tender.  Nose: Normal external nose, mucus membranes and septum.  Pharynx: Dental Hygiene adequate. Normal buccal mucosa. Normal pharynx.  Neck / Thyroid: Supple, no masses, nodes, nodules or enlargement.  Lymphatics: No abnormally enlarged lymph nodes.  Chest: Normal chest wall and respirations. Clear to auscultation.  Breasts: Bilateral pendulous breasts.  Fibroglandular breast.  No discrete palpable masses.  Heart: S1 S2 RRR, no murmur.   Abdomen: abdomen is soft without significant tenderness, masses, organomegaly or guarding  Extremities: normal strength, tone, and muscle mass  Skin: normal. no rash or abnormalities  CNS: non focal.    Lab Results    Recent Results (from the past 168 hour(s))   HM2(CBC w/o Differential)   Result Value Ref Range    WBC 9.7 4.0 - 11.0 thou/uL    RBC 4.24 3.80 - 5.40 mill/uL    Hemoglobin 12.3 12.0 - 16.0 g/dL    Hematocrit 38.2 35.0 - 47.0 %    MCV 90 80 - 100 fL    MCH 29.0 27.0 - 34.0 pg    MCHC 32.2 32.0 - 36.0 g/dL    RDW 12.9 11.0 - 14.5 %    Platelets 351 140 - 440 thou/uL    MPV 8.8 8.5 - 12.5 fL   Comprehensive Metabolic Panel   Result Value Ref Range    Sodium 141 136 - 145 mmol/L    Potassium 4.1 3.5 - 5.0 mmol/L    Chloride 107 98 - 107 mmol/L    CO2 27 22 - 31 mmol/L    Anion Gap, Calculation 7 5 - 18 mmol/L    Glucose 91 70 - 125 mg/dL    BUN 16 8 - 28 mg/dL    Creatinine 1.06 0.60 - 1.10 mg/dL    GFR MDRD Af Amer >60 >60 mL/min/1.73m2    GFR MDRD Non Af Amer 50 (L) >60 mL/min/1.73m2    Bilirubin, Total 0.4 0.0 - 1.0 mg/dL    Calcium 9.0 8.5 - 10.5 mg/dL    Protein, Total 6.2 6.0 - 8.0 g/dL    Albumin 3.4 (L) 3.5 - 5.0 g/dL    Alkaline Phosphatase 67 45 - 120 U/L    AST 13 0 - 40 U/L    ALT 9 0 - 45 U/L       Imaging    Dxa Bone Density Scan    Result Date: 2/19/2020 2/18/2020 RE: Krystal Virgen YOB: 1940 Dear Herbert Zavala, Patient Profile: 79 y.o. female, postmenopausal, is here for the follow up bone  density test. History of fractures - None. Family history of osteoporosis - None.  Family history of hip fracture: None. Smoking history - No. Osteoporosis treatment past -  Yes;  HRT and Bisphosphonates. Osteoporosis treatment current - No.  Chronic medical problems - Breast cancer. High risk medications -  Steroids;  Yes, in the Past and Aromatase Inhibitor;  Yes, in the Past. Assessment: 1. The spine bone density L2-L3 with T-score -1.6 and significant decline of 5.3 % compared to 2018. 2. Femoral bone densities show left total hip T- score -2.2 and right femoral neck T-score -2.4, stable. 3. Trabecular bone score indicates moderate trabecular bone architecture. 79 y.o. female with LOW BONE DENSITY (OSTEOPENIA) and HIGH fracture risk, adjusted for the TBS, with major osteoporotic fracture risk 17.4% and hip fracture risk 5.8%. Recommendations: Appropriate evaluation and treatment recommended with follow up bone density scan after 1 year of active treatment. Bone densitometry was performed on your patient using our [employer] densitometer. The results are summarized and a copy of the actual scans are included for your review. In conformity with the International Society of Clinical Densitometry's most recent position statement for DXA interpretation (2015), the diagnosis will be made on the lowest measured T-score of the lumbar spine, femoral neck, total proximal femur or 33% radius. Note the change in terminology for diagnostic classification from OSTEOPENIA to LOW BONE MASS. All trending for sequential exams will be done using multiple vertebrae or the total proximal femur. Fracture risk is based on the WHO Fracture Risk Assessment Tool (FRAX). If additional information is needed or if you would like to discuss the results, please do not hesitate to call me. Thank you for referring this patient to [facility]. We are happy to be of service in support of you and your practice. If you have  any questions or suggestions to improve our service, please call me at [phone]. Sincerely, Boston Paul M.D. HODAN. [facility]     Mammo Screening Bilateral    Result Date: 2/18/2020  BILATERAL FULL FIELD DIGITAL SCREENING MAMMOGRAM Performed on: 2/18/20 Compared to: 07/17/2018 Mammo Diagnostic Bilateral, 07/06/2017 Mammo Diagnostic Right, 06/29/2017 Mammo Screening Bilateral, 06/27/2016 Mammo Screening Bilateral, and 06/24/2015 Mammo Screening Bilateral Findings: The breasts have scattered areas of fibroglandular densities.  There are postsurgical lumpectomy changes in the right breast. No evidence for malignancy and no change from the previous exam(s). This study was evaluated with the assistance of Computer-Aided Detection. Continue routine screening mammogram as recommended. ACR BI-RADS Category 2: Benign Findings      Signed by: Herbert Zavala MD

## 2021-06-07 NOTE — TELEPHONE ENCOUNTER
"Patient calls today concerned of UTI symptoms. States, \"I think I have a bladder infection. I've had them a lot in the past.\" Reports symptoms started this morning. Patient has urinary frequency and burning when she pees. She has intermittent pains in her abdomen and back. Denies fevers. Denies hematuria or urine that is cloudy or foul-smelling. Patient states she's taken Macrobid in the past for UTIs with good results.    Protocol recommends patient be seen within 24 hours. I recommended patient to start virtual visit through [email or web address]. Patient provided with directions on how to start visit. Verbalized understanding and will call back with additional questions or concerns.    Brigid Bejaraon RN    Reason for Disposition    Bad or foul-smelling urine    Answer Assessment - Initial Assessment Questions  1. SYMPTOM: \"What's the main symptom you're concerned about?\" (e.g., frequency, incontinence)      Urinary frequency. Burning with urination. Intermittent pains in abdomen and back.  2. ONSET: \"When did the  sx  start?\"      This morning.  3. PAIN: \"Is there any pain?\" If so, ask: \"How bad is it?\" (Scale: 1-10; mild, moderate, severe)      Moderate pain with urination. I asked if patient has abdominal pain or back/flank pain. She states she has she has intermittent pains \"all over.\"  4. CAUSE: \"What do you think is causing the symptoms?\"      States \"I think I have bladder infections.\" Patient has had bladder infections in the past.  5. OTHER SYMPTOMS: \"Do you have any other symptoms?\" (e.g., fever, flank pain, blood in urine, pain with urination)      Painful urination.  6. PREGNANCY: \"Is there any chance you are pregnant?\" \"When was your last menstrual period?\"      no    Protocols used: URINARY SYMPTOMS-A-AH      "

## 2021-06-07 NOTE — PROGRESS NOTES
MTM Follow Up Encounter  Assessment & Plan                                                     Depression: Per chart review, patient has been on citalopram 20 mg in the past. Would be reasonable to switch back. Reviewed however risk of QTc prolongation with citalopram + Moxifloxacin. Recommended that she stop moxi and call pulmonology. She would rather not have an EKG done now. Also reviewed ideally cross tapering but could directly switch if she is comfortable -- she would prefer to directly switch. Reviewed that first week may uncomfortable. Give citalopram full month to reach full response. Will not increase further due to age. If she needs further help with her depression, could increase bupropion in the future.   PLAN:   1. Stop duloxetine and start citalopram 20 mg daily pending response from pulmonology. She will call today.     UTI: Resolved.     Asthma: Stable at this time. Continue to follow up with Dr. Thakkar.      Hives: Resolved.     Breast Cancer: Patient to continue to follow up with Dr. Zavala.     Osteopenia: Did not discuss in detail today. DEXA scan did show decline in spine. Is currently on drug holiday. Last Vitamin D level normal, but old value. Will recheck with future labs. At follow up with discuss calcium supplementation and dietary intake.     History of microcytic anemia with iron deficiency: Per Dr Zavala, no need for iron and will recheck in the future. Last hgb level was normal.     Follow Up  3 week follow up    Subjective & Objective                                                       Krystal Virgen is a 79 y.o. female called for a follow up visit for Medication Therapy Management. She was referred to me from Juan C Fairchild MD. Last spoke with patient on 7/12/19.     Patient consented to a telehealth visit: Yes    Chief Complaint: wonders about switching to citalopram.      Medication Adherence/Access: lives in [facility] now. No adherence issues noted.     Depression: Currently  taking duloxetine 60 mg daily and bupropion  mg AM.  Reports she thinks the duloxetine has worn off. She alternates between duloxetine and citalopram every few years. Used to be on citalopram 20 mg. Knows that the medication wears off because she will wear at a . In the past when she has switched, she has not tapered the medication but directly switched with no issues. Currently denies panic attacks or suicidal thoughts.     UTI: Met with Dr. Alicia on 4/29/20 for UTI. Was started on nitrofurantoin 100 mg two times a day x 5 days. Reports her UTI cleared up, but abx upset her stomach. Took probiotics and yogurt.     Asthma: Continues Symbicort 160/4.5 mcg 2 puffs two times a day, DuoNeb every 4 hours PRN, albuterol HFA every 4 hours PRN, and monteukast 10 mg HS. Continues Avelox ppx 400 mg every other day - -supposed to take during the winter, but she would like to continue.    Sees  pulm Dr Thakkar. Uses albuterol about once a week. Does not like DPI. Pulm recommends continuing lansoprazole 30 mg two times a day.   For nasal drainage recommends Flonase 2 sprays NU daily, benedryl 25 mg HS PRN, and ipratropium 0.03% two sprays NU two times a day.  Reports she has hard time breathing with a mask on, but otherwise breathing is good. Not using neb right now but has on hand. Reports some draining and coughing once in a while.      Hives: At last MTM appt she was having hives and itching. Took antihistamines and her sx resolved.     Breast Cancer: Follows with Dr. Zavala. Invasive ductal carcinoma of the right breast Stage IA. S/p right breast lumpectomy and right sentinel lymph node biopsy on 8/10/2017. She started anastrozole in October 2017, then switched to tamoxifen in December 2018 due to intolerable vasomotor symptoms and not to compromise her bone density. No clinical or mammographic evidence of breast cancer recurrence.  Per onc note, she is comfortable continuing tamoxifen at this point although  she continues to have hot flashes.      Osteopenia: DEXA checked 2/18/20 with T score -2.4. Was on alendraonte in the past (Oct 2017 drug holiday). Taking calcium and Vitamin D.   Vitamin D, Total (25-Hydroxy)   Date Value Ref Range Status   06/10/2014 49.3 30.0 - 80.0 ng/mL Final     History of microcytic anemia with iron deficiency: She was getting IV iron periodically and last IV iron therapy was in March 2018. Colonoscopy on 11/8/17 showed diverticulosis without evidence of source of bleeding. EGD from 12/14/2017 showed a large hiatal hernia with John's erosion. Follow-up hemogram and iron studies on 3/3/20 - Dr Zavala recommended rechecking at next visit and not starting oral iron.       PMH: reviewed in EPIC   Allergies/ADRs: reviewed in EPIC   Alcohol: reviewed in EPIC  Tobacco:   Social History     Tobacco Use   Smoking Status Never Smoker   Smokeless Tobacco Never Used     Today's Vitals: There were no vitals filed for this visit.  ----------------    The patient declined an after visit summary    I spent 15 minutes with this patient today;   All changes were made via collaborative practice agreement with Juan C Fairchild MD. A copy of the visit note was provided to the patient's provider.     Mikala Hays, Pharm.D., [facility]  Medication Therapy Management Pharmacist  [facility]    Telemedicine Visit Details    Type of service:  Telephone     Start Time: 1:12  End Time: 1:30    Originating Location (pt. Location): Home    Distant Location (provider location):  [facility] MEDICATION THERAPY MANAGEMENT [facility]    Mode of Communication: Telephone    Current Outpatient Medications   Medication Sig Dispense Refill     acetaminophen (TYLENOL) 325 MG tablet Take 325 mg by mouth as needed for pain.       albuterol (PROVENTIL) 2.5 mg /3 mL (0.083 %) nebulizer solution INHALE 1 VIAL VIA NEBULIZER Q 4 H PRF WHEEZING OR SOB  0     budesonide-formoteroL (SYMBICORT) 160-4.5 mcg/actuation  inhaler USE 2 INHALATIONS ORALLY   TWICE DAILY 3 Inhaler 1     buPROPion (WELLBUTRIN XL) 300 MG 24 hr tablet TAKE 1 TABLET EVERY MORNING 90 tablet 3     CHOLECALCIFEROL, VITAMIN D3, (VITAMIN D3 ORAL) Take 2,000 Units by mouth daily.       codeine-guaiFENesin (GUAIFENESIN AC)  mg/5 mL liquid Take 10 mL by mouth every 4 (four) hours as needed.       DULoxetine (CYMBALTA) 60 MG capsule TAKE 1 CAPSULE DAILY 90 capsule 3     fluticasone (FLONASE) 50 mcg/actuation nasal spray 2 sprays into each nostril daily as needed.             ipratropium-albuterol (DUO-NEB) 0.5-2.5 mg/3 mL nebulizer Inhale 3 mL.       lansoprazole (PREVACID) 30 MG capsule TAKE 1 CAPSULE DAILY 90 capsule 3     montelukast (SINGULAIR) 10 mg tablet TAKE 1 TABLET AT BEDTIME 90 tablet 1     moxifloxacin (AVELOX) 400 mg tablet TAKE 1 TABLET THREE TIMES  WEEKLY 12 tablet 1     naproxen (NAPROSYN) 500 MG tablet Take 1 tablet (500 mg total) by mouth 2 (two) times a day with meals. 180 tablet 0     nitrofurantoin, macrocrystal-monohydrate, (MACROBID) 100 MG capsule Take 1 capsule (100 mg total) by mouth 2 (two) times a day for 5 days. 10 capsule 0     oxyCODONE-acetaminophen (PERCOCET/ENDOCET) 5-325 mg per tablet Take 1-2 tablets by mouth. Take rare for migraine             SUMAtriptan (IMITREX) 20 mg/actuation nasal spray 1 spray (20 mg total) into each nostril every 2 (two) hours as needed for migraine. 3 Inhaler 3     tamoxifen (NOLVADEX) 20 MG tablet Take 1 tablet (20 mg total) by mouth daily. 90 tablet 3     traZODone (DESYREL) 50 MG tablet TAKE 1 TABLET AT BEDTIME 90 tablet 3     triamcinolone (KENALOG) 0.5 % ointment USE 1 APPLICATION TOPICALLYTWO TIMES A DAY AS NEEDED 30 g 1     VENTOLIN HFA 90 mcg/actuation inhaler USE 2 INHALATIONS ORALLY   EVERY 6 HOURS AS NEEDED 54 g 1     No current facility-administered medications for this visit.

## 2021-06-07 NOTE — PROGRESS NOTES
"Krystal Virgen is a 79 y.o. female who is being evaluated via a billable telephone visit.      The patient has been notified of following:     \"This telephone visit will be conducted via a call between you and your physician/provider. We have found that certain health care needs can be provided without the need for a physical exam.  This service lets us provide the care you need with a short phone conversation.  If a prescription is necessary we can send it directly to your pharmacy.  If lab work is needed we can place an order for that and you can then stop by our lab to have the test done at a later time.    Telephone visits are billed at different rates depending on your insurance coverage. During this emergency period, for some insurers they may be billed the same as an in-person visit.  Please reach out to your insurance provider with any questions.    If during the course of the call the physician/provider feels a telephone visit is not appropriate, you will not be charged for this service.\"    Patient has given verbal consent to a Telephone visit? Yes    Patient would like to receive their AVS by AVS Preference: Lu.    Additional provider notes: Jody is a 79-year-old female presenting today for a virtual visit related to a possible urinary tract infection.  The patient reports onset of symptoms in the past couple of days with increased frequency as well as burning discomfort.  She denies any associated back pain or fever.  She does have a history of urinary tract infections in the past although the last 1 was perhaps about a year ago.  She would prefer not to come into clinic due to the coronavirus pandemic.    Assessment/Plan:  1. Acute cystitis without hematuria  I think it is reasonable to try to empirically treat with a antibiotic and prescribed Macrobid twice daily for 5 days.  I explained to the patient that if she does not notice significant relief in the next 24 to 48 hours to call back to " clinic.  - nitrofurantoin, macrocrystal-monohydrate, (MACROBID) 100 MG capsule; Take 1 capsule (100 mg total) by mouth 2 (two) times a day for 5 days.  Dispense: 10 capsule; Refill: 0        Phone call duration:  6 minutes    Ana Alicia MD

## 2021-06-08 NOTE — TELEPHONE ENCOUNTER
"Patient called stating that she needs refill of Tamoxifen. She said refill request was sent on 5/12 and refill still not received.  She asked if we could do some checking at get it refilled and she's \"scraping the bottom of the barrel\".      Dr. Fairchild, PCP, refilled today and sent to [facility] mail order. Quantity #90. 3 refills. Left message notifying patient of this. Instructed her to call me back if she has any further issues refilling/JAZ Aguair RN   "

## 2021-06-08 NOTE — PROGRESS NOTES
MT Follow Up Encounter  Assessment & Plan                                                     Depression:  Continue to monitor over the next month to see if her fatigue gets better. Per patient she has noticed an improvement.      Asthma: Patient to continue to follow up with pulm. No longer on Avelox therefore less QTc prolongation risk. However, recommended to let pulm know that she is taking citalopram now due to future abx selection.      Osteopenia: Patient's DEXA is being monitored at oncology. Reviewed that she is on adequate calcium due to her high dietary intake. Try to find citrate version if possible due to concomitant PPI. Would recommend future Vitamin D level as well to ensure adequate supplementation.   PLAN:   1. Future Vitamin D level      Follow Up  As needed with MT     Subjective & Objective                                                       Krystal Virgen is a 79 y.o. female called for a follow up visit for Medication Therapy Management. She was referred to me from Juan C Fairchild MD    Patient consented to a telehealth visit: Yes    Chief Complaint: follow up since last appointment on 5/4/20    Medication Adherence/Access: No issues.     Depression: Currently taking citalopram 20 mg daily and bupropion  mg AM.  At last [facility] appt she reported she thought the duloxetine has worn off therefore we discussed switching to citalopram. She alternates between duloxetine and citalopram every few years. Used to be on citalopram 20 mg. Due to QTc prolongation risk with Avelox, I had recommended that she discuss with pulmonology. She did not contact pul, rather she stopped Avelox. Knows that the medication wears off because she will swear at a . In the past when she has switched, she has not tapered the medication but directly switched with no issues. Currently denies panic attacks or suicidal thoughts. Reports that she feels fine and a little better. She is a little more tired,  but it is not concerning and she expects it to go away.      Asthma: Continues Symbicort 160/4.5 mcg 2 puffs two times a day, DuoNeb every 4 hours PRN, albuterol HFA every 4 hours PRN, and monteukast 10 mg HS. No longer on Avelox ppx 400 mg every other day. Was supposed to only take in the winter, but she continued until we started citalopram due to Qtc prolongation risk. Reports that with her insurance they dont cover Avelox therefore in the fall she will need to switch to a new abx anyway.   Sees  pulm Dr Thakkar. Uses albuterol about once a week. Does not like DPI. Pulm recommends continuing lansoprazole 30 mg two times a day.   For nasal drainage recommends Flonase 2 sprays NU daily, benedryl 25 mg HS PRN, and ipratropium 0.03% two sprays NU two times a day.  Reports she has hard time breathing with a mask on, but otherwise breathing is good. Not using neb right now but has on hand. Reports some draining and coughing once in a while.      Osteopenia: DEXA checked 2/18/20 with T score -2.4. Was on alendraonte in the past (Oct 2017 drug holiday). Taking calcium once daily and Vitamin D 2000 IU daily. She does not know the strength of calcium. Tries to find citrate but it they are too large.    Dietary calcium: Switched to drinking to some whole or 2% milk and ice cream. Eats a lot of cheese and yogurt         Vitamin D, Total (25-Hydroxy)   Date Value Ref Range Status   06/10/2014 49.3 30.0 - 80.0 ng/mL Final          PMH: reviewed in EPIC   Allergies/ADRs: reviewed in EPIC   Alcohol: reviewed in EPIC  Tobacco:   Social History     Tobacco Use   Smoking Status Never Smoker   Smokeless Tobacco Never Used     Today's Vitals: There were no vitals filed for this visit.  ----------------    The patient declined an after visit summary    I spent 15 minutes with this patient today;   All changes were made via collaborative practice agreement with Juan C Fairchild MD. A copy of the visit note was provided to the  patient's provider.     Mikala Hays, PharmSebasD., BCACP  Medication Therapy Management Pharmacist  [facility]    Telemedicine Visit Details    Type of service:  Telephone     Start Time: 2:42  End Time: 2:57    Originating Location (pt. Location): Home    Distant Location (provider location):  [facility] MEDICATION THERAPY MANAGEMENT [facility]    Mode of Communication: Telephone    Current Outpatient Medications   Medication Sig Dispense Refill     acetaminophen (TYLENOL) 325 MG tablet Take 325 mg by mouth as needed for pain.       albuterol (PROVENTIL) 2.5 mg /3 mL (0.083 %) nebulizer solution INHALE 1 VIAL VIA NEBULIZER Q 4 H PRF WHEEZING OR SOB  0     budesonide-formoteroL (SYMBICORT) 160-4.5 mcg/actuation inhaler USE 2 INHALATIONS ORALLY   TWICE DAILY 3 Inhaler 1     buPROPion (WELLBUTRIN XL) 300 MG 24 hr tablet TAKE 1 TABLET EVERY MORNING 90 tablet 3     CHOLECALCIFEROL, VITAMIN D3, (VITAMIN D3 ORAL) Take 2,000 Units by mouth daily.       citalopram (CELEXA) 20 MG tablet Take 1 tablet (20 mg total) by mouth daily. 90 tablet 0     codeine-guaiFENesin (GUAIFENESIN AC)  mg/5 mL liquid Take 10 mL by mouth every 4 (four) hours as needed.       DULoxetine (CYMBALTA) 60 MG capsule TAKE 1 CAPSULE DAILY 90 capsule 3     fluticasone (FLONASE) 50 mcg/actuation nasal spray 2 sprays into each nostril daily as needed.             ipratropium-albuterol (DUO-NEB) 0.5-2.5 mg/3 mL nebulizer Inhale 3 mL.       lansoprazole (PREVACID) 30 MG capsule TAKE 1 CAPSULE DAILY 90 capsule 3     montelukast (SINGULAIR) 10 mg tablet TAKE 1 TABLET AT BEDTIME 90 tablet 1     moxifloxacin (AVELOX) 400 mg tablet TAKE 1 TABLET THREE TIMES  WEEKLY 12 tablet 1     naproxen (NAPROSYN) 500 MG tablet Take 1 tablet (500 mg total) by mouth 2 (two) times a day with meals. 180 tablet 0     oxyCODONE-acetaminophen (PERCOCET/ENDOCET) 5-325 mg per tablet Take 1-2 tablets by mouth. Take rare for migraine              SUMAtriptan (IMITREX) 20 mg/actuation nasal spray 1 spray (20 mg total) into each nostril every 2 (two) hours as needed for migraine. 3 Inhaler 3     tamoxifen (NOLVADEX) 20 MG tablet TAKE 1 TABLET DAILY 90 tablet 3     traZODone (DESYREL) 50 MG tablet TAKE 1 TABLET AT BEDTIME 90 tablet 3     triamcinolone (KENALOG) 0.5 % ointment USE 1 APPLICATION TOPICALLYTWO TIMES A DAY AS NEEDED 30 g 1     VENTOLIN HFA 90 mcg/actuation inhaler USE 2 INHALATIONS ORALLY   EVERY 6 HOURS AS NEEDED 54 g 1     No current facility-administered medications for this visit.

## 2021-06-08 NOTE — TELEPHONE ENCOUNTER
RN cannot approve Refill Request    RN can NOT refill this medication med is not covered by policy/route to provider     . Last office visit: 11/1/2019 Juan C Fairchild MD Last Physical: 8/8/2017 Last MTM visit: Visit date not found Last visit same specialty: 11/1/2019 Juan C Fairchild MD.  Next visit within 3 mo: Visit date not found  Next physical within 3 mo: Visit date not found      Janine Grajeda, [facility] Triage/Med Refill 5/20/2020    Requested Prescriptions   Pending Prescriptions Disp Refills     tamoxifen (NOLVADEX) 20 MG tablet [Pharmacy Med Name: TAMOXIFEN TAB 20MG] 90 tablet 3     Sig: TAKE 1 TABLET DAILY       There is no refill protocol information for this order

## 2021-06-09 NOTE — TELEPHONE ENCOUNTER
RN cannot approve Refill Request    RN can NOT refill this medication med is not covered by policy/route to provider     . Last office visit: 11/1/2019 Juan C Fairchild MD Last Physical: 8/8/2017 Last MTM visit: Visit date not found Last visit same specialty: 11/1/2019 Juan C Fairchild MD.  Next visit within 3 mo: Visit date not found  Next physical within 3 mo: Visit date not found      Janine Grajeda, [facility] Triage/Med Refill 7/17/2020    Requested Prescriptions   Pending Prescriptions Disp Refills     SYMBICORT 160-4.5 mcg/actuation inhaler [Pharmacy Med Name: SYMBICORT -4.5] 1 Inhaler 11     Sig: USE 2 INHALATIONS ORALLY   TWICE DAILY       There is no refill protocol information for this order

## 2021-06-09 NOTE — TELEPHONE ENCOUNTER
Refill Approved    Rx renewed per Medication Renewal Policy. Medication was last renewed on 1/27/20.    Ramila Richter, [facility] Connection Triage/Med Refill 7/4/2020     Requested Prescriptions   Pending Prescriptions Disp Refills     albuterol (PROAIR HFA;PROVENTIL HFA;VENTOLIN HFA) 90 mcg/actuation inhaler [Pharmacy Med Name: ALBUTEROL(V) INH ] 54 g 1     Sig: USE 2 INHALATIONS ORALLY   EVERY 6 HOURS AS NEEDED       Albuterol/Levalbuterol Refill Protocol Passed - 7/4/2020  3:16 AM        Passed - PCP or prescribing provider visit in last year     Last office visit with prescriber/PCP: 11/1/2019 Juan C Fairchild MD OR same dept: 11/1/2019 Juan C Fairchild MD OR same specialty: 11/1/2019 Juan C Fairchild MD Last physical: 8/8/2017       Next appt within 3 mo: Visit date not found  Next physical within 3 mo: Visit date not found  Prescriber OR PCP: Juan C Fairchild MD  Last diagnosis associated with med order: 1. Moderate persistent asthma with acute exacerbation  - albuterol (PROAIR HFA;PROVENTIL HFA;VENTOLIN HFA) 90 mcg/actuation inhaler [Pharmacy Med Name: ALBUTEROL(V) INH ]; USE 2 INHALATIONS ORALLY   EVERY 6 HOURS AS NEEDED  Dispense: 54 g; Refill: 1    If protocol passes may refill for 6 months if within 3 months of last provider visit (or a total of 9 months). If patient requesting >1 inhaler per month refill x 6 months and have patient make appointment with provider.

## 2021-06-10 NOTE — TELEPHONE ENCOUNTER
"Triage Call  Call from patient with concerns of progressive bouts of diarrhea  Reports the last six months have been \"unpredictable\"  Reports today looks exceptionally dark, but took Pepto yesterday  Reports having at least 2 episodes a day and sometimes more  Denies abdominal pain or cramping  Reports \"suddenly I will need to go and I need to get there fast\"  Reports feeling exhausted afterward  Denies fever  Denies bloody or tary stools  Reports not always as dark as today and did over do it on the watermelon yesterday  Reports stating hydrated and drinking a lot of water  Denies pus but not unusual to have mucous in the stool     Disposition  See within 3 days in office per protocol. Will schedule virtual visit per workflow. Educated per care advice, verbalized understanding. Scheduled at 1320 with Dr Fairchild via telephone visit today.    Reason for Disposition    MILD diarrhea (e.g., 1-3 or more stools than normal in past 24 hours) diarrhea without known cause and present > 7 days    Additional Information    Negative: Shock suspected (e.g., cold/pale/clammy skin, too weak to stand, low BP, rapid pulse)    Negative: Difficult to awaken or acting confused (e.g., disoriented, slurred speech)    Negative: Sounds like a life-threatening emergency to the triager    Negative: Vomiting also present and worse than the diarrhea    Negative: Blood in stool and without diarrhea    Negative: SEVERE abdominal pain (e.g., excruciating) and present > 1 hour    Negative: SEVERE abdominal pain and age > 60    Negative: Bloody, black, or tarry bowel movements    Negative: SEVERE diarrhea (e.g., 7 or more times / day more than normal) and age > 60 years    Negative: Constant abdominal pain lasting > 2 hours    Negative: Drinking very little and has signs of dehydration (e.g., no urine > 12 hours, very dry mouth, very lightheaded)    Negative: Patient sounds very sick or weak to the triager    Negative: SEVERE diarrhea (e.g., 7 or " more times / day more than normal) and present > 24 hours (1 day)    Negative: MODERATE diarrhea (e.g., 4-6 times / day more than normal) and present > 48 hours (2 days)    Negative: MODERATE diarrhea (e.g., 4-6 times / day more than normal) and age > 70 years    Negative: Abdominal pain  (Exception: pain clears completely with each passage of diarrhea stool)    Negative: Fever > 101 F (38.3 C)    Negative: Blood in the stool    Negative: Mucus or pus in stool has been present > 2 days and diarrhea is more than mild    Negative: Weak immune system (e.g., HIV positive, cancer chemo, splenectomy, organ transplant, chronic steroids)    Negative: Travel to a foreign country in past month    Negative: Recent antibiotic therapy (i.e., within last 2 months) and diarrhea present > 3 days since antibiotic was stopped    Negative: Recent hospitalization and diarrhea present > 3 days    Negative: Tube feedings (e.g., nasogastric, g-tube, j-tube)    Protocols used: DIARRHEA-A-OH    COVID 19 Nurse Triage Plan/Patient Instructions    Please be aware that novel coronavirus (COVID-19) may be circulating in the community. If you develop symptoms such as fever, cough, or SOB or if you have concerns about the presence of another infection including coronavirus (COVID-19), please contact your health care provider or visit [email or web address].     Disposition/Instructions    Virtual Visit with provider recommended. Reference Visit Selection Guide.    Thank you for taking steps to prevent the spread of this virus.  o Limit your contact with others.  o Wear a simple mask to cover your cough.  o Wash your hands well and often.    Resources    [facility]: About COVID-19: [email or web address]    CDC: What to Do If You're Sick: www.cdc.gov/coronavirus/2019-ncov/about/steps-when-sick.html    CDC: Ending Home Isolation: www.cdc.gov/coronavirus/2019-ncov/hcp/disposition-in-home-patients.html     CDC: Caring for Someone:  www.cdc.gov/coronavirus/2019-ncov/if-you-are-sick/care-for-someone.html     [facility]: Interim Guidance for Hospital Discharge to Home: www.health.[facility].[place]/diseases/coronavirus/hcp/hospdischarge.pdf    [facility] clinical trials (COVID-19 research studies): [email or web address]-clinical-trials     Below are the COVID-19 hotlines at the [place] Department of Health ([facility]). Interpreters are available.   o For health questions: Call 593-972-3810 or 1-846.889.8022 (7 a.m. to 7 p.m.)  o For questions about schools and childcare: Call 760-822-5478 or 1-196.563.2931 (7 a.m. to 7 p.m.)     Fiordaliza Whitman RN [facility] 7/27/2020 10:17 AM

## 2021-06-10 NOTE — TELEPHONE ENCOUNTER
No 40 minute appointments available with any provider for a pre-op physical prior to patients scheduled surgery date. Do you want me to use your two same day appointments the day before surgery or do you want to see her today or Monday?

## 2021-06-10 NOTE — PROGRESS NOTES
Assessment:   1. Viral upper respiratory tract infection  Encourage patient to use OTC antihistamine and increase fluid.   - Rapid Strep A Screen-Throat: Negative  - Group A Strep, RNA Direct Detection, Throat     Plan:   Use of OTC analgesics recommended as well as salt water gargles.  Use of decongestant recommended.  Follow up as needed.     Subjective:   Krystal Virgen is a 76 y.o. female who presents for evaluation of sore throat. Associated symptoms include enlarged tonsils, hoarseness, nasal blockage, clear nasal discharge, pain while swallowing, post nasal drip, sinus and nasal congestion, sore throat and swollen glands. Onset of symptoms was 5 days ago, and have been gradually worsening since that time. She is drinking moderate amounts of fluids. She has not had a recent close exposure to someone with proven streptococcal pharyngitis.    The following portions of the patient's history were reviewed and updated as appropriate:   She  does not have any pertinent problems on file.  She  has a past surgical history that includes Hysterectomy (1984).  Her family history includes Breast cancer (age of onset: 35) in her maternal aunt; Breast cancer (age of onset: 48) in her paternal aunt and sister; Breast cancer (age of onset: 78) in her mother.  She  reports that she has never smoked. She does not have any smokeless tobacco history on file. Her alcohol and drug histories are not on file.  Current Outpatient Prescriptions   Medication Sig Dispense Refill     albuterol (PROVENTIL HFA;VENTOLIN HFA) 90 mcg/actuation inhaler Inhale 2 puffs every 6 (six) hours as needed for wheezing. 3 Inhaler 6     albuterol (PROVENTIL) 2.5 mg /3 mL (0.083 %) nebulizer solution INHALE 1 VIAL VIA NEBULIZER Q 4 H PRF WHEEZING OR SOB  0     alendronate (FOSAMAX) 70 MG tablet Take 1 tablet (70 mg total) by mouth every 7 days. Take in the morning on an empty stomach with a full glass of water 30 minutes before food 12 tablet 0      "buPROPion (WELLBUTRIN SR) 100 MG 12 hr tablet Take 2 tablets (200 mg total) by mouth 2 (two) times a day. 360 tablet 3     citalopram (CELEXA) 20 MG tablet Take 1 tablet (20 mg total) by mouth daily. 90 tablet 3     DULoxetine (CYMBALTA) 30 MG capsule Take 30 mg by mouth daily.       estradiol (VIVELLE-DOT) 0.05 mg/24 hr Place 1 patch on the skin 2 (two) times a week. 36 patch 3     fluticasone (FLONASE) 50 mcg/actuation nasal spray 1 spray into each nostril daily.       lansoprazole (PREVACID) 30 MG capsule Take 30 mg by mouth daily.       montelukast (SINGULAIR) 10 mg tablet Take 10 mg by mouth bedtime.       naproxen sodium (ANAPROX) 550 MG tablet Take 550 mg by mouth 2 (two) times a day with meals.       oxyCODONE-acetaminophen (PERCOCET) 5-325 mg per tablet Take 1 tablet by mouth daily as needed for pain. 20 tablet 0     PRIYANK LC SPRINT NEBULIZER SET Misc UTD  0     SUMAtriptan (IMITREX) 20 mg/actuation nasal spray 1 spray into each nostril every 2 (two) hours as needed for migraine.       traZODone (DESYREL) 50 MG tablet TAKE ONE TABLET AT BEDTIME_Due for Annual Exam. Please Schedule. 90 tablet 0     triamcinolone (KENALOG) 0.5 % ointment Apply topically 2 (two) times a day. 45 g 1     VIRTUSSIN AC  mg/5 mL liquid TK 10 ML PO Q 4 H PRN  0     ergocalciferol (ERGOCALCIFEROL) 50,000 unit capsule Take 50,000 Units by mouth once a week.       SPIRIVA WITH HANDIHALER 18 mcg inhalation capsule   3     No current facility-administered medications for this visit.      She is allergic to sulfa (sulfonamide antibiotics)..    Review of Systems  A 12 point comprehensive review of systems was negative except as noted.      Objective:      /68  Pulse 74  Temp 98.4  F (36.9  C) (Oral)   Ht 5' 2\" (1.575 m)  Wt 169 lb 11.2 oz (77 kg)  SpO2 99%  BMI 31.04 kg/m2  General appearance: alert, appears stated age and cooperative  Head: Normocephalic, without obvious abnormality, atraumatic  Eyes: conjunctivae/corneas " clear. PERRL, EOM's intact. Fundi benign.  Ears: normal TM's and external ear canals both ears  Nose: Nares normal. Septum midline. Mucosa normal. No drainage or sinus tenderness.  Throat: lips, mucosa, and tongue normal; teeth and gums normal  Neck: no adenopathy, no carotid bruit, no JVD, supple, symmetrical, trachea midline and thyroid not enlarged, symmetric, no tenderness/mass/nodules  Lungs: clear to auscultation bilaterally  Heart: regular rate and rhythm, S1, S2 normal, no murmur, click, rub or gallop  Pulses: 2+ and symmetric  Skin: Skin color, texture, turgor normal. No rashes or lesions  Lymph nodes: Cervical, supraclavicular, and axillary nodes normal.  Neurologic: Grossly normal    Laboratory  Strep test done. Results:negative.

## 2021-06-10 NOTE — PROGRESS NOTES
[facility]  [address]  [facility] [address]  Dept: [phone]  Dept Fax: [phone]  Primary Provider: Lai Haley MD  Pre-op Performing Provider: LAI HALEY    PREOPERATIVE EVALUATION:  Today's date: 8/25/2020    Krystal Virgen is a 80 y.o. female who presents for a preoperative evaluation.    Surgical Information:  Dr Tom  Right Wrist surgery -ORIF  8/26/20  [facility]  No flowsheet data found.  Fax number for surgical facility: Note does not need to be faxed, will be available electronically in Epic.  Type of Anesthesia Anticipated: Local with MAC    Subjective     HPI related to upcoming procedure:     Krystal Virgen is a 80 y.o. female who fell down stairs last Wednesday and fractured her right wrist and needs open reduction and internal fixation.  She sustained a mild injury with some bruising in the right lower extremity but no evidence of any other fractures or significant injury.    She has a history of moderate persistent asthma that is under good control currently without any sign of exacerbation or acute infection.  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  She is followed closely by pulmonology.    She has a history of urinary symptoms including recurrent urinary tract infections.  No signs or symptoms of any acute concern at this point in time.    She has a history of migraine headaches without any current concern.    She has a history of breast cancer was has been treated.    She has no prior significant problems with anesthesia.  She has no history of blood clots or bleeding disorders.      No flowsheet data found.      Patient does not have a Health Care Directive or Living Will: Discussed advance care planning with patient; information given to patient to review.      See problem list for active medical problems.  Problems all longstanding and stable, except as noted/documented.   See ROS for pertinent symptoms related to these conditions.      Review of Systems  Constitutional, neuro, ENT, endocrine, pulmonary, cardiac, gastrointestinal, genitourinary, musculoskeletal, integument and psychiatric systems are negative, except as otherwise noted.      Patient Active Problem List    Diagnosis Date Noted     POLST (Physician Orders for Life-Sustaining Treatment) 04/12/2018     Adverse effect of other drugs, medicaments and biological substances, initial encounter 02/20/2018     John lesion, chronic 01/04/2018     Colon, diverticulosis 01/04/2018     Gastric polyps 01/04/2018     Hiatal hernia 01/04/2018     Diaphragmatic hernia 12/14/2017     Polyp of duodenum 12/14/2017     Diverticular disease of large intestine 11/08/2017     Dvrtclos of lg int w/o perforation or abscess w/o bleeding 11/08/2017     Iron deficiency anemia 09/08/2017     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) 09/08/2017     Gastroesophageal reflux disease 05/25/2017     IgG1 subclass deficiency (H) 05/25/2017     Moderate persistent asthma without complication 05/25/2017     Insomnia 08/20/2016     Hyperparathyroidism      Vitamin D Deficiency      Hypogammaglobulinemia      Hyperactivity Of The Bladder      Cough      Asthma With Acute Exacerbation      Major Depression, Single Episode In Remission      Chronic Sinusitis      Urinary Incontinence      Taking Female Hormones For Postmenopausal HRT      Menopause Has Occurred      Asthma      Osteopenia      Fatigue      Migraine Headache      Lung nodule 06/24/2008     Pulmonary infiltrate 06/24/2008     Thrombocytosis (H) 06/24/2008     Chronic rhinitis 09/19/2006     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       albuterol (PROAIR HFA;PROVENTIL HFA;VENTOLIN HFA) 90 mcg/actuation inhaler USE 2 INHALATIONS ORALLY   EVERY 6 HOURS AS NEEDED 3 Inhaler 0     albuterol (PROVENTIL) 2.5 mg /3 mL (0.083 %) nebulizer solution INHALE 1 VIAL VIA NEBULIZER Q 4 H PRF WHEEZING OR SOB  0     bismuth subsalicylate (PEPTO BISMOL) 262 mg/15 mL suspension Take 15 mL by mouth every 6 (six) hours as needed for indigestion.       buPROPion (WELLBUTRIN XL) 300 MG 24 hr tablet Take 1 tablet (300 mg total) by mouth every morning. 90 tablet 3     CHOLECALCIFEROL, VITAMIN D3, (VITAMIN D3 ORAL) Take 2,000 Units by mouth daily.       citalopram (CELEXA) 20 MG tablet TAKE 1 TABLET DAILY        (REPLACING DULOXETINE) 90 tablet 3     fluticasone (FLONASE) 50 mcg/actuation nasal spray 2 sprays into each nostril daily as needed.             ipratropium-albuterol (DUO-NEB) 0.5-2.5 mg/3 mL nebulizer Inhale 3 mL.       lansoprazole (PREVACID) 30 MG capsule TAKE 1 CAPSULE DAILY 90 capsule 3     montelukast (SINGULAIR) 10 mg tablet Take 1 tablet (10 mg total) by mouth at bedtime. 90 tablet 3     naproxen (NAPROSYN) 500 MG tablet Take 1 tablet (500 mg total) by mouth 2 (two) times a day with meals. 180 tablet 0     SUMAtriptan (IMITREX) 20 mg/actuation nasal spray 1 spray (20 mg total) into each nostril every 2 (two) hours as needed for migraine. 3 Inhaler 3     SYMBICORT 160-4.5 mcg/actuation inhaler USE 2 INHALATIONS ORALLY   TWICE DAILY 1 Inhaler 11     tamoxifen (NOLVADEX) 20 MG tablet TAKE 1 TABLET DAILY 90 tablet 3     traZODone (DESYREL) 50 MG tablet TAKE 1 TABLET AT BEDTIME 90 tablet 3     triamcinolone (KENALOG) 0.5 % ointment USE 1  "APPLICATION TOPICALLYTWO TIMES A DAY AS NEEDED 30 g 1     oxyCODONE-acetaminophen (PERCOCET/ENDOCET) 5-325 mg per tablet Take 1 tablet by mouth every 8 (eight) hours as needed for pain. 12 tablet 0     No current facility-administered medications for this visit.        Allergies   Allergen Reactions     Sulfa (Sulfonamide Antibiotics) Hives     Levofloxacin Other (See Comments)     Near full body myalgias     Amoxicillin Other (See Comments)       Social History     Tobacco Use     Smoking status: Never Smoker     Smokeless tobacco: Never Used   Substance Use Topics     Alcohol use: No      Family History   Problem Relation Age of Onset     Breast cancer Paternal Aunt 48     Breast cancer Mother 78     Breast cancer Sister 48     Breast cancer Maternal Aunt 35     Heart disease Father      Macular degeneration Father      Heart disease Brother      Social History     Substance and Sexual Activity   Drug Use No           Objective   /78   Pulse 73   Temp 97.7  F (36.5  C)   Ht 5' 3\" (1.6 m)   Wt 180 lb (81.6 kg)   SpO2 97%   BMI 31.89 kg/m    Physical Exam    GENERAL APPEARANCE: healthy, alert and no distress     EYES: EOMI, PERRL     HENT: ear canals and TM's normal and nose and mouth without ulcers or lesions     NECK: no adenopathy, no asymmetry, masses, or scars and thyroid normal to palpation     RESP: lungs clear to auscultation - no rales, rhonchi or wheezes     CV: regular rates and rhythm, normal S1 S2, no S3 or S4 and no murmur, click or rub     ABDOMEN:  soft, nontender, no HSM or masses and bowel sounds normal     MS: extremities normal- no gross deformities noted, no evidence of inflammation in joints, FROM in all extremities.     SKIN: no suspicious lesions or rashes     NEURO: Normal strength and tone, sensory exam grossly normal, mentation intact and speech normal     PSYCH: mentation appears normal. and affect normal/bright     LYMPHATICS: No cervical adenopathy    Recent Labs   Lab Test " 07/29/20  1401 03/03/20  1538   HGB 11.8* 12.3    351    141   K 4.1 4.1   CREATININE 1.03 1.06        PRE-OP Diagnostics:   No labs were ordered during this visit.  No EKG required, no history of coronary heart disease, significant arrhythmia, peripheral arterial disease or other structural heart disease.         Assessment & Plan       The proposed surgical procedure is considered LOW risk.    REVISED CARDIAC RISK INDEX   The patient has the following serious cardiovascular risks for perioperative complications:  No serious cardiac risks = 0 points    INTERPRETATION: 0 points: Class I (very low risk - 0.4% complication rate)    Krystal was seen today for pre-op exam.    Diagnoses and all orders for this visit:    Preop general physical exam  -     HM2(CBC w/o Differential)    Wrist fracture, right, closed, initial encounter  -     oxyCODONE-acetaminophen (PERCOCET/ENDOCET) 5-325 mg per tablet; Take 1 tablet by mouth every 8 (eight) hours as needed for pain.    Major Depression, Single Episode In Remission  -     buPROPion (WELLBUTRIN XL) 300 MG 24 hr tablet; Take 1 tablet (300 mg total) by mouth every morning.    Moderate persistent asthma without complication    History of recurrent UTI (urinary tract infection)    Chronic bilateral low back pain without sciatica    Migraine with aura and without status migrainosus, not intractable    IgG subclass deficiency (H)    HX: breast cancer        The patient has the following additional risks and recommendations for perioperative complications:     - No identified additional risk factors other than previously addressed     MEDICATION INSTRUCTIONS:  Patient is to take all scheduled medications on the day of surgery EXCEPT for modifications listed below:    RECOMMENDATION:  APPROVAL GIVEN to proceed with proposed procedure, without further diagnostic evaluation.    No follow-ups on file.    Signed Electronically by: Juan C Fairchild MD    Copy of this  evaluation report is provided to requesting physician.

## 2021-06-10 NOTE — PROGRESS NOTES
"Krystal Virgen is a 79 y.o. female who is being evaluated via a billable telephone visit.      The patient has been notified of following:     \"This telephone visit will be conducted via a call between you and your physician/provider. We have found that certain health care needs can be provided without the need for a physical exam.  This service lets us provide the care you need with a short phone conversation.  If a prescription is necessary we can send it directly to your pharmacy.  If lab work is needed we can place an order for that and you can then stop by our lab to have the test done at a later time.    Telephone visits are billed at different rates depending on your insurance coverage. During this emergency period, for some insurers they may be billed the same as an in-person visit.  Please reach out to your insurance provider with any questions.    If during the course of the call the physician/provider feels a telephone visit is not appropriate, you will not be charged for this service.\"    Patient has given verbal consent to a Telephone visit? Yes    What phone number would you like to be contacted at? [phone]    Patient would like to receive their AVS by AVS Preference: Lu.    Krystal Virgen is a 79 y.o. female complex and includes asthma with frequent respiratory infections likely secondary to immunodeficiency.  She is followed closely by pulmonology.  She also has a history of breast cancer which has been treated.  She has a history of migraine headaches.  She calls today concerned regarding diarrhea that is been ongoing for about 6 months.  Patient reports that in January February she began to have diarrhea.  Sometimes this is accompanied by urgency.  On rare occasion she has more normal stool.  Diarrhea seems to be more clustered early in the day.  Patient is exhausted after having a bowel movement.  Patient reports her appetite is otherwise good and she has not had any significant " weight loss.  She denies any fevers or chills.  No known specific infectious exposures.  She has not had any recent medication changes.  Patient did note black stool on 1 occasion which thought was likely secondary to Pepto-Bismol.  Patient reports no obvious blood or bleeding.  She does not have any episodes of vomiting.  She has had frequent antibiotic use in the past because of her frequent respiratory infections.    Assessment/Plan:      Diagnoses and all orders for this visit:    Diarrhea, unspecified type  -     Comprehensive Metabolic Panel; Future; Expected date: 07/28/2020  -     HM1(CBC and Differential); Future; Expected date: 07/28/2020  -     Thyroid Cascade; Future; Expected date: 07/28/2020  -     Celiac(Gluten)Antibody Panel ($$$); Future; Expected date: 07/28/2020  -     Culture, Stool; Future; Expected date: 07/28/2020  -     C. difficile Toxigenic by PCR; Future; Expected date: 07/28/2020  -     Giardia Detection, Stool; Future; Expected date: 07/28/2020  -     Ova and Parasite, Stool; Future; Expected date: 07/28/2020  -     Ova and Parasite, Stool; Future; Expected date: 07/28/2020  -     Ova and Parasite, Stool; Future; Expected date: 07/28/2020          But patient come in for lab evaluation including collection of stool samples.  We will then decide on further course of action which may entail referral to gastroenterology given the chronic nature of her symptoms if we cannot identify a potential cause.      Phone call duration:  10 minutes    Juan C Fairchild MD

## 2021-06-10 NOTE — TELEPHONE ENCOUNTER
New Appointment Needed  What is the reason for the visit:    Pre-Op Appt Request  When is the surgery? :  8/26   Where is the surgery?:   [facility]   Who is the surgeon? :  Dr. Tom  What type of surgery is being done?: Rt broken wrist  Provider Preference: Any available PCP preferred  How soon do you need to be seen?: before 8/26 Afternoon booking is requested  Waitlist offered?: No  Okay to leave a detailed message:  Yes

## 2021-06-10 NOTE — TELEPHONE ENCOUNTER
Left message to call back for: appointment  Information to relay to patient:  Please see if patient can come in to see Dr. Fairchild on 8/25/2020 at 2:20 pm for her pre-op physical? (I already scheduled this appointment for her)

## 2021-06-10 NOTE — TELEPHONE ENCOUNTER
Patient Returning Call  Reason for call:  Patient returning call  Information relayed to patient:  Message below- appointment does work for her. Appointment confirmed.  Patient has additional questions:  No  If YES, what are your questions/concerns:  n/a  Okay to leave a detailed message?: No call back needed

## 2021-06-10 NOTE — TELEPHONE ENCOUNTER
Refill Approved    Rx renewed per Medication Renewal Policy. Medication was last renewed on 5/4/20.    Janine Grajeda, [facility] Connection Triage/Med Refill 8/4/2020     Requested Prescriptions   Pending Prescriptions Disp Refills     citalopram (CELEXA) 20 MG tablet [Pharmacy Med Name: CITALOPRAM TAB 20MG] 90 tablet 0     Sig: TAKE 1 TABLET DAILY        (REPLACING DULOXETINE)       SSRI Refill Protocol  Passed - 8/4/2020  8:57 AM        Passed - PCP or prescribing provider visit in last year     Last office visit with prescriber/PCP: 11/1/2019 Juan C Fairchild MD OR same dept: 11/1/2019 Juan C Fairchild MD OR same specialty: 11/1/2019 Juan C Fairchild MD  Last physical: 8/8/2017 Last MTM visit: Visit date not found   Next visit within 3 mo: Visit date not found  Next physical within 3 mo: Visit date not found  Prescriber OR PCP: Juan C Fairchild MD  Last diagnosis associated with med order: 1. 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

## 2021-06-11 NOTE — PROGRESS NOTES
I spoke with Krystal today, who goes by Jody, to explain the receptor results from her 7/12/17 pathology report. She asked about continuing her Fosamax and Estrogen patch. I encouraged her to contact her PCP and discuss any need for discontinuation prior to seeing Dr. Hernandez, at her Aug 1st appointment.  I advised her against stopping any medications without first talking to her PCP. She was appreciative of the call today. I encouraged her to call me with any further questions or concerns.

## 2021-06-11 NOTE — TELEPHONE ENCOUNTER
Refill Approved    Rx renewed per Medication Renewal Policy. Medication was last renewed on 7/4/20.    Mary Rivas, [facility] Connection Triage/Med Refill 9/7/2020     Requested Prescriptions   Pending Prescriptions Disp Refills     albuterol (PROAIR HFA;PROVENTIL HFA;VENTOLIN HFA) 90 mcg/actuation inhaler [Pharmacy Med Name: ALBUTEROL(V) INH ] 54 g 0     Sig: USE 2 INHALATIONS ORALLY   EVERY 6 HOURS AS NEEDED       Albuterol/Levalbuterol Refill Protocol Passed - 9/6/2020  6:47 AM        Passed - PCP or prescribing provider visit in last year     Last office visit with prescriber/PCP: 11/1/2019 Juan C Fairchild MD OR same dept: 11/1/2019 Juan C Fairchild MD OR same specialty: 11/1/2019 Juan C Fairchild MD Last physical: 8/25/2020       Next appt within 3 mo: Visit date not found  Next physical within 3 mo: Visit date not found  Prescriber OR PCP: Juan C Fairchild MD  Last diagnosis associated with med order: 1. Moderate persistent asthma with acute exacerbation  - albuterol (PROAIR HFA;PROVENTIL HFA;VENTOLIN HFA) 90 mcg/actuation inhaler [Pharmacy Med Name: ALBUTEROL(V) INH ]; USE 2 INHALATIONS ORALLY   EVERY 6 HOURS AS NEEDED  Dispense: 54 g; Refill: 0    If protocol passes may refill for 6 months if within 3 months of last provider visit (or a total of 9 months). If patient requesting >1 inhaler per month refill x 6 months and have patient make appointment with provider.

## 2021-06-11 NOTE — TELEPHONE ENCOUNTER
FYI - Status Update  Who is Calling: Patient  Update: Patient stated she is very upset that every time she tells Juan C Fairchild MD or his nurse to send in her refills, they always send it to the wrong pharmacy. The patient's preferred pharmacies were updated. Patient stated she told them to send her bupropion to [facility]. Please re-send the Rx to [facility].  Okay to leave a detailed message?:  No

## 2021-06-12 NOTE — PROGRESS NOTES
Assessment/Plan:      Visit for Preoperative Exam.     Patient approved for surgery with general or local anesthesia.       Diagnoses and all orders for this visit:    Preoperative examination  -     Electrocardiogram Perform and Read  -     Hemoglobin    Invasive ductal carcinoma of right breast    Moderate persistent asthma without complication    Migraine Headache    Anemia      Recent Results (from the past 240 hour(s))   Hemoglobin   Result Value Ref Range    Hemoglobin 9.9 (L) 12.0 - 16.0 g/dL   Electrocardiogram Perform and Read   Result Value Ref Range    SYSTOLIC BLOOD PRESSURE  mmHg    DIASTOLIC BLOOD PRESSURE  mmHg    VENTRICULAR RATE 64 BPM    ATRIAL RATE 64 BPM    P-R INTERVAL 146 ms    QRS DURATION 80 ms    Q-T INTERVAL 370 ms    QTC CALCULATION (BEZET) 381 ms    P Axis 13 degrees    R AXIS -8 degrees    T AXIS 29 degrees    MUSE DIAGNOSIS       Normal sinus rhythm  Minimal voltage criteria for LVH, may be normal variant  Borderline ECG  When compared with ECG of 01-FEB-2013 20:26,  No significant change was found       No concern is identified that would require further evaluation or treatment prior to scheduled procedure.  No concern is identified that would represent a significant risk for the intended procedure and anesthesia.     Subjective:     Scheduled Procedure: Lumpectomy  Surgery Date:  8/10/17  Surgery Location:  [facility]  Surgeon:  Dr. Mariaelena Baileyliz Virgen is a 76-year-old female with recent diagnosis of right breast invasive ductal carcinoma that is estrogen receptor positive, progesterone receptor positive, HER2/OC negative.  This was found on routine mammographic screening.  She is scheduled for lumpectomy with sentinel lymph node biopsy.  There is a family history of breast cancer.    She has a prior history of surgery without complications with the anesthesia or any unexpected complications such as bleeding or blood clots.  No family history of significant anesthesia  reactions.    She has moderate persistent asthma that is currently controlled.  Last exacerbation was this past winter.    No acute illness or symptoms of concern are elicited.    She is noted to have anemia.  Anemia has been chronic but is measured at slightly worse than prior.  No history of blood loss.  Discussed follow-up postoperatively for additional workup.    Current Outpatient Prescriptions   Medication Sig Dispense Refill     albuterol (PROVENTIL HFA;VENTOLIN HFA) 90 mcg/actuation inhaler Inhale 2 puffs every 6 (six) hours as needed for wheezing. 3 Inhaler 6     albuterol (PROVENTIL) 2.5 mg /3 mL (0.083 %) nebulizer solution INHALE 1 VIAL VIA NEBULIZER Q 4 H PRF WHEEZING OR SOB  0     alendronate (FOSAMAX) 70 MG tablet Take 1 tablet (70 mg total) by mouth every 7 days. Take in the morning on an empty stomach with a full glass of water 30 minutes before food 12 tablet 0     buPROPion (WELLBUTRIN SR) 100 MG 12 hr tablet Take 2 tablets (200 mg total) by mouth 2 (two) times a day. 360 tablet 3     citalopram (CELEXA) 20 MG tablet TAKE ONE TABLET BY MOUTH EVERY DAY 90 tablet 3     DULoxetine (CYMBALTA) 30 MG capsule Take 30 mg by mouth daily.       ergocalciferol (ERGOCALCIFEROL) 50,000 unit capsule Take 50,000 Units by mouth once a week.       fluticasone (FLONASE) 50 mcg/actuation nasal spray 1 spray into each nostril daily.       lansoprazole (PREVACID) 30 MG capsule Take 30 mg by mouth daily.       montelukast (SINGULAIR) 10 mg tablet Take 10 mg by mouth bedtime.       naproxen sodium (ANAPROX) 550 MG tablet Take 550 mg by mouth 2 (two) times a day with meals.       PRIYANK LC SPRINT NEBULIZER SET Misc UTD  0     SPIRIVA WITH HANDIHALER 18 mcg inhalation capsule   3     SUMAtriptan (IMITREX) 20 mg/actuation nasal spray 1 spray into each nostril every 2 (two) hours as needed for migraine.       traZODone (DESYREL) 50 MG tablet TAKE ONE TABLET AT BEDTIME_Due for Annual Exam. Please Schedule. 90 tablet 0      triamcinolone (KENALOG) 0.5 % ointment Apply topically 2 (two) times a day. 45 g 1     oxyCODONE-acetaminophen (PERCOCET) 5-325 mg per tablet Take 1 tablet by mouth daily as needed for pain. 20 tablet 0     VIRTUSSIN AC  mg/5 mL liquid TK 10 ML PO Q 4 H PRN  0     VIVELLE-DOT 0.05 mg/24 hr APPLY ONE PATCH TOPICALLY TO SKIN TWICE WEEKLY 24 patch 0     No current facility-administered medications for this visit.      Allergies   Allergen Reactions     Amoxicillin      Sulfa (Sulfonamide Antibiotics)      Immunization History   Administered Date(s) Administered     Influenza [id number], 01/05/2010     Influenza, seasonal,quad inj 6-35 mos 10/06/2009, 10/01/2010, 10/17/2013     Pneumo Polysac 23-V 10/28/2005, 05/08/2014     ZOSTER 10/06/2009     Patient Active Problem List   Diagnosis     Hyperactivity Of The Bladder     Cough     Asthma With Acute Exacerbation     Major Depression, Single Episode In Remission     Chronic Sinusitis     Urinary Incontinence     Taking Female Hormones For Postmenopausal HRT     Menopause Has Occurred     Asthma     Osteopenia     Fatigue     Migraine Headache     Hyperparathyroidism     Vitamin D Deficiency     Hypogammaglobulinemia     Insomnia     Past Medical History:   Diagnosis Date     Asthma      Breast cancer      Social History     Social History     Marital status:      Spouse name: N/A     Number of children: N/A     Years of education: N/A     Occupational History     Not on file.     Social History Main Topics     Smoking status: Never Smoker     Smokeless tobacco: Never Used     Alcohol use No     Drug use: No     Sexual activity: Not on file     Other Topics Concern     Not on file     Social History Narrative     Past Surgical History:   Procedure Laterality Date     CHOLECYSTECTOMY       FOOT SURGERY       HYSTERECTOMY  1984     SINUS SURGERY       History of Present Illness  Recent Health  Fever: no  Chills: no  Fatigue: no  Chest Pain: no  Cough: no  Dyspnea:  "no  Urinary Frequency: no  Nausea: no  Vomiting: no  Diarrhea: no  Abdominal Pain: no  Easy Bruising: no  Lower Extremity Swelling: no  Poor Exercise Tolerance: no    Pertinent History  Prior Anesthesia: yes  Previous Anesthesia Reaction:  no  Diabetes: no  Cardiovascular Disease: no  Pulmonary Disease: yes  Renal Disease: no  GI Disease: no  Sleep Apnea: no  Thromboembolic Problems: no  Clotting Disorder: no  Bleeding Disorder: no  Transfusion Reaction: no  Impaired Immunity: no  Steroid use in the last 6 months: no  Frequent Aspirin use: no    Review of Systems  Review of Systems      Complete review of systems is obtained.  Other than the specific considerations noted above complete review of systems is negative.    Objective:         Vitals:    08/08/17 1201   BP: 128/76   Pulse: 67   Resp: 18   SpO2: 97%   Weight: 165 lb (74.8 kg)   Height: 5' 3\" (1.6 m)     Physical Exam:  Physical Exam     General Appearance:    Alert, cooperative, no distress   Eyes:    No conjunctival irritation, no scleral icterus       Throat:   Lips, mucosa, and tongue normal; teeth and gums normal   Neck:   Supple, symmetrical, trachea midline, no adenopathy;        thyroid:  No enlargement/tenderness/nodules   Lungs:     Clear to auscultation bilaterally, respirations unlabored   Heart:    Regular rate and rhythm, S1 and S2 normal, no murmur, rub   or gallop   Abdomen:     Soft, non-tender   Extremities:   Extremities normal, atraumatic, no cyanosis or edema   Skin:   Skin color, texture, turgor normal, no rashes or lesions   Neurologic:   Normal strength and sensation            "

## 2021-06-12 NOTE — PROGRESS NOTES
In for follow-up of her right lumpectomy  with sentinel lymph node biopsy.  She is feeling well.  She is having very minimal pain.  She was a little upset because she states that she had a lot of pain while she was in surgery.  There seems to have been a huge miscommunication.  She very clearly stated to multiple staff members that day that she wanted this done under local.  I am not sure she really knew what she was asking for.  She had been scheduled as a MAC but then seemed to want it done under just local.  She actually did I thought very well through the case but now is upset that she was not given sedation.  Explained to her that she was asked several times and she kept saying she wanted it done under local.  Which she thinks she was saying is that she just wanted a lot of local in addition to sedation.  I will discuss this with the staff downstairs.  In any case she has been doing fine since her surgery.    Physical exam:  Appears well.  Does not appear in any discomfort.  Breasts: Incisions are healing nicely with no signs of infection.  No swelling.    Pathology: The tumor was 1.7 cm.  There was also a small satellite lesion.  The margins are negative.  Her sentinel lymph node is negative.    Impression: Postop visit. Doing well.  Overall she is doing well.  She definitely needs at least hormone therapy.  Could potentially benefit from radiation given the fact that there is a satellite lesion but at her age I am not sure that is necessary.  Since she is strongly ER /IN positive I do not think we need to get an Oncotype.    Plan: We'll refer her onto [facility] oncology.  Follow-up with me in 1 year with bilateral mammograms.  We will also discuss her at tumor conference.

## 2021-06-12 NOTE — PROGRESS NOTES
Met with Jody in medical oncology exam room where she has arrived for consult with Dr. Zavala.  Welcomed her to clinic and described some of the many resources available through [facility] including, but not limited to, resource center, educational and support groups, financial counselors, and oncology psychotherapist and chaplains.  She knows Susan Lombardi will be her navigator when she continues follow up at [facility] location, and I am available to assist her as well.      Jody's sister was diagnosed with breast cancer approximately twenty years ago and they have been part of [facility].  She has informed the researchers about her diagnosis.  Alice Chen RN

## 2021-06-12 NOTE — PROGRESS NOTES
"[facility] Hematology and Oncology Progress Note    Patient: Krystal Virgen  MRN: [id number]  Date of Service: 10/21/2020        Reason for Visit    #.  Breast cancer, stage Ia, ER positive, HER-2 negative.    Assessment and Plan  Cancer Staging  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)  Staging form: Breast, AJCC 7th Edition  - Clinical: No stage assigned - Unsigned  - Pathologic stage from 9/8/2017: Stage IA (T1c, N0, cM0) - Signed by Herbert Zavala MD on 9/8/2017  ER Status: Positive  MT Status: Positive  HER2 Status: Negative      ECOG Performance   ECOG Performance Status: 1     Distress Assessment  Distress Assessment Score: 4(\"very irritable\"-Covid, upcoming elections)    Pain  Currently in Pain: Yes  Pain Score (Initial OR Reassessment): 5  Location: R wrist    #.  Invasive ductal carcinoma of the right breast.  Stage IA (pT1c, pN0, cM0), grade 1, ER/MT strongly positive, HER2/Uriel negative, with positive inferior margin. Associated DCIS. S/p right breast lumpectomy and right sentinel lymph node biopsy on 8/10/2017.  She declined reexcision or adjuvant radiation treatment.  She started anastrozole in October 2017, then switched to tamoxifen in December 2018 due to intolerable vasomotor symptoms and not to compromise her bone density.      She is clinically well without clear evidence of breast cancer recurrence.  No intolerable side effects from tamoxifen.  She is now about 3 years on endocrine therapy.   Requested screening mammogram for February 2021.    Follow-up clinical exam in 6 months. (every 6 months in April and Oct).      Problem List    1. Visit for screening mammogram  Mammo Screening Bilateral   2. Malignant neoplasm of upper-outer quadrant of right breast in female, estrogen receptor positive (H)          ______________________________________________________________________________    History of Present Illness    Jody presented herself for follow up.  She had " a fall and sustained a right wrist fracture which she underwent surgical fixation.  She is recovering expectantly.  She has been doing poorly with the Covid outbreak and she missed seeing her grandkids.  She reported pain and burning with urination and intermittent urinary tract infection.  Noted the symptoms return after off antibiotics.    Pain Status  Currently in Pain: Yes    Other commobidies  #.  Low bone density/osteopenia with high fracture risk.     Compared to DEXA scan from 2018, her bone does density showed a decline in lumbar spine, with stable bilateral hips.   She was on Fosamax for about 25 years per her report.  She discontinued Fosamax in October 2017 for drug holiday.   Continue calcium and vitamin D.   Continue follow-up with Dr. Fairchild.     #.  History of microcytic anemia with iron deficiency.   She was getting IV iron periodically.   Colonoscopy on 11/8/17 showed diverticulosis without evidence of source of bleeding. EGD from 12/14/2017 showed a large hiatal hernia with John's erosion.     Review of Systems    Constitutional  Constitutional (WDL): All constitutional elements are within defined limits  Neurosensory  Neurosensory (WDL): Exceptions to WDL  Peripheral Motor Neuropathy: None  Ataxia: None  Peripheral Sensory Neuropathy: Asymptomatic, loss of deep tendon reflexes or paresthesia(R thumb and index finger)  Confusion: None  Syncope: None  Eye   Eye Disorder (WDL): All eye disorder elements are within defined limits  Ear  Ear Disorder (WDL): All ear disorder elements are within defined limits  Cardiovascular  Cardiovascular (WDL): All cardiovascular elements are within defined limits  Pulmonary  Respiratory (WDL): Exceptions to WDL  Cough: Mild symptoms, nonprescription intervention indicated(her normal-hx asthma)  Dyspnea: Shortness of breath with moderate exertion(her normal-hx asthma)  Hypoxia: None  Gastrointestinal  Gastrointestinal (WDL): All gastrointestinal elements are within  "defined limits  Genitourinary  Genitourinary (WDL): All genitourinary elements are within defined limits  Lymphatic  Lymph (WDL): All lymph disorder elements are within defined limits  Musculoskeletal and Connective Tissue  Musculoskeletal and Connetive Tissue Disorders (WDL): Exceptions to WDL  Arthralgia: Moderate pain, limiting instrumental ADL(R wrist)  Bone Pain: Moderate pain, limiting instrumental ADL(R wrist)  Muscle Weakness : None  Myalgia: Moderate pain, limiting instrumental ADL(R arm)  Integumentary  Integumentary (WDL): All integumentary elements are within defined limits  Patient Coping  Patient Coping: Accepting;Open/discussion  Distress Assessment  Distress Assessment Score: 4(\"very irritable\"-Covid, upcoming elections)  Accompanied by  Accompanied by: Alone  Oral Chemo Adherence       Past History  Past Medical History:   Diagnosis Date     Anemia      Asthma      Breast cancer (H) 2017     Chronic bronchitis (H)      Osteoporosis      Pneumonia      Sinusitis        Physical Exam    Recent Vitals 10/21/2020   Height -   Weight 178 lbs 2 oz   BSA (m2) 1.9 m2   /90   Pulse 81   Temp 98.6   Temp src 1   SpO2 95   Some recent data might be hidden     General: alert, awake, not in acute distress  HEENT: Head: Normal, normocephalic, atraumatic.  Eye: Normal external eye, conjunctiva, lids cornea, RINKU.  Ears:  Non-tender.  Nose: Normal external nose, mucus membranes and septum.  Pharynx: Dental Hygiene adequate. Normal buccal mucosa. Normal pharynx.  Neck / Thyroid: Supple, no masses, nodes, nodules or enlargement.  Lymphatics: No abnormally enlarged lymph nodes.  Chest: Normal chest wall and respirations. Clear to auscultation.  Breasts: Bilateral pendulous breasts.  Fibroglandular density.  No discrete palpable masses.  Heart: S1 S2 RRR, no murmur.   Abdomen: abdomen is soft without significant tenderness, masses, organomegaly or guarding  Extremities: normal strength, tone, and muscle mass.  " Kyphosis noted.  No tenderness on percussion over the spine.  Skin: normal. no rash or abnormalities  CNS: non focal.    Lab Results    No results found for this or any previous visit (from the past 168 hour(s)).    Imaging    No results found.    Signed by: Herbert Zavala MD

## 2021-06-12 NOTE — CONSULTS
[facility] Hematology and Oncology Consult Note    Patient: Krystal Virgen  MRN: [id number]  Date of Service: 09/08/2017      Reason for Visit    Referred by Dr. Hernandez regarding breast cancer.    Assessment/Plan    ECOG Performance   ECOG Performance Status: 1  Distress Assessment  Distress Assessment Score: 3    #. Stage IA (pT1c, pN0, cM0) invasive ductal carcinoma of the right breast, grade 1, ER/NY strongly positive, HER2/Uriel negative, with positive inferior margin. Associated DCIS. S/p right breast lumpectomy and right sentinel lymph node biopsy.   - We reviewed her path report in detail. She is aware of positive inferior margin discussed by Dr. Hernandez. She did not want radiation treatment (due to fear of needing mastectomy with recurrence versus another lumpectomy with recurrence). She declined to meet with a radiation oncologist.    - I discussed about adjuvant therapy with endocrine therapy (Tamoxifen versus AIs). She is familiar with these meds from her family members. She is agreeable to start on one of these medication. I reviewed each medication's side effects in details and written info given. Duration of therapy will be 5 years.    - She has several family members with breast cancer. Discussed about genetic counseling referral. She would like to check with her sister first.    #. Osteopenia/ low bone density   - DEXA scan requested for a baseline. No prior records available.    #. Chronic microcytic anemia   - will request HM-1, morphology, iron studies, ferritin.    - patient recalled her last colonoscopy was a while ago. If confirmed iron deficient, she will need a GI eval starting with a colonoscopy.    #. Chronic thrombocytosis likely reactive with iron deficiency    #. Anxiety   - Her mood symptoms have more prominent with withdrawal of HRT. It is likely exacerbated with starting on endocrine therapy. Offered psychotherapy and she agreed.     Problem List    1. Malignant neoplasm of upper-outer  quadrant of right female breast  DXA Bone Density Scan   2. Low bone density  DXA Bone Density Scan   3. Disorder of bone density and structure, unspecified   DXA Bone Density Scan   4. Disorder of bone   DXA Bone Density Scan   5. Microcytic anemia  Comprehensive Metabolic Panel    Ferritin    Iron and Transferrin Iron Binding Capacity    Vitamin B12    Reticulocytes    Morphology,Smear Review (MORP)    Peripheral Blood Smear, Path Review    CANCELED: HM1(CBC and Differential)    CANCELED: HM1 (CBC with Diff)     ______________________________________________________________________________    Staging History    Malignant neoplasm of upper-outer quadrant of right female breast    Staging form: Breast, AJCC 7th Edition    - Clinical: No stage assigned - Unsigned    - Pathologic stage from 9/8/2017: Stage IA (T1c, N0, cM0) - Signed by Herbert Zavala MD on 9/8/2017    History    Ms. Krystal Virgen is a pleasant 77 y.o. female presents herself today.    She was recently diagnosed with stage IA invasive ductal carcinoma of the right breast with associated DCIS in August from a screening mammogram. She underwent a lumpectomy and left sentinel lymph node biopsy on 8/10/17 and recovered from that very well.    She was on HRT for menopausal symptoms for many years, but stopped a month ago at diagnosis.       Past History    Past Medical History:   Diagnosis Date     Anemia      Asthma      Breast cancer      Chronic bronchitis      Osteoporosis      Pneumonia      Sinusitis     Family History   Problem Relation Age of Onset     Breast cancer Paternal Aunt 48     Breast cancer Mother 78     Breast cancer Sister 48     Breast cancer Maternal Aunt 35     Heart disease Father      Macular degeneration Father      Heart disease Brother       S/p hysterectomy Social History     Social History     Marital status:      Spouse name: N/A     Number of children: N/A     Years of education: N/A     Occupational History      Not on file.     Social History Main Topics     Smoking status: Never Smoker     Smokeless tobacco: Never Used     Alcohol use No     Drug use: No     Sexual activity: Not on file     Other Topics Concern     Not on file     Social History Narrative        She is a . She has 3 children. She is a spiritual guidance counselor.     Allergies    Allergies   Allergen Reactions     Amoxicillin      Sulfa (Sulfonamide Antibiotics)        Review of Systems    General  General (WDL): Exceptions to WDL  Fatigue: Yes - Chronic (Greater than 3 months)  Generalized Muscle Weakness: Yes - Chronic (Greater than 3 months)  ENT  ENT (WDL): Exceptions to WDL  Glasses or Contacts: Yes - Chronic (Greater than 3 months)  Sinus Congestion/Drainage: Yes - Chronic (Greater than 3 months)  Respiratory  Respiratory (WDL): Exceptions to WDL  Dyspnea: Yes - Chronic (Greater than 3 months)  Cough: Yes - Chronic (Greater than 3 months)  Cardiovascular  Cardiovascular (WDL): All cardiovascular elements are within defined limits  Endocrine  Endocrine (WDL): All endocrine elements are within defined limits  Gastrointestinal  Gastrointestinal (WDL): Exceptions to WDL  Heartburn: Yes - Chronic (Greater than 3 months)  Diarrhea: Yes - Chronic (Greater than 3 months)  Musculoskeletal  Musculoskeletal (WDL): Exceptions to WDL  Joint pain: Yes - Chronic (Greater than 3 months)  Back Pain: Yes - Chronic (Greater than 3 months)  Difficulty to lie flat for more than 30 minutes: Yes - Chronic (Greater than 3 months)  Pain interfering with walking: Yes - Chronic (Greater than 3 months)  Muscle pain or stiffness: Yes - Chronic (Greater than 3 months)  Neurological  Neurological (WDL): Exceptions to WDL  Headaches: Yes - Chronic (Greater than 3 months)  Difficulty walking: Yes - Chronic (Greater than 3 months)  Dominant Hand: Right  Psychological/Emotional  Psychological/Emotional (WDL): Exceptions to WDL  Depression: Yes - Chronic (Greater than 3  "months)  Panic attacks: Yes - Chronic (Greater than 3 months)  Anxiety: Yes - Chronic (Greater than 3 months)  Hematological/Lymphatic  Hematological/Lymphatic (WDL): All hematological/lymphatic elements are within defined limits  Dermatological  Dermatologic (WDL): Exceptions to WDL  Open wounds: Yes - Recent (Less than 3 months) (Healing right brest incision)  Hair Loss: Yes - Chronic (Greater than 3 months)  Genitourinary/Reproductive  Genitourinary/Reproductive (WDL): Exceptions to WDL  Urinary Frequency: Yes - Chronic (Greater than 3 months)  Urination more than 2 times a night: Yes - Chronic (Greater than 3 months)  Reproductive (Females only)     Pain  Currently in Pain: Yes  Pain Frequency: Constant/continuous  Location: left hip, foor, headaches  Pain Characteristics : Aching  Pain Intervention(s): Home medication    Physical Exam    Recent Vitals 9/8/2017   Height 5' 2\"   Weight 170 lbs   BSA (m2) 1.84 m2   /59   Pulse 76   Temp 98.1   Temp src 1   SpO2 97   Some recent data might be hidden     General: alert, awake, not in acute distress, obese female.  HEENT: Head: Normal, normocephalic, atraumatic.  Eye: Normal external eye, conjunctiva, lids cornea, RINKU.  Ears: Non-tender.  Nose: Normal external nose, mucus membranes and septum.  Pharynx: Dental Hygiene adequate. Normal buccal mucosa. Normal pharynx.  Neck / Thyroid: Supple, no masses, nodes, nodules or enlargement.  Lymphatics: No abnormally enlarged lymph nodes.  Chest: Normal chest wall and respirations. Clear to auscultation.  Breasts: bilateral nipples are everted. No palpable masses. Post lumpectomy scar is healing well.  Heart: S1 S2 RRR, no murmur.   Abdomen: abdomen is soft without significant tenderness, masses, organomegaly or guarding  Extremities: normal strength, tone, and muscle mass  Skin: normal. no rash or abnormalities  CNS: non focal.      Lab Results    Recent Results (from the past 168 hour(s))   Comprehensive Metabolic " Panel   Result Value Ref Range    Sodium 141 136 - 145 mmol/L    Potassium 4.5 3.5 - 5.0 mmol/L    Chloride 109 (H) 98 - 107 mmol/L    CO2 25 22 - 31 mmol/L    Anion Gap, Calculation 7 5 - 18 mmol/L    Glucose 80 70 - 125 mg/dL    BUN 14 8 - 28 mg/dL    Creatinine 0.91 0.60 - 1.10 mg/dL    GFR MDRD Af Amer >60 >60 mL/min/1.73m2    GFR MDRD Non Af Amer 60 (L) >60 mL/min/1.73m2    Bilirubin, Total 0.4 0.0 - 1.0 mg/dL    Calcium 9.1 8.5 - 10.5 mg/dL    Protein, Total 6.3 6.0 - 8.0 g/dL    Albumin 3.6 3.5 - 5.0 g/dL    Alkaline Phosphatase 74 45 - 120 U/L    AST 12 0 - 40 U/L    ALT 12 0 - 45 U/L   Ferritin   Result Value Ref Range    Ferritin 3 (L) 10 - 130 ng/mL   Iron and Transferrin Iron Binding Capacity   Result Value Ref Range    Iron 27 (L) 42 - 175 ug/dL    Transferrin 349 212 - 360 mg/dL    Transferrin Saturation, Calculated 6 (L) 20 - 50 %    Transferrin IBC, Calculated 436 313 - 563 ug/dL   Vitamin B12   Result Value Ref Range    Vitamin B-12 294 213 - 816 pg/mL   Reticulocytes   Result Value Ref Range    Retic Absolute Count 0.064 0.010 - 0.110 mill/uL   Morphology,Smear Review (MORP)   Result Value Ref Range    Pathology, Smear Review See Separate Pathology Report (!) (none)    WBC 8.7 4.0 - 11.0 thou/uL    RBC 3.99 3.80 - 5.40 mill/uL    Hemoglobin 9.2 (L) 12.0 - 16.0 g/dL    Hematocrit 30.2 (L) 35.0 - 47.0 %    MCV 76 (L) 80 - 100 fL    MCH 23.1 (L) 27.0 - 34.0 pg    MCHC 30.5 (L) 32.0 - 36.0 g/dL    RDW 17.1 (H) 11.0 - 14.5 %    Platelets 468 (H) 140 - 440 thou/uL    MPV 8.4 (L) 8.5 - 12.5 fL    Neutrophils % 66 50 - 70 %    Lymphocytes % 19 (L) 20 - 40 %    Monocytes % 9 2 - 10 %    Eosinophils % 5 0 - 6 %    Basophils % 1 0 - 2 %    Neutrophils Absolute 5.8 2.0 - 7.7 thou/uL    Lymphocytes Absolute 1.7 0.8 - 4.4 thou/uL    Monocytes Absolute 0.7 0.0 - 0.9 thou/uL    Eosinophils Absolute 0.5 (H) 0.0 - 0.4 thou/uL    Basophils Absolute 0.1 0.0 - 0.2 thou/uL   Peripheral Blood Smear, Path Review   Result  Value Ref Range    Case Report       Peripheral Blood Morphology                       Case: [id number]                                   Authorizing Provider:  Herbert Zavala MD       Collected:           09/08/2017 1354              Ordering Location:     [facility] and Received:            09/08/2017 1426                                     Hematology                                                                   Pathologist:           Xenia Juarez MD                                                          Specimen:    Peripheral Blood                                                                           Final Diagnosis       PERIPHERAL BLOOD SMEAR MORPHOLOGY:     1) MILD HYPOCHROMIC-MICROCYTIC ANEMIA         a) MILD ANISOCYTOSIS          b) MILD POIKILOCYTOSIS WITH OCCASIONAL MICROCYTES AND ELLIPTOCYTES,              SUGGESTIVE OF IRON DEFICIENCY ANEMIA         c) LACK OF CORRESPONDING RETICULOCYTOSIS          d) IRON STUDIES - PENDING      2) BORDERLINE ABSOLUTE EOSINOPHILIA (0.5 k/uL)     3) MILD THROMBOCYTOSIS, PROBABLY REACTIVE    Comment       The differential diagnosis of a microcytic anemia includes iron deficiency anemia, pyridoxine responsive anemia, hemoglobinopathies/thalassemia, lead poisoning and anemia of chronic disease. Red blood cell morphology is suggestive of early or partially treated iron deficiency. There is lack of a corresponding reticulocytosis. Correlation with the pending iron studies is necessary.    Clinical Information Breast cancer     Peripheral Smear       Erythrocytes are normal in number, hypochromic and microcytic. There is mild anisocytosis and mild poikilocytosis with occasional ovalocytes, elliptocytes and microcytes. Increased polychromasia, basophilic stippling and nucleated red blood cells are not identified.     Leukocytes are normal in number and show a normal leukocyte differential except for a borderline absolute eosinophilia (0.5 k/uL). A  significant neutrophilic left shift, megaloblastic changes, dysplastic features and blasts are not identified. Lymphocytes are mature and polymorphic in appearance.    Platelets are mildly increased in number and normal in morphology.    Charges CPT:  98167    ICD-10:  D50.9        Imaging Results    No results found.      Signed by: Herbert Zavala MD

## 2021-06-12 NOTE — PROGRESS NOTES
Krystal is scheduled for right lumpectomy with Dr. Hernandez on 8/10/17 at [facility]. Patient was given instructions of arrival time, need a , pre-op physical within 30days and NPO after midnight. Patient verbalized understanding.     Sia Mansfield [facility]  Physician    [facility]   [phone]

## 2021-06-12 NOTE — PROGRESS NOTES
This is a 76 y.o.  female who I'm asked to see by Juan C Fairchild MD for evaluation of a right breast cancer.  This was picked up  on screening mammogram.  The patient cannot feel a mass.  A needle biopsy was done which shows an invasive ductal carcinoma.  It is estrogen receptor positive, progesterone receptor positive and HER-2 negative.    She has a strong family history of breast cancer.  Her mother, sister, several aunts and cousins.  Nobody has had genetic testing.      PAST MEDICAL HISTORY:  Past Medical History:   Diagnosis Date     Asthma      Breast cancer      Past Surgical History:   Procedure Laterality Date     CHOLECYSTECTOMY       FOOT SURGERY       HYSTERECTOMY  1984     SINUS SURGERY         Medications:    Current Outpatient Prescriptions:      albuterol (PROVENTIL HFA;VENTOLIN HFA) 90 mcg/actuation inhaler, Inhale 2 puffs every 6 (six) hours as needed for wheezing., Disp: 3 Inhaler, Rfl: 6     albuterol (PROVENTIL) 2.5 mg /3 mL (0.083 %) nebulizer solution, INHALE 1 VIAL VIA NEBULIZER Q 4 H PRF WHEEZING OR SOB, Disp: , Rfl: 0     alendronate (FOSAMAX) 70 MG tablet, Take 1 tablet (70 mg total) by mouth every 7 days. Take in the morning on an empty stomach with a full glass of water 30 minutes before food, Disp: 12 tablet, Rfl: 0     buPROPion (WELLBUTRIN SR) 100 MG 12 hr tablet, Take 2 tablets (200 mg total) by mouth 2 (two) times a day., Disp: 360 tablet, Rfl: 3     citalopram (CELEXA) 20 MG tablet, TAKE ONE TABLET BY MOUTH EVERY DAY, Disp: 90 tablet, Rfl: 3     DULoxetine (CYMBALTA) 30 MG capsule, Take 30 mg by mouth daily., Disp: , Rfl:      ergocalciferol (ERGOCALCIFEROL) 50,000 unit capsule, Take 50,000 Units by mouth once a week., Disp: , Rfl:      fluticasone (FLONASE) 50 mcg/actuation nasal spray, 1 spray into each nostril daily., Disp: , Rfl:      lansoprazole (PREVACID) 30 MG capsule, Take 30 mg by mouth daily., Disp: , Rfl:      montelukast (SINGULAIR) 10 mg tablet, Take 10 mg by  "mouth bedtime., Disp: , Rfl:      naproxen sodium (ANAPROX) 550 MG tablet, Take 550 mg by mouth 2 (two) times a day with meals., Disp: , Rfl:      oxyCODONE-acetaminophen (PERCOCET) 5-325 mg per tablet, Take 1 tablet by mouth daily as needed for pain., Disp: 20 tablet, Rfl: 0     PRIYANK LC SPRINT NEBULIZER SET Misc, UTD, Disp: , Rfl: 0     SPIRIVA WITH HANDIHALER 18 mcg inhalation capsule, , Disp: , Rfl: 3     SUMAtriptan (IMITREX) 20 mg/actuation nasal spray, 1 spray into each nostril every 2 (two) hours as needed for migraine., Disp: , Rfl:      traZODone (DESYREL) 50 MG tablet, TAKE ONE TABLET AT BEDTIME_Due for Annual Exam. Please Schedule., Disp: 90 tablet, Rfl: 0     triamcinolone (KENALOG) 0.5 % ointment, Apply topically 2 (two) times a day., Disp: 45 g, Rfl: 1     VIRTUSSIN AC  mg/5 mL liquid, TK 10 ML PO Q 4 H PRN, Disp: , Rfl: 0     VIVELLE-DOT 0.05 mg/24 hr, APPLY ONE PATCH TOPICALLY TO SKIN TWICE WEEKLY, Disp: 24 patch, Rfl: 0    Allergies:  Allergies   Allergen Reactions     Amoxicillin      Sulfa (Sulfonamide Antibiotics)        Social History:   reports that she has never smoked. She has never used smokeless tobacco. She reports that she does not drink alcohol or use illicit drugs.    ROS:  A 12 point comprehensive review of systems was negative except as noted.    Physical Exam  /79  Pulse 79  Ht 5' 2\" (1.575 m)  Wt 166 lb (75.3 kg)  SpO2 93%  BMI 30.36 kg/m2  General:alert, appears stated age and cooperative  Lungs:clear to auscultation bilaterally  CV:regular rate and rhythm, S1, S2 normal, no murmur, click, rub or gallop  Breasts:Subtle palpable mass in the superior right breast.  Lymph Nodes:no axillary nodes palpated  Neuro:Grossly normal      Reviewed her mammograms and ultrasound and pathology.     Impression: Right Breast Cancer.  Clinically T1, N0 discussed the surgical options for treatment of breast cancer which generally are a lumpectomy (partial mastectomy) combined with " radiation versus a mastectomy.  Explained that the survival benefit is the same for both.  The difference is in local recurrence risk.  The patient is a Excellent candidate for a lumpectomy. This is a small lesion.   Discussed SLN biopsy.  The procedure and rationale were explained.  Discussed that at this point we do not know yet whether or not she will need chemotherapy and we may not know until we get all of the results of surgery back.  Be very unlikely given the low-grade nature of the tumor.      Plan: We'll schedule for a right partial mastectomy with wire localization and sentinel lymph node biopsy. This is typically an outpatient procedure under local MAC anesthetic.  The risks and benefits of surgery were explained.  Also talked about expected recovery time.

## 2021-06-13 NOTE — PROGRESS NOTES
I met with Jody today to introduce myself and check in with her.  She was here to see Dr. Zavala for a follow up appt.  I gave her my card and an explanation of my role.  I encouraged her to call me if she thinks of any questions or isn't sure of the next step.  Support and encouragement provided, invited calls.

## 2021-06-13 NOTE — PROGRESS NOTES
[facility] Hematology and Oncology Progress Note    Patient: Krystal Virgen  MRN: [id number]  Date of Service: 10/05/2017        Reason for Visit    #.  Breast cancer, stage Ia, ER positive, HER-2 negative.  #.  Chronic microcytic anemia with iron deficiency.    Assessment and Plan  Malignant neoplasm of upper-outer quadrant of right female breast    Staging form: Breast, AJCC 7th Edition    - Clinical: No stage assigned - Unsigned    - Pathologic stage from 9/8/2017: Stage IA (T1c, N0, cM0) - Signed by Herbert Zavala MD on 9/8/2017    ECOG Performance   ECOG Performance Status: 1     Distress Assessment  Distress Assessment Score: 5    Pain  Currently in Pain: Yes  Pain Score (Initial OR Reassessment): 4  Location: left hip, feet, and lower back     #. Stage IA (pT1c, pN0, cM0) invasive ductal carcinoma of the right breast, grade 1, ER/ID strongly positive, HER2/Uriel negative, with positive inferior margin. Associated DCIS. S/p right breast lumpectomy and right sentinel lymph node biopsy.  She declined reexcision or adjuvant radiation treatment.    -Today we discussed about adjuvant therapy with endocrine therapy again.  Reviewing the risk and benefits of tamoxifen and anastrozole, we decided to proceed with anastrozole.  She is made aware that we will continue to watch her bone density as long as she is on anastrozole.  She would like to have a 90 day supply of the medication.  She will follow-up with me in about 6 weeks for follow-up.    -She is to take with calcium/vitamin D.  She is currently taking vitamin D only.  She will supplement with calcium.   -She is a bit worried about the reported side effects of cough, or shortness of breath from the written information of anastrozole.  She has a pulmonologist that she will follow-up in the next few weeks to make sure that her respiratory status is not exacerbated by the medication.  I explained to her that these reported side effects is rare and mainly for her  to aware of the side effects, and not necessarily mean that she will experience all the side effects listed from the prescription package.  Being said that, if she experiences any unusual side effects after starting on anastrozole, she is advised to stop and call us for further advice.   -Screening mammogram due in August 2017.      #. Osteopenia/ low bone density    -DEXA scan from 9/21/2017 showed low bone density: L1-L3 T score-0.6, left femoral neck-2.1, right femoral neck-2.1, stable since 2014.   -She is on Fosamax for about 25 years.  I explained to her that I am not familiar with the use of bisphosphonate for such a long period of time.  She will be follow-up with Dr. Fairchild next week to discuss about continuation or discontinuation of Fosamax.   -I explained to her that we will need to repeat DEXA scan in 1 year regardless of she is on bisphosphonate or not.  If there is declining in bone density, we will need to change it to tamoxifen.   -Continue on vitamin D and add calcium 7225-2649 mg per day.     #. Chronic microcytic anemia with iron deficiency.   -Referral made for colonoscopy and she is scheduled to have a colonoscopy on 11/2/17.   - I discussed about iron supplementation with oral iron tablets but would like to hold off until she completes a colonoscopy not to interfere with the test.  Agree with the plan.     #. Chronic thrombocytosis likely reactive with iron deficiency     #. Anxiety    - Her mood symptoms have more prominent with withdrawal of HRT. It is likely exacerbated with starting on endocrine therapy. Offered psychotherapy and she agreed to follow-up with her but she is currently having a lot of appointment that she is pushing out.  I also offered her acupuncture if that would be helpful for her anxiety as well as menopausal symptoms..       Problem List    1. Malignant neoplasm of upper-outer quadrant of right breast in female, estrogen receptor positive       "______________________________________________________________________________    History of Present Illness    Jody presents herself today.  She continues to have constant hot flashes, night sweats and head sweats.  No new bone pain.  She is very pleased with the surgery wound healing.  She has some pain in her hips, feet and lower back and they are not new for her.  She denies any obvious blood loss.    Pain Status  Currently in Pain: Yes    Review of Systems    Constitutional  Constitutional (WDL): Exceptions to WDL  Fatigue: Fatigue not relieved by rest - Limiting instrumental ADL  Fever: None  Chills: None  Weight Gain: None  Weight Loss: to <10% from baseline, intervention not indicated (5# since 9/8/17)  Neurosensory  Neurosensory (WDL): All neurosensory elements are within defined limits  Eye   Eye Disorder (WDL): Exceptions to WDL (wears glasses)  Blurred Vision: Intervention not indicated (has appt scheduled to see )  Dry Eye: None  Eye Pain: None  Watering Eyes: None  Ear  Ear Disorder (WDL): All ear disorder elements are within defined limits  Cardiovascular  Cardiovascular (WDL): All cardiovascular elements are within defined limits  Pulmonary  Respiratory (WDL): Exceptions to WDL  Cough: Mild symptoms, nonprescription intervention indicated (dry)  Dyspnea: Shortness of breath with moderate exertion  Gastrointestinal  Gastrointestinal (WDL): Exceptions to WDL  Anorexia: None  Constipation: None  Diarrhea: None (\"sometimes\")  Dysphagia: None  Esophagitis: None (Prevacid helps)  Nausea: None  Pharyngitis: None  Vomiting: None  Dysgeusia: None  Dry Mouth: Symptomatic (e.g., dry or thick saliva) without significant dietary alteration, unstimulated saliva flow >0.2 ml/min  Genitourinary  Genitourinary (WDL): All genitourinary elements are within defined limits  Lymphatic  Lymph (WDL): All lymph disorder elements are within defined limits  Musculoskeletal and Connective Tissue  Musculoskeletal and " Connetive Tissue Disorders (WDL): Exceptions to WDL  Arthralgia: Mild pain (left hip, low back, and feet)  Bone Pain: None  Muscle Weakness : None  Myalgia: None  Integumentary  Integumentary (WDL): All integumentary elements are within defined limits  Patient Coping  Patient Coping: Accepting  Distress Assessment  Distress Assessment Score: 5  Accompanied by  Accompanied by: Alone  Oral Chemo Adherence       Past History  Past Medical History:   Diagnosis Date     Anemia      Asthma      Breast cancer      Chronic bronchitis      Osteoporosis      Pneumonia      Sinusitis        Physical Exam    Recent Vitals 10/5/2017   Height -   Weight 165 lbs   BSA (m2) -   /65   Pulse 91   Temp -   Temp src -   SpO2 94   Some recent data might be hidden     General: alert, awake, not in acute distress, obese female.  HEENT: Head: Normal, normocephalic, atraumatic.  Eye: Normal external eye, conjunctiva, lids cornea, RINKU.  Ears:  Non-tender.  Nose: Normal external nose, mucus membranes and septum.  Pharynx: Dental Hygiene adequate. Normal buccal mucosa. Normal pharynx.  Neck / Thyroid: Supple, no masses, nodes, nodules or enlargement.  Lymphatics: No abnormally enlarged lymph nodes.  Chest: Normal chest wall and respirations. Clear to auscultation.  Heart: S1 S2 RRR, no murmur.   Abdomen: abdomen is soft without significant tenderness, masses, organomegaly or guarding  Extremities: normal strength, tone, and muscle mass  Skin: normal. no rash or abnormalities  CNS: non focal.      Lab Results    No results found for this or any previous visit (from the past 168 hour(s)).    Imaging    Dxa Bone Density Scan    Result Date: 9/24/2017 9/21/2017 RE: Krystal Virgen YOB: 1940 Dear Herbert Zavala, Patient Profile: 77 y.o. female, postmenopausal, is here for the follow up bone density test. History of fractures - None. Family history of osteoporosis - Yes;  mother and sibling.  Family history of hip fracture:  "None. Smoking history - No. Osteoporosis treatment past -  Yes;  HRT. Osteoporosis treatment current - Yes;  Bisphosphonates, per patient > 20 years.  Chronic medical problems - Breast cancer and Chronic low back problems. High risk medications -  Steroids;  Yes, in the Past. Assessment: 1. The spine bone density L1-L3 with T-score -0.6 and significant artifacts. 2. Femoral bone densities show left femoral neck T- score -2.1 and right femoral neck T-score -2.1, stable compared to 2014. 3. Trabecular bone score indicates poor trabecular bone architecture. 77 y.o. female with LOW BONE DENSITY (OSTEOPENIA), stable on a very long treatment with oral bisphosphonate. Recommendations: Appropriate calcium, vitamin D supplements, along with balance and weight bearing exercise and possible \"drug holiday\" recommended with follow up bone density scan in 1 year. Bone densitometry was performed on your patient using our [employer] densitometer. The results are summarized and a copy of the actual scans are included for your review. In conformity with the International Society of Clinical Densitometry's most recent position statement for DXA interpretation (2015), the diagnosis will be made on the lowest measured T-score of the lumbar spine, femoral neck, total proximal femur or 33% radius. Note the change in terminology for diagnostic classification from OSTEOPENIA to LOW BONE MASS. All trending for sequential exams will be done using multiple vertebrae or the total proximal femur. Fracture risk is based on the WHO Fracture Risk Assessment Tool (FRAX). If additional information is needed or if you would like to discuss the results, please do not hesitate to call me. Thank you for referring this patient to [facility]. We are happy to be of service in support of you and your practice. If you have any questions or suggestions to improve our service, please call me at [phone]. Sincerely, [place] Paul, " JOSE F PETTIT. Osteoporosis Services, [facility]         Signed by: Herbert Zavala MD

## 2021-06-13 NOTE — TELEPHONE ENCOUNTER
Refill Approved    Rx renewed per Medication Renewal Policy. Medication was last renewed on 12/27/19.    Janine Grajeda, [facility] Triage/Med Refill 12/6/2020     Requested Prescriptions   Pending Prescriptions Disp Refills     lansoprazole (PREVACID) 30 MG capsule [Pharmacy Med Name: LANSOPRAZ DR CAP 30MG RX] 90 capsule 3     Sig: TAKE 1 CAPSULE DAILY       GI Medications Refill Protocol Passed - 12/4/2020  7:21 PM        Passed - PCP or prescribing provider visit in last 12 or next 3 months.     Last office visit with prescriber/PCP: 11/1/2019 Juan C Fairchild MD OR same dept: Visit date not found OR same specialty: 11/1/2019 Juan C Fairchild MD  Last physical: 8/25/2020 Last MTM visit: Visit date not found   Next visit within 3 mo: Visit date not found  Next physical within 3 mo: Visit date not found  Prescriber OR PCP: Juan C Fairchild MD  Last diagnosis associated with med order: 1. Gastroesophageal reflux disease without esophagitis  - lansoprazole (PREVACID) 30 MG capsule [Pharmacy Med Name: LANSOPRAZ DR CAP 30MG RX]; TAKE 1 CAPSULE DAILY  Dispense: 90 capsule; Refill: 3    2. Insomnia  - traZODone (DESYREL) 50 MG tablet [Pharmacy Med Name: TRAZODONE TAB 50MG]; TAKE 1 TABLET AT BEDTIME  Dispense: 90 tablet; Refill: 3    If protocol passes may refill for 12 months if within 3 months of last provider visit (or a total of 15 months).                traZODone (DESYREL) 50 MG tablet [Pharmacy Med Name: TRAZODONE TAB 50MG] 90 tablet 3     Sig: TAKE 1 TABLET AT BEDTIME       Tricyclics/Misc Antidepressant/Antianxiety Meds Refill Protocol Passed - 12/4/2020  7:21 PM        Passed - PCP or prescribing provider visit in last year     Last office visit with prescriber/PCP: 11/1/2019 Juan C Fairchild MD OR same dept: Visit date not found OR same specialty: 11/1/2019 Juan C Fairchild MD  Last physical: 8/25/2020 Last MTM visit: Visit date not found   Next visit within 3 mo: Visit date not found  Next  physical within 3 mo: Visit date not found  Prescriber OR PCP: Juan C Fairchild MD  Last diagnosis associated with med order: 1. Gastroesophageal reflux disease without esophagitis  - lansoprazole (PREVACID) 30 MG capsule [Pharmacy Med Name: LANSOPRAZ DR CAP 30MG RX]; TAKE 1 CAPSULE DAILY  Dispense: 90 capsule; Refill: 3    2. Insomnia  - traZODone (DESYREL) 50 MG tablet [Pharmacy Med Name: TRAZODONE TAB 50MG]; TAKE 1 TABLET AT BEDTIME  Dispense: 90 tablet; Refill: 3    If protocol passes may refill for 12 months if within 3 months of last provider visit (or a total of 15 months).

## 2021-06-13 NOTE — PROGRESS NOTES
Patient ID: Krystal Virgen is a 77 y.o. female.  /72  Pulse 74  Wt 169 lb (76.7 kg)  SpO2 97%  BMI 30.91 kg/m2    Assessment/Plan:                   Diagnoses and all orders for this visit:    Cough    Asthma  -     albuterol (PROAIR HFA;PROVENTIL HFA;VENTOLIN HFA) 90 mcg/actuation inhaler; Inhale 2 puffs every 6 (six) hours as needed for wheezing.  Dispense: 3 Inhaler; Refill: 6    Osteopenia    Malignant neoplasm of upper-outer quadrant of right breast in female, estrogen receptor positive    Major Depression, Single Episode In Remission    Other orders  -     DULoxetine (CYMBALTA) 30 MG capsule; Take 1 capsule (30 mg total) by mouth daily.  Dispense: 90 capsule; Refill: 3        DISCUSSION  See discussion below.  Change to fluoxetine.  Stop citalopram.  Stop Fosamax at this point.  Repeat bone density in 1 year.  Calcium and vitamin D supplementation as discussed.  Continue to follow with specialty providers.  No indication for additional treatment for her upper respiratory tract infection.  Follow-up with her pulmonologist as planned.  Subjective:     HPI    Krystal Virgen is a 77-year-old female who is here today to discuss multiple concerns.  She was diagnosed and has been treated with lumpectomy for right sided breast cancer.  It was ductal carcinoma in situ.  Estrogen receptor and progesterone receptor positive HER-2/vera negative.  She underwent lumpectomy and is now on Arimidex.  Prior to her cancer she has been on hormone replacement therapy.  She does report hot flashes but no other significant concerns.  She expresses concern about potential side effects of medication but feel she is doing well at this point.  Reiterated the importance of taking such a medication for her ongoing cancer treatment.  She did undergo a local anesthetic for her lumpectomy.  I reviewed Dr. Hopkins's notes in this regard.  Patient brought up concerns regarding her discomfort associated with this and we  discussed it briefly today.    She is being worked up for an anemia by her hematologist oncologist.    She is osteopenia.  There is concern by decreasing bone density with her new Arimidex therapy.  She had been on Fosamax for a very long period of time.  There have been some confusion as to the duration and whether she was taken off of it for a period of time or not.  At this point there is not likely to be additional benefit of remaining on the Fosamax.  We discussed stopping medication repeating bone density in 1 year and deciding on other courses of treatment.  We reviewed extensively calcium and vitamin D supplementation.    She does have chronic diarrhea multiple potential causes.  Discussed addition of fiber as a means to try and help.    She does have a cough.  She feels she has a upper respiratory infection which is actually improving.  She has significant asthma and follows with pulmonology.  Reports no worsening of asthma symptoms.  Does have routine follow-up set with her pulmonologist soon.    He does have depression.  Has been on citalopram, reports benefit from switching between citalopram and duloxetine in the past.  She would like to switch back to duloxetine at this point in time.    Review of Systems  Complete review of systems is obtained.  Other than the specific considerations noted above complete review of systems is negative.        Objective:   Medications:  Current Outpatient Prescriptions   Medication Sig Note     acetaminophen (TYLENOL) 325 MG tablet Take 325 mg by mouth as needed for pain.      albuterol (PROAIR HFA;PROVENTIL HFA;VENTOLIN HFA) 90 mcg/actuation inhaler Inhale 2 puffs every 6 (six) hours as needed for wheezing.      albuterol (PROVENTIL) 2.5 mg /3 mL (0.083 %) nebulizer solution INHALE 1 VIAL VIA NEBULIZER Q 4 H PRF WHEEZING OR SOB 5/27/2016: Received from: External Pharmacy     anastrozole (ARIMIDEX) 1 mg tablet Take 1 tablet (1 mg total) by mouth daily.      buPROPion  (WELLBUTRIN SR) 100 MG 12 hr tablet Take 2 tablets (200 mg total) by mouth 2 (two) times a day.      CHOLECALCIFEROL, VITAMIN D3, (VITAMIN D3 ORAL) Take 1,000 Units by mouth daily.      citalopram (CELEXA) 20 MG tablet TAKE ONE TABLET BY MOUTH EVERY DAY      DULoxetine (CYMBALTA) 30 MG capsule Take 1 capsule (30 mg total) by mouth daily.      ergocalciferol (ERGOCALCIFEROL) 50,000 unit capsule Take 50,000 Units by mouth once a week. 10/5/2017: Hasn't started yet      fluticasone (FLONASE) 50 mcg/actuation nasal spray 1 spray into each nostril daily.      lansoprazole (PREVACID) 30 MG capsule Take 30 mg by mouth daily.      montelukast (SINGULAIR) 10 mg tablet Take 10 mg by mouth bedtime.      naproxen sodium (ALEVE) 220 MG tablet Take 220 mg by mouth as needed for pain.      oxyCODONE-acetaminophen (PERCOCET) 5-325 mg per tablet Take 1 tablet by mouth daily as needed for pain.      PRIYANK LC SPRINT NEBULIZER SET [facility] UTD 5/27/2016: Received from: External Pharmacy     SPIRIVA WITH HANDIHALER 18 mcg inhalation capsule as needed.  5/27/2016: Received from: External Pharmacy     SUMAtriptan (IMITREX) 20 mg/actuation nasal spray 1 spray into each nostril every 2 (two) hours as needed for migraine.      traZODone (DESYREL) 50 MG tablet TAKE ONE TABLET BY MOUTH AT BEDTIME      triamcinolone (KENALOG) 0.5 % ointment Apply topically 2 (two) times a day. (Patient taking differently: Apply 1 application topically as needed. )      VIRTUSSIN AC  mg/5 mL liquid TK 10 ML PO Q 4 H PRN 5/27/2016: Received from: External Pharmacy     Allergies:  Allergies   Allergen Reactions     Amoxicillin      Sulfa (Sulfonamide Antibiotics)      Tobacco:   reports that she has never smoked. She has never used smokeless tobacco.     Physical Exam      /72  Pulse 74  Wt 169 lb (76.7 kg)  SpO2 97%  BMI 30.91 kg/m2        General Appearance:    Alert, cooperative, no distress   Eyes:    No conjunctival irritation, no scleral icterus        Lungs:     Clear to auscultation bilaterally, respirations unlabored   Heart:    Regular rate and rhythm, S1 and S2 normal, no murmur, rub   or gallop   Extremities:   Extremities normal, atraumatic, no cyanosis or edema   Skin:   Skin color, texture, turgor normal, no rashes or lesions   Neurologic:   Normal strength and sensation        Duration of visit exceeded 25 minutes more than 50% of time spent in counseling and coronation of care.

## 2021-06-14 NOTE — TELEPHONE ENCOUNTER
Dr. Fairchild are you able to send these prescriptions to the pharmacy for patient?   She was seen by cardiology at the [facility] and in the notes it indicated these prescriptions were sent to [facility].    The patient and I both called [facility] and they have no record of these prescriptions.    I was unable to get through to the [facility] cardiology to request these prescriptions are resent to [facility].

## 2021-06-14 NOTE — TELEPHONE ENCOUNTER
JUAN C - received a phone call from patient wanting to arrange a a telephone visit to follow-up on her hospital stay.     Patient was admitted from 1/7/2021-1/11/2021 for a heart attack.  She declines an in clinic visit.  Telephone visit scheduled on 1/15/2021      Please advise if a telephone visit is NOT ok.

## 2021-06-14 NOTE — PROGRESS NOTES
[facility] Hematology and Oncology Progress Note    Patient: Krystal Virgen  MRN: [id number]  Date of Service: 11/14/2017        Reason for Visit    #.  Breast cancer, stage Ia, ER positive, HER-2 negative.  #.  Chronic microcytic anemia with iron deficiency.    Assessment and Plan  Malignant neoplasm of upper-outer quadrant of right female breast    Staging form: Breast, AJCC 7th Edition    - Clinical: No stage assigned - Unsigned    - Pathologic stage from 9/8/2017: Stage IA (T1c, N0, cM0) - Signed by Herbert Zavala MD on 9/8/2017    ECOG Performance   ECOG Performance Status: 1     Distress Assessment  Distress Assessment Score: 3    Pain  Currently in Pain: Yes  Pain Score (Initial OR Reassessment): 4  Location: back and hands    #. Stage IA (pT1c, pN0, cM0) invasive ductal carcinoma of the right breast, grade 1, ER/MI strongly positive, HER2/Uriel negative, with positive inferior margin. Associated DCIS. S/p right breast lumpectomy and right sentinel lymph node biopsy.  She declined reexcision or adjuvant radiation treatment.    - No clinical evidence of breast cancer recurrence. She tolerates anastrozole. and ok to continue.    -She will continue calcium/vitamin D.     -Screening mammogram due in August 2018.      #. Osteopenia/ low bone density    -DEXA scan from 9/21/2017 showed low bone density: L1-L3 T score-0.6, left femoral neck-2.1, right femoral neck-2.1, stable since 2014.   -She is on Fosamax for about 25 years.  discontinued Fosamax in 10/2017.   -Plan to repeat DEXA scan in 1 year.  If there is declining in bone density, we will need to change it to tamoxifen.   -Continue on vitamin D and add calcium 5933-1023 mg per day.     #. Chronic microcytic anemia with iron deficiency.   -Colonoscopy on 11/8/17 showed diverticulosis without evidence of source of bleeding. Requested to complete EGD and capsule endoscopy to look for source of GI bleeding. No obvious source of bleeding.   - I discussed about  "iron supplementation with oral iron tablets to start ferrous sulfate 325 mg once daily and increase to tid as tolerated but to hold if further GI eval in a near future.      #. Chronic thrombocytosis likely reactive with iron deficiency     #. Anxiety    - stable.    Problem List    1. Malignant neoplasm of upper-outer quadrant of right breast in female, estrogen receptor positive     2. Iron deficiency anemia, unspecified iron deficiency anemia type      ______________________________________________________________________________    History of Present Illness    Jody presents herself today.  She reports she is tolerating anastrazole well. A month ago, she was on antibiotic for infection prevention (usually takes during winter months) and noted nausea when anastrazole was started. She stopped antibiotics and will follow up her pulmonologist. No new bone pain. No headache. She denies any obvious blood loss.    Pain Status  Currently in Pain: Yes    Review of Systems    Constitutional  Constitutional (WDL): Exceptions to WDL  Fatigue: Fatigue not relieved by rest - Limiting instrumental ADL (\"little energy\"-sleeping 10-11 hours/day)  Fever: None  Chills: None  Neurosensory  Neurosensory (WDL): All neurosensory elements are within defined limits  Eye   Eye Disorder (WDL): Exceptions to WDL (wears glasses)  Blurred Vision: None  Dry Eye: None  Eye Pain: None  Watering Eyes: Intervention not indicated (Left eye-intermittent)  Ear  Ear Disorder (WDL): All ear disorder elements are within defined limits  Cardiovascular  Cardiovascular (WDL): All cardiovascular elements are within defined limits  Pulmonary  Respiratory (WDL): Exceptions to WDL (nasal drainage)  Cough: Mild symptoms, nonprescription intervention indicated (loose sounding)  Dyspnea: Shortness of breath with moderate exertion  Hypoxia: None  Gastrointestinal  Gastrointestinal (WDL): Exceptions to WDL  Anorexia: None  Constipation: None  Diarrhea: Increase of " "<4 stools per day over baseline, mild increase in ostomy output compared to baseline (loose-no blood-one per day)  Dysphagia: None  Esophagitis: Asymptomatic, clinical or diagnostic observations only, intervention not indicated (Prilosec twice a day helps)  Nausea: None (was while taking anastrozole and antibiotic from pulmonologist (preventative RX from Oct to April)-stopped taking antibiotic-pulmonologist aware)  Pharyngitis: None  Vomiting: None  Dysgeusia: None  Dry Mouth: None  Genitourinary  Genitourinary (WDL): All genitourinary elements are within defined limits  Lymphatic  Lymph (WDL): All lymph disorder elements are within defined limits  Musculoskeletal and Connective Tissue  Musculoskeletal and Connetive Tissue Disorders (WDL): Exceptions to WDL  Arthralgia: Mild pain  Bone Pain: Mild pain  Muscle Weakness : Symptomatic, perceived by patient but not evident on physical exam  Myalgia: Mild pain  Integumentary  Integumentary (WDL): Exceptions to WDL (bruise to R forearm)  Patient Coping  Patient Coping: Accepting  Distress Assessment  Distress Assessment Score: 3  Accompanied by  Accompanied by: Alone  Oral Chemo Adherence       Past History  Past Medical History:   Diagnosis Date     Anemia      Asthma      Breast cancer      Chronic bronchitis      Osteoporosis      Pneumonia      Sinusitis        Physical Exam    Recent Vitals 11/14/2017   Height 5' 2\"   Weight (No Data)   BSA (m2) -   /58   Pulse 86   Temp -   Temp src -   SpO2 98   Some recent data might be hidden     General: alert, awake, not in acute distress, obese female.  HEENT: Head: Normal, normocephalic, atraumatic.  Eye: Normal external eye, conjunctiva, lids cornea, RINKU.  Ears:  Non-tender.  Nose: Normal external nose, mucus membranes and septum.  Pharynx: Dental Hygiene adequate. Normal buccal mucosa. Normal pharynx.  Neck / Thyroid: Supple, no masses, nodes, nodules or enlargement.  Lymphatics: No abnormally enlarged lymph " nodes.  Chest: Normal chest wall and respirations. Clear to auscultation.  Heart: S1 S2 RRR, no murmur.   Abdomen: abdomen is soft without significant tenderness, masses, organomegaly or guarding  Extremities: normal strength, tone, and muscle mass  Skin: normal. no rash or abnormalities  CNS: non focal.      Lab Results    No results found for this or any previous visit (from the past 168 hour(s)).    Imaging    No results found.      Signed by: Herbert Zavala MD

## 2021-06-14 NOTE — PROGRESS NOTES
Survivorship Care Plan folder given and explained to patient.  I instructed patient to review and if she had questions to contact CHARISSE Haro, Survivorship RN.  I told her that Robert would also be calling her in a couple weeks to check-in with her.  Patient verbalized understanding of this information.  JAZ Aguiar RN.

## 2021-06-14 NOTE — PROGRESS NOTES
Assessment and Plan:     1. Acute cystitis with hematuria  ciprofloxacin HCl (CIPRO) 500 MG tablet   2. Dysuria  Urinalysis-UC if Indicated    Culture, Urine     We will treat urinary tract infection with Cipro 500 mg 2 times daily for 5 days.  Educated on its indications and side effects.  Discussed symptomatic treatment and methods of preventing urinary tract infections in the future.  If no improvement in symptoms, suggest follow-up with Dr. Fairchild.  She is content with the plan.    Subjective:     Krystal is a 77 y.o. female presenting to the clinic for concerns for possible urinary tract infection.  Patient states she had a urinary tract infection in the spring which was diagnosed via E-visit.  She was also treated for urinary tract infection on 9/20/17.  She [facility], but I do not see a urinalysis or culture in the chart.  This morning, she developed dysuria, pelvic pressure, urinary frequency and urgency.  She has had some incontinence with coughing and standing.  She denies low back pain, fever, hematuria.  She has not tried any over-the-counter products for symptoms.  She feels as though her symptoms are worsening.    Review of Systems: A complete 14 point review of systems was obtained and is negative or as stated in the history of present illness.    Social History     Social History     Marital status:      Spouse name: N/A     Number of children: N/A     Years of education: N/A     Occupational History     Not on file.     Social History Main Topics     Smoking status: Never Smoker     Smokeless tobacco: Never Used     Alcohol use No     Drug use: No     Sexual activity: Not on file     Other Topics Concern     Not on file     Social History Narrative       Active Ambulatory Problems     Diagnosis Date Noted     Hyperactivity Of The Bladder      Cough      Asthma With Acute Exacerbation      Major Depression, Single Episode In Remission      Chronic Sinusitis       Urinary Incontinence      Taking Female Hormones For Postmenopausal HRT      Menopause Has Occurred      Asthma      Osteopenia      Fatigue      Migraine Headache      Hyperparathyroidism      Vitamin D Deficiency      Hypogammaglobulinemia      Insomnia 08/20/2016     Iron deficiency anemia 09/08/2017     Malignant neoplasm of upper-outer quadrant of right female breast 09/08/2017     Resolved Ambulatory Problems     Diagnosis Date Noted     No Resolved Ambulatory Problems     Past Medical History:   Diagnosis Date     Anemia      Asthma      Breast cancer      Chronic bronchitis      Osteoporosis      Pneumonia      Sinusitis        Family History   Problem Relation Age of Onset     Breast cancer Paternal Aunt 48     Breast cancer Mother 78     Breast cancer Sister 48     Breast cancer Maternal Aunt 35     Heart disease Father      Macular degeneration Father      Heart disease Brother        Objective:     /62 (Patient Site: Right Arm, Patient Position: Sitting, Cuff Size: Adult Regular)  Pulse 83  SpO2 98%    Patient is alert, in no obvious distress.   Skin: Warm, dry.   Lungs:  Clear to auscultation. Respirations even and unlabored.  No wheezing or rales noted.   Heart:  Regular rate and rhythm.  No murmurs, S3, S4, gallops, or rubs.    Abdomen: Soft, nontender.  No organomegaly. Bowel sounds normoactive. No guarding or masses noted.  CVA tenderness is negative bilaterally.      LABORATORY: Urinalysis and urine culture ordered.  Urinalysis shows moderate leukocytes, trace blood, and positive nitrates.

## 2021-06-14 NOTE — TELEPHONE ENCOUNTER
Called the pharmacy and ensured prescriptions will be delivered to patient. I also ensured these would be covered by insurance for her.   They are unable to deliver until Monday.     Called and notified the patient and she states if she is unable to wait until Monday she will try to find a ride to the pharmacy.

## 2021-06-14 NOTE — PROGRESS NOTES
"Krystal Virgen is a 80 y.o. female who is being evaluated via a billable telephone visit.      What phone number would you like to be contacted at? [phone]  How would you like to obtain your AVS? AVS Preference: Lu.  Assessment & Plan     Krystal was seen today for follow-up.    Diagnoses and all orders for this visit:    ST elevation myocardial infarction involving left circumflex coronary artery (H)    Moderate persistent asthma without complication    IgG subclass deficiency (H)    HX: breast cancer    Urinary tract infection without hematuria, site unspecified    Ischemic cardiomyopathy    No medication changes.  Reviewed information thoroughly discussed with patient, provided overall reassurance.  No indication that hematoma has worsened requiring further evaluation or treatment at this time based on our conversation.  Importance of follow-up with cardiologist and to the importance of cardiac rehab are discussed in detail.  The importance of continuing to take the Brilinta to prevent stent thrombosis are discussed thoroughly with patient today as well.  69}  BMI:   Estimated body mass index is 31.55 kg/m  as calculated from the following:    Height as of 8/25/20: 5' 3\" (1.6 m).    Weight as of 10/21/20: 178 lb 1.6 oz (80.8 kg). 9269}    No follow-ups on file.    Juan C Fairchild MD  [facility]     Krystal Virgen is 80 y.o. and presents to clinic today for the following health issues   HPI     She was admitted to [facility] for ST elevation myocardial infarction involving the left circumflex coronary artery on January 7, 2021.  She was discharged on January 10, 2021.    She was placed on the following medications at the time of discharge, baby aspirin, metoprolol extended release 25 mg 1 tablet daily, sublingual nitroglycerin as needed, rosuvastatin 40 mg 1 tablet daily, Brilinta 90 mg 1 tablet every 12 hours.  Her other " medications were continued.    She presented on the day of admission with complaint of chest pressure.  She had worsening symptoms with activity and nausea.  On arrival to the emergency department she had findings on EKG of ST elevation in leads I, aVL and noted reciprocal changes.  Her troponin was 0.3.  She was sent immediately to the Cath Lab which showed 100% stenosis in the left circumflex.  Her ejection fraction at the time of her intervention was noted to be 55 to 60% with some inferior lateral hypokinesis noted.  She have a small right groin hematoma that developed.    Cardiac rehabilitation was recommended at discharge.  She assures me she will be starting soon.  She also has follow-up scheduled next week with her cardiology team.    She does report that she feels a little bit short of breath with activity.  She states this is really been present since the event on 7 January.  She is concerned that one of her medications and she is not sure which may be a factor.  She does not report wheezing.  She has never been on a beta-blocker and has rather significant asthma but does not report any wheezing symptoms associate with shortness of breath.  Patient will discuss with her cardiology team whether any medication may potentially be related to this but I did not think that it was obvious that it was the cause and explained to her that I do believe her shortness of breath is probably a manifestation of the myocardial infarction itself.  She is clear to state it is not worsening.  She does not have other signs or symptoms of heart failure, she denies any lower extremity edema.    She has a history of moderate persistent asthma that is under good control currently without any sign of exacerbation or acute infection.  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  She is followed closely by pulmonology.     She has a history of urinary symptoms including recurrent  urinary tract infections.  No signs or symptoms of any acute concern at this point in time.     She has a history of migraine headaches without any current concern.     She has a history of breast cancer was has been treated.      Review of Systems  Complete review of systems is obtained.  Other than the specific considerations noted above complete review of systems is negative.        Objective       Vitals:  No vitals were obtained today due to virtual visit.    Physical Exam          Phone call duration: 12  minutes

## 2021-06-14 NOTE — TELEPHONE ENCOUNTER
Reason contacted:  Order request  Information relayed:  Spoke with patient who is interested in establishing with a cardiologist closer to home.    She currently travels to the [facility] and this is a far drive for her.         Patient is wondering who you would recommend she see?   Can you place a new order for cardiology?   Additional questions:  No  Further follow-up needed:  Yes  Okay to leave a detailed message:  Yes - [phone]

## 2021-06-15 NOTE — TELEPHONE ENCOUNTER
RN cannot approve Refill Request    RN can NOT refill this medication Protocol failed and NO refill given. Last office visit: 11/1/2019 Juan C Fairchild MD Last Physical: 8/25/2020 Last MTM visit: Visit date not found Last visit same specialty: 11/1/2019 Juan C Fairchild MD.  Next visit within 3 mo: Visit date not found  Next physical within 3 mo: Visit date not found      Hazel Rodríguez, [facility] Triage/Med Refill 2/6/2021    Requested Prescriptions   Pending Prescriptions Disp Refills     triamcinolone (KENALOG) 0.5 % ointment [Pharmacy Med Name: TRIAMCINOLON OIN 0.5%] 30 g 1     Sig: USE 1 APPLICATION TOPICALLYTWO TIMES A DAY AS NEEDED       There is no refill protocol information for this order

## 2021-06-15 NOTE — TELEPHONE ENCOUNTER
Pt would like to do cardiac rehab in her complex:  [facility]  [phone] [address] [place]  [address]    Labs and echo scheduled 4/9/21.  bakari

## 2021-06-15 NOTE — PATIENT INSTRUCTIONS - HE
It was nice to visit with you today.  We are going to plan an ultrasound of your heart and some laboratory studies and I will be in touch with the results.  Please call my nurse Blanca at [phone] with any heart related questions or concerns.  Please try to arrange cardiac rehabilitation.  I would like to visit again in approximately 3 months.

## 2021-06-15 NOTE — PROGRESS NOTES
" Patient ID: Krystal Virgen is a 77 y.o. female.  /74 (Patient Site: Left Arm, Patient Position: Sitting, Cuff Size: Adult Regular)  Pulse 70  Resp 16  Ht 5' 2\" (1.575 m)  Wt 168 lb 9.6 oz (76.5 kg)  SpO2 98%  BMI 30.84 kg/m2    Assessment/Plan:                   Diagnoses and all orders for this visit:    Shingles      DISCUSSION  On exam there is a rash that has resemblance to a shingles rash.  The pattern of the area where she has the pain is consistent with herpetic neuralgia.  She did previously receive a shingles vaccine and has never no previous outbreaks of shingles.  She does not have any definitive immunocompromise.  Clinically I think the picture is consistent with shingles.  Do not think at this point a week after the onset that there would be worth in using antiviral therapy.  Discussed the possibility of other pain medication.  Patient will fill the prescription from the emergency department and take as needed.  If over-the-counter analgesic is adequate she will use this.  A confirmatory test will be obtained.  Patient is encouraged to call if she has ongoing pain issues at which time we will give more consideration to additional medication treatment.  Subjective:     HPI    Krystal Virgen is a 77 y.o. female is a complex medical history including rather severe asthma and recent treatment for breast cancer.  She also has osteoporosis.    Is here today to follow-up on back pain.  Patient states last week and she began to experience pain in the left side mid back region.  The pain seemed to wrap around the trunk below the left breast region.  Patient states she was using a heating pad and felt that she had burned herself with a heating pad early on after the pain had started.  She was trying to take medication at home but the pain became bothersome and she felt she needed to be evaluated.  She was seen in the emergency department 2 days ago.  A thoracic plain film was obtained that " showed what appears to be an old T3 compression fracture.  Patient was not aware that she had a compression fracture in this region.  We reviewed the previous imaging that was used as a comparison.  Discussed with her that it is not clear-cut that this pain is in any way related to the apparently stable compression fracture.  Patient does not report any shortness of breath fevers chills night sweats.  She has taken primarily Tylenol to help manage the pain along with icing now at this point.    Review of Systems  Complete review of systems is obtained.  Other than the specific considerations noted above complete review of systems is negative.        Objective:   Medications:  Current Outpatient Prescriptions   Medication Sig Note     acetaminophen (TYLENOL) 325 MG tablet Take 325 mg by mouth as needed for pain.      albuterol (PROAIR HFA;PROVENTIL HFA;VENTOLIN HFA) 90 mcg/actuation inhaler Inhale 2 puffs every 6 (six) hours as needed for wheezing.      albuterol (PROVENTIL) 2.5 mg /3 mL (0.083 %) nebulizer solution INHALE 1 VIAL VIA NEBULIZER Q 4 H PRF WHEEZING OR SOB 5/27/2016: Received from: External Pharmacy     anastrozole (ARIMIDEX) 1 mg tablet Take 1 tablet (1 mg total) by mouth daily.      buPROPion (WELLBUTRIN SR) 100 MG 12 hr tablet Take 2 tablets (200 mg total) by mouth 2 (two) times a day.      CHOLECALCIFEROL, VITAMIN D3, (VITAMIN D3 ORAL) Take 1,000 Units by mouth daily.      citalopram (CELEXA) 20 MG tablet TAKE ONE TABLET BY MOUTH EVERY DAY      DULoxetine (CYMBALTA) 30 MG capsule Take 1 capsule (30 mg total) by mouth daily.      ergocalciferol (ERGOCALCIFEROL) 50,000 unit capsule Take 50,000 Units by mouth once a week. 10/5/2017: Hasn't started yet      ferrous sulfate 325 (65 FE) MG tablet Take 1 tablet (325 mg total) by mouth 3 (three) times a day with meals.      fluticasone (FLONASE) 50 mcg/actuation nasal spray 1 spray into each nostril daily.      lansoprazole (PREVACID) 30 MG capsule Take 30  "mg by mouth daily.      montelukast (SINGULAIR) 10 mg tablet Take 10 mg by mouth bedtime.      naproxen sodium (ALEVE) 220 MG tablet Take 220 mg by mouth as needed for pain.      oxyCODONE-acetaminophen (PERCOCET) 5-325 mg per tablet Take 1 tablet by mouth every 6 (six) hours as needed.      PRIYANK LC SPRINT NEBULIZER SET Misc UTD 5/27/2016: Received from: External Pharmacy     SPIRIVA WITH HANDIHALER 18 mcg inhalation capsule as needed.  5/27/2016: Received from: External Pharmacy     SUMAtriptan (IMITREX) 20 mg/actuation nasal spray 1 spray into each nostril every 2 (two) hours as needed for migraine.      traZODone (DESYREL) 50 MG tablet TAKE ONE TABLET BY MOUTH AT BEDTIME      traZODone (DESYREL) 50 MG tablet Take 50 mg by mouth. 1/4/2018: Received from: [place] Received Sig: Take 50 mg by mouth 2 times daily.     triamcinolone (KENALOG) 0.5 % ointment Apply topically 2 (two) times a day. (Patient taking differently: Apply 1 application topically as needed. )      VIRTUSSIN AC  mg/5 mL liquid TK 10 ML PO Q 4 H PRN 5/27/2016: Received from: External Pharmacy     Allergies:  Allergies   Allergen Reactions     Amoxicillin      Sulfa (Sulfonamide Antibiotics) Hives     Tobacco:   reports that she has never smoked. She has never used smokeless tobacco.     Physical Exam      /74 (Patient Site: Left Arm, Patient Position: Sitting, Cuff Size: Adult Regular)  Pulse 70  Resp 16  Ht 5' 2\" (1.575 m)  Wt 168 lb 9.6 oz (76.5 kg)  SpO2 98%  BMI 30.84 kg/m2    General: Patient alert no signs of distress    Lungs: Good air movement throughout no wheeze crackle or other focal sounds    Chest wall: In the lateral chest wall there is a rash consisting of red papules some of which seem to have begun to crust over others have a clear layer of skin as though they were recent vesicles that have ruptured.  The rash is clustered in an area about the size of the appointment the hand it was initially hidden under her bra " strap.  It is mildly tender to palpation in this area.  She does not have midline thoracic spine tenderness on my exam.

## 2021-06-16 NOTE — TELEPHONE ENCOUNTER
Pt s/p PCI 01/07/21 (STEMI) - would like cardiac rehab at her [facility] complex.  Therapy called - they can do therapy (Nustep, etc) and check BP / HR, but are not able to monitor her heart.  If pt needs the monitoring of her heart she will need to go out to another location to have actual Cardiac Rehab.    Dr Ramon - do you want rehab on the monitor, or can she have therapy in her complex?  -darren

## 2021-06-16 NOTE — TELEPHONE ENCOUNTER
----- Message from MARCUS Amador sent at 3/17/2021 10:08 AM CDT -----  Regarding: [facility] PATIENT  General phone call:    Caller: KELY TERAN FROM [facility]    Primary cardiologist: [facility]     Detailed reason for call: QUESTION RE: CARDIAC REHAB     Best phone number: [phone]    Best time to contact: ANY    Ok to leave a detailed message? YES    Device? NO    Additional Info:

## 2021-06-16 NOTE — TELEPHONE ENCOUNTER
Cardiac rehab would be my first choice. Better to monitor heart rhythm but if not possible then rehab where she lives is second choice. [facility]

## 2021-06-16 NOTE — TELEPHONE ENCOUNTER
----- Message from MARCUS Amador sent at 3/22/2021  2:01 PM CDT -----  Regarding: [facility] PATIENT  General phone call:    Caller: PARUL EDGE FROM [facility]    Primary cardiologist: [facility]    Detailed reason for call: CHECKING BACK ON CARDIAC REHAB FOR PATIENT,     Best phone number: [phone]    Best time to contact: ANY    Ok to leave a detailed message? YES    Device? NO    Additional Info:

## 2021-06-16 NOTE — PROGRESS NOTES
[facility] Hematology and Oncology Progress Note    Patient: Krystal Virgen  MRN: [id number]  Date of Service: 2/20/2018        Reason for Visit    #.  Breast cancer, stage Ia, ER positive, HER-2 negative.  #.  Chronic microcytic anemia with iron deficiency.    Assessment and Plan  Malignant neoplasm of upper-outer quadrant of right female breast    Staging form: Breast, AJCC 7th Edition    - Clinical: No stage assigned - Unsigned    - Pathologic stage from 9/8/2017: Stage IA (T1c, N0, cM0) - Signed by Herbert Zavala MD on 9/8/2017          ER Status: Positive          AL Status: Positive          HER2 Status: Negative    ECOG Performance   ECOG Performance Status: 1     Distress Assessment  Distress Assessment Score: No distress    Pain  Currently in Pain: Yes  Pain Score (Initial OR Reassessment): 5    #. Stage IA (pT1c, pN0, cM0) invasive ductal carcinoma of the right breast, grade 1, ER/AL strongly positive, HER2/Uriel negative, with positive inferior margin. Associated DCIS. S/p right breast lumpectomy and right sentinel lymph node biopsy.  She declined reexcision or adjuvant radiation treatment.    - No clinical evidence of breast cancer recurrence, but the left breast was not able to examine due to sensitive skin from recent shingles. She tolerates anastrozole. and ok to continue.    -She will continue calcium/vitamin D.     -Screening mammogram due in August 2018.   -Follow up with me in 4 months.       #. Osteopenia/ low bone density    -DEXA scan from 9/21/2017 showed low bone density: L1-L3 T score-0.6, left femoral neck-2.1, right femoral neck-2.1, stable since 2014.   -She is on Fosamax for about 25 years.  discontinued Fosamax in 10/2017.   -Plan to repeat DEXA scan in 1 year.  If there is declining in bone density, we will need to change it to tamoxifen.   -Continue on vitamin D and add calcium 9491-9441 mg per day.     #. Chronic microcytic anemia with iron deficiency.   -Colonoscopy on 11/8/17  showed diverticulosis without evidence of source of bleeding. EGD from 12/14/2017 showed a large hiatal hernia with John's erosion.    -He has a good understanding that she will need lifelong iron supplementation periodically.  She did not tolerate oral iron supplementation due to GI side effect with severe constipation.  She has stopped taking iron pill.  I discussed about IV iron therapy and she agreed to proceed.  We will request Feraheme 2 doses.  We will plan to repeat iron study in about 6-8 weeks.  I explained to her that she might need additional IV iron therapy based on the response.  Her ferritin is fairly low currently.     #. Chronic thrombocytosis likely reactive with iron deficiency     #. Anxiety    - stable.    Problem List    1. Malignant neoplasm of upper-outer quadrant of right breast in female, estrogen receptor positive          ______________________________________________________________________________    History of Present Illness    Jody presents herself today.  She reports she is tolerating anastrazole well.   She was supposed to have IV iron therapy in November but she decided not to come in due to cold weather that she does not want to get sick from respiratory tract infection.  She has stopped taking oral iron supplementation due to severe constipation.  She denies any blood loss.  She is very tired.    She has been taking amoxicillin for infection prevention as her usual that she take during winter months.   She just had shingles on her left back going to the left breast and she is on second round of Valtrex.   No breast concern.  No headaches.  No bone pain.    Pain Status  Currently in Pain: Yes    Review of Systems    Constitutional  Constitutional (WDL): Exceptions to WDL  Fatigue: Fatigue not relieved by rest - Limiting instrumental ADL  Fever: None  Chills: None  Neurosensory  Neurosensory (WDL): All neurosensory elements are within defined limits  Eye   Eye Disorder (WDL):  "Exceptions to WDL (wears glasses)  Blurred Vision: None  Dry Eye: None  Eye Pain: None  Watering Eyes: Intervention not indicated (Left watery eye-chronic)  Ear  Ear Disorder (WDL): All ear disorder elements are within defined limits  Cardiovascular  Cardiovascular (WDL): All cardiovascular elements are within defined limits  Pulmonary  Respiratory (WDL): Exceptions to WDL  Cough: Mild symptoms, nonprescription intervention indicated (dry \"tickle\")  Dyspnea: Shortness of breath with moderate exertion  Hypoxia: None  Gastrointestinal  Gastrointestinal (WDL): Exceptions to WDL  Anorexia: None  Constipation: None  Diarrhea: None  Dysphagia: None  Esophagitis: Symptomatic, altered eating/swallowing, oral supplements indicated (Prilosec controls)  Nausea: None  Pharyngitis: None  Vomiting: None  Dysgeusia: None  Dry Mouth: Symptomatic (e.g., dry or thick saliva) without significant dietary alteration, unstimulated saliva flow >0.2 ml/min  Genitourinary  Genitourinary (WDL): All genitourinary elements are within defined limits  Lymphatic  Lymph (WDL): All lymph disorder elements are within defined limits  Musculoskeletal and Connective Tissue  Musculoskeletal and Connetive Tissue Disorders (WDL): Exceptions to WDL  Arthralgia: Mild pain  Bone Pain: Mild pain  Muscle Weakness : Symptomatic, perceived by patient but not evident on physical exam  Myalgia: Mild pain  Integumentary  Integumentary (WDL): Exceptions to WDL (hx shingles to left back)  Alopecia: None  Rash Maculo-Papular: None  Pruritus: None  Urticaria: None  Palmar-Plantar Erythrodysesthesia Syndrome: None  Flushing: None  Patient Coping  Patient Coping: Accepting  Distress Assessment  Distress Assessment Score: No distress  Accompanied by  Accompanied by: Alone  Oral Chemo Adherence       Past History  Past Medical History:   Diagnosis Date     Anemia      Asthma      Breast cancer      Chronic bronchitis      Osteoporosis      Pneumonia      Sinusitis  "       Physical Exam    Recent Vitals 2/20/2018   Height -   Weight (No Data)   BSA (m2) -   /74   Pulse 86   Temp 98.2   Temp src 1   SpO2 94   Some recent data might be hidden     General: alert, awake, not in acute distress, obese female.  HEENT: Head: Normal, normocephalic, atraumatic.  Eye: Normal external eye, conjunctiva, lids cornea, RINKU.  Ears:  Non-tender.  Nose: Normal external nose, mucus membranes and septum.  Pharynx: Dental Hygiene adequate. Normal buccal mucosa. Normal pharynx.  Neck / Thyroid: Supple, no masses, nodes, nodules or enlargement.  Lymphatics: No abnormally enlarged lymph nodes.  Chest: Normal chest wall and respirations. Clear to auscultation.  Breasts: Slight keloid formation in the previous lumpectomy site in the right breast.  No palpable masses.  Left breast was very sensitive that patient refused further examination due to pain from shingles.  Heart: S1 S2 RRR, no murmur.   Abdomen: abdomen is soft without significant tenderness, masses, organomegaly or guarding  Extremities: normal strength, tone, and muscle mass  Skin: normal. no rash or abnormalities  CNS: non focal.      Lab Results    No results found for this or any previous visit (from the past 168 hour(s)).    Imaging    No results found.      Signed by: Herbert Zavala MD

## 2021-06-16 NOTE — TELEPHONE ENCOUNTER
Left message for Velvet with another rehab staff member informing her of Dr. Ramon's response/recommendations and that cardiac rehab referral order placed which could be faxed if needed - understanding verbalized.  mg

## 2021-06-17 NOTE — TELEPHONE ENCOUNTER
"Message from TIFFANIE Marcum CMA for Dr. Fairchild  \"Spoke with the patient who reports she has not received her prescription [Tamoxifen] from [facility] and she is currently running low on this medication.     Can this be resent by Dr. Zavala to her mail order pharmacy?\"    Refill was sent to [facility] by Dr. Zavala on 5/4/21. Quantity #90. 3 refills.  Receipt confirmed by pharmacy on 5/4/41 1616.    I called [facility] and they confirmed that refill received on 5/4/21 and mailed out to patient on 5/9/21. They have record that Rx was delivered to patient on 5/14/21. Patient should call [facility] if she still hasn't received.   Called patient with this information. She said she still hasn't received. She said she would call [facility]. Message to TIFFANIE Marcum CMA/JAZ Aguiar RN        "

## 2021-06-17 NOTE — TELEPHONE ENCOUNTER
RN cannot approve Refill Request    RN can NOT refill this medication med is not covered by policy/route to provider. Last office visit: 11/1/2019 Juan C Fairchild MD Last Physical: 8/25/2020 Last MTM visit: Visit date not found Last visit same specialty: 11/1/2019 Juan C Fairchild MD.  Next visit within 3 mo: Visit date not found  Next physical within 3 mo: Visit date not found      Elizabeth Cristobal, [facility] Triage/Med Refill 5/19/2021    Requested Prescriptions   Pending Prescriptions Disp Refills     tamoxifen (NOLVADEX) 20 MG tablet [Pharmacy Med Name: TAMOXIFEN TAB 20MG] 90 tablet 3     Sig: TAKE 1 TABLET DAILY       There is no refill protocol information for this order

## 2021-06-17 NOTE — TELEPHONE ENCOUNTER
Spoke with the patient who reports she has not received her prescription from [facility] and she is currently running low on this medication.    Can this be resent by Dr. Zavala to her mail order pharmacy?

## 2021-06-17 NOTE — PROGRESS NOTES
Patient ID: Krystal Virgen is a 77 y.o. female.  /78  Pulse 75  Wt 170 lb (77.1 kg)  SpO2 97%  BMI 31.09 kg/m2    Assessment/Plan:                   Diagnoses and all orders for this visit:    Shingles    Osteopenia    Major Depression, Single Episode In Remission    Persistent asthma    John lesion, chronic    Iron deficiency anemia, unspecified iron deficiency anemia type    Other orders  -     Cancel: Pneumococcal conjugate vaccine 13-valent 6wks-17yrs; >50yrs           DISCUSSION  See discussion below.  No need for labs or other evaluation at this point in time.  Subjective:     HPI    Krystal Virgen is a 77 y.o. female she is here today to discuss ongoing chronic health issues.  She had recent shingles this past winter.  She reports the rash is completely resolved the pain is tremendously improved.  She is not taking any specific medication for pain management at this time but she does note that she still has some degree of neuropathic pain which can vary in intensity.  We discussed this.    She has osteopenia most recent DEXA scan September 2017.  Remains on treatment.  She has major depression.  Continues to take medications.  She reports with the springlike weather and the longer days her depression symptoms have improved.  She does report a lot of concern about her family.  She continues to grieve the loss of her  and son.  Her sister had a significant health issue arise yesterday.  We discussed all of this.    She has persistent asthma follows closely with pulmonology.  She had an extensive consultation note from earlier in April 2018 which was reviewed today.  Patient has established a POLST.  She has been treated recently for microcytic anemia with iron deficiency.  She underwent a workup through hematology.  She did undergo endoscopy and colonoscopy.  Endoscopy revealed John's erosions and a hiatal hernia.  She is on lansoprazole.  She has received iron infusions and iron  level and hemoglobin are back to normal and she has follow-up scheduled with hematology.    They reviewed all of this information in depth.    Review of Systems  Complete review of systems is obtained.  Other than the specific considerations noted above complete review of systems is negative.          Objective:   Medications:  Current Outpatient Prescriptions   Medication Sig Note     acetaminophen (TYLENOL) 325 MG tablet Take 325 mg by mouth as needed for pain.      albuterol (PROAIR HFA;PROVENTIL HFA;VENTOLIN HFA) 90 mcg/actuation inhaler Inhale 2 puffs. 4/26/2018: Received from: [facility] Received Sig: Inhale 2 Puffs every 4 hours as needed for Wheezing or Shortness of Breath.     albuterol (PROVENTIL) 2.5 mg /3 mL (0.083 %) nebulizer solution INHALE 1 VIAL VIA NEBULIZER Q 4 H PRF WHEEZING OR SOB 5/27/2016: Received from: External Pharmacy     anastrozole (ARIMIDEX) 1 mg tablet Take 1 tablet (1 mg total) by mouth daily.      budesonide-formoterol (SYMBICORT) 160-4.5 mcg/actuation inhaler Inhale 2 puffs. 4/26/2018: Received from: [facility] Received Sig: Inhale 2 Puffs two times a day.     buPROPion (WELLBUTRIN SR) 100 MG 12 hr tablet Take 2 tablets (200 mg total) by mouth 2 (two) times a day.      CHOLECALCIFEROL, VITAMIN D3, (VITAMIN D3 ORAL) Take 1,000 Units by mouth daily.      citalopram (CELEXA) 20 MG tablet TAKE ONE TABLET BY MOUTH EVERY DAY      DULoxetine (CYMBALTA) 30 MG capsule Take 1 capsule (30 mg total) by mouth daily.      ergocalciferol (ERGOCALCIFEROL) 50,000 unit capsule Take 50,000 Units by mouth once a week. 10/5/2017: Hasn't started yet      fluticasone (FLONASE) 50 mcg/actuation nasal spray 2 sprays. 4/26/2018: Received from: [email or web address] Received Sig: Place 2 Sprays into both nostrils daily.     gabapentin (NEURONTIN) 100 MG capsule Take 100 mg by mouth 3 (three) times a day.      lansoprazole (PREVACID) 30 MG capsule TAKE ONE CAPSULE BY MOUTH TWICE A DAY 4/26/2018: Received  from: [facility]     montelukast (SINGULAIR) 10 mg tablet Take 10 mg by mouth bedtime.      naproxen sodium (ALEVE) 220 MG tablet Take 220 mg by mouth as needed for pain.      SPIRIVA WITH HANDIHALER 18 mcg inhalation capsule as needed.  5/27/2016: Received from: External Pharmacy     SUMAtriptan (IMITREX) 20 mg/actuation nasal spray 1 spray into each nostril every 2 (two) hours as needed for migraine.      traZODone (DESYREL) 50 MG tablet TAKE ONE TABLET BY MOUTH AT BEDTIME      triamcinolone (KENALOG) 0.5 % ointment Apply topically 2 (two) times a day. (Patient taking differently: Apply 1 application topically as needed. )      VIRTUSSIN AC  mg/5 mL liquid TK 10 ML PO Q 4 H PRN 5/27/2016: Received from: External Pharmacy       Allergies:  Allergies   Allergen Reactions     Amoxicillin      Sulfa (Sulfonamide Antibiotics) Hives       Tobacco:   reports that she has never smoked. She has never used smokeless tobacco.     Physical Exam          /78  Pulse 75  Wt 170 lb (77.1 kg)  SpO2 97%  BMI 31.09 kg/m2      General Appearance:    Alert, cooperative, no distress   Eyes:    No conjunctival irritation, no scleral icterus       Lungs:     Clear to auscultation bilaterally, respirations unlabored   Heart:    Regular rate and rhythm, S1 and S2 normal, no murmur, rub   or gallop   Extremities:   Extremities normal, atraumatic, no cyanosis or edema   Skin:   Skin color, texture, turgor normal, no rashes or lesions   Neurologic:   Normal strength and sensation

## 2021-06-17 NOTE — PROGRESS NOTES
[facility] Hematology and Oncology Progress Note    Patient: Krystal Virgen  MRN: [id number]  Date of Service: 5/4/2021        Reason for Visit    #.  Breast cancer, stage Ia, ER positive, HER-2 negative.    Assessment and Plan  Cancer Staging  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)  Staging form: Breast, AJCC 7th Edition  - Clinical: No stage assigned - Unsigned  - Pathologic stage from 9/8/2017: Stage IA (T1c, N0, cM0) - Signed by Herbert Zavala MD on 9/8/2017  ER Status: Positive  MO Status: Positive  HER2 Status: Negative      ECOG Performance   ECOG Performance Status: (P) 1     Distress Assessment  Distress Assessment Score: (P) No distress    Pain  Currently in Pain: (P) Yes    #.  Invasive ductal carcinoma of the right breast.  Stage IA (pT1c, pN0, cM0), grade 1, ER/MO strongly positive, HER2/Uriel negative, with positive inferior margin. Associated DCIS. S/p right breast lumpectomy and right sentinel lymph node biopsy on 8/10/2017.  She declined reexcision or adjuvant radiation treatment.  She started anastrozole in October 2017, then switched to tamoxifen in December 2018 due to intolerable vasomotor symptoms and not to compromise her bone density.      She looks clinically well without signs and symptoms suggestive of breast cancer recurrence.  Recent mammogram from 4/30/2021 was benign.  She has been on tamoxifen for about 3-1/2 years.  She is overall doing very well feeling okay to continue.    We extended follow-up visit to 1 year.  She is advised to call me sooner with any concern.     Annual screening mammogram in April/May 2022.    #.  Low bone density/osteopenia with high fracture risk.     Compared to DEXA scan from 2018, her bone does density showed a decline in lumbar spine, with stable bilateral hips.   She was on Fosamax for about 25 years per her report.  She discontinued Fosamax in October 2017 for drug holiday.   Continue calcium and vitamin D.   Continue  follow-up with Dr. Fairchild.    #.  ST elevation MI in January 2021.  Post stent placement.   She is recovering expectantly.  Now she has additional medication for that.     #.  History of microcytic anemia with iron deficiency.   She was getting IV iron periodically.   Colonoscopy on 11/8/17 showed diverticulosis without evidence of source of bleeding. EGD from 12/14/2017 showed a large hiatal hernia with John's erosion.    Her hemogram from August 2020 was near normal hemoglobin.  No black stools.      Problem List    1. Malignant neoplasm of upper-outer quadrant of right breast in female, estrogen receptor positive (H)  tamoxifen (NOLVADEX) 20 MG tablet        ______________________________________________________________________________    History of Present Illness    Jody presented herself today.     She reported that she had ST elevation MI in January 2021 when she experienced tightness in her chest.  She had a coronary stent placed.  She had recent follow-up with cardiology.  Overall doing okay.  She has slight cough as weather changes.  She also has shortness of breath with minimal exertion.   She takes tamoxifen regularly.    Pain Status  Currently in Pain: (P) Yes    Other commobidies      Review of Systems    Constitutional  Constitutional (WDL): Exceptions to WDL  Fatigue: Fatigue not relieved by rest - Limiting instrumental ADL  Neurosensory  Neurosensory (WDL): (P) Exceptions to WDL  Peripheral Sensory Neuropathy: (P) Asymptomatic, loss of deep tendon reflexes or paresthesia(uses W/C for long distances)  Eye   Eye Disorder (WDL): (P) Exceptions to WDL  Watering Eyes: (P) Intervention not indicated(chronic)  Ear  Ear Disorder (WDL): (P) All ear disorder elements are within defined limits  Cardiovascular  Cardiovascular (WDL): (P) All cardiovascular elements are within defined limits  Pulmonary  Respiratory (WDL): (P) Exceptions to WDL  Cough: (P) Mild symptoms, nonprescription intervention  indicated(clear)  Dyspnea: (P) Shortness of breath with minimal exertion, limiting instrumental ADL  Gastrointestinal  Gastrointestinal (WDL): (P) Exceptions to WDL  Constipation: (P) Occasional or intermittent symptoms, occasional use of stool softeners, laxatives, dietary modification, or enema  Diarrhea: (P) Increase of <4 stools per day over baseline, mild increase in ostomy output compared to baseline  Esophagitis: (P) Symptomatic, altered eating/swallowing, oral supplements indicated(Prilosec controls)  Dysgeusia: (P) Altered taste but no change in diet  Dry Mouth: (P) Symptomatic (e.g., dry or thick saliva) without significant dietary alteration, unstimulated saliva flow >0.2 ml/min  Genitourinary  Genitourinary (WDL): (P) All genitourinary elements are within defined limits  Lymphatic  Lymph (WDL): (P) All lymph disorder elements are within defined limits  Musculoskeletal and Connective Tissue  Musculoskeletal and Connetive Tissue Disorders (WDL): (P) Exceptions to WDL  Arthralgia: (P) Moderate pain, limiting instrumental ADL  Muscle Weakness : (P) Symptomatic, perceived by patient but not evident on physical exam  Myalgia: (P) Moderate pain, limiting instrumental ADL  Integumentary  Integumentary (WDL): (P) All integumentary elements are within defined limits  Patient Coping  Patient Coping: (P) Accepting;Open/discussion  Distress Assessment  Distress Assessment Score: (P) No distress  Accompanied by     Oral Chemo Adherence       Past History  Past Medical History:   Diagnosis Date     Anemia      Asthma      Breast cancer (H) 2017     Chronic bronchitis (H)      Osteoporosis      Pneumonia      Sinusitis        Physical Exam    Recent Vitals 5/4/2021   Height -   Weight 173 lbs 10 oz   BSA (m2) 1.87 m2   /63   Pulse 50   Temp 98.2   Temp src 1   SpO2 98   Some recent data might be hidden     General: alert, awake, not in acute distress  HEENT: Head: Normal, normocephalic, atraumatic.  Eye: Normal  external eye, conjunctiva, lids cornea, RINKU.  Nose: Normal external nose, mucus membranes and septum.  Pharynx: Normal buccal mucosa. Normal pharynx.  Neck / Thyroid: Supple, no masses, nodes, nodules or enlargement.  Lymphatics: No abnormally enlarged lymph nodes.  Chest: Normal chest wall and respirations. Clear to auscultation.  Breasts: Bilateral pendulous breasts, glandular breasts.  No palpable abnormalities.  Post surgical scar on the right breast.  Heart: S1 S2 RRR, no murmur.   Abdomen: abdomen is soft without significant tenderness, masses, organomegaly or guarding  Extremities: normal strength, tone, and muscle mass  Skin: normal. no rash or abnormalities  CNS: non focal.    Lab Results    Recent Results (from the past 168 hour(s))   Comprehensive metabolic panel   Result Value Ref Range    Sodium 140 136 - 145 mmol/L    Potassium 4.1 3.5 - 5.0 mmol/L    Chloride 110 (H) 98 - 107 mmol/L    CO2 23 22 - 31 mmol/L    Anion Gap, Calculation 7 5 - 18 mmol/L    Glucose 89 70 - 125 mg/dL    BUN 14 8 - 28 mg/dL    Creatinine 1.39 (H) 0.60 - 1.10 mg/dL    GFR MDRD Af Amer 44 (L) >60 mL/min/1.73m2    GFR MDRD Non Af Amer 37 (L) >60 mL/min/1.73m2    Bilirubin, Total 0.5 0.0 - 1.0 mg/dL    Calcium 8.4 (L) 8.5 - 10.5 mg/dL    Protein, Total 6.1 6.0 - 8.0 g/dL    Albumin 3.0 (L) 3.5 - 5.0 g/dL    Alkaline Phosphatase 58 45 - 120 U/L    AST 11 0 - 40 U/L    ALT <9 0 - 45 U/L   Lipid Profile   Result Value Ref Range    Triglycerides 77 <=149 mg/dL    Cholesterol 120 <=199 mg/dL    LDL Calculated 42 <=129 mg/dL    HDL Cholesterol 63 >=50 mg/dL    Patient Fasting > 8hrs? Yes        Imaging    Echo Complete    Result Date: 4/9/2021    No previous study for comparison.   Technically challenging study. Definity contrast not utilized on this examination.   Normal left ventricular size. Mild left ventricular hypertrophy   Left ventricle ejection fraction is lower limits of normal. The calculated left ventricular ejection  fraction is 55%.   Normal right ventricular size and systolic function.   Nonspecific thickening the mitral valve leaflets with mitral annular calcification and mild to moderate mitral regurgitation. Not fully visualized on this examination.   Mild tricuspid regurgitation. Estimate of RV systolic pressure 37 mmHg plus right atrial pressure.   Mild enlargement of the ascending aorta. Mild enlargement of the transverse aorta.   Possible patent foramen ovale suboptimally visualized.      Mammo Screening Bilateral    Result Date: 4/30/2021  BILATERAL FULL FIELD DIGITAL SCREENING MAMMOGRAM WITH TOMOSYNTHESIS Performed on: 4/30/21 Compared to: 02/18/2020 Mammo Screening Bilateral, 07/17/2018 Mammo Diagnostic Bilateral, 07/06/2017 Mammo Diagnostic Right, 06/29/2017 Mammo Screening Bilateral, 06/27/2016 Mammo Screening Bilateral, 06/24/2015 Mammo Screening Bilateral, 06/18/2014 Mammo Screening Bilateral, 06/17/2013 Mammo Screening Bilateral, 06/22/2012 Mammo Diagnostic Left, 06/12/2012 Mammo Screening Bilateral, 12/20/2010 Mammo Diagnostic Right, and 12/09/2010 Mammo Screening Bilateral Findings: The breasts have scattered areas of fibroglandular densities. There is no radiographic evidence of malignancy. This study was evaluated with the assistance of Computer-Aided Detection. Breast Tomosynthesis was used in interpretation. Repeat routine screening mammogram in one year is recommended. There are breast conservation changes in the right breast. ACR BI-RADS Category 2: Benign      Signed by: Herbert Zavala MD

## 2021-06-17 NOTE — TELEPHONE ENCOUNTER
Medication is prescribed by Oncologist - Dr Zavala, renewal sent on 5/4.  Please determine if renewal is actually needed and if so send to Dr Zavala.

## 2021-06-18 NOTE — LETTER
Letter by Mikala Hays PharmD at      Author: Mikala Hays, Joby Service: -- Author Type: --    Filed:  Encounter Date: 1/10/2019 Status: (Other)             2019      Krystal Berryully  [address]  [place] [address]            Dear Krystal Virgen     Thank you for talking with me on 1/10/19 about your health and medications. [facility] MTM (Medication Therapy Management) program helps you understand your medications and use them safely.    Along with this letter are an action plan (Medication Action Plan) and a medication list (Personal Medication List). The action plan has steps you should take to help you get the best results from your medications. The medication list will help you keep track of your medications and how to use them the right way.       Have your action plan and medication list with you when you talk with your doctors, pharmacists, and other health care providers.    Ask your doctors, pharmacists, and other healthcare providers to update them at every visit.     Take your medication list with you if you go to the hospital or emergency room.     Give a copy of the action plan and medication list to your family or caregivers.     If you want to talk about this letter or any of the papers with it, please call [phone]. We look forward to working with you, your doctors, and other healthcare providers to help you stay healthy.    Sincerely,     Mikala Hays    _  Medication Action Plan For Krystal Virgen, : 1940     This action plan will help you get the best results from your medications if you:     1. Read What we talked about.   2. Take the steps listed in the What I need to do boxes.   3. Fill in What I did and when I did it.   4. Fill in My follow-up plan and Questions I want to ask.     Have this action plan with you when you talk with your doctors, pharmacists, and other healthcare providers in your care team. Share this with your family or caregivers  too.    Date Prepared: 1/11/2019      What we talked about:  The dose of piroxicam you tried is low, therefore that may be why it was not helpful for you.      What I need to do:  Increase piroxicam to 20 mg once daily.      What I did and when I did it:          What we talked about:       What I need to do:       What I did and when I did it:          What we talked about:       What I need to do:       What I did and when I did it:         My follow-up plan (add notes about next steps):            Questions I want to ask (include topics about medications or therapy):          If you have any questions about your action plan, call Mikala ROSENTHAL Heracliojuan ramon at [phone], Monday-Friday 8:00-4:30pm  _  This medication list was made for you after we talked. We also used information from your doctors chart.      Use blank rows to add new medications. Then fill in the dates you started using them.     Cross out medications when you no longer use them. Then write the date and why you stopped using them.     Ask your doctors, pharmacists, and other healthcare providers to update this list at every visit.  Keep this list up-to-date with:  ? prescription medications  ? over the counter drugs  ? herbals  ? vitamins  ? minerals          If you go to the hospital or emergency room, take this list with you. Share this with your family or caregivers too.    Date Prepared: 1/11/2019      Allergies or side effects: levofloxacin; amoxicillin; sulfa (sulfonamide antibiotics)      Medication: acetaminophen (TYLENOL) 325 MG tablet      How I use it: Take 325 mg by mouth as needed for pain.      Why I use it: Arthritis    Prescriber: Juan C Fairchild MD      Date I started using it:     Date I stopped using it:       Why I stopped using it:          Medication: albuterol (PROAIR HFA;PROVENTIL HFA;VENTOLIN HFA) 90 mcg/actuation inhaler      How I use it: Inhale 2 puffs every 6 (six) hours as needed.      Why I use it: Moderate persistent  asthma with acute exacerbation    Prescriber: Juan C Fairchild MD      Date I started using it:     Date I stopped using it:       Why I stopped using it:          Medication: albuterol (PROVENTIL) 2.5 mg /3 mL (0.083 %) nebulizer solution      How I use it: INHALE 1 VIAL VIA NEBULIZER Q 4 H PRF WHEEZING OR SOB      Why I use it: Asthma    Prescriber: Juan C Fairchild MD      Date I started using it:     Date I stopped using it:       Why I stopped using it:          Medication: budesonide-formoterol (SYMBICORT) 160-4.5 mcg/actuation inhaler      How I use it: Inhale 2 puffs 2 (two) times a day.      Why I use it: Moderate persistent asthma with acute exacerbation    Prescriber: Juan C Fairchild MD      Date I started using it:     Date I stopped using it:       Why I stopped using it:          Medication: buPROPion (WELLBUTRIN XL) 300 MG 24 hr tablet      How I use it: Take 1 tablet (300 mg total) by mouth every morning.      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      Why I use it: Osteopenia    Prescriber: Juan C Fairchild MD      Date I started using it:     Date I stopped using it:       Why I stopped using it:          Medication: DULoxetine (CYMBALTA) 60 MG capsule      How I use it: Take 1 capsule (60 mg total) by mouth daily.      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            Why I use it: Asthma    Prescriber: Juan C Fairchild MD       Date I started using it:     Date I stopped using it:       Why I stopped using it:          Medication: lansoprazole (PREVACID) 30 MG capsule      How I use it: Take 1 capsule (30 mg total) by mouth daily.      Why I use it: Gastroesophageal Reflux Disease without Esophagitis    Prescriber: Juan C Fairchild MD      Date I started using it:     Date I stopped using it:       Why I stopped using it:          Medication: montelukast (SINGULAIR) 10 mg tablet      How I use it: Take 1 tablet (10 mg total) by mouth at bedtime.      Why I use it: Moderate persistent asthma with acute exacerbation    Prescriber: Juan C Fairchild MD      Date I started using it:     Date I stopped using it:       Why I stopped using it:          Medication: moxifloxacin (AVELOX) 400 mg tablet      How I use it: Take 1 tablet (400 mg total) by mouth 3 (three) times a week.      Why I use it: Moderate persistent asthma with acute exacerbation    Prescriber: Juan C Fairchild MD      Date I started using it:     Date I stopped using it:       Why I stopped using it:          Medication: naproxen (NAPROSYN) 500 MG tablet      How I use it: Take 1 tablet (500 mg total) by mouth 2 (two) times a day with meals.      Why I use it: Arthritis    Prescriber: Juan C Fairchild MD      Date I started using it:     Date I stopped using it:       Why I stopped using it:          Medication: SUMAtriptan (IMITREX) 20 mg/actuation nasal spray      How I use it: 1 spray (20 mg total) into each nostril every 2 (two) hours as needed for migraine.      Why I use it: Migraine with aura and without status migrainosus, not intractable    Prescriber: Juan C Fairchild MD      Date I started using it:     Date I stopped using it:       Why I stopped using it:          Medication: tamoxifen (NOLVADEX) 20 MG tablet      How I use it: Take 1 tablet (20 mg total) by mouth daily.      Why I use it: Malignant neoplasm of upper-outer quadrant of right breast in female,  estrogen receptor positive     Prescriber: Juan C Fairchild MD      Date I started using it:     Date I stopped using it:       Why I stopped using it:          Medication: traZODone (DESYREL) 50 MG tablet      How I use it: Take 1 tablet (50 mg total) by mouth at bedtime.      Why I use it: Insomnia    Prescriber: Juan C Fairchild MD      Date I started using it:     Date I stopped using it:       Why I stopped using it:          Medication: triamcinolone (KENALOG) 0.5 % ointment      How I use it: Apply 1 application topically 2 (two) times a day as needed. Two tubes of 15 g      Why I use it: Dyshidrotic eczema    Prescriber: Juan C Fairchild MD      Date I started using it:     Date I stopped using it:       Why I stopped using it:          Medication: VIRTUSSIN AC  mg/5 mL liquid      How I use it: TK 10 ML PO Q 4 H PRN      Why I use it: Asthma    Prescriber: Juan C Fairchild MD      Date I started using it:     Date I stopped using it:       Why I stopped using it:          Medication:       How I use it:       Why I use it:     Prescriber:       Date I started using it:     Date I stopped using it:       Why I stopped using it:          Medication:       How I use it:       Why I use it:     Prescriber:       Date I started using it:     Date I stopped using it:       Why I stopped using it:          Medication:       How I use it:       Why I use it:     Prescriber:       Date I started using it:     Date I stopped using it:       Why I stopped using it:          Other Information:      If you have any questions about your medication list, call [phone]    According to the Paperwork Reduction Act of 1995, no persons are required to respond to a collection of information unless it displays a valid OMB control number. The valid OMB number for this information collection is 1365-1248. The time required to complete this information collection is estimated to average 37.76 minutes per response,  including the time to review instructions, searching existing data resources, gather the data needed, and complete and review the information collection. If you have any comments concerning the accuracy of the time estimate(s) or suggestions for improving this form, please write to: CMS, Attn: DAVID Reports Clearance Officer, [address] [place] [address], [place], [place] [address].

## 2021-06-18 NOTE — PROGRESS NOTES
"[facility] Hematology and Oncology Progress Note    Patient: Krystal Virgen  MRN: [id number]  Date of Service: 6/19/2018        Reason for Visit    #.  Breast cancer, stage Ia, ER positive, HER-2 negative.  #.  Chronic microcytic anemia with iron deficiency.    Assessment and Plan  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)    Staging form: Breast, AJCC 7th Edition    - Clinical: No stage assigned - Unsigned    - Pathologic stage from 9/8/2017: Stage IA (T1c, N0, cM0) - Signed by Herbert Zavala MD on 9/8/2017          ER Status: Positive          KS Status: Positive          HER2 Status: Negative    ECOG Performance   ECOG Performance Status: 1     Distress Assessment  Distress Assessment Score: 6    Pain  Currently in Pain: Yes  Pain Score (Initial OR Reassessment): 3  Location: \"everything hurts\"    #. Stage IA (pT1c, pN0, cM0) invasive ductal carcinoma of the right breast, grade 1, ER/KS strongly positive, HER2/Uriel negative, with positive inferior margin. Associated DCIS. S/p right breast lumpectomy and right sentinel lymph node biopsy.  She declined reexcision or adjuvant radiation treatment.    - No clinical evidence of breast cancer recurrence. She tolerates anastrozole. and ok to continue.    -She will continue calcium/vitamin D.     -Screening mammogram due in August 2018.   -Follow up in 6 months for clinical exam.       #. Osteopenia/ low bone density    -DEXA scan from 9/21/2017 showed low bone density: L1-L3 T score-0.6, left femoral neck-2.1, right femoral neck-2.1, stable since 2014.   -She is on Fosamax for about 25 years.  discontinued Fosamax in 10/2017.   -Plan to repeat DEXA scan in 1 year.  If there is declining in bone density, we will need to change it to tamoxifen.   -Continue on vitamin D and add calcium 0637-0276 mg per day.     #. Chronic microcytic anemia with iron deficiency.   -Colonoscopy on 11/8/17 showed diverticulosis without evidence of source of " bleeding. EGD from 12/14/2017 showed a large hiatal hernia with John's erosion.    -She has a good understanding that she will need lifelong iron supplementation periodically.  She did not tolerate oral iron supplementation due to GI side effect with severe constipation.  She received IV Feraheme 2 doses in follow-up iron studies week ago showed normalization of iron parameters.       #. Chronic thrombocytosis likely reactive with iron deficiency, resolved.     #. Anxiety    - stable.  She is going to follow with spiritual guide to help her anxiety or stress related to her family issues.    Problem List    1. Iron deficiency anemia, unspecified iron deficiency anemia type  HM1(CBC and Differential)    Reticulocytes    Ferritin    Comprehensive Metabolic Panel    HM1 (CBC with Diff)        ______________________________________________________________________________    History of Present Illness    Jody presents herself today.  She reports she is tolerating anastrazole well.   She was supposed to have IV iron therapy in November but she decided not to come in due to cold weather that she does not want to get sick from respiratory tract infection.  She has stopped taking oral iron supplementation due to severe constipation.  She denies any blood loss.  She is very tired.    She has been taking amoxicillin for infection prevention as her usual that she take during winter months.   She just had shingles on her left back going to the left breast and she is on second round of Valtrex.   No breast concern.  No headaches.  No bone pain.    Pain Status  Currently in Pain: Yes    Review of Systems    Constitutional  Constitutional (WDL): Exceptions to WDL  Fatigue: Fatigue not relieved by rest - Limiting instrumental ADL  Fever: None  Chills: None  Neurosensory  Neurosensory (WDL): All neurosensory elements are within defined limits  Eye   Eye Disorder (WDL): All eye disorder elements are within defined limits  Ear  Ear  "Disorder (WDL): All ear disorder elements are within defined limits  Cardiovascular  Cardiovascular (WDL): All cardiovascular elements are within defined limits  Pulmonary  Respiratory (WDL): Exceptions to WDL  Cough: Mild symptoms, nonprescription intervention indicated (dry)  Dyspnea: Shortness of breath with moderate exertion  Hypoxia: None  Gastrointestinal  Gastrointestinal (WDL): Exceptions to WDL  Anorexia: None  Constipation: Occasional or intermittent symptoms, occasional use of stool softeners, laxatives, dietary modification, or enema (alternates between diarrhea and constipation)  Diarrhea: Increase of <4 stools per day over baseline, mild increase in ostomy output compared to baseline (alternates between diarrhea and constipation)  Dysphagia: None  Esophagitis: Symptomatic, altered eating/swallowing, oral supplements indicated (Prilosec)  Nausea: None  Pharyngitis: None  Vomiting: None  Dysgeusia: None  Dry Mouth: Symptomatic (e.g., dry or thick saliva) without significant dietary alteration, unstimulated saliva flow >0.2 ml/min  Genitourinary  Genitourinary (WDL): All genitourinary elements are within defined limits  Lymphatic  Lymph (WDL): All lymph disorder elements are within defined limits  Musculoskeletal and Connective Tissue  Musculoskeletal and Connetive Tissue Disorders (WDL): Exceptions to WDL (\"every thing hurts\")  Integumentary  Integumentary (WDL): All integumentary elements are within defined limits  Patient Coping  Patient Coping: Open/discussion  Distress Assessment  Distress Assessment Score: 6  Accompanied by  Accompanied by: Alone  Oral Chemo Adherence       Past History  Past Medical History:   Diagnosis Date     Anemia      Asthma      Breast cancer (H)      Chronic bronchitis (H)      Osteoporosis      Pneumonia      Sinusitis        Physical Exam    Recent Vitals 6/19/2018   Height 5' 2\"   Weight -   BSA (m2) -   /67   Pulse 93   Temp 97.9   Temp src 1   SpO2 94   Some " recent data might be hidden     General: alert, awake, not in acute distress, obese female.  HEENT: Head: Normal, normocephalic, atraumatic.  Eye: Normal external eye, conjunctiva, lids cornea, RINKU.  Ears:  Non-tender.  Nose: Normal external nose, mucus membranes and septum.  Pharynx: Dental Hygiene adequate. Normal buccal mucosa. Normal pharynx.  Neck / Thyroid: Supple, no masses, nodes, nodules or enlargement.  Lymphatics: No abnormally enlarged lymph nodes.  Chest: Normal chest wall and respirations. Clear to auscultation.  Breasts: Slight keloid formation in the previous lumpectomy site in the right breast.  No palpable masses.  Left breast is visibly larger than the right breast which is hard to baseline after right breast surgery.    Heart: S1 S2 RRR, no murmur.   Abdomen: abdomen is soft without significant tenderness, masses, organomegaly or guarding  Extremities: normal strength, tone, and muscle mass  Skin: normal. no rash or abnormalities  CNS: non focal.      Lab Results    Recent Results (from the past 168 hour(s))   Reticulocytes   Result Value Ref Range    Retic Absolute Count 0.115 (H) 0.010 - 0.110 mill/uL   Ferritin   Result Value Ref Range    Ferritin 107 10 - 130 ng/mL   Comprehensive Metabolic Panel   Result Value Ref Range    Sodium 140 136 - 145 mmol/L    Potassium 4.2 3.5 - 5.0 mmol/L    Chloride 107 98 - 107 mmol/L    CO2 23 22 - 31 mmol/L    Anion Gap, Calculation 10 5 - 18 mmol/L    Glucose 112 70 - 125 mg/dL    BUN 14 8 - 28 mg/dL    Creatinine 0.92 0.60 - 1.10 mg/dL    GFR MDRD Af Amer >60 >60 mL/min/1.73m2    GFR MDRD Non Af Amer 59 (L) >60 mL/min/1.73m2    Bilirubin, Total 0.5 0.0 - 1.0 mg/dL    Calcium 9.2 8.5 - 10.5 mg/dL    Protein, Total 6.3 6.0 - 8.0 g/dL    Albumin 3.3 (L) 3.5 - 5.0 g/dL    Alkaline Phosphatase 80 45 - 120 U/L    AST 19 0 - 40 U/L    ALT 29 0 - 45 U/L   HM1 (CBC with Diff)   Result Value Ref Range    WBC 9.2 4.0 - 11.0 thou/uL    RBC 4.41 3.80 - 5.40 mill/uL     Hemoglobin 13.3 12.0 - 16.0 g/dL    Hematocrit 39.4 35.0 - 47.0 %    MCV 89 80 - 100 fL    MCH 30.2 27.0 - 34.0 pg    MCHC 33.8 32.0 - 36.0 g/dL    RDW 12.5 11.0 - 14.5 %    Platelets 424 140 - 440 thou/uL    MPV 8.5 8.5 - 12.5 fL    Neutrophils % 69 50 - 70 %    Lymphocytes % 13 (L) 20 - 40 %    Monocytes % 8 2 - 10 %    Eosinophils % 8 (H) 0 - 6 %    Basophils % 2 0 - 2 %    Neutrophils Absolute 6.3 2.0 - 7.7 thou/uL    Lymphocytes Absolute 1.2 0.8 - 4.4 thou/uL    Monocytes Absolute 0.7 0.0 - 0.9 thou/uL    Eosinophils Absolute 0.8 (H) 0.0 - 0.4 thou/uL    Basophils Absolute 0.1 0.0 - 0.2 thou/uL       Imaging    No results found.      Signed by: Herbert Zavala MD

## 2021-06-18 NOTE — PROGRESS NOTES
Patient here for labs and 4 mo. Follow-up with Dr. Zavala for breast CA.  JAZ Aguiar RN    enlarged lymph nodes

## 2021-06-18 NOTE — TELEPHONE ENCOUNTER
Problem: Adult Inpatient Plan of Care  Goal: Plan of Care Review  Outcome: Ongoing, Progressing  Flowsheets (Taken 6/18/2021 0432)  Outcome Summary: VSS, room air. Pt ambulated in cameron x1. Not complaining of pain or nausea. Easily full/not tolerating liquids still. Continue to encourage ambulation, drinking, and IS use.     Problem: Adult Inpatient Plan of Care  Goal: Patient-Specific Goal (Individualized)  Outcome: Ongoing, Progressing  Flowsheets (Taken 6/18/2021 0432)  Patient-Specific Goals (Include Timeframe): Monitor pain q2h     Problem: Adult Inpatient Plan of Care  Goal: Absence of Hospital-Acquired Illness or Injury  Outcome: Ongoing, Progressing     Problem: Adult Inpatient Plan of Care  Goal: Absence of Hospital-Acquired Illness or Injury  Intervention: Identify and Manage Fall Risk  Recent Flowsheet Documentation  Taken 6/18/2021 0400 by Catina Russell RN  Safety Promotion/Fall Prevention: activity supervised  Taken 6/18/2021 0200 by Catina Russell RN  Safety Promotion/Fall Prevention: activity supervised  Taken 6/18/2021 0000 by Catina Russell RN  Safety Promotion/Fall Prevention: activity supervised  Taken 6/17/2021 2200 by Catina Russell RN  Safety Promotion/Fall Prevention: activity supervised  Taken 6/17/2021 2000 by Catina Russell RN  Safety Promotion/Fall Prevention: activity supervised     Problem: Adult Inpatient Plan of Care  Goal: Absence of Hospital-Acquired Illness or Injury  Intervention: Prevent Skin Injury  Recent Flowsheet Documentation  Taken 6/18/2021 0400 by Catina Russell RN  Body Position: position changed independently  Taken 6/18/2021 0200 by Catina Russell RN  Body Position: position changed independently  Taken 6/18/2021 0000 by Catina Russell RN  Body Position: position changed independently  Taken 6/17/2021 2200 by Catina Russell RN  Body Position: position changed independently  Taken 6/17/2021 2000 by Catina Russell RN  Body  Maryjane Mauricio PA-C - 01/21/2021 2:30 PM CST    You were seen today in the Cardiovascular Clinic at the [facility].  Today, you were seen by Maryjane Mauricio PA-C for your cardiovascular care.     Changes Made Today:  1. New prescription sent for Plavix. Once this has arrived, please complete day's worth of Brilinta. The following morning, you should take 300mg (or 4 tablets) of Plavix in the morning. After that dose, start 75mg (or 1 tablet) of Plavix the following morning. You will take this medication once per day.    clopidogrel (PLAVIX) 75 MG tablet   Take 1 tablet (75 mg) by mouth daily 90 tablet   3 01/21/2021     clopidogrel (PLAVIX) 75 MG tablet   Take 4 tablets (300 mg) by mouth once for 1 dose 4 tablet   0 01/21/2021 1/22/21 PCP filled Plavix because [facility] rx was sent to incorrect pharm.  3/3/21 rx filled by [facility] - sent to [facility].    [facility] and was told pt does not have rx drug coverage there.    LM for pt to call [phone] to discuss.  bakari   Position: position changed independently     Problem: Adult Inpatient Plan of Care  Goal: Absence of Hospital-Acquired Illness or Injury  Intervention: Prevent and Manage VTE (venous thromboembolism) Risk  Recent Flowsheet Documentation  Taken 6/17/2021 2000 by Catina Russell RN  VTE Prevention/Management:   bilateral   sequential compression devices on     Problem: Adult Inpatient Plan of Care  Goal: Optimal Comfort and Wellbeing  Outcome: Ongoing, Progressing     Problem: Adult Inpatient Plan of Care  Goal: Optimal Comfort and Wellbeing  Intervention: Provide Person-Centered Care  Recent Flowsheet Documentation  Taken 6/17/2021 2000 by Catina Russell RN  Trust Relationship/Rapport:   care explained   choices provided     Problem: Adult Inpatient Plan of Care  Goal: Readiness for Transition of Care  Outcome: Ongoing, Progressing     Problem: Fall Injury Risk  Goal: Absence of Fall and Fall-Related Injury  Outcome: Ongoing, Progressing     Problem: Fall Injury Risk  Goal: Absence of Fall and Fall-Related Injury  Intervention: Identify and Manage Contributors to Fall Injury Risk  Recent Flowsheet Documentation  Taken 6/18/2021 0400 by Catina Russell RN  Medication Review/Management: medications reviewed  Taken 6/18/2021 0200 by Catina Russell RN  Medication Review/Management: medications reviewed  Taken 6/18/2021 0000 by Catina Russell RN  Medication Review/Management: medications reviewed  Taken 6/17/2021 2200 by Catina Russell RN  Medication Review/Management: medications reviewed  Taken 6/17/2021 2000 by Catina Russell RN  Medication Review/Management: medications reviewed     Problem: Fall Injury Risk  Goal: Absence of Fall and Fall-Related Injury  Intervention: Promote Injury-Free Environment  Recent Flowsheet Documentation  Taken 6/18/2021 0400 by Catina Russell RN  Safety Promotion/Fall Prevention: activity supervised  Taken 6/18/2021 0200 by Catina Russell RN  Safety  Promotion/Fall Prevention: activity supervised  Taken 6/18/2021 0000 by Catina Russell RN  Safety Promotion/Fall Prevention: activity supervised  Taken 6/17/2021 2200 by Catina Russell RN  Safety Promotion/Fall Prevention: activity supervised  Taken 6/17/2021 2000 by Catina Russell RN  Safety Promotion/Fall Prevention: activity supervised     Problem: Bariatric Care Environmental Safety (Bariatric Surgery)  Goal: Safety Maintained with Care  Outcome: Ongoing, Progressing     Problem: Bleeding (Bariatric Surgery)  Goal: Absence of Bleeding  Outcome: Ongoing, Progressing     Problem: Bowel Motility Impaired (Bariatric Surgery)  Goal: Effective Bowel Motility and Elimination  Outcome: Ongoing, Progressing     Problem: Glycemic Control Impaired (Bariatric Surgery)  Goal: Blood Glucose Level Within Desired Range  Outcome: Ongoing, Progressing     Problem: Infection (Bariatric Surgery)  Goal: Absence of Infection Signs and Symptoms  Outcome: Ongoing, Progressing     Problem: Ongoing Anesthesia Effects (Bariatric Surgery)  Goal: Anesthesia/Sedation Recovery  Outcome: Ongoing, Progressing     Problem: Ongoing Anesthesia Effects (Bariatric Surgery)  Goal: Anesthesia/Sedation Recovery  Intervention: Optimize Anesthesia Recovery  Recent Flowsheet Documentation  Taken 6/18/2021 0400 by Catina Russell RN  Safety Promotion/Fall Prevention: activity supervised  Taken 6/18/2021 0200 by Catina Russell RN  Safety Promotion/Fall Prevention: activity supervised  Taken 6/18/2021 0000 by Catina Russell RN  Safety Promotion/Fall Prevention: activity supervised  Taken 6/17/2021 2200 by Catina Russell RN  Safety Promotion/Fall Prevention: activity supervised  Taken 6/17/2021 2000 by Catina Russell RN  Patient Tolerance (IS): fair  Safety Promotion/Fall Prevention: activity supervised  Administration (IS): self-administered  Level Incentive Spirometer (mL): 1200  Incentive Spirometer Predicted Level (mL):  2000  Number of Repetitions (IS): 5     Problem: Pain (Bariatric Surgery)  Goal: Acceptable Pain Control  Outcome: Ongoing, Progressing     Problem: Pain (Bariatric Surgery)  Goal: Acceptable Pain Control  Intervention: Prevent or Manage Pain  Recent Flowsheet Documentation  Taken 6/17/2021 2000 by Catina Russell RN  Diversional Activities:   television   smartphone     Problem: Postoperative Nausea and Vomiting (Bariatric Surgery)  Goal: Nausea and Vomiting Relief  Outcome: Ongoing, Progressing     Problem: Postoperative Urinary Retention (Bariatric Surgery)  Goal: Effective Urinary Elimination  Outcome: Ongoing, Progressing   Goal Outcome Evaluation:              Outcome Summary: VSS, room air. Pt ambulated in cameron x1. Not complaining of pain or nausea. Easily full/not tolerating liquids still. Continue to encourage ambulation, drinking, and IS use.

## 2021-06-20 NOTE — PROGRESS NOTES
Patient ID: Krystal Virgen is a 78 y.o. female.  /76  Pulse 68  Wt 172 lb (78 kg)  BMI 31.46 kg/m2    Assessment/Plan:                Diagnoses and all orders for this visit:    Back pain    Migraine headache    Left foot pain    Osteoarthritis of both hands, unspecified osteoarthritis type    Other orders  -     Influenza High Dose, Seasonal 65+ yrs  -     Pneumococcal conjugate vaccine 13-valent 6wks-17yrs; >50yrs  -     celecoxib (CELEBREX) 200 MG capsule; Take 1 capsule (200 mg total) by mouth daily.  Dispense: 90 capsule; Refill: 3      DISCUSSION  Initiate Celebrex 2 mg 1 capsule daily.  Consider exploring further other modes of pain control.  Immunizations administered.  Duration of visit today about 25 minutes more than 50% of the time is spent in counseling and coronation of care.  Subjective:     HPI    Krystal Virgen is a 78 y.o. female who is here today to discuss pain.  Patient has immunodeficiency And Asthma That Has Led to Frequent Respiratory Infections.  She Follows Closely with Pulmonology at [facility].  She Reports No Current Concern.  She Also Struggles with Several Pain Issues Including Chronic Back Pain migraine headache, foot pain and osteo-arthritis of the hands.  Today we discussed considerations related to pain management.  She has used intermittently oxycodone for management of more severe migraine headache pain.  We discussed considerations related to over-the-counter analgesics such as acetaminophen and NSAIDs.  We discussed NSAIDs that may be used that could be more effective and more gentle on the digestive tract.  We also discussed other modes of pain control including healing touch, chiropractic and acupuncture.  We discussed updating vaccinations today as well.    Review of Systems  Complete review of systems is obtained.  Other than the specific considerations noted above complete review of systems is negative.      Objective:   Medications:  Current  Outpatient Prescriptions   Medication Sig Note     acetaminophen (TYLENOL) 325 MG tablet Take 325 mg by mouth as needed for pain.      albuterol (PROAIR HFA;PROVENTIL HFA;VENTOLIN HFA) 90 mcg/actuation inhaler Inhale 2 puffs. 4/26/2018: Received from: [facility] Received Sig: Inhale 2 Puffs every 4 hours as needed for Wheezing or Shortness of Breath.     albuterol (PROVENTIL) 2.5 mg /3 mL (0.083 %) nebulizer solution INHALE 1 VIAL VIA NEBULIZER Q 4 H PRF WHEEZING OR SOB 5/27/2016: Received from: External Pharmacy     anastrozole (ARIMIDEX) 1 mg tablet Take 1 tablet (1 mg total) by mouth daily.      budesonide-formoterol (SYMBICORT) 160-4.5 mcg/actuation inhaler Inhale 2 puffs. 4/26/2018: Received from: [facility] Received Sig: Inhale 2 Puffs two times a day.     buPROPion (WELLBUTRIN SR) 100 MG 12 hr tablet Take 2 tablets (200 mg total) by mouth 2 (two) times a day.      CHOLECALCIFEROL, VITAMIN D3, (VITAMIN D3 ORAL) Take 1,000 Units by mouth daily.      citalopram (CELEXA) 20 MG tablet TAKE ONE TABLET BY MOUTH EVERY DAY      DULoxetine (CYMBALTA) 30 MG capsule Take 1 capsule (30 mg total) by mouth daily.      ergocalciferol (ERGOCALCIFEROL) 50,000 unit capsule Take 50,000 Units by mouth once a week. 10/5/2017: Hasn't started yet      fluticasone (FLONASE) 50 mcg/actuation nasal spray 2 sprays. 4/26/2018: Received from: [facility] Received Sig: Place 2 Sprays into both nostrils daily.     gabapentin (NEURONTIN) 100 MG capsule Take 100 mg by mouth 3 (three) times a day.      lansoprazole (PREVACID) 30 MG capsule TAKE ONE CAPSULE BY MOUTH TWICE A DAY 4/26/2018: Received from: [facility]     montelukast (SINGULAIR) 10 mg tablet Take 10 mg by mouth bedtime.      naproxen sodium (ALEVE) 220 MG tablet Take 220 mg by mouth as needed for pain.      SPIRIVA WITH HANDIHALER 18 mcg inhalation capsule as needed.  5/27/2016: Received from: External Pharmacy     SUMAtriptan (IMITREX) 20 mg/actuation nasal spray 1  spray into each nostril every 2 (two) hours as needed for migraine.      traZODone (DESYREL) 50 MG tablet TAKE ONE TABLET BY MOUTH AT BEDTIME      triamcinolone (KENALOG) 0.5 % ointment Apply topically 2 (two) times a day. (Patient taking differently: Apply 1 application topically as needed. )      VIRTUSSIN AC  mg/5 mL liquid TK 10 ML PO Q 4 H PRN 5/27/2016: Received from: External Pharmacy     celecoxib (CELEBREX) 200 MG capsule Take 1 capsule (200 mg total) by mouth daily.      Allergies:  Allergies   Allergen Reactions     Levofloxacin Other (See Comments)     Near full body myalgias     Amoxicillin Other (See Comments)     Sulfa (Sulfonamide Antibiotics) Hives     Tobacco:   reports that she has never smoked. She has never used smokeless tobacco.     Physical Exam      /76  Pulse 68  Wt 172 lb (78 kg)  BMI 31.46 kg/m2    General Appearance:    Alert, cooperative, no distress   Eyes:    No conjunctival irritation, no scleral icterus       Lungs:     Clear to auscultation bilaterally, respirations unlabored   Heart:    Regular rate and rhythm, S1 and S2 normal, no murmur, rub   or gallop   Extremities:   Extremities normal, atraumatic, no cyanosis or edema   Skin:   Skin color, texture, turgor normal, no rashes or lesions   Neurologic:   Normal strength and sensation

## 2021-06-20 NOTE — PROGRESS NOTES
MTM Initial Encounter  Assessment & Plan                                                     Arthritis: Uncontrolled. Appears that celebrex was not helpful at all, therefore will not try a two times a day dosing. Will have patient follow up with Dr. Fairchild and discuss alternative therapies. Until then, will have her continue naproxen, but will have her use rx naproxen 500 mg two times a day and stop OTC. Recommended to take with food and water. Will have her use schedule APAP since it sounds like she only used it PRN. Discussed that scheduled 1000 mg three times a day APAP can be slightly helpful. Will have her stay off gabapentin. Reviewed topical products of diclofenac gel and lidocaine patch -- will have her use diclofenac gel on hands and knees and lidocaine patch on lower back. Could consider CBD oil in the future.   PLAN:   1. Instead of OTC Aleve, take naproxen 500 mg two times a day with food.   2. Try the diclofenac gel using the dosing card. 2 grams on hands and 4 grams lower extremities. Use up four times a day.   3. APAP 1000 mg three times a day on a schedule.   4. Try a lidocaine patch 4% (over the counter) on your lower back.     Headache/Migraine: Stable. Will have patient continue PRN regimen.     Hx Breast Cancer: Continue to follow up with [facility] oncology. Will request a refill for anastrozole.     Asthma/Allergies: Stable. Recommended to continue current regimen.     Osteopenia: Recommended patient to switch to calcium citrate since she is on concomitant PPI. Will have her continue two times a day calcium since citrate has a low elemental calcium amount. Last Vitamin D level WNL but is old therefore could consider rechecking in the future. Per oncology, they will recheck DEXA in 1 year and consider changing to tamoxifen if needed.   PLAN:   1. When done with Viactiv, switch to calcium citrate BID    Depression/Anxiety/Insomnia: Stable. Appears that the SNRI + bupropion combination works well  for her. Reviewed that bupropion is very activating and may be contributing to her insomnia. Therefore reviewed a trial of bupropion  mg that she can take all at once in the morning. She was agreeable that she feels she does not need 400 mg of bupropion. Discussed that if needed, we could add 150 mg bupropion if needed (total and max 450 mg/day). She will watch for improvement in insomnia. Reviewed that in the future we could consider increasing duloxetine to help with pain.   PLAN:   1. Instead of bupropion 200 mg two times a day, change to bupropion  mg once daily morning.      GERD: Stable. Recommended to continue since she is currently on NSAIDs for GI protection.     Follow Up  2 months    Subjective & Objective                                                     Krystal Virgen is a 78 y.o. female coming in for an initial visit for Medication Therapy Management. She was referred to me from  [facility] program    Medication Adherence/Access: Brings her pills with her today -- stores them in a tackle box. Has a shoebox with the bottles and she fills up the tackle box.     Arthritis: Reports low back pain, foot pain, hip. At last appt with Dr. Fairchild, celebrex 200 mg once daily was started. Reports that she tried the celebrex and got more pain in her hip. Quit two days ago because it was not helpful. Currently taking 2 OTC aleve three times a day. Takes with food. Believes she was on rx naproxen in the past but it was a large green tablet that she did not like to swallow. Has tried capsaicin topically in the past.   Was on gabapentin for shingles - not helpful, stopped.   Believes she has diclofenac gel and lidocaine at home.   Has never has injections for her arthritis.     Headache/Migraine: Reports that she has had migraine all her life, but they have lessened. Uses sumatriptan nasal spray PRN aura which is very helpful. Will also use a baclofen PRN. Has oxycodone at home which she uses only  occasionally for a migraine.      Hx Breast Cancer: Currently taking anastrozole 1 mg daily. Follows with [facility] oncology. Reports hot flashes from anastrozole which bother her. Requests a refill.     Asthma/Allergies: Currently taking Symbicort 160-4.5 mcg 2 puffs two times a day, albuterol HFA PRN, and albuterol neb PRN and montelukast 10 mg daily. No longe ashtyn Spiriva -- did not like it. Reports that she usually does not use albuterol but does not go anywhere without it. Winter her sinuses act up and she is prone to respiratory issues then she will use neb.  In winter she will OTC antihistamine -- brings benedryl today. Uses Avelox in the winter prophylactically.   Follows with  Pulmonary.     Osteopenia: Currently taking Viactiv (calcium carbonate) daily and Vitamin D 2000 IU daily. Reports that she has been on a lot of prednisone in the past.   Dietary calcium = Milk not daily, ice cream daily, cheese, yogurt almost daily.   Last Vitamin D level = 49.3 in 2014  Last DEXA 9/21/17 T score = -2.1    Depression/Anxiety/Insomnia: Currently taking duloxetine 30 mg daily, bupropion  mg x2 (200 mg) two times a day, and trazodone 50 mg daily. Reports that she alternates every few years with duloxetine and citalopram since they wear off after a few year. Feels like this combination controls her mood well. Uses trazodone but cannot fall asleep until 2am. Feels like she could use more trazodone. Dry mouth always. Has never been on duloxetine >30 mg. No suicidal thoughts.     GERD: Currently taking lansoprazole 30 mg two times a day. Denies any issues, helps with her cough per pulmonary.         PMH: reviewed in EPIC   Allergies/ADRs: reviewed in EPIC   Alcohol: reviewed in EPIC   Tobacco:   History   Smoking Status     Never Smoker   Smokeless Tobacco     Never Used     Today's Vitals:   Vitals:    09/27/18 1323   BP: 126/76   Pulse: 74     ----------------    Much or all of the text in this note was generated  through the use of Dragon Dictate voice-to-text software. Errors in spelling or words which seem out of context are unintentional. Sound alike errors, in particular, may have escaped editing.    The patient was given a CMS standardized format medication action plan and after visit summary    I spent 45 minutes with this patient today. An extra 15 minutes was spent creating the Medication Action Plan. All changes were made via collaborative practice agreement with Juan C Fairchild MD. A copy of the visit note was provided to the patient's provider.     Mikala Hays PharmSebasD., [facility] Center[facility]  Medication Therapy Management Pharmacist  [facility]     Current Outpatient Prescriptions   Medication Sig Dispense Refill     acetaminophen (TYLENOL) 325 MG tablet Take 325 mg by mouth as needed for pain.       albuterol (PROAIR HFA;PROVENTIL HFA;VENTOLIN HFA) 90 mcg/actuation inhaler Inhale 2 puffs.       albuterol (PROVENTIL) 2.5 mg /3 mL (0.083 %) nebulizer solution INHALE 1 VIAL VIA NEBULIZER Q 4 H PRF WHEEZING OR SOB  0     anastrozole (ARIMIDEX) 1 mg tablet Take 1 tablet (1 mg total) by mouth daily. 90 tablet 0     budesonide-formoterol (SYMBICORT) 160-4.5 mcg/actuation inhaler Inhale 2 puffs.       buPROPion (WELLBUTRIN SR) 100 MG 12 hr tablet Take 2 tablets (200 mg total) by mouth 2 (two) times a day. 360 tablet 3     celecoxib (CELEBREX) 200 MG capsule Take 1 capsule (200 mg total) by mouth daily. 90 capsule 3     CHOLECALCIFEROL, VITAMIN D3, (VITAMIN D3 ORAL) Take 1,000 Units by mouth daily.       citalopram (CELEXA) 20 MG tablet TAKE ONE TABLET BY MOUTH EVERY DAY 90 tablet 3     DULoxetine (CYMBALTA) 30 MG capsule Take 1 capsule (30 mg total) by mouth daily. 90 capsule 3     ergocalciferol (ERGOCALCIFEROL) 50,000 unit capsule Take 50,000 Units by mouth once a week.       fluticasone (FLONASE) 50 mcg/actuation nasal spray 2 sprays.       gabapentin (NEURONTIN) 100 MG capsule Take 100 mg by mouth 3 (three)  times a day. 90 capsule 0     lansoprazole (PREVACID) 30 MG capsule TAKE ONE CAPSULE BY MOUTH TWICE A DAY       montelukast (SINGULAIR) 10 mg tablet Take 10 mg by mouth bedtime.       naproxen sodium (ALEVE) 220 MG tablet Take 220 mg by mouth as needed for pain.       SPIRIVA WITH HANDIHALER 18 mcg inhalation capsule as needed.   3     SUMAtriptan (IMITREX) 20 mg/actuation nasal spray 1 spray into each nostril every 2 (two) hours as needed for migraine.       traZODone (DESYREL) 50 MG tablet TAKE ONE TABLET BY MOUTH AT BEDTIME 90 tablet 2     triamcinolone (KENALOG) 0.5 % ointment Apply topically 2 (two) times a day. (Patient taking differently: Apply 1 application topically as needed. ) 45 g 1     VIRTUSSIN AC  mg/5 mL liquid TK 10 ML PO Q 4 H PRN  0     No current facility-administered medications for this visit.

## 2021-06-20 NOTE — LETTER
Letter by Herbert Zavala MD at      Author: Herbert Zavala MD Service: -- Author Type: --    Filed:  Encounter Date: 7/21/2020 Status: (Other)                   Office Hours: Monday - Friday 8:00 - 4:30PM    Krystal Virgen  [address]  [place] [address]           July 21, 2020      Dear Krystal:    It looks as though you are due to see Dr. Zavala in October. If you would like to schedule this please call [phone]         Sincerely,        [facility]

## 2021-06-20 NOTE — LETTER
Letter by Juan C Fairhcild MD at      Author: Juan C Fairchild MD Service: -- Author Type: --    Filed:  Encounter Date: 8/30/2020 Status: (Other)         Krystal Virgen  [address]  [place] [address]             August 30, 2020         Dear Ms. Param,    Below are the results from your recent visit:    Resulted Orders   HM2(CBC w/o Differential)   Result Value Ref Range    WBC 8.9 4.0 - 11.0 thou/uL    RBC 4.03 3.80 - 5.40 mill/uL    Hemoglobin 11.7 (L) 12.0 - 16.0 g/dL    Hematocrit 35.3 35.0 - 47.0 %    MCV 88 80 - 100 fL    MCH 29.2 27.0 - 34.0 pg    MCHC 33.3 32.0 - 36.0 g/dL    RDW 12.2 11.0 - 14.5 %    Platelets 381 140 - 440 thou/uL    MPV 7.8 7.0 - 10.0 fL       There is no significant problem with your blood counts.  By the time you receive this he would have already had your surgery.  You do have a little bit of an anemia meaning your red blood cell count (hemoglobin) is a little bit below normal.  This is probably a result of your recent injury and is not a significant concern.    Please call with questions or contact us using [employer].    Sincerely,        Electronically signed by Juan C Fairchild MD

## 2021-06-20 NOTE — PROGRESS NOTES
"Assessment/Plan:    1. Trochanteric bursitis of left hip  Left hip trochanteric bursitis reviewed.  Conservative treatments initially.  Patient does have Naprosyn available at home and she will use twice daily as needed with avoidance of exacerbating triggers however patient insistent on continuing yeny chi exercises in pool setting.  Encourage patient to continue avoidance of opioid analgesics due to history of alcoholism now sober times 27 years.  Discussed consideration for cortical steroid injection if persistent concerns.    2. History of alcoholism (H)  As above.  Continue ongoing sobriety times 27 years.    3. Migraine with aura and without status migrainosus, not intractable  Migraine history.  Doing relatively well.  Has oxycodone that she states works best but she uses very sparingly and has not required recently.  Hydrocodone has not been helpful for her in the past.  Did review concerns with history of alcoholism which remains in sobriety.    25 minutes total time with patient, > 50% with counseling and coordination of cares.         Subjective:    Krystal Virgen is seen today for evaluation of left hip pain.  Symptoms were past 3 weeks.  Thought she perhaps pulled a muscle initially in her left hip or back.  Was participating in yeny chi type exercises in a pool setting.  Has not been using Celebrex since it does not help.  Naprosyn available however this too has not been used consistently.  Symptoms over the last 3 weeks.  Has seen [facility]TM pharmacist with recommendation for use of lidocaine patches etc. for chronic pain concerns.  Has not had any alcohol in 27 years.  Migraine history.  Does use oxycodone occasionally stating hydrocodone does not help.  Patient not concerned about addictive qualities to medication.  Comprehensive review of systems as above otherwise all negative.    \"Jody\" or \"Krystal\" .....not Tammy    Past Surgical History:   Procedure Laterality Date     BREAST LUMPECTOMY Right 2017 "     CHOLECYSTECTOMY       FOOT SURGERY       HYSTERECTOMY  1984     SINUS SURGERY          Family History   Problem Relation Age of Onset     Breast cancer Paternal Aunt 48     Breast cancer Mother 78     Breast cancer Sister 48     Breast cancer Maternal Aunt 35     Heart disease Father      Macular degeneration Father      Heart disease Brother         Past Medical History:   Diagnosis Date     Anemia      Asthma      Breast cancer (H)      Chronic bronchitis (H)      Osteoporosis      Pneumonia      Sinusitis         Social History   Substance Use Topics     Smoking status: Never Smoker     Smokeless tobacco: Never Used     Alcohol use No        Current Outpatient Prescriptions   Medication Sig Dispense Refill     acetaminophen (TYLENOL) 325 MG tablet Take 325 mg by mouth as needed for pain.       albuterol (PROAIR HFA;PROVENTIL HFA;VENTOLIN HFA) 90 mcg/actuation inhaler Inhale 2 puffs.       albuterol (PROVENTIL) 2.5 mg /3 mL (0.083 %) nebulizer solution INHALE 1 VIAL VIA NEBULIZER Q 4 H PRF WHEEZING OR SOB  0     anastrozole (ARIMIDEX) 1 mg tablet Take 1 tablet (1 mg total) by mouth daily. 90 tablet 0     budesonide-formoterol (SYMBICORT) 160-4.5 mcg/actuation inhaler Inhale 2 puffs.       buPROPion (WELLBUTRIN XL) 300 MG 24 hr tablet Take 1 tablet (300 mg total) by mouth every morning. 90 tablet 0     CHOLECALCIFEROL, VITAMIN D3, (VITAMIN D3 ORAL) Take 2,000 Units by mouth daily.       DULoxetine (CYMBALTA) 30 MG capsule Take 1 capsule (30 mg total) by mouth daily. 90 capsule 3     fluticasone (FLONASE) 50 mcg/actuation nasal spray 2 sprays.       lansoprazole (PREVACID) 30 MG capsule TAKE ONE CAPSULE BY MOUTH TWICE A DAY       montelukast (SINGULAIR) 10 mg tablet Take 10 mg by mouth bedtime.       naproxen (NAPROSYN) 500 MG tablet Take 1 tablet (500 mg total) by mouth 2 (two) times a day with meals. 180 tablet 0     SUMAtriptan (IMITREX) 20 mg/actuation nasal spray 1 spray into each nostril every 2 (two)  hours as needed for migraine.       traZODone (DESYREL) 50 MG tablet TAKE ONE TABLET BY MOUTH AT BEDTIME 90 tablet 2     VIRTUSSIN AC  mg/5 mL liquid TK 10 ML PO Q 4 H PRN  0     triamcinolone (KENALOG) 0.5 % ointment Apply topically 2 (two) times a day. (Patient taking differently: Apply 1 application topically as needed. ) 45 g 1     No current facility-administered medications for this visit.           Objective:    Vitals:    10/04/18 1354   BP: 130/60   Pulse: 86   SpO2: 96%      There is no height or weight on file to calculate BMI.    Alert.  No apparent distress.  Left hip trochanteric bursa tenderness on exam.  Hip range of motion appears otherwise symmetric with internal and external range of motion and hip flexion.  Distal CMS appears intact.  Chest clear.  Cardiac exam regular.  Cooperative and forthcoming with appropriate eye contact without significant psychomotor agitation etc.      This note has been dictated using voice recognition software and as a result may contain minor grammatical errors and unintended word substitutions.

## 2021-06-21 NOTE — LETTER
Letter by Herbert Zavala MD at      Author: Herbert Zavala MD Service: -- Author Type: --    Filed:  Encounter Date: 1/28/2021 Status: (Other)                   Office Hours: Monday - Friday 8:00 - 4:30PM    Krystal Virgen  [address]  [place] [address]           January 28, 2021      Dear Krystal:    It looks as though you are due to see Dr. Zavala in April. If you would like to schedule this please call [phone]         Sincerely,        [facility]

## 2021-06-21 NOTE — PROGRESS NOTES
Patient ID: Krystal Virgen is a 78 y.o. female.  /76  Pulse 72  Temp 98.6  F (37  C)  Wt 174 lb (78.9 kg)  SpO2 93%  BMI 31.83 kg/m2    Assessment/Plan:                Diagnoses and all orders for this visit:    Trochanteric bursitis of left hip  -     methylPREDNISolone acetate injection 80 mg (DEPO-MEDROL); Inject 1 mL (80 mg total) into the joint once.  -     lidocaine 10 mg/mL (1 %) injection 3 mL; Inject 3 mL into the joint once.    DISCUSSION  Injection into the trochanteric bursa performed as noted in the procedure note below.  Patient will follow-up by phone if there are questions if there are new concerns she will need reevaluation.  Subjective:     HPI    Krystal Virgen is a 78 y.o. female she is here today to discuss ongoing left hip pain.  Patient reports pain in the left hip that is severe without any associated injury.  Pain is maximal tenderness right over the left trochanter.  She was seen for this previously diagnosed with trochanteric bursitis.  Patient reports no significant change in the character of her pain.  Patient reports that use of over-the-counter medication including NSAIDs has not helped with pain management.  It had previously been mentioned that an injection may be helpful she would like to discuss this further.  She does not have any numbness or tingling.  Pain is still confined to the area around the trochanteric bursa.  She does report some radiation of pain down toward the direction of the knee.  She denies any falls or injuries.  She otherwise feels that her chronic lung disease is becoming exacerbated and she has discussed with her pulmonologist previously how to treat this and she plans to initiate a course of prednisone.  Her breathing was not deemed to be a significant concern for evaluation today based on a brief discussion.    Review of Systems  Complete review of systems is obtained.  Other than the specific considerations noted above complete review of  systems is negative.          Objective:   Medications:  Current Outpatient Prescriptions   Medication Sig Note     acetaminophen (TYLENOL) 325 MG tablet Take 325 mg by mouth as needed for pain.      albuterol (PROAIR HFA;PROVENTIL HFA;VENTOLIN HFA) 90 mcg/actuation inhaler Inhale 2 puffs. 4/26/2018: Received from: [facility] Received Sig: Inhale 2 Puffs every 4 hours as needed for Wheezing or Shortness of Breath.     albuterol (PROVENTIL) 2.5 mg /3 mL (0.083 %) nebulizer solution INHALE 1 VIAL VIA NEBULIZER Q 4 H PRF WHEEZING OR SOB 5/27/2016: Received from: External Pharmacy     anastrozole (ARIMIDEX) 1 mg tablet TAKE ONE TABLET BY MOUTH DAILY      budesonide-formoterol (SYMBICORT) 160-4.5 mcg/actuation inhaler Inhale 2 puffs. 4/26/2018: Received from: [facility] Received Sig: Inhale 2 Puffs two times a day.     buPROPion (WELLBUTRIN XL) 300 MG 24 hr tablet Take 1 tablet (300 mg total) by mouth every morning.      CHOLECALCIFEROL, VITAMIN D3, (VITAMIN D3 ORAL) Take 2,000 Units by mouth daily.      DULoxetine (CYMBALTA) 30 MG capsule Take 1 capsule (30 mg total) by mouth daily.      fluticasone (FLONASE) 50 mcg/actuation nasal spray 2 sprays. 4/26/2018: Received from: [facility] Received Sig: Place 2 Sprays into both nostrils daily.     lansoprazole (PREVACID) 30 MG capsule TAKE ONE CAPSULE BY MOUTH TWICE A DAY 4/26/2018: Received from: [facility]     montelukast (SINGULAIR) 10 mg tablet Take 10 mg by mouth bedtime.      naproxen (NAPROSYN) 500 MG tablet Take 1 tablet (500 mg total) by mouth 2 (two) times a day with meals.      predniSONE (DELTASONE) 10 mg tablet  10/12/2018: Received from: External Pharmacy     SUMAtriptan (IMITREX) 20 mg/actuation nasal spray 1 spray into each nostril every 2 (two) hours as needed for migraine.      traZODone (DESYREL) 50 MG tablet TAKE ONE TABLET BY MOUTH AT BEDTIME      triamcinolone (KENALOG) 0.5 % ointment Apply topically 2 (two) times a day. (Patient taking  differently: Apply 1 application topically as needed. )      VIRTUSSIN AC  mg/5 mL liquid TK 10 ML PO Q 4 H PRN 5/27/2016: Received from: External Pharmacy       Allergies:  Allergies   Allergen Reactions     Levofloxacin Other (See Comments)     Near full body myalgias     Amoxicillin Other (See Comments)     Sulfa (Sulfonamide Antibiotics) Hives       Tobacco:   reports that she has never smoked. She has never used smokeless tobacco.     Physical Exam          /76  Pulse 72  Temp 98.6  F (37  C)  Wt 174 lb (78.9 kg)  SpO2 93%  BMI 31.83 kg/m2    General: Patient alert no signs of distress she does not appear to be having any significant difficulty with breathing during the course of our visit.  She does sound somewhat congested with a slightly hoarse voice.    Left hip: There is significant pain to palpation over the bursa as expected.  There is limited range of motion.  She is able to stand and bear weight without significant pain.  No evidence of any sensory disturbance or overall motor dysfunction.  Pain is limiting factor for decreased range of motion.    Left hip trochanteric bursa injection procedure Note     Krystal Virgen is a 78 y.o. female is here today for a corticosteroid injection into the left trochanteric region of the hip for pain relief as other more conservative measures have failed     Indications:   No contraindication to the procedure.  Patient needs additional treatment for resistant pain.    Procedure Details   After the nature the procedure was discussed in great detail including risks benefits and alternatives were elected to proceed.  With the patient in the right lateral decubitus position appropriate landmarks identified.  The area in question is then cleansed with iodine.  Then using a 5 cc syringe containing 3 cc of 1% lidocaine mixed with 1 cc of 80 mg/mL Depo-Medrol the injection is performed through a 25-gauge 1-1/2 inch needle.  The needle is directed at the  point of maximal tenderness.    Findings:  Some mild immediate pain relief from the lidocaine    Specimens: See orders    Complications:   None    Plan:   Anticipate the effect of the cortisone will onset within days.  Patient is encouraged to refrain from activities especially if there is any numbing effect in the short-term from the lidocaine.  If there are complications which were discussed patient should follow-up for evaluation or at least call for advice as appropriate.      Juan C Fairchild

## 2021-06-21 NOTE — PROGRESS NOTES
Patient ID: Krystal Virgen is a 78 y.o. female.  /80  Pulse 75  Wt 174 lb (78.9 kg)  BMI 31.83 kg/m2    Assessment/Plan:                Diagnoses and all orders for this visit:    Hip pain, left  -     XR Pelvis W 2 Vw Hip Left; Future; Expected date: 10/22/18  -     Ambulatory referral to Orthopedic Surgery    Other orders  -     oxyCODONE-acetaminophen (PERCOCET/ENDOCET) 5-325 mg per tablet; Take 1 tablet by mouth every 8 (eight) hours as needed for pain.  Dispense: 20 tablet; Refill: 0      DISCUSSION  I am concerned about the rather refractory nature of her pain.  Obtained an x-ray today to make sure there are not unusual bone lesions or other concerns.  She does have some rather significant arthritis changes.  At this point will refer to orthopedic surgery for help in further delineating the source of her significant pain whether this is a more refractory bursitis or if there is more significant arthritis or perhaps another cause.  Do not think any additional lab work or testing is needed more immediately.  We will treat pain with cautious use of oxycodone acetaminophen.  Discussed extensively.  Subjective:     HPI    Krystal Virgen is a 78 y.o. female she is here today to discuss ongoing left hip pain.  At her last visit with me diagnosis of left hip trochanteric bursitis was made and she underwent a corticosteroid injection.  Patient also coincidentally since that time has undergone a course of oral steroid for treatment of chronic lung disease.  Patient has noted no benefit from either the injection or a course of oral steroid on her hip pain.  She denies any new injury.  She denies any new symptoms compared to previous.  She in fact does report that there may be slight overall worsening in terms of pain intensity and frequency of pain.  She is having a very difficult time being comfortable.  She is losing sleep because of this.  She does not report any additional systemic symptoms on  complete review.    Review of Systems  Complete review of systems is obtained.  Other than the specific considerations noted above complete review of systems is negative.      Objective:   Medications:  Current Outpatient Prescriptions   Medication Sig Note     acetaminophen (TYLENOL) 325 MG tablet Take 325 mg by mouth as needed for pain.      albuterol (PROAIR HFA;PROVENTIL HFA;VENTOLIN HFA) 90 mcg/actuation inhaler Inhale 2 puffs. 4/26/2018: Received from: [facility] Received Sig: Inhale 2 Puffs every 4 hours as needed for Wheezing or Shortness of Breath.     albuterol (PROVENTIL) 2.5 mg /3 mL (0.083 %) nebulizer solution INHALE 1 VIAL VIA NEBULIZER Q 4 H PRF WHEEZING OR SOB 5/27/2016: Received from: External Pharmacy     anastrozole (ARIMIDEX) 1 mg tablet TAKE ONE TABLET BY MOUTH DAILY      budesonide-formoterol (SYMBICORT) 160-4.5 mcg/actuation inhaler Inhale 2 puffs. 4/26/2018: Received from: [facility] Received Sig: Inhale 2 Puffs two times a day.     buPROPion (WELLBUTRIN XL) 300 MG 24 hr tablet Take 1 tablet (300 mg total) by mouth every morning.      CHOLECALCIFEROL, VITAMIN D3, (VITAMIN D3 ORAL) Take 2,000 Units by mouth daily.      DULoxetine (CYMBALTA) 30 MG capsule Take 1 capsule (30 mg total) by mouth daily.      fluticasone (FLONASE) 50 mcg/actuation nasal spray 2 sprays. 4/26/2018: Received from: [facility] Received Sig: Place 2 Sprays into both nostrils daily.     lansoprazole (PREVACID) 30 MG capsule TAKE ONE CAPSULE BY MOUTH TWICE A DAY 4/26/2018: Received from: [facility]     montelukast (SINGULAIR) 10 mg tablet Take 10 mg by mouth bedtime.      naproxen (NAPROSYN) 500 MG tablet Take 1 tablet (500 mg total) by mouth 2 (two) times a day with meals.      predniSONE (DELTASONE) 10 mg tablet  10/12/2018: Received from: External Pharmacy     SUMAtriptan (IMITREX) 20 mg/actuation nasal spray 1 spray into each nostril every 2 (two) hours as needed for migraine.      traZODone (DESYREL)  50 MG tablet TAKE ONE TABLET BY MOUTH AT BEDTIME      triamcinolone (KENALOG) 0.5 % ointment Apply topically 2 (two) times a day. (Patient taking differently: Apply 1 application topically as needed. )      VIRTUSSIN AC  mg/5 mL liquid TK 10 ML PO Q 4 H PRN 5/27/2016: Received from: External Pharmacy     oxyCODONE-acetaminophen (PERCOCET/ENDOCET) 5-325 mg per tablet Take 1 tablet by mouth every 8 (eight) hours as needed for pain.      Allergies:  Allergies   Allergen Reactions     Levofloxacin Other (See Comments)     Near full body myalgias     Amoxicillin Other (See Comments)     Sulfa (Sulfonamide Antibiotics) Hives     Tobacco:   reports that she has never smoked. She has never used smokeless tobacco.     Physical Exam      /80  Pulse 75  Wt 174 lb (78.9 kg)  BMI 31.83 kg/m2    General: Patient alert no signs of distress      Left hip: There is significant pain to palpation over the bursa as expected.  There is limited range of motion.  She is able to stand and bear weight without significant pain.  No evidence of any sensory disturbance or overall motor dysfunction.  Pain is limiting factor for decreased range of motion.

## 2021-06-22 NOTE — PROGRESS NOTES
[facility] Follow Up Encounter  Assessment & Plan                                                     Trochanteric bursitis of left hip: Improved with injection, but still needs improvement. Reviewed the long-term side effects of naproxen and recommended using PRN. Now that her pain is a little better, encouraged her to try the lidocaine patch again. Reviewed CBD -- she was interested and will try it topically or drops SL. Also reviewed that duloxetine can be helpful for pain, but in higher doses. She was willing to increase -- will increase to 60 mg and consider increasing further in the future. Also consider Genesight due to her familial drug metabolism patterns.   PLAN:   1. Increase duloxetine to 60 mg daily  2. Try lidocaine 4% patch again  3. Try CBD oil/sublingual drops     Depression: Will reassess after January.     Asthma/Allergies: Stable and well controlled. Patient to continue to follow up with  Pulmonology.       Follow Up  6 weeks     Subjective & Objective                                                       Krystal Virgen is a 78 y.o. female coming in for a follow up visit for Medication Therapy Management.     Trochanteric bursitis of left hip: Reports having a left hip injection from I about 2 weeks ago, which was her third injection since I last saw her. The third one was helpful.  Seen by Mike.  Since her pain has improved she has stopped Tylenol and continues naproxen 500 mg two times a day. Diclofenac Gel did not work. lidocaine patch did not do much, but that was when her pain was really bad.    Done with oxycodone - APAP -- does not need anymore.   Reports that she usually needs higher doses of medications because she is a redhead -- similar situation with her brother and son.   Does not think she has been on a higher dose of duloxetine.   No follow up with nicole planned  CBD -- she has read about it.     Depression: Continues duloxetine 30 mg daily. Alternates between citalopram and  duloxetine because they wear off after a few years. Now on bupropion  mg -- changed from  mg two times a day at last [facility] appt. Reports this is her worst time of the year to assess her depression and to discuss it again after January.    Asthma/Allergies: Currently taking Symbicort 160-4.5 mcg 2 puffs two times a day, albuterol HFA PRN, and albuterol neb PRN and montelukast 10 mg daily. Now on Avelox 400 mg TIW -- uses in the winter prophylactically. No longer on Spiriva -- did not like it. Reports that she usually does not use albuterol but does not go anywhere without it. Has not needed albuterol for months. So far so good breathing.   Follows with  Pulmonary.       PMH: reviewed in EPIC   Allergies/ADRs: reviewed in EPIC   Alcohol: reviewed in EPIC   Tobacco:   Social History     Tobacco Use   Smoking Status Never Smoker   Smokeless Tobacco Never Used     Today's Vitals:   Vitals:    12/06/18 1134   BP: 126/68   Pulse: 85   SpO2: 98%     ----------------    Much or all of the text in this note was generated through the use of Dragon Dictate voice-to-text software. Errors in spelling or words which seem out of context are unintentional. Sound alike errors, in particular, may have escaped editing.    The patient was given a CMS standardized format medication action plan and PILAR PALOMINO spent 30 minutes with this patient today;   All changes were made via collaborative practice agreement with Juan C Fairchild MD. A copy of the visit note was provided to the patient's provider.     Mikala Hays, Pharm.D., BCACP  Medication Therapy Management Pharmacist  [facility]     Current Outpatient Medications   Medication Sig Dispense Refill     moxifloxacin (AVELOX) 400 mg tablet Take 400 mg by mouth 3 (three) times a week.       acetaminophen (TYLENOL) 325 MG tablet Take 325 mg by mouth as needed for pain.       albuterol (PROAIR HFA;PROVENTIL HFA;VENTOLIN HFA) 90 mcg/actuation inhaler Inhale  2 puffs daily as needed.             albuterol (PROVENTIL) 2.5 mg /3 mL (0.083 %) nebulizer solution INHALE 1 VIAL VIA NEBULIZER Q 4 H PRF WHEEZING OR SOB  0     anastrozole (ARIMIDEX) 1 mg tablet TAKE ONE TABLET BY MOUTH DAILY 90 tablet 2     budesonide-formoterol (SYMBICORT) 160-4.5 mcg/actuation inhaler Inhale 2 puffs.       buPROPion (WELLBUTRIN XL) 300 MG 24 hr tablet Take 1 tablet (300 mg total) by mouth every morning. 90 tablet 0     CHOLECALCIFEROL, VITAMIN D3, (VITAMIN D3 ORAL) Take 2,000 Units by mouth daily.       DULoxetine (CYMBALTA) 60 MG capsule Take 1 capsule (60 mg total) by mouth daily. 90 capsule 0     fluticasone (FLONASE) 50 mcg/actuation nasal spray 2 sprays into each nostril daily as needed.             lansoprazole (PREVACID) 30 MG capsule TAKE ONE CAPSULE BY MOUTH TWICE A DAY       montelukast (SINGULAIR) 10 mg tablet Take 10 mg by mouth bedtime.       naproxen (NAPROSYN) 500 MG tablet Take 1 tablet (500 mg total) by mouth 2 (two) times a day with meals. 180 tablet 0     SUMAtriptan (IMITREX) 20 mg/actuation nasal spray 1 spray into each nostril every 2 (two) hours as needed for migraine.       traZODone (DESYREL) 50 MG tablet TAKE ONE TABLET BY MOUTH AT BEDTIME 90 tablet 2     triamcinolone (KENALOG) 0.5 % ointment Apply topically 2 (two) times a day. (Patient taking differently: Apply 1 application topically as needed. ) 45 g 1     VIRTUSSIN AC  mg/5 mL liquid TK 10 ML PO Q 4 H PRN  0     No current facility-administered medications for this visit.

## 2021-06-22 NOTE — PROGRESS NOTES
"[facility] Hematology and Oncology Progress Note    Patient: Krystal Virgen  MRN: [id number]  Date of Service: 12/11/2018        Reason for Visit    #.  Breast cancer, stage Ia, ER positive, HER-2 negative.  #.  Chronic microcytic anemia with iron deficiency.    Assessment and Plan  Cancer Staging  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)  Staging form: Breast, AJCC 7th Edition  - Clinical: No stage assigned - Unsigned  - Pathologic stage from 9/8/2017: Stage IA (T1c, N0, cM0) - Signed by Herbert Zavala MD on 9/8/2017  ER Status: Positive  CO Status: Positive  HER2 Status: Negative      ECOG Performance   ECOG Performance Status: 1     Distress Assessment  Distress Assessment Score: 2    Pain  Currently in Pain: Yes  Pain Score (Initial OR Reassessment): 7  Location: \"body hurts all the time\"-hands, left hip, feet, back    #. Stage IA (pT1c, pN0, cM0) invasive ductal carcinoma of the right breast, grade 1, ER/CO strongly positive, HER2/Uriel negative, with positive inferior margin. Associated DCIS. S/p right breast lumpectomy and right sentinel lymph node biopsy on 8/10/2017.  She declined reexcision or adjuvant radiation treatment.    - No clinical evidence of breast cancer recurrence.  Last mammogram from July 2018 was benign.  She has significant hot flashes, fatigue, musculoskeletal pain that she would like to change treatment to tamoxifen.  I think it will help her vasomotor symptoms as well as her bone density.  Reviewed the possible drug interaction with tamoxifen.    - She has about 8 pills of anastrozole left that she will complete.  She will start tamoxifen after.     -As she is taking moxifloxacin for pulmonary infection prophylaxis, I will check her EKG in a few weeks after starting tamoxifen.  Her baseline EKG from a year ago was normal with normal QTC of 381 ms.     -Screening mammogram due in July 2019.   -I will follow-up with EKG results once it is available. Follow " up in 6 months for clinical exam and advised to call me sooner if any clinical concerns.     #.  Medication monitoring with tamoxifen   Will monitor EKG for QTC.        #. Osteopenia/ low bone density    -DEXA scan from 9/21/2017 showed low bone density: L1-L3 T score-0.6, left femoral neck-2.1, right femoral neck-2.1. She was on Fosamax for about 25 years.  discontinued Fosamax in 10/2017 for a drug holiday.   -Requested DEXA scan today.    -Continue on vitamin D and add calcium 1017-3650 mg per day.     #. Chronic microcytic anemia with iron deficiency.   -Colonoscopy on 11/8/17 showed diverticulosis without evidence of source of bleeding. EGD from 12/14/2017 showed a large hiatal hernia with John's erosion.    -She has a good understanding that she will need lifelong iron supplementation periodically.   -She has normal hemogram in June 2018.  No evidence of black stool or red stool at this point.  Clinically stable.  Plan to recheck labs in about 6 months.    Problem List    1. Malignant neoplasm of upper-outer quadrant of right breast in female, estrogen receptor positive (H)     2. Encounter for monitoring tamoxifen therapy  ECG 12 lead with MUSE   3. Osteopenia of multiple sites  DXA Bone Density Scan   4. Iron deficiency anemia, unspecified iron deficiency anemia type  HM2(CBC w/o Differential)    Ferritin        ______________________________________________________________________________    History of Present Illness    Jody presents herself today.    She reports that her hot flashes did not get better over a year of anastrozole and she has been experiencing multiple hot flashes throughout the day interrupting her sleep.  She denies any new bone pain, however she has significant left hip pain that she recently had steroid injection.  Appetite is good.  She is gaining weight.  No headaches.  She welcomed her first great grand child 17 days ago.  She is very excited about it.    Pain Status  Currently in  "Pain: Yes    Review of Systems    Constitutional  Constitutional (WDL): Exceptions to WDL  Fatigue: Fatigue not relieved by rest - Limiting instrumental ADL(tired all the time)  Fever: None  Chills: None  Neurosensory  Neurosensory (WDL): Exceptions to WDL  Peripheral Motor Neuropathy: None  Ataxia: Asymptomatic, clinical or diagnostic observations only, intervention not indicated(uses cane, due to pain)  Peripheral Sensory Neuropathy: None  Confusion: None  Syncope: None  Eye   Eye Disorder (WDL): Exceptions to WDL  Blurred Vision: None  Dry Eye: None  Eye Pain: None  Watering Eyes: Intervention not indicated(left eye-chronic)  Ear  Ear Disorder (WDL): All ear disorder elements are within defined limits  Cardiovascular  Cardiovascular (WDL): All cardiovascular elements are within defined limits  Pulmonary  Respiratory (WDL): Exceptions to WDL  Cough: Mild symptoms, nonprescription intervention indicated(dry-worse when laying down)  Dyspnea: Shortness of breath with minimal exertion, limiting instrumental ADL  Hypoxia: None  Gastrointestinal  Gastrointestinal (WDL): Exceptions to WDL  Anorexia: None  Constipation: Occasional or intermittent symptoms, occasional use of stool softeners, laxatives, dietary modification, or enema(alternates between diarrhea and constipation.  )  Diarrhea: Increase of <4 stools per day over baseline, mild increase in ostomy output compared to baseline  Dysphagia: None  Esophagitis: Symptomatic, altered eating/swallowing, oral supplements indicated(Prevacid)  Nausea: None  Pharyngitis: None  Vomiting: None  Dysgeusia: None  Dry Mouth: None  Genitourinary  Genitourinary (WDL): All genitourinary elements are within defined limits  Lymphatic  Lymph (WDL): All lymph disorder elements are within defined limits  Musculoskeletal and Connective Tissue  Musculoskeletal and Connetive Tissue Disorders (WDL): Exceptions to WDL(\"body hurts all the time\"-hands, left hip, feet, back)  Arthralgia: " Moderate pain, limiting instrumental ADL  Bone Pain: Moderate pain, limiting instrumental ADL  Muscle Weakness : None  Myalgia: Moderate pain, limiting instrumental ADL  Integumentary  Integumentary (WDL): All integumentary elements are within defined limits  Patient Coping  Patient Coping: Accepting  Distress Assessment  Distress Assessment Score: 2  Accompanied by  Accompanied by: Alone  Oral Chemo Adherence       Past History  Past Medical History:   Diagnosis Date     Anemia      Asthma      Breast cancer (H)      Chronic bronchitis (H)      Osteoporosis      Pneumonia      Sinusitis        Physical Exam    Recent Vitals 12/11/2018   Height -   Weight -   BSA (m2) -   /79   Pulse 83   Temp 97.7   Temp src 1   SpO2 94   Some recent data might be hidden     General: alert, awake, not in acute distress, obese female.  HEENT: Head: Normal, normocephalic, atraumatic.  Eye: Normal external eye, conjunctiva, lids cornea, RINKU.  Ears:  Non-tender.  Nose: Normal external nose, mucus membranes and septum.  Pharynx: Dental Hygiene adequate. Normal buccal mucosa. Normal pharynx.  Neck / Thyroid: Supple, no masses, nodes, nodules or enlargement.  Lymphatics: No abnormally enlarged lymph nodes.  Chest: Normal chest wall and respirations. Clear to auscultation.  Breasts: Pendulous breast bilaterally.  Fibroglandular breasts.  No discrete palpable masses.    Heart: S1 S2 RRR, no murmur.   Abdomen: abdomen is soft without significant tenderness, masses, organomegaly or guarding  Extremities: normal strength, tone, and muscle mass  Skin: normal. no rash or abnormalities  CNS: non focal.      Lab Results    No results found for this or any previous visit (from the past 168 hour(s)).    Imaging    No results found.    TT: 40 minutes and more than 50% was spent on coordination and counseling of care.    Signed by: Herbert Zavala MD

## 2021-06-22 NOTE — TELEPHONE ENCOUNTER
Spoke to patient. Hip doctor's assistant gave her a Medrol which she is planning on using last resort. Also given piroxicam 10 mg two times a day. Is worried about it making her tired. She has not tried it yet.     Reviewed that piroxicam is an NSAID, therefore hold naproxen if she is going to try it. I dont think it should cause fatigue, but if she is worried about that to take HS 10 mg. Increase to 20 mg HS if needed. Take with food. Patient has normal renal function and is on a PPI.

## 2021-06-23 NOTE — PATIENT INSTRUCTIONS - HE
Recommendations from today's PharmD medication management visit                                                       1. Try piroxicam 20 mg (two tablets of 10 mg) once daily. With food and water. Hold naproxen.     2. I will send all of your medications to [facility]     3. Look for a liquid or chewable calcium citrate     Next pharmacist visit: As needed     My Pharmacist's contact information:                                                       It was a pleasure seeing you today to discuss your medications!  Please feel free to contact me with any questions or concerns you have. I look forward to seeing you again to help you get the most benefit from your medications!    To reschedule your PharmD appointment, please call the clinic directly or you may call the [facility] scheduling line at [phone] or toll-free at [phone]:   [phone] [place] (available for appointments Mondays and Thursdays)  [phone] [facility] (available for appointments Tuesday, Wednesday & Friday)     Mikala Hays PharmD, [facility] Hospital[facility]  Medication Therapy Management Pharmacist  [facility]    You may receive a survey about the [facility] services you received.  I would appreciate your feedback to help me serve you better in the future. Please fill it out and return it when you can. Your comments will be anonymous.

## 2021-06-23 NOTE — TELEPHONE ENCOUNTER
I have sent a prescription for an antibiotic called nitrofurantoin.  Take the antibiotic twice daily for 7 days.  Prescription sent to [facility] pharmacy in [place].  If symptoms do not improve it is very important to come in for an evaluation.  On the last urine culture from November 2017 there was a resistant type of infection.  The antibiotic prescribed should be effective even if it is the same bacteria again causing the infection, but if symptoms do not improve by next week she will need an evaluation.

## 2021-06-23 NOTE — TELEPHONE ENCOUNTER
Describe your symptoms: burning, frequency, pushing to urinate, fatigue, denies fever, denies abdominal/back pain  Any pain: no  New/Ongoing: New  How long have you been having symptoms: 5  day(s)  Have you been seen for this:  No.  Appointment offered? Patient declines  Triage offered?: patient declined  Home remedies tried: N/A  Pharmacy Name and Location: [facility] #[id number]  Okay to leave a detailed message? Yes     Patient reports she wants Juan C Fairchild MD to advise because he is the only one that knows what medication she is needing - last time she had a UTI she started on an ABX and then the culture came back and she wasn't informed until 3 days later.  Explained sometimes that's happens when the culture comes back but patient wanted Juan C Fairchild MD only to advise.

## 2021-06-23 NOTE — PROGRESS NOTES
MT Follow Up Encounter  Assessment & Plan                                                     Arthritis: Stable. Encouraged her to to continue APAP, lidocaine patch. Recommended trying piroxicam again, but to increase to 20 mg once daily. Could also increase duloxetine further to 90 mg, but she was not interested. She will continue the different forms of CBD and find a product that she likes and is helpful.   PLAN:   1. Try piroxicam 20 mg (two tablets of 10 mg) once daily. With food and water. Hold naproxen.     Depression: Stable. Will reassess in the future after January.     Hx Breast Cancer: Continue tamoxifen and follow up with [facility] oncology.    Osteopenia: We reviewed both of her DEXA scans together and compared the results. There was a slight worsening in her most recent DEXA, but she is still in the osteopenia range. Last Vitamin D level WNL, but consider rechecking in the future. We reviewed chewable calcium citrate products and she will try to find one. Reviewed in postmenopausal women, tamoxifen use is associated with a protective effect on BMD.       Follow Up  As needed -- patient will call     Subjective & Objective                                                       Krystal Virgen is a 78 y.o. female coming in for a follow up visit for Medication Therapy Management.     Chief Complaint: feels so much better.   Has a new insurance and needs all new prescriptions sent to [facility]     Arthritis: Reports low back pain, foot pain, hip. At 9/27/18 [facility] appt, I recommended naproxen 500 mg two times a day temporarily, diclofenac gel, scheduled APAP 1000 mg three times a day, and lidocaine 4% patch. At last MTM appt (12/6/18) she stopped the APAP. Diclofenac gel did not work. Lidocaine patch did not do much, but used it when her pain was very bad. At last appt I recommended she try the patch again - reports she thinks it helped at first. At last MTM appt we increased her duloxetine  to 60 mg -- she thinks that has helped.   Was given piroxicam 10 mg from ortho -- tried it a couple days, but she thinks she needs to try it again and not move around after taking. Used PRN.   CBD recommended at last MTM appt -- reports CBD initially helped. Hands and feet hurt allthe time and she puts a CBD lotion on which is a little helpful. Has capsules but having a hard time opening the bottle.   Celebrex has not been helpful. Takes OTC Aleve.   Has tried capsaicin topically in the past.   Was on gabapentin for shingles - not helpful, stopped.   Believes she has diclofenac gel and lidocaine at home.   Left hip injection in Nov.     Depression: Currently taking duloxetine 60 mg daily (increased at last MTM appt for pain) and bupropion  mg AM. Alternates between citalopram and duloxetine because they wear off after a few years. Bupropion  mg changed from  mg two times a day at last MTM appt. Reports this is her worst time of the year to assess her depression and to discuss it again after January. She would like to stay on her current medications.     Hx Breast Cancer: Currently taking tamoxifen 20 mg daily -- switched from anastrozole 1 mg daily per [facility] oncology on 12/11. Reports hot flashes from anastrozole which bother her, but the hot flashes/night sweats are not as bad with tamoxifen.     Osteopenia: [facility] ordered a DEXA which she completed on 12/28. T score -2.2. Continues on Vitamin D 2000 IU daily. Is having a hard time swallowing the calcium citrate petites and would prefer to go back to chewable.   Vitamin D, Total (25-Hydroxy)   Date Value Ref Range Status   06/10/2014 49.3 30.0 - 80.0 ng/mL Final         PMH: reviewed in EPIC   Allergies/ADRs: reviewed in EPIC   Alcohol: reviewed in EPIC   Tobacco:   Social History     Tobacco Use   Smoking Status Never Smoker   Smokeless Tobacco Never Used     Today's Vitals: There were no vitals filed for this  visit.  ----------------    Much or all of the text in this note was generated through the use of Dragon Dictate voice-to-text software. Errors in spelling or words which seem out of context are unintentional. Sound alike errors, in particular, may have escaped editing.    The patient was given a CMS standardized format medication action plan and PILAR    I spent 30 minutes with this patient today;   All changes were made via collaborative practice agreement with Juan C Fairchild MD. A copy of the visit note was provided to the patient's provider.     Mikala Hays, PharmSebasD., BCACP  Medication Therapy Management Pharmacist  [facility] and [facility]     Current Outpatient Medications   Medication Sig Dispense Refill     acetaminophen (TYLENOL) 325 MG tablet Take 325 mg by mouth as needed for pain.       albuterol (PROAIR HFA;PROVENTIL HFA;VENTOLIN HFA) 90 mcg/actuation inhaler Inhale 2 puffs daily as needed.             albuterol (PROVENTIL) 2.5 mg /3 mL (0.083 %) nebulizer solution INHALE 1 VIAL VIA NEBULIZER Q 4 H PRF WHEEZING OR SOB  0     budesonide-formoterol (SYMBICORT) 160-4.5 mcg/actuation inhaler Inhale 2 puffs 2 (two) times a day .             buPROPion (WELLBUTRIN XL) 300 MG 24 hr tablet Take 1 tablet (300 mg total) by mouth every morning. 90 tablet 3     CHOLECALCIFEROL, VITAMIN D3, (VITAMIN D3 ORAL) Take 2,000 Units by mouth daily.       DULoxetine (CYMBALTA) 60 MG capsule Take 1 capsule (60 mg total) by mouth daily. 90 capsule 0     fluticasone (FLONASE) 50 mcg/actuation nasal spray 2 sprays into each nostril daily as needed.             lansoprazole (PREVACID) 30 MG capsule TAKE ONE CAPSULE BY MOUTH ONCE DAILY       montelukast (SINGULAIR) 10 mg tablet Take 10 mg by mouth bedtime.       moxifloxacin (AVELOX) 400 mg tablet Take 400 mg by mouth 3 (three) times a week.       naproxen (NAPROSYN) 500 MG tablet Take 1 tablet (500 mg total) by mouth 2 (two) times a day with meals. 180 tablet 0      SUMAtriptan (IMITREX) 20 mg/actuation nasal spray 1 spray into each nostril every 2 (two) hours as needed for migraine.       tamoxifen (NOLVADEX) 20 MG tablet Take 1 tablet (20 mg total) by mouth daily. 90 tablet 1     traZODone (DESYREL) 50 MG tablet TAKE ONE TABLET BY MOUTH AT BEDTIME 90 tablet 2     triamcinolone (KENALOG) 0.5 % ointment Apply topically 2 (two) times a day. (Patient taking differently: Apply 1 application topically as needed. ) 45 g 1     VIRTUSSIN AC  mg/5 mL liquid TK 10 ML PO Q 4 H PRN  0     No current facility-administered medications for this visit.

## 2021-06-30 NOTE — PROGRESS NOTES
Progress Notes by Juan C Ramon MD at 3/3/2021  2:10 PM     Author: Juan C Ramon MD Service: -- Author Type: Physician    Filed: 3/3/2021  2:45 PM Encounter Date: 3/3/2021 Status: Signed    : Juan C Ramon MD (Physician)             [facility] Office Consult       Assessment:   1.  Coronary artery disease.  Patient presented emergently to the [facility] with acute ST segment myocardial infarction per review of the chart record.  She underwent intervention to the circumflex with mild disease in the other coronary territories by report.  Course was complicated by right groin hematoma which is now improved.  She reports some chronic dyspnea on exertion related to underlying COPD and asthma which she reports to me is baseline.  We discussed the importance of her current combination of medications including aspirin and Plavix.  She had dyspnea with Brilinta and improved significantly with Plavix.  She is on aspirin, Plavix, metoprolol XL 25 mg daily and Crestor 40 mg daily.  She reports no prior history of hyperlipidemia.  Cholesterol numbers when in hospital January 2021 finds a cholesterol 158 LDL of 75.  Echocardiogram during the hospitalization reported ejection fraction of 50 to 55% with hypokinesis of the inferior lateral and basal to mid anterolateral walls.  Plan for follow-up echocardiogram.    2.  Risk factor modification.  As outlined above.  Plan for follow-up cholesterol panel liver and renal panel.  1. CAD (coronary artery disease)  Echo Complete    Lipid Profile    Comprehensive metabolic panel    ECG Clinic - Today   2. STEMI (ST elevation myocardial infarction) (H)  clopidogreL (PLAVIX) 75 mg tablet   3. Coronary artery disease involving native coronary artery of native heart without angina pectoris     4. Hyperlipidemia with target LDL less than 70        Plan:   1.  Echocardiogram  2.  Laboratory studies as outlined  3.  Continue with current combination  of medications and follow-up in 3 months or sooner if specific issues were to arise.  4.  Cardiac rehabilitation.  Patient reports that she has these facilities at her place of residence and will look into this.        History of Present Illness:   Thank you for asking the [facility] Care team to see Krystal Virgen a 80 y.o.  female  in consultation  to evaluate history of coronary artery disease with recent coronary intervention to the circumflex presenting to the [facility] with acute ST segment myocardial infarction.  Chart records indicate Patient presented to the hospital with chest pressure 1/7/2021 ---> STEMI -->cath with prox Cx -->JUAN--> c/b small coronary perforation (no pericardial effusion) small right groin hematoma and UTI.   After discharge she developed mild persistent shortness of breath at rest that worsened with exertion that began while at the hospital. She was transitioned to plavix 1/21/21 at an out pt visit  She reports to me today that she is feeling better.  She has had no complaints of chest pain.  She has chronic dyspnea on exertion for years related to underlying lung disease and indicates that this is at baseline.  She has not yet participated in cardiac rehabilitation.  She tells me that cardiac rehabilitation was going to be completed at her place of residence although I do not have all the details.  I reviewed with her why I thought that cardiac rehab was important.    I spent some time today discussing the coronary angiographic findings.  I was able to discuss with Dr. Almeida the complete angiographic findings as in epic there is an only comment about the circumflex.  Reportedly  the left main had no significant disease, the LAD had 40%, the circumflex was the culprit vessel and RCA with no significant disease.  She has not experienced palpitations, dizziness or lightheadedness.  Shortness of breath symptoms have been significantly improved with change from  Brilinta to Plavix.    Cardiac risk factors include remote history of tobacco, remote history of alcohol no reported history of hypertension or hyperlipidemia.  Father with a history of heart disease as outlined.      Past Medical History:     Past Medical History:   Diagnosis Date   ? Anemia    ? Asthma    ? Breast cancer (H) 2017   ? Chronic bronchitis (H)    ? Osteoporosis    ? Pneumonia    ? Sinusitis        Past Surgical History:     Past Surgical History:   Procedure Laterality Date   ? BREAST BIOPSY     ? BREAST LUMPECTOMY Right 2017   ? CHOLECYSTECTOMY     ? FOOT SURGERY     ? HYSTERECTOMY  1984   ? SINUS SURGERY         Family History:     Family History   Problem Relation Age of Onset   ? Breast cancer Paternal Aunt 48   ? Breast cancer Mother 78   ? Breast cancer Sister 48   ? Breast cancer Maternal Aunt 35   ? Heart disease Father    ? Macular degeneration Father    ? Heart disease Brother        Social History:    reports that she has never smoked. She has never used smokeless tobacco. She reports that she does not drink alcohol or use drugs.  The primary care physician is Juan C Fairchild MD  Meds:   Scheduled Meds:  Current Outpatient Medications   Medication Sig Dispense Refill   ? albuterol (PROAIR HFA;PROVENTIL HFA;VENTOLIN HFA) 90 mcg/actuation inhaler USE 2 INHALATIONS ORALLY   EVERY 6 HOURS AS NEEDED 54 g 1   ? aspirin 81 MG EC tablet Take 81 mg by mouth.     ? azithromycin (ZITHROMAX) 250 MG tablet Take 250 mg by mouth. 3 times a week     ? buPROPion (WELLBUTRIN XL) 300 MG 24 hr tablet Take 1 tablet (300 mg total) by mouth every morning. 90 tablet 3   ? CHOLECALCIFEROL, VITAMIN D3, (VITAMIN D3 ORAL) Take 2,000 Units by mouth daily.     ? citalopram (CELEXA) 20 MG tablet TAKE 1 TABLET DAILY        (REPLACING DULOXETINE) 90 tablet 3   ? clopidogreL (PLAVIX) 75 mg tablet Take 1 tablet (75 mg total) by mouth daily. 90 tablet 3   ? lansoprazole (PREVACID) 30 MG capsule TAKE 1 CAPSULE DAILY 90  capsule 2   ? metoprolol succinate (TOPROL-XL) 25 MG Take 1 tablet (25 mg total) by mouth daily. 90 tablet 3   ? montelukast (SINGULAIR) 10 mg tablet Take 1 tablet (10 mg total) by mouth at bedtime. 90 tablet 3   ? nitroglycerin (NITROSTAT) 0.4 MG SL tablet For chest pain place 1 tablet under the tongue every 5 minutes for 3 doses. If symptoms persist 5 minutes after 1st dose call 911.     ? rosuvastatin (CRESTOR) 40 MG tablet Take 1 tablet (40 mg total) by mouth at bedtime. 90 tablet 3   ? SYMBICORT 160-4.5 mcg/actuation inhaler USE 2 INHALATIONS ORALLY   TWICE DAILY 1 Inhaler 11   ? tamoxifen (NOLVADEX) 20 MG tablet TAKE 1 TABLET DAILY 90 tablet 3   ? traZODone (DESYREL) 50 MG tablet TAKE 1 TABLET AT BEDTIME 90 tablet 2   ? acetaminophen (TYLENOL) 325 MG tablet Take 325 mg by mouth as needed for pain.     ? clopidogreL (PLAVIX) 75 mg tablet Take 4 tablets (300 mg total) by mouth daily for 1 dose. Then take 1 tablet (75 mg total) by mouth once daily. 4 tablet 0   ? fluticasone (FLONASE) 50 mcg/actuation nasal spray 2 sprays into each nostril daily as needed.           ? oxyCODONE-acetaminophen (PERCOCET/ENDOCET) 5-325 mg per tablet Take 1 tablet by mouth every 8 (eight) hours as needed for pain. 12 tablet 0     No current facility-administered medications for this visit.        PRN Meds:.    Allergies:   Sulfa (sulfonamide antibiotics), Levofloxacin, and Doxycycline          Objective:      Physical Exam  170 lb (77.1 kg)     Wt Readings from Last 3 Encounters:   03/03/21 170 lb (77.1 kg)   10/21/20 178 lb 1.6 oz (80.8 kg)   08/25/20 180 lb (81.6 kg)   138/82, heart rate of 81, O2 sat 98%, weight 170 pounds.  Repeat blood pressure during my examination 122/70.    General Appearance:   Alert, cooperative and in no acute distress.   HEENT:  No scleral icterus; the mucous membranes were pink and moist.   Neck: JVP within normal limits. No thyromegaly. No HJR   Chest: The spine was straight. The chest was symmetric.    Lungs:   Respirations unlabored; the lungs are clear to auscultation mildly diminished at the bases.   Cardiovascular:   S1 and S2 without significant murmur, clicks or rubs. Brachial, radial, carotid and posterior tibial pulses are intact and symetrical.  No carotid bruits noted   Abdomen:  No organomegaly, masses, bruits, or tenderness. Bowels sounds are present   Extremities: No cyanosis, clubbing, or edema.   Skin: No xanthelasma.   Neurologic: Mood and affect are appropriate.             Lab Reviewed Personally by myself    Lab Results   Component Value Date     2020    K 4.1 2020     2020    CO2 28 2020    BUN 11 2020    CREATININE 1.03 2020    CALCIUM 8.6 2020     No results found for: CHOL, TRIG, HDL, LDLDIRECT  BNP (pg/mL)   Date Value   2013 46     Creatinine (mg/dL)   Date Value   2020 1.03   2020 1.06   2018 0.92   2017 0.91     No results found for: LDLCALC  Lab Results   Component Value Date    WBC 8.9 2020    HGB 11.7 (L) 2020    HCT 35.3 2020    MCV 88 2020     2020       Cardiac Testin. Patent right external iliac and common femoral arteries and veins  without evidence of pseudoaneurysm.  2. No DVT in the right common femoral vein or external iliac vein.    I have personally reviewed the examination and initial interpretation  and I agree with the findings.    JORDYN CUEVAS MD  Result Narrative   [id number]  [id number]  [id number]  [id number]^JUAN ALBERTO^CALOS        [facility],[place]  Echocardiography Laboratory  [address] [place] [id number]    Name: MERY VAUGHN  MRN: [id number]  : 1940  Study Date: 2021 07:33 AM  Age: 80 yrs  Gender: Female  Patient [facility]: [facility]  Reason For Study: Myocardial Infarction  Ordering Physician: CALOS AVENDANO  Performed By: GLORIA Little    BSA: 1.8 m2  Height: 63 in  Weight: 170  lb  BP: 113/68 mmHg  _____________________________________________________________________________  __      Procedure  Complete Portable Echo Adult. Contrast Optison. Technically difficult study.  Poor acoustic windows. Optison (NDC #9864-8700-77) given intravenously.  Patient was given 6 ml mixture of 3 ml Optison and 6 ml saline. 3 ml wasted.  IV start location L Forearm .  _____________________________________________________________________________  __      Interpretation Summary  Borderline (EF 50-55%) reduced left ventricular function is present.  Hypokinesis of inferolateral and base to mid anterolateral walls noted.  Global right ventricular function is normal.  IVC is normal.  No significant valve disease.  This study was compared with the study from 1/7/2021 .  There has been no change in regional wall motion. EF appear unchanged side to  side comparison.     Cardiac Catheterization1/7/2021  [place]  Other Result Information   This result has an attachment that is not available.   Result Narrative     Ost Cx to Prox Cx lesion is 100% stenosed.    Posterior STEMI with successful PCI and stent placement in the proximal   LCx.   Initial contained small wire perforation but without any evidence of   pericardial accumulation on ECHO.  Moderate  right groin hematoma contained and stable         ECG personally reviewed by myself shows normal sinus rhythm, low voltage, mild nonspecific T wave changes.  No significant change from February 2019.         Review of Systems:       Review of Systems:   General: WNL  Eyes: WNL  Ears/Nose/Throat: WNL  Lungs: Cough, Shortness of Breath, Snoring, Wheezing  Heart: WNL  Stomach: WNL  Bladder: WNL  Muscle/Joints: Joint Pain, Muscle Weakness  Skin: WNL  Nervous System: WNL  Mental Health: Depression     Blood: Easy Bruising, Easy Bleeding      This note has been dictated using voice recognition software. Any grammatical or context distortions are unintentional and inherent to  the software.

## 2021-07-03 NOTE — ADDENDUM NOTE
Addendum Note by Lia Haley MD at 1/5/2018  4:49 PM     Author: Lai Haley MD Service: -- Author Type: Physician    Filed: 1/5/2018  4:49 PM Encounter Date: 1/5/2018 Status: Signed    : Lai Haley MD (Physician)    Addended by: LAI HALEY on: 1/5/2018 04:49 PM        Modules accepted: Orders

## 2021-07-15 ENCOUNTER — TELEPHONE (OUTPATIENT)
Facility: CLINIC | Age: 81
End: 2021-07-15

## 2021-07-15 DIAGNOSIS — F32.5 MAJOR DEPRESSIVE DISORDER, SINGLE EPISODE IN FULL REMISSION (H): Primary | ICD-10-CM

## 2021-07-16 RX ORDER — CITALOPRAM HYDROBROMIDE 20 MG/1
20 TABLET ORAL DAILY
Qty: 90 TABLET | Refills: 3 | Status: SHIPPED | OUTPATIENT
Start: 2021-07-16 | End: 2021-07-26

## 2021-07-19 ENCOUNTER — TELEPHONE (OUTPATIENT)
Facility: CLINIC | Age: 81
End: 2021-07-19

## 2021-07-26 DIAGNOSIS — F32.5 MAJOR DEPRESSIVE DISORDER, SINGLE EPISODE IN FULL REMISSION (H): ICD-10-CM

## 2021-07-26 RX ORDER — CITALOPRAM HYDROBROMIDE 20 MG/1
20 TABLET ORAL DAILY
Qty: 30 TABLET | Refills: 0 | Status: SHIPPED | OUTPATIENT
Start: 2021-07-26 | End: 2021-08-24

## 2021-07-26 RX ORDER — CITALOPRAM HYDROBROMIDE 20 MG/1
20 TABLET ORAL DAILY
Qty: 90 TABLET | Refills: 3 | Status: SHIPPED | OUTPATIENT
Start: 2021-07-26 | End: 2022-08-01

## 2021-07-26 NOTE — TELEPHONE ENCOUNTER
Patient is calling to check the status of this request.   Please refill ASAP if appropriate.     Patient will need a 30 day supply sent to her local pharmacy and a 90 day supply to her mail order pharmacy   - prescriptions already pended for approval.

## 2021-07-30 DIAGNOSIS — F32.5 MAJOR DEPRESSIVE DISORDER, SINGLE EPISODE, IN REMISSION (H): Primary | ICD-10-CM

## 2021-07-30 RX ORDER — BUPROPION HYDROCHLORIDE 300 MG/1
300 TABLET ORAL EVERY MORNING
Qty: 90 TABLET | Refills: 3 | Status: SHIPPED | OUTPATIENT
Start: 2021-07-30 | End: 2021-09-08

## 2021-07-30 NOTE — TELEPHONE ENCOUNTER
Patient is calling to request a refill of bupropion.  She states she needs this appropriate ASAP to ensure she receives her prescription from [facility] mail order pharmacy before she runs out.    Please refill if appropriate.

## 2021-08-16 ENCOUNTER — TRANSFERRED RECORDS (OUTPATIENT)
Dept: HEALTH INFORMATION MANAGEMENT | Facility: CLINIC | Age: 81
End: 2021-08-16

## 2021-08-16 DIAGNOSIS — Z17.0 MALIGNANT NEOPLASM OF UPPER-OUTER QUADRANT OF RIGHT BREAST IN FEMALE, ESTROGEN RECEPTOR POSITIVE (H): Primary | ICD-10-CM

## 2021-08-16 DIAGNOSIS — C50.411 MALIGNANT NEOPLASM OF UPPER-OUTER QUADRANT OF RIGHT BREAST IN FEMALE, ESTROGEN RECEPTOR POSITIVE (H): Primary | ICD-10-CM

## 2021-08-16 RX ORDER — TAMOXIFEN CITRATE 20 MG/1
20 TABLET ORAL DAILY
Qty: 90 TABLET | Refills: 2 | Status: SHIPPED | OUTPATIENT
Start: 2021-08-16 | End: 2022-05-13

## 2021-08-16 NOTE — TELEPHONE ENCOUNTER
"Jody called and left message with question about a medication she is taking. She asked for call back to [phone]. She said she will be gone after 2p today and around most of Tues.    Called Jody back. She said she needs refill of Tamoxifen and requesting 90 day supply with \"a couple of refills\" to get her through until her next follow-up with Dr. Zavala in May.  She confirmed that she would like Rx sent to [facility] mail order.  Message to Dr. Zavala/JAZ Aguiar RN   "

## 2021-08-17 ENCOUNTER — TELEPHONE (OUTPATIENT)
Dept: ONCOLOGY | Facility: CLINIC | Age: 81
End: 2021-08-17

## 2021-08-17 ENCOUNTER — TELEPHONE (OUTPATIENT)
Dept: FAMILY MEDICINE | Facility: CLINIC | Age: 81
End: 2021-08-17

## 2021-08-17 DIAGNOSIS — N39.0 URINARY TRACT INFECTION WITHOUT HEMATURIA, SITE UNSPECIFIED: Primary | ICD-10-CM

## 2021-08-17 RX ORDER — NITROFURANTOIN 25; 75 MG/1; MG/1
100 CAPSULE ORAL 2 TIMES DAILY
Qty: 10 CAPSULE | Refills: 0 | Status: SHIPPED | OUTPATIENT
Start: 2021-08-17 | End: 2021-08-22

## 2021-08-17 NOTE — TELEPHONE ENCOUNTER
Contacted [facility]  Mail order service after receiving paperwork for additional information, reference number [id number], and spoke with , Kassandra. Kassandra did not think that patient had an account with them but after further evaluation, prescription needed to be forwarded to another department within [facility] for specialty medication (Prescription was forTamoxifen)/Zaida Rader RN

## 2021-08-17 NOTE — TELEPHONE ENCOUNTER
Reason for call:  Patient reporting a symptom    Symptom or request: patient is calling with a UTI symptom and would like a medication to treat this. Preferably the one prescribed prior.     Duration (how long have symptoms been present): 2 days    Have you been treated for this before? Yes    Additional comments: patient feels very home bound due to her symptoms. Pressure and pain when urinating. No blood in urine.     Patient has an appointment next week but would like something today sent to [facility] drug     Phone Number patient can be reached at:  Home number on file [phone] (home)    Best Time:  Anytime     Can we leave a detailed message on this number:  YES    Call taken on 8/17/2021 at 3:32 PM by Zoraida Bliss

## 2021-08-19 DIAGNOSIS — G47.00 INSOMNIA, UNSPECIFIED TYPE: ICD-10-CM

## 2021-08-19 DIAGNOSIS — K21.9 GASTROESOPHAGEAL REFLUX DISEASE WITHOUT ESOPHAGITIS: Primary | ICD-10-CM

## 2021-08-22 RX ORDER — TRAZODONE HYDROCHLORIDE 50 MG/1
TABLET, FILM COATED ORAL
Qty: 90 TABLET | Refills: 2 | Status: SHIPPED | OUTPATIENT
Start: 2021-08-22 | End: 2022-05-15

## 2021-08-22 RX ORDER — LANSOPRAZOLE 30 MG/1
CAPSULE, DELAYED RELEASE ORAL
Qty: 90 CAPSULE | Refills: 2 | Status: SHIPPED | OUTPATIENT
Start: 2021-08-22 | End: 2022-05-15

## 2021-08-23 PROBLEM — N31.8 HYPERTONICITY OF BLADDER: Status: ACTIVE | Noted: 2021-08-23

## 2021-08-23 PROBLEM — R05.9 COUGH: Status: ACTIVE | Noted: 2021-08-23

## 2021-08-23 PROBLEM — K25.7: Status: ACTIVE | Noted: 2018-01-04

## 2021-08-23 PROBLEM — K31.7 GASTRIC POLYPS: Status: ACTIVE | Noted: 2018-01-04

## 2021-08-23 PROBLEM — D80.3: Status: ACTIVE | Noted: 2017-05-25

## 2021-08-23 PROBLEM — M94.9 DISORDER OF BONE AND CARTILAGE: Status: ACTIVE | Noted: 2021-08-23

## 2021-08-23 PROBLEM — D50.9 IRON DEFICIENCY ANEMIA: Status: ACTIVE | Noted: 2017-09-08

## 2021-08-23 PROBLEM — E78.5 HYPERLIPIDEMIA WITH TARGET LDL LESS THAN 70: Status: ACTIVE | Noted: 2021-03-03

## 2021-08-23 PROBLEM — R53.81 OTHER MALAISE AND FATIGUE: Status: ACTIVE | Noted: 2021-08-23

## 2021-08-23 PROBLEM — K44.9 DIAPHRAGMATIC HERNIA: Status: ACTIVE | Noted: 2017-12-14

## 2021-08-23 PROBLEM — K57.30 COLON, DIVERTICULOSIS: Status: ACTIVE | Noted: 2018-01-04

## 2021-08-23 PROBLEM — M89.9 DISORDER OF BONE AND CARTILAGE: Status: ACTIVE | Noted: 2021-08-23

## 2021-08-23 PROBLEM — R53.83 OTHER MALAISE AND FATIGUE: Status: ACTIVE | Noted: 2021-08-23

## 2021-08-23 PROBLEM — J45.901 ASTHMA WITH ACUTE EXACERBATION: Status: ACTIVE | Noted: 2021-08-23

## 2021-08-23 PROBLEM — Z79.890 NEED FOR PROPHYLACTIC HORMONE REPLACEMENT THERAPY (POSTMENOPAUSAL): Status: ACTIVE | Noted: 2021-08-23

## 2021-08-23 PROBLEM — F32.5 MAJOR DEPRESSIVE DISORDER, SINGLE EPISODE IN FULL REMISSION (H): Status: ACTIVE | Noted: 2021-08-23

## 2021-08-23 PROBLEM — C50.411 MALIGNANT NEOPLASM OF UPPER-OUTER QUADRANT OF RIGHT FEMALE BREAST (H): Status: ACTIVE | Noted: 2017-09-08

## 2021-08-23 PROBLEM — J45.40 MODERATE PERSISTENT ASTHMA WITHOUT COMPLICATION: Status: ACTIVE | Noted: 2017-05-25

## 2021-08-23 PROBLEM — T50.995A ADVERSE EFFECT OF OTHER DRUGS, MEDICAMENTS AND BIOLOGICAL SUBSTANCES, INITIAL ENCOUNTER: Status: ACTIVE | Noted: 2018-02-20

## 2021-08-23 PROBLEM — G43.909 MIGRAINE HEADACHE: Status: ACTIVE | Noted: 2021-08-23

## 2021-08-23 PROBLEM — E55.9 VITAMIN D DEFICIENCY: Status: ACTIVE | Noted: 2021-08-23

## 2021-08-23 PROBLEM — K31.7 POLYP OF DUODENUM: Status: ACTIVE | Noted: 2017-12-14

## 2021-08-23 PROBLEM — D80.1 HYPOGAMMAGLOBULINEMIA (H): Status: ACTIVE | Noted: 2021-08-23

## 2021-08-23 PROBLEM — I25.10 CORONARY ATHEROSCLEROSIS: Status: ACTIVE | Noted: 2021-03-03

## 2021-08-23 PROBLEM — E21.3 HYPERPARATHYROIDISM (H): Status: ACTIVE | Noted: 2021-08-23

## 2021-08-23 NOTE — TELEPHONE ENCOUNTER
"Routing refill request to provider for review/approval because:  Drug not active on patient's medication list    Last office visit provider:  1/15/21       Requested Prescriptions   Pending Prescriptions Disp Refills     LANsoprazole (PREVACID) 30 MG DR capsule [Pharmacy Med Name: LANSOPRAZ DR CAP 30MG RX] 90 capsule 2     Sig: TAKE 1 CAPSULE DAILY       PPI Protocol Failed - 8/19/2021  7:10 PM        Failed - Not on Clopidogrel (unless Pantoprazole ordered)        Failed - Medication is active on med list        Passed - No diagnosis of osteoporosis on record        Passed - Recent (12 mo) or future (30 days) visit within the authorizing provider's specialty     Patient has had an office visit with the authorizing provider or a provider within the authorizing providers department within the previous 12 mos or has a future within next 30 days. See \"Patient Info\" tab in inbasket, or \"Choose Columns\" in Meds & Orders section of the refill encounter.              Passed - Patient is age 18 or older        Passed - No active pregnacy on record        Passed - No positive pregnancy test in past 12 months           traZODone (DESYREL) 50 MG tablet [Pharmacy Med Name: TRAZODONE TAB 50MG] 90 tablet 2     Sig: TAKE 1 TABLET AT BEDTIME       Serotonin Modulators Passed - 8/19/2021  7:10 PM        Passed - Recent (12 mo) or future (30 days) visit within the authorizing provider's specialty     Patient has had an office visit with the authorizing provider or a provider within the authorizing providers department within the previous 12 mos or has a future within next 30 days. See \"Patient Info\" tab in inbasket, or \"Choose Columns\" in Meds & Orders section of the refill encounter.              Passed - Medication is active on med list        Passed - Patient is age 18 or older        Passed - No active pregnancy on record        Passed - No positive pregnancy test in past 12 months             jose c mauricio RN 08/22/21 7:37 " PM

## 2021-08-24 ENCOUNTER — OFFICE VISIT (OUTPATIENT)
Dept: FAMILY MEDICINE | Facility: CLINIC | Age: 81
End: 2021-08-24
Payer: COMMERCIAL

## 2021-08-24 VITALS
DIASTOLIC BLOOD PRESSURE: 76 MMHG | BODY MASS INDEX: 31.89 KG/M2 | HEART RATE: 65 BPM | WEIGHT: 180 LBS | SYSTOLIC BLOOD PRESSURE: 126 MMHG | OXYGEN SATURATION: 97 %

## 2021-08-24 DIAGNOSIS — J45.40 MODERATE PERSISTENT ASTHMA WITHOUT COMPLICATION: ICD-10-CM

## 2021-08-24 DIAGNOSIS — R53.83 OTHER FATIGUE: ICD-10-CM

## 2021-08-24 DIAGNOSIS — D80.3 IGG SUBCLASS DEFICIENCY (H): ICD-10-CM

## 2021-08-24 DIAGNOSIS — Z85.3 HX: BREAST CANCER: ICD-10-CM

## 2021-08-24 DIAGNOSIS — R06.00 DYSPNEA, UNSPECIFIED TYPE: Primary | ICD-10-CM

## 2021-08-24 DIAGNOSIS — I25.5 ISCHEMIC CARDIOMYOPATHY: ICD-10-CM

## 2021-08-24 DIAGNOSIS — I21.3 ST ELEVATION MYOCARDIAL INFARCTION (STEMI), UNSPECIFIED ARTERY (H): ICD-10-CM

## 2021-08-24 DIAGNOSIS — D64.9 ANEMIA, UNSPECIFIED TYPE: ICD-10-CM

## 2021-08-24 DIAGNOSIS — I21.21 ST ELEVATION MYOCARDIAL INFARCTION INVOLVING LEFT CIRCUMFLEX CORONARY ARTERY (H): ICD-10-CM

## 2021-08-24 LAB
ALBUMIN SERPL-MCNC: 3.1 G/DL (ref 3.5–5)
ALP SERPL-CCNC: 57 U/L (ref 45–120)
ALT SERPL W P-5'-P-CCNC: <9 U/L (ref 0–45)
ANION GAP SERPL CALCULATED.3IONS-SCNC: 9 MMOL/L (ref 5–18)
AST SERPL W P-5'-P-CCNC: 13 U/L (ref 0–40)
BASOPHILS # BLD AUTO: 0.1 10E3/UL (ref 0–0.2)
BASOPHILS NFR BLD AUTO: 1 %
BILIRUB SERPL-MCNC: 0.3 MG/DL (ref 0–1)
BUN SERPL-MCNC: 18 MG/DL (ref 8–28)
CALCIUM SERPL-MCNC: 8.9 MG/DL (ref 8.5–10.5)
CHLORIDE BLD-SCNC: 110 MMOL/L (ref 98–107)
CO2 SERPL-SCNC: 22 MMOL/L (ref 22–31)
CREAT SERPL-MCNC: 1.51 MG/DL (ref 0.6–1.1)
EOSINOPHIL # BLD AUTO: 0.4 10E3/UL (ref 0–0.7)
EOSINOPHIL NFR BLD AUTO: 4 %
ERYTHROCYTE [DISTWIDTH] IN BLOOD BY AUTOMATED COUNT: 12.8 % (ref 10–15)
GFR SERPL CREATININE-BSD FRML MDRD: 32 ML/MIN/1.73M2
GLUCOSE BLD-MCNC: 74 MG/DL (ref 70–125)
HCT VFR BLD AUTO: 35.5 % (ref 35–47)
HGB BLD-MCNC: 11.8 G/DL (ref 11.7–15.7)
IMM GRANULOCYTES # BLD: 0 10E3/UL
IMM GRANULOCYTES NFR BLD: 0 %
LYMPHOCYTES # BLD AUTO: 2.2 10E3/UL (ref 0.8–5.3)
LYMPHOCYTES NFR BLD AUTO: 23 %
MCH RBC QN AUTO: 29.6 PG (ref 26.5–33)
MCHC RBC AUTO-ENTMCNC: 33.2 G/DL (ref 31.5–36.5)
MCV RBC AUTO: 89 FL (ref 78–100)
MONOCYTES # BLD AUTO: 1 10E3/UL (ref 0–1.3)
MONOCYTES NFR BLD AUTO: 10 %
NEUTROPHILS # BLD AUTO: 5.9 10E3/UL (ref 1.6–8.3)
NEUTROPHILS NFR BLD AUTO: 62 %
PLATELET # BLD AUTO: 295 10E3/UL (ref 150–450)
POTASSIUM BLD-SCNC: 4.6 MMOL/L (ref 3.5–5)
PROT SERPL-MCNC: 5.8 G/DL (ref 6–8)
RBC # BLD AUTO: 3.98 10E6/UL (ref 3.8–5.2)
SODIUM SERPL-SCNC: 141 MMOL/L (ref 136–145)
TSH SERPL DL<=0.005 MIU/L-ACNC: 3.38 UIU/ML (ref 0.3–5)
WBC # BLD AUTO: 9.5 10E3/UL (ref 4–11)

## 2021-08-24 PROCEDURE — 85025 COMPLETE CBC W/AUTO DIFF WBC: CPT | Performed by: FAMILY MEDICINE

## 2021-08-24 PROCEDURE — 84443 ASSAY THYROID STIM HORMONE: CPT | Performed by: FAMILY MEDICINE

## 2021-08-24 PROCEDURE — 99214 OFFICE O/P EST MOD 30 MIN: CPT | Performed by: FAMILY MEDICINE

## 2021-08-24 PROCEDURE — 36415 COLL VENOUS BLD VENIPUNCTURE: CPT | Performed by: FAMILY MEDICINE

## 2021-08-24 PROCEDURE — 80053 COMPREHEN METABOLIC PANEL: CPT | Performed by: FAMILY MEDICINE

## 2021-08-24 RX ORDER — METOPROLOL SUCCINATE 25 MG/1
12.5 TABLET, EXTENDED RELEASE ORAL DAILY
Qty: 90 TABLET | Refills: 3
Start: 2021-08-24 | End: 2022-01-28

## 2021-08-24 ASSESSMENT — ASTHMA QUESTIONNAIRES
QUESTION_5 LAST FOUR WEEKS HOW WOULD YOU RATE YOUR ASTHMA CONTROL: SOMEWHAT CONTROLLED
QUESTION_3 LAST FOUR WEEKS HOW OFTEN DID YOUR ASTHMA SYMPTOMS (WHEEZING, COUGHING, SHORTNESS OF BREATH, CHEST TIGHTNESS OR PAIN) WAKE YOU UP AT NIGHT OR EARLIER THAN USUAL IN THE MORNING: ONCE OR TWICE
QUESTION_2 LAST FOUR WEEKS HOW OFTEN HAVE YOU HAD SHORTNESS OF BREATH: ONCE OR TWICE A WEEK
ACT_TOTALSCORE: 19
QUESTION_1 LAST FOUR WEEKS HOW MUCH OF THE TIME DID YOUR ASTHMA KEEP YOU FROM GETTING AS MUCH DONE AT WORK, SCHOOL OR AT HOME: A LITTLE OF THE TIME
QUESTION_4 LAST FOUR WEEKS HOW OFTEN HAVE YOU USED YOUR RESCUE INHALER OR NEBULIZER MEDICATION (SUCH AS ALBUTEROL): ONCE A WEEK OR LESS

## 2021-08-24 NOTE — PROGRESS NOTES
Krystal Virgen  /76   Pulse 65   Wt 81.6 kg (180 lb)   SpO2 97%   BMI 31.89 kg/m       Assessment/Plan:                Krystal was seen today for medication therapy management, recheck, medication problem and fatigue.    Diagnoses and all orders for this visit:    Dyspnea, unspecified type  -     CBC with Platelets & Differential; Future  -     TSH with free T4 reflex; Future  -     CBC with Platelets & Differential  -     TSH with free T4 reflex    Other fatigue  -     CBC with Platelets & Differential; Future  -     TSH with free T4 reflex; Future  -     CBC with Platelets & Differential  -     TSH with free T4 reflex    ST elevation myocardial infarction (STEMI), unspecified artery (H)  -     Comprehensive metabolic panel; Future  -     Comprehensive metabolic panel    ST elevation myocardial infarction involving left circumflex coronary artery (H)  -     metoprolol succinate ER (TOPROL-XL) 25 MG 24 hr tablet; Take 0.5 tablets (12.5 mg) by mouth daily    Anemia, unspecified type    Moderate persistent asthma without complication    IgG subclass deficiency (H)    HX: breast cancer    Ischemic cardiomyopathy  -     B-Type Natriuretic Peptide ([facility] Only); Future         DISCUSSION  There multitude of possibilities contributing to shortness of breath.  Given the duration and describe situation I do not think this is an emergent consideration but does require further work-up and consideration.  Will obtain laboratory tests including reevaluation of hemoglobin to make sure anemia is not a contributing factor.  Reduce dose of metoprolol to 12.5.  Metoprolol may be contributing to fatigue and with asthma could be a factor with triggering more mild symptoms.  Based on her most recent echocardiogram from April when according to patient the symptom was still present her ejection fraction was noted to be 55%.  She may require further cardiac work-up.  There is no reported symptoms to suggest an anemia but a  Holter monitor may be something to consider as well.  We will also give strong consideration to having her visit with her cardiologist to discuss options for further evaluation.  She has an appoint scheduled with her pulmonologist to evaluate her asthma breathing concerns.  Subjective:     HPI:    Krystal Virgen is a 81 year old female with a complex medical history including ST elevation myocardial infarction in January 2021, moderate persistent asthma, IgG subclass deficiency resulting in frequent respiratory infections, migraine headaches, breast cancer for which she has been treated and remains on tamoxifen, recurrent urinary tract infections with recent treatment for infection last week.    She is here today reporting that she has had persistent dyspnea which limits activity since the time of her myocardial infarction.  She states that with minimal activity she becomes short of breath.  She is tended to remain sedentary because of this and she is comfortable with being sedentary.  Her weight is increased but she denies edema.  She states that she has been eating more poorly recently.  She denies any chest pain tightness or discomfort.  She inquires about the medications, metoprolol, rosuvastatin and clopidogrel as to whether they may be causes for her symptoms since they were started after her myocardial infarction.  She reports her shortness of breath does not feel as though it is asthma because she is not wheezing or coughing.  She does not feel she has a respiratory infection.  She was noted to have an anemia with a hemoglobin of 8.6 shortly after her myocardial infarction.  She has had more recent hemoglobin measurements that have come up to 10.6.  She denies any blood loss.    She reports that her recent urinary tract infection has improved tremendously.  She denies any other significant concerns reporting that mental health overall is good.        ROS:  Complete review of systems is obtained.  Other  than the specific considerations noted above complete review of systems is negative.          Objective:   Medications:  Current Outpatient Medications   Medication     albuterol (PROAIR HFA) 108 (90 BASE) MCG/ACT inhaler     aspirin (ASA) 81 MG EC tablet     budesonide-formoterol (SYMBICORT) 160-4.5 MCG/ACT inhaler     buPROPion (WELLBUTRIN XL) 300 MG 24 hr tablet     citalopram (CELEXA) 20 MG tablet     clopidogrel (PLAVIX) 75 MG tablet     estradiol (VIVELLE-DOT) 0.05 MG/24HR patch     fluticasone (FLONASE) 50 MCG/ACT nasal spray     LANsoprazole (PREVACID) 30 MG DR capsule     metoprolol succinate ER (TOPROL-XL) 25 MG 24 hr tablet     montelukast (SINGULAIR) 10 MG tablet     naproxen sodium (ANAPROX) 550 MG tablet     nitroGLYcerin (NITROSTAT) 0.4 MG sublingual tablet     Omeprazole (PRILOSEC PO)     rosuvastatin (CRESTOR) 40 MG tablet     sumatriptan (IMITREX) 100 MG tablet     tamoxifen (NOLVADEX) 20 MG tablet     traZODone (DESYREL) 50 MG tablet     No current facility-administered medications for this visit.        Allergies:     Allergies   Allergen Reactions     Ticagrelor Other (See Comments)     Dyspnea     Penicillins      Amoxicillin doesn't work for her     Sulfa Drugs         Social History     Socioeconomic History     Marital status:      Spouse name: Not on file     Number of children: Not on file     Years of education: Not on file     Highest education level: Not on file   Occupational History     Not on file   Tobacco Use     Smoking status: Never Smoker     Smokeless tobacco: Never Used   Substance and Sexual Activity     Alcohol use: No     Drug use: No     Sexual activity: Not on file   Other Topics Concern     Parent/sibling w/ CABG, MI or angioplasty before 65F 55M? Not Asked   Social History Narrative     Not on file     Social Determinants of Health     Financial Resource Strain:      Difficulty of Paying Living Expenses:    Food Insecurity:      Worried About Running Out of Food  in the Last Year:      Ran Out of Food in the Last Year:    Transportation Needs:      Lack of Transportation (Medical):      Lack of Transportation (Non-Medical):    Physical Activity:      Days of Exercise per Week:      Minutes of Exercise per Session:    Stress:      Feeling of Stress :    Social Connections:      Frequency of Communication with Friends and Family:      Frequency of Social Gatherings with Friends and Family:      Attends Judaism Services:      Active Member of Clubs or Organizations:      Attends Club or Organization Meetings:      Marital Status:    Intimate Partner Violence:      Fear of Current or Ex-Partner:      Emotionally Abused:      Physically Abused:      Sexually Abused:        Family History   Problem Relation Age of Onset     Breast Cancer Paternal Aunt 48.00     Breast Cancer Mother 78.00     Breast Cancer Sister 48.00     Breast Cancer Maternal Aunt 35.00     Heart Disease Father      Macular Degeneration Father      Heart Disease Brother         Most Recent Immunizations   Administered Date(s) Administered     COVID-19,PF,Moderna 02/24/2021     FLU 6-35 months 10/01/2010     Influenza (H1N1) 01/05/2010     Influenza (High Dose) 3 valent vaccine 10/01/2020     Influenza (IIV3) PF 10/17/2013     Influenza Vaccine, 6+MO IM (QUADRIVALENT W/PRESERVATIVES) 10/17/2013     Mantoux Tuberculin Skin Test 04/13/2006     Pneumo Conj 13-V (2010&after) 09/11/2018     Pneumococcal 23 valent 10/01/2019     Tdap (Adacel,Boostrix) 04/28/2014     Zoster vaccine, live 10/06/2009        Wt Readings from Last 3 Encounters:   08/24/21 81.6 kg (180 lb)   05/04/21 78.7 kg (173 lb 9.6 oz)   03/03/21 77.1 kg (170 lb)        BP Readings from Last 6 Encounters:   08/24/21 126/76   05/04/21 127/63   03/03/21 138/82   01/21/21 137/80   01/10/21 111/78   10/21/20 (!) 180/90        Hemoglobin A1C   Date Value Ref Range Status   01/08/2021 5.3 0 - 5.6 % Final     Comment:     Normal <5.7% Prediabetes 5.7-6.4%   Diabetes 6.5% or higher - adopted from ADA   consensus guidelines.     02/01/2013 5.8 4.2 - 6.1 % Final      PHYSICAL EXAM:    /76   Pulse 65   Wt 81.6 kg (180 lb)   SpO2 97%   BMI 31.89 kg/m           General Appearance:    Alert, cooperative, no distress   Eyes:   No scleral icterus or conjunctival irritation       Neck:   Supple, symmetrical, trachea midline, no adenopathy;        thyroid:  No enlargement/tenderness/nodules   Lungs:     Clear to auscultation bilaterally, respirations unlabored, no wheezes or crackles   Heart:    Regular rate and rhythm,  No murmur   Extremities:  No edema, no joint swelling or redness, no evidence of any injuries   Skin:  No concerning skin findings, no suspicious moles, no rashes   Neurologic:  On gross examination there is no motor or sensory deficit.  Patient walks with a normal gait

## 2021-08-25 ASSESSMENT — ASTHMA QUESTIONNAIRES: ACT_TOTALSCORE: 19

## 2021-08-26 ENCOUNTER — TELEPHONE (OUTPATIENT)
Dept: FAMILY MEDICINE | Facility: CLINIC | Age: 81
End: 2021-08-26

## 2021-08-26 DIAGNOSIS — N39.0 URINARY TRACT INFECTION WITHOUT HEMATURIA, SITE UNSPECIFIED: Primary | ICD-10-CM

## 2021-08-26 ASSESSMENT — PATIENT HEALTH QUESTIONNAIRE - PHQ9: SUM OF ALL RESPONSES TO PHQ QUESTIONS 1-9: 9

## 2021-08-26 NOTE — TELEPHONE ENCOUNTER
Patient forgot to ask at her recent visit, but she is requesting to have a prescription of Macrobid on hand in case she ends up getting another UTI and has trouble getting into the clinic on time.      Please sign pended med if appropriate.

## 2021-08-27 RX ORDER — NITROFURANTOIN 25; 75 MG/1; MG/1
100 CAPSULE ORAL 2 TIMES DAILY
Qty: 10 CAPSULE | Refills: 0 | Status: SHIPPED | OUTPATIENT
Start: 2021-08-27 | End: 2021-08-31

## 2021-08-30 ENCOUNTER — TELEPHONE (OUTPATIENT)
Dept: ONCOLOGY | Facility: CLINIC | Age: 81
End: 2021-08-30

## 2021-08-30 NOTE — TELEPHONE ENCOUNTER
Patient called and left message wanting to follow-up to find out where in the process tamoxifen is. She asked for call back to [phone].    Refill sent to [facility] on 8/16/21 by Dr. Zavala. Quantity #90. 2 refills. Called and notified patient refill sent. Asked patient if she called [facility] to set up delivery.  She did not.  I recommended she call to set up delivery and if she was still having issues to call me back.  She verbalized understanding/JAZ Aguiar RN

## 2021-08-31 ENCOUNTER — TELEPHONE (OUTPATIENT)
Dept: FAMILY MEDICINE | Facility: CLINIC | Age: 81
End: 2021-08-31

## 2021-08-31 DIAGNOSIS — N39.0 URINARY TRACT INFECTION WITHOUT HEMATURIA, SITE UNSPECIFIED: ICD-10-CM

## 2021-08-31 RX ORDER — NITROFURANTOIN 25; 75 MG/1; MG/1
100 CAPSULE ORAL 2 TIMES DAILY
Qty: 10 CAPSULE | Refills: 0 | Status: SHIPPED | OUTPATIENT
Start: 2021-08-31 | End: 2021-09-11

## 2021-08-31 NOTE — TELEPHONE ENCOUNTER
I reached out to pt today and relay result message below from Dr. Fairchild. Pt states that she does not see any difference in the past week of taking metoprolol.     Pt states that her uti sxs returned today with a lot of pressure and pain. Pt would like a new prescription for this and be sent over to: [facility] - [facility], [place] - [address]    Pt wanted to make an appt to see Dr. Fairchild for a follow up with everything above and I offered to help her with this. Pt is scheduled for Monday 09/20/21 at 2PM.

## 2021-08-31 NOTE — TELEPHONE ENCOUNTER
----- Message from Juan C Fairchild MD sent at 8/31/2021  7:58 AM CDT -----  Please call patient: The lab tests from her visit last week do not indicate any reason for her persistent symptoms.  The kidney function measurement is slightly reduced but stable in comparison to previous.  The blood count measurements including red blood cells, white blood cells and platelets are all actually normal.  There is no anemia.    Please ask if she has noticed any degree of improvement with the dose adjustment for the metoprolol and let me know.  Based on her response if any to this medication change we will decide on further steps to evaluate her concerns.

## 2021-09-07 DIAGNOSIS — F32.5 MAJOR DEPRESSIVE DISORDER, SINGLE EPISODE, IN REMISSION (H): ICD-10-CM

## 2021-09-08 RX ORDER — BUPROPION HYDROCHLORIDE 300 MG/1
300 TABLET ORAL EVERY MORNING
Qty: 90 TABLET | Refills: 3 | Status: SHIPPED | OUTPATIENT
Start: 2021-09-08 | End: 2022-08-15

## 2021-09-11 ENCOUNTER — NURSE TRIAGE (OUTPATIENT)
Dept: NURSING | Facility: CLINIC | Age: 81
End: 2021-09-11

## 2021-09-11 DIAGNOSIS — N39.0 URINARY TRACT INFECTION WITHOUT HEMATURIA, SITE UNSPECIFIED: ICD-10-CM

## 2021-09-11 RX ORDER — NITROFURANTOIN 25; 75 MG/1; MG/1
100 CAPSULE ORAL 2 TIMES DAILY
Qty: 10 CAPSULE | Refills: 0 | Status: SHIPPED | OUTPATIENT
Start: 2021-09-11 | End: 2021-09-20

## 2021-09-11 NOTE — TELEPHONE ENCOUNTER
Pt called stating she has bladder infection third round and she just finished micrbing less then a week. Pt reported once she was off the microbid she stared having the infection again. Pt reported she has painful spasms and when the spasms are done urine comes right out. Pt reported she is leaking urine, and going to the bathroom every one hour to 45 minutes.     Per protocal pt was advised to to be seen within 24 hours, pt refused stating she doesn't want to come in and a pee in a cup when she know she has bladder infection. Pt stated her brother in the ICU and she is allowed to visit him on Tuesday and she doesn't want to be going to the bathroom frequently.      RN paged on call provider , received a retun call from Dr Jade Tolbert who gave a telephone order for MACROBID 100 MG capsule, take one capsule by mouth twice a day for 5 days, quantity 10 with no refills. Provider advised pt to come in to the clinic to have UA/UC done to see if she is having antibiotic resistance.     RN called pt back relayed provider advice, and pt stated she has appointment with pcp when she is done this round of antibiotic. Pt requested antibiotic sent to [facility]. Pt was informed prescription will be called in to the pharmacy.    RN called the pharmacy to call in the prescription and  Was hold for 26 minutes and the call dropped atomically. RN again called pharmacy and was on hold for 18 minutes  and finally was able to find the pharmacy to send in E prescribed.       Raul Lira RN  [facility]       COVID 19 Nurse Triage Plan/Patient Instructions    Please be aware that novel coronavirus (COVID-19) may be circulating in the community. If you develop symptoms such as fever, cough, or SOB or if you have concerns about the presence of another infection including coronavirus (COVID-19), please contact your health care provider or visit [email or web address].      Disposition/Instructions    In-Person Visit with provider recommended. Reference Visit Selection Guide.    Thank you for taking steps to prevent the spread of this virus.  o Limit your contact with others.  o Wear a simple mask to cover your cough.  o Wash your hands well and often.    Resources    [facility]: About COVID-19: [email or web address]    CDC: What to Do If You're Sick: www.cdc.gov/coronavirus/2019-ncov/about/steps-when-sick.html    CDC: Ending Home Isolation: www.cdc.gov/coronavirus/2019-ncov/hcp/disposition-in-home-patients.html     CDC: Caring for Someone: www.cdc.gov/coronavirus/2019-ncov/if-you-are-sick/care-for-someone.html     [facility]: Interim Guidance for Hospital Discharge to Home: www.health.[facility].[place]/diseases/coronavirus/hcp/hospdischarge.pdf    [facility] clinical trials (COVID-19 research studies): [email or web address]-clinical-trials     Below are the COVID-19 hotlines at the [place] Department of Health ([facility]). Interpreters are available.   o For health questions: Call 967-027-5330 or 1-242.748.2485 (7 a.m. to 7 p.m.)  o For questions about schools and childcare: Call 139-945-3863 or 1-334.760.4672 (7 a.m. to 7 p.m.)           Reason for Disposition    [1] Can't control passage of urine (i.e., urinary incontinence) AND [2] new onset (< 2 weeks) or worsening    Additional Information    Negative: Shock suspected (e.g., cold/pale/clammy skin, too weak to stand, low BP, rapid pulse)    Negative: Sounds like a life-threatening emergency to the triager    Negative: Followed a genital area injury    Negative: Followed a genital area injury (penis, scrotum)    Negative: Vaginal discharge    Negative: Pus (white, yellow) or bloody discharge from end of penis    Negative: [1] Taking antibiotic for urinary tract infection (UTI) AND [2] female    Negative: [1] Taking antibiotic for urinary tract infection (UTI) AND [2] male    Negative: [1] Discomfort (pain,  burning or stinging) when passing urine AND [2] pregnant    Negative: [1] Discomfort (pain, burning or stinging) when passing urine AND [2] postpartum (from 0 to 6 weeks after delivery)    Negative: [1] Discomfort (pain, burning or stinging) when passing urine AND [2] female    Negative: [1] Discomfort (pain, burning or stinging) when passing urine AND [2] male    Negative: Pain or itching in the vulvar area    Negative: Pain in scrotum is main symptom    Negative: Blood in the urine is main symptom    Negative: Symptoms arising from use of a urinary catheter (Loo or Coude)    Negative: [1] Unable to urinate (or only a few drops) > 4 hours AND     [2] bladder feels very full (e.g., palpable bladder or strong urge to urinate)    Negative: [1] Decreased urination and [2] drinking very little AND [2] dehydration suspected (e.g., dark urine, no urine > 12 hours, very dry mouth, very lightheaded)    Negative: Patient sounds very sick or weak to the triager    Negative: Fever > 100.4 F (38.0 C)    Negative: Side (flank) or lower back pain present    Protocols used: URINARY SYMPTOMS-A-AH

## 2021-09-13 RX ORDER — NITROFURANTOIN 25; 75 MG/1; MG/1
100 CAPSULE ORAL 2 TIMES DAILY
Qty: 10 CAPSULE | Refills: 0 | Status: CANCELLED
Start: 2021-09-13

## 2021-09-13 NOTE — TELEPHONE ENCOUNTER
Juan Ramon could not get insurance info to go through and did not have RX ready Saturday evening. It was sent here for 24 hour convenience.     Per patient request, I called the RX into [facility] (usual pharmacy) and she will have someone pick it up.

## 2021-09-17 DIAGNOSIS — J45.40 MODERATE PERSISTENT ASTHMA WITHOUT COMPLICATION: Primary | ICD-10-CM

## 2021-09-18 NOTE — TELEPHONE ENCOUNTER
"Routing refill request to provider for review/approval because:  Labs not current:  ACT      Last office visit provider:  1/15/21     Requested Prescriptions   Pending Prescriptions Disp Refills     albuterol (PROAIR HFA/PROVENTIL HFA/VENTOLIN HFA) 108 (90 Base) MCG/ACT inhaler [Pharmacy Med Name: ALBUTEROL(V) INH ] 54 g 1     Sig: USE 2 INHALATIONS ORALLY   EVERY 6 HOURS AS NEEDED       Asthma Maintenance Inhalers - Anticholinergics Failed - 9/17/2021  7:11 PM        Failed - Asthma control assessment score within normal limits in last 6 months     Please review ACT score.           Passed - Patient is age 12 years or older        Passed - Medication is active on med list        Passed - Recent (6 mo) or future (30 days) visit within the authorizing provider's specialty     Patient had office visit in the last 6 months or has a visit in the next 30 days with authorizing provider or within the authorizing provider's specialty.  See \"Patient Info\" tab in inbasket, or \"Choose Columns\" in Meds & Orders section of the refill encounter.           Short-Acting Beta Agonist Inhalers Protocol  Failed - 9/17/2021  7:11 PM        Failed - Asthma control assessment score within normal limits in last 6 months     Please review ACT score.           Passed - Patient is age 12 or older        Passed - Medication is active on med list        Passed - Recent (6 mo) or future (30 days) visit within the authorizing provider's specialty     Patient had office visit in the last 6 months or has a visit in the next 30 days with authorizing provider or within the authorizing provider's specialty.  See \"Patient Info\" tab in inbasket, or \"Choose Columns\" in Meds & Orders section of the refill encounter.                 jose c mauricio RN 09/18/21 11:04 AM  "

## 2021-09-19 RX ORDER — ALBUTEROL SULFATE 90 UG/1
AEROSOL, METERED RESPIRATORY (INHALATION)
Qty: 54 G | Refills: 1 | Status: SHIPPED | OUTPATIENT
Start: 2021-09-19 | End: 2022-09-29

## 2021-09-20 ENCOUNTER — OFFICE VISIT (OUTPATIENT)
Dept: FAMILY MEDICINE | Facility: CLINIC | Age: 81
End: 2021-09-20
Payer: COMMERCIAL

## 2021-09-20 VITALS
OXYGEN SATURATION: 98 % | WEIGHT: 182 LBS | HEART RATE: 79 BPM | DIASTOLIC BLOOD PRESSURE: 74 MMHG | BODY MASS INDEX: 32.24 KG/M2 | SYSTOLIC BLOOD PRESSURE: 130 MMHG

## 2021-09-20 DIAGNOSIS — N39.0 URINARY TRACT INFECTION WITHOUT HEMATURIA, SITE UNSPECIFIED: ICD-10-CM

## 2021-09-20 PROCEDURE — 99213 OFFICE O/P EST LOW 20 MIN: CPT | Mod: 25 | Performed by: FAMILY MEDICINE

## 2021-09-20 PROCEDURE — G0008 ADMIN INFLUENZA VIRUS VAC: HCPCS | Performed by: FAMILY MEDICINE

## 2021-09-20 PROCEDURE — 90662 IIV NO PRSV INCREASED AG IM: CPT | Performed by: FAMILY MEDICINE

## 2021-09-20 RX ORDER — NITROFURANTOIN 25; 75 MG/1; MG/1
100 CAPSULE ORAL 2 TIMES DAILY
Qty: 10 CAPSULE | Refills: 0 | Status: SHIPPED | OUTPATIENT
Start: 2021-09-20 | End: 2021-11-02

## 2021-09-20 ASSESSMENT — PATIENT HEALTH QUESTIONNAIRE - PHQ9
SUM OF ALL RESPONSES TO PHQ QUESTIONS 1-9: 13
SUM OF ALL RESPONSES TO PHQ QUESTIONS 1-9: 13
10. IF YOU CHECKED OFF ANY PROBLEMS, HOW DIFFICULT HAVE THESE PROBLEMS MADE IT FOR YOU TO DO YOUR WORK, TAKE CARE OF THINGS AT HOME, OR GET ALONG WITH OTHER PEOPLE: SOMEWHAT DIFFICULT

## 2021-09-20 NOTE — PROGRESS NOTES
Krystal Virgen  /74   Pulse 79   Wt 82.6 kg (182 lb)   SpO2 98%   BMI 32.24 kg/m       Assessment/Plan:                Krystal was seen today for urinary problem, medication therapy management and imm/inj.    Diagnoses and all orders for this visit:    Urinary tract infection without hematuria, site unspecified  -     nitroFURantoin macrocrystal-monohydrate (MACROBID) 100 MG capsule; Take 1 capsule (100 mg) by mouth 2 times daily for 5 days    Other orders  -     INFLUENZA, QUAD, HD, PF, 65+ (FLUZONE HD)         DISCUSSION  See discussion below.  Subjective:     HPI:    Krystal Virgen is a 81 year old female is here today for follow-up primarily on 2 separate concerns.  She has ongoing shortness of breath.  She suffered a myocardial infarction this past year.  She also has a complex underlying medical history of chronic asthma.  Patient reports ongoing issues with shortness of breath following her myocardial infarction.  When she last saw me medication some work-up and did not find any acute issue.  I wondered if her metoprolol may be contributing factor to bronchospasm and/or shortness of breath.  We reduce the dose did not make a significant difference.  Patient has seen her pulmonologist.  Plan has been formulated for the winter but it was not felt that there was a significant modifiable factor related to her asthma that could be improved upon to improve shortness of breath.  She has an appointment scheduled with her cardiologist later this week to discuss further.  We elected to take no further action regarding the shortness of breath at this time pending her evaluation with her cardiologist.    She has had recurrent urinary tract infections.  Discussed having a prescription on him for Macrobid.  Discussed consideration of seeing a urologist.  I also discussed ways to help prevent urinary tract infection.  Discussed that with her plan to go on azithromycin this winter that may help prevent UTIs  as well.        ROS:  Complete review of systems is obtained.  Other than the specific considerations noted above complete review of systems is negative.          Objective:   Medications:  Current Outpatient Medications   Medication     albuterol (PROAIR HFA/PROVENTIL HFA/VENTOLIN HFA) 108 (90 Base) MCG/ACT inhaler     aspirin (ASA) 81 MG EC tablet     budesonide-formoterol (SYMBICORT) 160-4.5 MCG/ACT inhaler     buPROPion (WELLBUTRIN XL) 300 MG 24 hr tablet     citalopram (CELEXA) 20 MG tablet     clopidogrel (PLAVIX) 75 MG tablet     estradiol (VIVELLE-DOT) 0.05 MG/24HR patch     fluticasone (FLONASE) 50 MCG/ACT nasal spray     LANsoprazole (PREVACID) 30 MG DR capsule     metoprolol succinate ER (TOPROL-XL) 25 MG 24 hr tablet     montelukast (SINGULAIR) 10 MG tablet     naproxen sodium (ANAPROX) 550 MG tablet     nitroFURantoin macrocrystal-monohydrate (MACROBID) 100 MG capsule     nitroGLYcerin (NITROSTAT) 0.4 MG sublingual tablet     Omeprazole (PRILOSEC PO)     rosuvastatin (CRESTOR) 40 MG tablet     sumatriptan (IMITREX) 100 MG tablet     tamoxifen (NOLVADEX) 20 MG tablet     traZODone (DESYREL) 50 MG tablet     No current facility-administered medications for this visit.        Allergies:     Allergies   Allergen Reactions     Ticagrelor Other (See Comments)     Dyspnea     Penicillins      Amoxicillin doesn't work for her     Sulfa Drugs         Social History     Socioeconomic History     Marital status:      Spouse name: Not on file     Number of children: Not on file     Years of education: Not on file     Highest education level: Not on file   Occupational History     Not on file   Tobacco Use     Smoking status: Never Smoker     Smokeless tobacco: Never Used   Substance and Sexual Activity     Alcohol use: No     Drug use: No     Sexual activity: Not on file   Other Topics Concern     Parent/sibling w/ CABG, MI or angioplasty before 65F 55M? Not Asked   Social History Narrative     Not on file      Social Determinants of Health     Financial Resource Strain:      Difficulty of Paying Living Expenses:    Food Insecurity:      Worried About Running Out of Food in the Last Year:      Ran Out of Food in the Last Year:    Transportation Needs:      Lack of Transportation (Medical):      Lack of Transportation (Non-Medical):    Physical Activity:      Days of Exercise per Week:      Minutes of Exercise per Session:    Stress:      Feeling of Stress :    Social Connections:      Frequency of Communication with Friends and Family:      Frequency of Social Gatherings with Friends and Family:      Attends Islam Services:      Active Member of Clubs or Organizations:      Attends Club or Organization Meetings:      Marital Status:    Intimate Partner Violence:      Fear of Current or Ex-Partner:      Emotionally Abused:      Physically Abused:      Sexually Abused:        Family History   Problem Relation Age of Onset     Breast Cancer Paternal Aunt 48.00     Breast Cancer Mother 78.00     Breast Cancer Sister 48.00     Breast Cancer Maternal Aunt 35.00     Heart Disease Father      Macular Degeneration Father      Heart Disease Brother         Most Recent Immunizations   Administered Date(s) Administered     COVID-19,PF,Moderna 02/24/2021     FLU 6-35 months 10/01/2010     Influenza (H1N1) 01/05/2010     Influenza (High Dose) 3 valent vaccine 10/01/2020     Influenza (IIV3) PF 10/17/2013     Influenza Vaccine, 6+MO IM (QUADRIVALENT W/PRESERVATIVES) 10/17/2013     Influenza, Quad, High Dose, Pf, 65yr+ (Fluzone HD) 09/20/2021     Mantoux Tuberculin Skin Test 04/13/2006     Pneumo Conj 13-V (2010&after) 09/11/2018     Pneumococcal 23 valent 10/01/2019     Tdap (Adacel,Boostrix) 04/28/2014     Zoster vaccine, live 10/06/2009        Wt Readings from Last 3 Encounters:   09/20/21 82.6 kg (182 lb)   08/24/21 81.6 kg (180 lb)   05/04/21 78.7 kg (173 lb 9.6 oz)        BP Readings from Last 6 Encounters:   09/20/21 130/74    08/24/21 126/76   05/04/21 127/63   03/03/21 138/82   01/21/21 137/80   01/10/21 111/78        Hemoglobin A1C   Date Value Ref Range Status   01/08/2021 5.3 0 - 5.6 % Final     Comment:     Normal <5.7% Prediabetes 5.7-6.4%  Diabetes 6.5% or higher - adopted from ADA   consensus guidelines.     02/01/2013 5.8 4.2 - 6.1 % Final              PHYSICAL EXAM:    /74   Pulse 79   Wt 82.6 kg (182 lb)   SpO2 98%   BMI 32.24 kg/m           General Appearance:    Alert, cooperative, no distress   Eyes:   No scleral icterus or conjunctival irritation       Lungs:     Clear to auscultation bilaterally, respirations unlabored, no wheezes or crackles   Heart:    Regular rate and rhythm,  No murmur   Extremities:  No edema, no joint swelling or redness, no evidence of any injuries   Skin:  No concerning skin findings, no suspicious moles, no rashes   Neurologic:  On gross examination there is no motor or sensory deficit.  Patient walks with a normal gait       Answers for HPI/ROS submitted by the patient on 9/20/2021  If you checked off any problems, how difficult have these problems made it for you to do your work, take care of things at home, or get along with other people?: Somewhat difficult  PHQ9 TOTAL SCORE: 13

## 2021-09-21 ASSESSMENT — PATIENT HEALTH QUESTIONNAIRE - PHQ9: SUM OF ALL RESPONSES TO PHQ QUESTIONS 1-9: 13

## 2021-09-24 ENCOUNTER — OFFICE VISIT (OUTPATIENT)
Dept: CARDIOLOGY | Facility: CLINIC | Age: 81
End: 2021-09-24
Payer: COMMERCIAL

## 2021-09-24 VITALS
RESPIRATION RATE: 18 BRPM | WEIGHT: 179 LBS | DIASTOLIC BLOOD PRESSURE: 62 MMHG | BODY MASS INDEX: 31.71 KG/M2 | SYSTOLIC BLOOD PRESSURE: 100 MMHG | OXYGEN SATURATION: 94 % | HEART RATE: 81 BPM

## 2021-09-24 DIAGNOSIS — I25.10 ATHEROSCLEROSIS OF NATIVE CORONARY ARTERY OF NATIVE HEART WITHOUT ANGINA PECTORIS: Primary | ICD-10-CM

## 2021-09-24 DIAGNOSIS — R06.09 DYSPNEA ON EXERTION: ICD-10-CM

## 2021-09-24 DIAGNOSIS — J45.40 MODERATE PERSISTENT ASTHMA WITHOUT COMPLICATION: ICD-10-CM

## 2021-09-24 PROCEDURE — 83880 ASSAY OF NATRIURETIC PEPTIDE: CPT | Performed by: INTERNAL MEDICINE

## 2021-09-24 PROCEDURE — 36415 COLL VENOUS BLD VENIPUNCTURE: CPT | Performed by: INTERNAL MEDICINE

## 2021-09-24 PROCEDURE — 99214 OFFICE O/P EST MOD 30 MIN: CPT | Performed by: INTERNAL MEDICINE

## 2021-09-24 NOTE — PATIENT INSTRUCTIONS
Krystal Virgen,    It was a pleasure to see you today at the [facility].     My recommendations after this visit include:    1.  Please take your metoprolol every other day for 1 week and then discontinue it.  We are hoping that this will help improve your shortness of breath.  Please call Mee Desai RN in 2 weeks to let her know if your breathing is better.  Her direct phone number is [phone].    2.  We will check a blood test today to see if there is evidence of extra fluid in your system.  We will call you with the results.    3.  We will check a Holter monitor for 1 day as I heard some irregularities of your heartbeat today.  We will also call you with these results.        Edgar Potter

## 2021-09-24 NOTE — LETTER
9/24/2021    Juan C Fairchild MD, MD  [address]  [facility] [address]    RE: Krystal Virgen       Dear Colleague,    I had the pleasure of seeing Krystal Virgen in the [facility].            Assessment/Recommendations   Patient with known coronary artery disease, status post ST elevation MI with percutaneous intervention in the circumflex system.  She had significant asthma her entire life but her breathing has never returned to baseline since the procedure.  It did get better when she stopped Brilinta and went on Plavix but still quite short of breath even walking into the clinic today.  She does not think it is changed recently but is just never been as good.  She denies orthopnea and I do not detect increased fluid in her system but will check a B natruretic peptide today.    We will also taper and stop her metoprolol as this could be exacerbating her bronchospastic disease and she will call us.    There was some irregularity of her heart rate and we will check a Holter monitor as well.    Thank you for allowing us to participate in her care.       History of Present Illness/Subjective    Ms. Krystal Virgen is a 81 year old female known history of ST elevation MI with percutaneous intervention the circumflex system.  She had longstanding asthma prior to this which shortness of breath with activity but her breathing is never been the same since the procedure.  She was initially on Brilinta and her breathing was awful and when that was converted to Plavix her breathing got better but still gets short of breath quite easily and not back to the baseline she had prior to her myocardial infarction.  She denies orthopnea or paroxysmal nocturnal dyspnea and has not had peripheral edema.  With her myocardial infarction she had a pressure sensation in her mid chest that she has never had any return of this symptom.  She has not had any syncope or  near syncopal episodes.  She is very tired and fatigued.      EKG in March of this year showed sinus rhythm with some lateral T wave changes but otherwise unremarkable.  It is personally reviewed.     Physical Examination Review of Systems   /62 (BP Location: Right arm, Patient Position: Sitting, Cuff Size: Adult Regular)   Pulse 81   Resp 18   Wt 81.2 kg (179 lb)   SpO2 94%   BMI 31.71 kg/m    Body mass index is 31.71 kg/m .  Wt Readings from Last 3 Encounters:   09/24/21 81.2 kg (179 lb)   09/20/21 82.6 kg (182 lb)   08/24/21 81.6 kg (180 lb)     General Appearance:   Alert, cooperative and in no acute distress.   ENT/Mouth: Patient wearing a mask.      EYES:  no scleral icterus, normal conjunctivae   Neck: JVP normal. No Hepatojugular reflux. Thyroid not visualized.   Chest/Lungs:   Lungs have somewhat prolonged expiratory phase and mild expiratory rhonchi., equal chest wall expansion.   Cardiovascular:   S1, S2 without murmur ,clicks or rubs. Brachial, radial  pulses are intact and symetric. No carotid bruits noted   Abdomen:  Nontender. BS+.    Extremities: No cyanosis, clubbing or edema   Skin: no xanthelasma, warm.    Neurologic: normal arm movement bilateral, no tremors     Psychiatric: Appropriate affect.      Enc Vitals  BP: 100/62  Pulse: 81  Resp: 18  SpO2: 94 %  Weight: 81.2 kg (179 lb)                                           Medical History  Surgical History Family History Social History   Past Medical History:   Diagnosis Date     Anemia      Asthma      Asthma      Breast cancer (H) 2017     Chronic bronchitis (H)      Chronic sinusitis      Depression      GERD (gastroesophageal reflux disease)      Migraines      Osteopenia      Osteoporosis      Overactive bladder      Pneumococcal infection      Pneumonia      Sinusitis      Spinal headache     Past Surgical History:   Procedure Laterality Date     ARTHRODESIS FOOT  11/11/2011    Procedure:ARTHRODESIS FOOT; Right First  Metatarsophalangeal Joint Fusion with Second and Third Clawtoe Repairs; Surgeon:SARAH CASTRO; Location:[facility] OR     BIOPSY BREAST       CHOLECYSTECTOMY       CHOLECYSTECTOMY       CV CORONARY ANGIOGRAM N/A 1/7/2021    Procedure: Coronary Angiogram;  Surgeon: Slade Yu MD;  Location:  HEART CARDIAC CATH LAB     CV PCI STENT DRUG ELUTING N/A 1/7/2021    Procedure: Percutaneous Coronary Intervention Stent Drug Eluting;  Surgeon: Slade Yu MD;  Location: [facility] CARDIAC CATH LAB     ENT SURGERY      sinus surgery x1     FOOT SURGERY       GYN SURGERY      benign hysterectomy age 43     HYSTERECTOMY  1984     LUMPECTOMY BREAST Right 2017     ORTHOPEDIC SURGERY      fusion of grest toe right     SINUS SURGERY      Family History   Problem Relation Age of Onset     Breast Cancer Paternal Aunt 48.00     Breast Cancer Mother 78.00     Breast Cancer Sister 48.00     Breast Cancer Maternal Aunt 35.00     Heart Disease Father      Macular Degeneration Father      Heart Disease Brother     Social History     Socioeconomic History     Marital status:      Spouse name: Not on file     Number of children: Not on file     Years of education: Not on file     Highest education level: Not on file   Occupational History     Not on file   Tobacco Use     Smoking status: Never Smoker     Smokeless tobacco: Never Used   Substance and Sexual Activity     Alcohol use: No     Drug use: No     Sexual activity: Not on file   Other Topics Concern     Parent/sibling w/ CABG, MI or angioplasty before 65F 55M? Not Asked   Social History Narrative     Not on file     Social Determinants of Health     Financial Resource Strain:      Difficulty of Paying Living Expenses:    Food Insecurity:      Worried About Running Out of Food in the Last Year:      Ran Out of Food in the Last Year:    Transportation Needs:      Lack of Transportation (Medical):      Lack of Transportation (Non-Medical):    Physical Activity:       Days of Exercise per Week:      Minutes of Exercise per Session:    Stress:      Feeling of Stress :    Social Connections:      Frequency of Communication with Friends and Family:      Frequency of Social Gatherings with Friends and Family:      Attends Druze Services:      Active Member of Clubs or Organizations:      Attends Club or Organization Meetings:      Marital Status:    Intimate Partner Violence:      Fear of Current or Ex-Partner:      Emotionally Abused:      Physically Abused:      Sexually Abused:           Medications  Allergies    Allergies   Allergen Reactions     Ticagrelor Other (See Comments)     Dyspnea     Penicillins      Amoxicillin doesn't work for her     Sulfa Drugs          Lab Results    Chemistry/lipid CBC Cardiac Enzymes/BNP/TSH/INR   Lab Results   Component Value Date    CHOL 120 04/30/2021    HDL 63 04/30/2021    TRIG 77 04/30/2021    BUN 18 08/24/2021     08/24/2021    CO2 22 08/24/2021    Lab Results   Component Value Date    WBC 9.5 08/24/2021    HGB 11.8 08/24/2021    HCT 35.5 08/24/2021    MCV 89 08/24/2021     08/24/2021    Lab Results   Component Value Date    TSH 3.38 08/24/2021    INR 0.98 01/07/2021                                               Thank you for allowing me to participate in the care of your patient.      Sincerely,     Edgar Potter MD     [facility]  cc:   No referring provider defined for this encounter.

## 2021-09-24 NOTE — PROGRESS NOTES
Assessment/Recommendations   Patient with known coronary artery disease, status post ST elevation MI with percutaneous intervention in the circumflex system.  She had significant asthma her entire life but her breathing has never returned to baseline since the procedure.  It did get better when she stopped Brilinta and went on Plavix but still quite short of breath even walking into the clinic today.  She does not think it is changed recently but is just never been as good.  She denies orthopnea and I do not detect increased fluid in her system but will check a B natruretic peptide today.    We will also taper and stop her metoprolol as this could be exacerbating her bronchospastic disease and she will call us.    There was some irregularity of her heart rate and we will check a Holter monitor as well.    Thank you for allowing us to participate in her care.       History of Present Illness/Subjective    Ms. Krystal Virgen is a 81 year old female known history of ST elevation MI with percutaneous intervention the circumflex system.  She had longstanding asthma prior to this which shortness of breath with activity but her breathing is never been the same since the procedure.  She was initially on Brilinta and her breathing was awful and when that was converted to Plavix her breathing got better but still gets short of breath quite easily and not back to the baseline she had prior to her myocardial infarction.  She denies orthopnea or paroxysmal nocturnal dyspnea and has not had peripheral edema.  With her myocardial infarction she had a pressure sensation in her mid chest that she has never had any return of this symptom.  She has not had any syncope or near syncopal episodes.  She is very tired and fatigued.      EKG in March of this year showed sinus rhythm with some lateral T wave changes but otherwise unremarkable.  It is personally reviewed.     Physical Examination Review of Systems   /62 (BP  Location: Right arm, Patient Position: Sitting, Cuff Size: Adult Regular)   Pulse 81   Resp 18   Wt 81.2 kg (179 lb)   SpO2 94%   BMI 31.71 kg/m    Body mass index is 31.71 kg/m .  Wt Readings from Last 3 Encounters:   09/24/21 81.2 kg (179 lb)   09/20/21 82.6 kg (182 lb)   08/24/21 81.6 kg (180 lb)     General Appearance:   Alert, cooperative and in no acute distress.   ENT/Mouth: Patient wearing a mask.      EYES:  no scleral icterus, normal conjunctivae   Neck: JVP normal. No Hepatojugular reflux. Thyroid not visualized.   Chest/Lungs:   Lungs have somewhat prolonged expiratory phase and mild expiratory rhonchi., equal chest wall expansion.   Cardiovascular:   S1, S2 without murmur ,clicks or rubs. Brachial, radial  pulses are intact and symetric. No carotid bruits noted   Abdomen:  Nontender. BS+.    Extremities: No cyanosis, clubbing or edema   Skin: no xanthelasma, warm.    Neurologic: normal arm movement bilateral, no tremors     Psychiatric: Appropriate affect.      Enc Vitals  BP: 100/62  Pulse: 81  Resp: 18  SpO2: 94 %  Weight: 81.2 kg (179 lb)                                           Medical History  Surgical History Family History Social History   Past Medical History:   Diagnosis Date    Anemia     Asthma     Asthma     Breast cancer (H) 2017    Chronic bronchitis (H)     Chronic sinusitis     Depression     GERD (gastroesophageal reflux disease)     Migraines     Osteopenia     Osteoporosis     Overactive bladder     Pneumococcal infection     Pneumonia     Sinusitis     Spinal headache     Past Surgical History:   Procedure Laterality Date    ARTHRODESIS FOOT  11/11/2011    Procedure:ARTHRODESIS FOOT; Right First Metatarsophalangeal Joint Fusion with Second and Third Clawtoe Repairs; Surgeon:SARAH CASTRO; Location:[facility]    BIOPSY BREAST      CHOLECYSTECTOMY      CHOLECYSTECTOMY      CV CORONARY ANGIOGRAM N/A 1/7/2021    Procedure: Coronary Angiogram;  Surgeon: Slade Yu MD;   Location:  HEART CARDIAC CATH LAB    CV PCI STENT DRUG ELUTING N/A 1/7/2021    Procedure: Percutaneous Coronary Intervention Stent Drug Eluting;  Surgeon: Slade Yu MD;  Location:  HEART CARDIAC CATH LAB    ENT SURGERY      sinus surgery x1    FOOT SURGERY      GYN SURGERY      benign hysterectomy age 43    HYSTERECTOMY  1984    LUMPECTOMY BREAST Right 2017    ORTHOPEDIC SURGERY      fusion of grest toe right    SINUS SURGERY      Family History   Problem Relation Age of Onset    Breast Cancer Paternal Aunt 48.00    Breast Cancer Mother 78.00    Breast Cancer Sister 48.00    Breast Cancer Maternal Aunt 35.00    Heart Disease Father     Macular Degeneration Father     Heart Disease Brother     Social History     Socioeconomic History    Marital status:      Spouse name: Not on file    Number of children: Not on file    Years of education: Not on file    Highest education level: Not on file   Occupational History    Not on file   Tobacco Use    Smoking status: Never Smoker    Smokeless tobacco: Never Used   Substance and Sexual Activity    Alcohol use: No    Drug use: No    Sexual activity: Not on file   Other Topics Concern    Parent/sibling w/ CABG, MI or angioplasty before 65F 55M? Not Asked   Social History Narrative    Not on file     Social Determinants of Health     Financial Resource Strain:     Difficulty of Paying Living Expenses:    Food Insecurity:     Worried About Running Out of Food in the Last Year:     Ran Out of Food in the Last Year:    Transportation Needs:     Lack of Transportation (Medical):     Lack of Transportation (Non-Medical):    Physical Activity:     Days of Exercise per Week:     Minutes of Exercise per Session:    Stress:     Feeling of Stress :    Social Connections:     Frequency of Communication with Friends and Family:     Frequency of Social Gatherings with Friends and Family:     Attends Shinto Services:     Active Member of Clubs or Organizations:      Attends Club or Organization Meetings:     Marital Status:    Intimate Partner Violence:     Fear of Current or Ex-Partner:     Emotionally Abused:     Physically Abused:     Sexually Abused:           Medications  Allergies    Allergies   Allergen Reactions    Ticagrelor Other (See Comments)     Dyspnea    Penicillins      Amoxicillin doesn't work for her    Sulfa Drugs          Lab Results    Chemistry/lipid CBC Cardiac Enzymes/BNP/TSH/INR   Lab Results   Component Value Date    CHOL 120 04/30/2021    HDL 63 04/30/2021    TRIG 77 04/30/2021    BUN 18 08/24/2021     08/24/2021    CO2 22 08/24/2021    Lab Results   Component Value Date    WBC 9.5 08/24/2021    HGB 11.8 08/24/2021    HCT 35.5 08/24/2021    MCV 89 08/24/2021     08/24/2021    Lab Results   Component Value Date    TSH 3.38 08/24/2021    INR 0.98 01/07/2021

## 2021-09-25 LAB — NT-PROBNP SERPL-MCNC: 2347 PG/ML (ref 0–450)

## 2021-09-28 DIAGNOSIS — R06.09 DYSPNEA ON EXERTION: Primary | ICD-10-CM

## 2021-09-28 RX ORDER — FUROSEMIDE 20 MG
20 TABLET ORAL DAILY
Qty: 10 TABLET | Refills: 0 | Status: SHIPPED | OUTPATIENT
Start: 2021-09-28 | End: 2022-01-28

## 2021-10-12 ENCOUNTER — TELEPHONE (OUTPATIENT)
Dept: CARDIOLOGY | Facility: CLINIC | Age: 81
End: 2021-10-12

## 2021-10-12 DIAGNOSIS — R06.09 DYSPNEA ON EXERTION: Primary | ICD-10-CM

## 2021-10-12 DIAGNOSIS — I25.10 CORONARY ARTERY DISEASE INVOLVING NATIVE CORONARY ARTERY OF NATIVE HEART WITHOUT ANGINA PECTORIS: ICD-10-CM

## 2021-10-12 NOTE — TELEPHONE ENCOUNTER
Dr. Potter, please see update from patient. Any new recs? -ejb  --------------------------------------------------------------------------    Update received from patient. She was prescribed Lasix for 3 days for d/t elevated BNP. Patient is not entirely pleased that she had to take a diuretic given her recent history of UTI. Explained to patient that frequent urination with UTI does not mean she is diuresing or removing extra fluid from her system. Patient notes significant improvement in her breathing, but attributes this to stopping metoprolol.

## 2021-10-22 NOTE — TELEPHONE ENCOUNTER
----- Message -----  From: Edgar Potter MD  Sent: 10/13/2021  12:26 PM CDT  To: Mee Desai, RN    Mee,    Glad her breathing is better.  We get await Holter monitor but I think she should see Kassidye in a couple weeks to listen to her lungs and reevaluate her breathing and fluid status.    Thanks,    Edgar      Return call received from patient. Reviewed response and recommendations per Dr. Potter. Patient verbalized understanding and agreement with plan.   Transferred to scheduling to arrange Holter and follow-up with Renita. -lanieb

## 2021-10-25 ENCOUNTER — TELEPHONE (OUTPATIENT)
Dept: FAMILY MEDICINE | Facility: CLINIC | Age: 81
End: 2021-10-25

## 2021-10-25 DIAGNOSIS — R30.0 DYSURIA: Primary | ICD-10-CM

## 2021-10-25 RX ORDER — PHENAZOPYRIDINE HYDROCHLORIDE 100 MG/1
100 TABLET, FILM COATED ORAL 3 TIMES DAILY PRN
Qty: 30 TABLET | Refills: 0 | Status: SHIPPED | OUTPATIENT
Start: 2021-10-25 | End: 2022-02-01

## 2021-10-25 NOTE — TELEPHONE ENCOUNTER
Patient reports that she is on her second day on Macrobid 100MG BID. She reports that this time around it is not working. She is still having burning and frequency.      She is inquiring about a new antibiotic and also Pyridium. She wants to make sure that if anything is sent to [facility] that it is marked for delivery.

## 2021-10-25 NOTE — TELEPHONE ENCOUNTER
Spoke with patient.  Lab appt [facility] at 2pm for UA dropoff.  Can you place order for UA of your choice.  Thank you

## 2021-10-25 NOTE — TELEPHONE ENCOUNTER
Continue to take Macrobid for now.  Recommend that she come into leave a urine sample tomorrow with a lab visit.  We will send prescription for Pyridium as requested

## 2021-10-26 ENCOUNTER — LAB (OUTPATIENT)
Dept: LAB | Facility: CLINIC | Age: 81
End: 2021-10-26
Payer: COMMERCIAL

## 2021-10-26 DIAGNOSIS — R30.0 DYSURIA: ICD-10-CM

## 2021-10-26 LAB
ALBUMIN UR-MCNC: 100 MG/DL
APPEARANCE UR: CLEAR
BILIRUB UR QL STRIP: ABNORMAL
COLOR UR AUTO: YELLOW
GLUCOSE UR STRIP-MCNC: NEGATIVE MG/DL
HGB UR QL STRIP: NEGATIVE
KETONES UR STRIP-MCNC: NEGATIVE MG/DL
LEUKOCYTE ESTERASE UR QL STRIP: NEGATIVE
NITRATE UR QL: NEGATIVE
PH UR STRIP: 7 [PH] (ref 5–8)
SP GR UR STRIP: 1.02 (ref 1–1.03)
UROBILINOGEN UR STRIP-ACNC: 2 E.U./DL

## 2021-10-26 PROCEDURE — 81003 URINALYSIS AUTO W/O SCOPE: CPT

## 2021-10-29 ENCOUNTER — TELEPHONE (OUTPATIENT)
Dept: FAMILY MEDICINE | Facility: CLINIC | Age: 81
End: 2021-10-29

## 2021-10-29 DIAGNOSIS — N39.0 URINARY TRACT INFECTION WITHOUT HEMATURIA, SITE UNSPECIFIED: ICD-10-CM

## 2021-10-29 NOTE — TELEPHONE ENCOUNTER
Called and left a message.      Just seeing how she is feeling.    If she calls back and has any symptoms that are still lingering please take a message and let Dr Fairchild know.    <the urine test does not show anything that suggests a major infection, meaning the macrobid she is currently taking is at least helping.  Have symptoms changed since Monday?  Let me know and we will decide what to do. >

## 2021-11-01 NOTE — TELEPHONE ENCOUNTER
Recvd call from pt/Krystal, she is feeling fine. She used her standby RX/nitrofurantoin macrocrystal-monohydrate 100 MG capsule for bladder infection and she needs a refill for standby again.    Please refill this RX if you are ok with her request.

## 2021-11-02 RX ORDER — NITROFURANTOIN 25; 75 MG/1; MG/1
100 CAPSULE ORAL 2 TIMES DAILY
Qty: 10 CAPSULE | Refills: 0 | Status: SHIPPED | OUTPATIENT
Start: 2021-11-02 | End: 2022-03-22

## 2021-11-02 NOTE — TELEPHONE ENCOUNTER
Please inform patient that her prescription has been sent to the pharmacy, she can get it filled and keep it on hand as prior.

## 2021-11-16 PROBLEM — I21.3 ST ELEVATION MI (STEMI) (H): Status: RESOLVED | Noted: 2021-01-07 | Resolved: 2021-11-16

## 2021-11-16 PROBLEM — J45.901 ASTHMA WITH ACUTE EXACERBATION: Status: RESOLVED | Noted: 2021-08-23 | Resolved: 2021-11-16

## 2021-11-16 PROBLEM — R06.02 SOB (SHORTNESS OF BREATH): Status: ACTIVE | Noted: 2021-11-16

## 2021-11-16 PROBLEM — I49.9 IRREGULAR HEART RATE: Status: ACTIVE | Noted: 2021-11-16

## 2021-11-19 ENCOUNTER — TELEPHONE (OUTPATIENT)
Dept: FAMILY MEDICINE | Facility: CLINIC | Age: 81
End: 2021-11-19
Payer: COMMERCIAL

## 2021-11-22 NOTE — TELEPHONE ENCOUNTER
Called and spoke with Jody.   Offered a virtual visit.  She did not want one.  It was more to let Dr Fairchild know she is going to try AZO for some infrequent intermittent urinary symptoms.  She said she has been pretty good lately.  I said I will pass the message to Dr. Fairchild as an fyi.  She will call back if any symptoms become to troublesome to handle.    Fyi only.

## 2022-01-26 ENCOUNTER — TELEPHONE (OUTPATIENT)
Dept: FAMILY MEDICINE | Facility: CLINIC | Age: 82
End: 2022-01-26
Payer: COMMERCIAL

## 2022-01-26 NOTE — TELEPHONE ENCOUNTER
Reason for Call:  Call back    Detailed comments: Recvd call from pt/Jody, she would like a RX for her bladder leak.    Jody is ok with waiting for Dr Juan C Fairchild to be bk in clinic on Friday 01.28.2022    Phone Number Patient can be reached at: Home number on file [phone] (home)    Best Time: anytime    Can we leave a detailed message on this number? YES    Call taken on 1/26/2022 at 4:14 PM by Estefania Godwin

## 2022-01-27 NOTE — TELEPHONE ENCOUNTER
Please see if she is willing to set up a phone visit, this is something that we should discuss a little bit further in terms of the potential side effects of the medications that we use for this purpose.

## 2022-01-28 ENCOUNTER — VIRTUAL VISIT (OUTPATIENT)
Dept: FAMILY MEDICINE | Facility: CLINIC | Age: 82
End: 2022-01-28
Payer: COMMERCIAL

## 2022-01-28 DIAGNOSIS — N39.46 MIXED STRESS AND URGE URINARY INCONTINENCE: ICD-10-CM

## 2022-01-28 DIAGNOSIS — N32.81 OVERACTIVE BLADDER: Primary | ICD-10-CM

## 2022-01-28 PROCEDURE — 99214 OFFICE O/P EST MOD 30 MIN: CPT | Mod: 95 | Performed by: FAMILY MEDICINE

## 2022-01-28 RX ORDER — TOLTERODINE 2 MG/1
2 CAPSULE, EXTENDED RELEASE ORAL DAILY
Qty: 30 CAPSULE | Refills: 3 | Status: SHIPPED | OUTPATIENT
Start: 2022-01-28 | End: 2022-02-01

## 2022-01-28 NOTE — PROGRESS NOTES
Jody is a 81 year old who is being evaluated via a billable telephone visit.      What phone number would you like to be contacted at? [phone]  How would you like to obtain your AVS?     Krystal was seen today for urinary problem.    Diagnoses and all orders for this visit:    Overactive bladder  -     tolterodine ER (DETROL LA) 2 MG 24 hr capsule; Take 1 capsule (2 mg) by mouth daily    Mixed stress and urge urinary incontinence  -     tolterodine ER (DETROL LA) 2 MG 24 hr capsule; Take 1 capsule (2 mg) by mouth daily           Subjective   Jody is a 81 year old who has a complex medical history including heart disease, asthma and history of breast cancer.  She overall reports she has been doing well most recently.  She does have a history of urinary tract infections but does not report any symptoms of dysuria or other signs of urinary tract infection currently.  She does have lifelong bladder problems which have included urinary incontinence which is likely a mixed picture of both stress and urge incontinence.  She is reporting more issues with urgency and leakage.  She would like to consider medication therapy for overactive bladder to see if this helps.  We discussed potential treatment options.  We discussed her current medications and updated the medication list.  We discussed the potential side effects of medications that we would utilize.  She would like to try this.  Feel that Detrol LA low-dose 2 mg the most recent starting point.  We will begin treatment and monitor closely she agrees to contact me with any concerns of side effects.      Review of Systems   Complete review of systems is obtained.  Other than the specific considerations noted above complete review of systems is negative.        Objective           Vitals:  No vitals were obtained today due to virtual visit.    Physical Exam   healthy, alert and no distress  PSYCH: Alert and oriented times 3; coherent speech, normal   rate and volume,  able to articulate logical thoughts, able   to abstract reason, no tangential thoughts, no hallucinations   or delusions  Her affect is normal  RESP: No cough, no audible wheezing, able to talk in full sentences  Remainder of exam unable to be completed due to telephone visits                Phone call duration: 9 minutes

## 2022-02-01 ENCOUNTER — OFFICE VISIT (OUTPATIENT)
Dept: CARDIOLOGY | Facility: CLINIC | Age: 82
End: 2022-02-01
Payer: COMMERCIAL

## 2022-02-01 ENCOUNTER — TELEPHONE (OUTPATIENT)
Dept: FAMILY MEDICINE | Facility: CLINIC | Age: 82
End: 2022-02-01

## 2022-02-01 VITALS
OXYGEN SATURATION: 96 % | WEIGHT: 178 LBS | HEART RATE: 82 BPM | BODY MASS INDEX: 31.53 KG/M2 | RESPIRATION RATE: 18 BRPM | SYSTOLIC BLOOD PRESSURE: 124 MMHG | DIASTOLIC BLOOD PRESSURE: 70 MMHG

## 2022-02-01 DIAGNOSIS — I21.21 ST ELEVATION MYOCARDIAL INFARCTION INVOLVING LEFT CIRCUMFLEX CORONARY ARTERY (H): ICD-10-CM

## 2022-02-01 DIAGNOSIS — J45.30 MILD PERSISTENT ASTHMA WITHOUT COMPLICATION: Primary | ICD-10-CM

## 2022-02-01 DIAGNOSIS — R06.02 SOB (SHORTNESS OF BREATH): ICD-10-CM

## 2022-02-01 DIAGNOSIS — N32.81 OVERACTIVE BLADDER: Primary | ICD-10-CM

## 2022-02-01 PROCEDURE — 99213 OFFICE O/P EST LOW 20 MIN: CPT | Performed by: INTERNAL MEDICINE

## 2022-02-01 NOTE — LETTER
2/1/2022    Juan C Fairchild MD, MD  [address]  [facility] [address]    RE: Krystal Virgen       Dear Colleague,     I had the pleasure of seeing Krystal Virgen in the [facility].      [facility]  [phone]          Assessment/Recommendations   Patient with known coronary artery disease with previous ST elevation MI in the circumflex distribution. She had dramatic worsening of her breathing on Brilinta and has baseline asthma which is at least moderate in nature and follows with a pulmonologist. Her breathing is, she believes, back to baseline off of the Brilinta and she still has limitations with shortness of breath, particularly in cold weather.    I have encouraged her to be as active as she can and she is hoping that some of the places in her building will reopen such as the pool where she can exercise for short periods of time.    Her LDL cholesterol is excellent and her blood pressure is at goal. She takes an antiplatelet agent.    We will plan on seeing her back in 1 year but of course would be happy to see her sooner if questions or problems arise.    Thank you for allowing us to participate in her care.       History of Present Illness/Subjective    Ms. Krystal Virgen is a 81 year old female with known coronary artery disease with previous history of ST elevation MI in the circumflex distribution and this was opened up with urgent percutaneous intervention. She had significant shortness of breath with Brilinta on top of her baseline moderate asthma but stopping the Brilinta has gotten her back close to where she was. We also tapered her off of metoprolol. She denies orthopnea or paroxysmal nocturnal dyspnea but does get short of breath with activity and this is particularly dramatic when it is very cold outside. She denies any chest discomfort, palpitations or syncopal episodes. Her LDL cholesterol is well below 70.    She follows with a  pulmonologist.         Physical Examination Review of Systems   /70 (BP Location: Left arm, Patient Position: Sitting, Cuff Size: Adult Regular)   Pulse 82   Resp 18   Wt 80.7 kg (178 lb)   SpO2 96%   BMI 31.53 kg/m    Body mass index is 31.53 kg/m .  Wt Readings from Last 3 Encounters:   02/01/22 80.7 kg (178 lb)   09/24/21 81.2 kg (179 lb)   09/20/21 82.6 kg (182 lb)     General Appearance:   Alert, cooperative and in no acute distress.   ENT/Mouth: Patient wearing a mask.      EYES:  no scleral icterus, normal conjunctivae   Neck: JVP normal. No Hepatojugular reflux. Thyroid not visualized.   Chest/Lungs:   Lungs are clear to auscultation but delayed expiratory phase, equal chest wall expansion.   Cardiovascular:   S1, S2 without murmur ,clicks or rubs. Brachial, radial  pulses are intact and symetric. No carotid bruits noted   Abdomen:  Nontender. BS+.    Extremities: No cyanosis, clubbing and slight ankle edema just above the sock line   Skin: no xanthelasma, warm.    Neurologic: normal arm movement bilateral, no tremors     Psychiatric: Appropriate affect.      Enc Vitals  BP: 124/70  Pulse: 82  Resp: 18  SpO2: 96 %  Weight: 80.7 kg (178 lb)                                           Medical History  Surgical History Family History Social History   Past Medical History:   Diagnosis Date     Anemia      Asthma      Asthma      Asthma with acute exacerbation 8/23/2021    Formatting of this note might be different from the original. Created by Conversion  Replacement Utility updated for latest IMO load     Breast cancer (H) 2017     Chronic bronchitis (H)      Chronic sinusitis      Depression      GERD (gastroesophageal reflux disease)      Migraines      Osteopenia      Osteoporosis      Overactive bladder      Pneumococcal infection      Pneumonia      Sinusitis      Spinal headache      ST elevation MI (STEMI) (H) 1/7/2021    Past Surgical History:   Procedure Laterality Date     ARTHRODESIS FOOT   11/11/2011    Procedure:ARTHRODESIS FOOT; Right First Metatarsophalangeal Joint Fusion with Second and Third Clawtoe Repairs; Surgeon:SARAH CASTRO; Location:[facility] OR     BIOPSY BREAST       CHOLECYSTECTOMY       CHOLECYSTECTOMY       CV CORONARY ANGIOGRAM N/A 1/7/2021    Procedure: Coronary Angiogram;  Surgeon: Slade Yu MD;  Location: [facility] CARDIAC CATH LAB     CV PCI STENT DRUG ELUTING N/A 1/7/2021    Procedure: Percutaneous Coronary Intervention Stent Drug Eluting;  Surgeon: Slade Yu MD;  Location: [facility] CARDIAC CATH LAB     ENT SURGERY      sinus surgery x1     FOOT SURGERY       GYN SURGERY      benign hysterectomy age 43     HYSTERECTOMY  1984     LUMPECTOMY BREAST Right 2017     ORTHOPEDIC SURGERY      fusion of grest toe right     SINUS SURGERY      Family History   Problem Relation Age of Onset     Breast Cancer Paternal Aunt 48.00     Breast Cancer Mother 78.00     Breast Cancer Sister 48.00     Breast Cancer Maternal Aunt 35.00     Heart Disease Father      Macular Degeneration Father      Heart Disease Brother     Social History     Socioeconomic History     Marital status:      Spouse name: Not on file     Number of children: Not on file     Years of education: Not on file     Highest education level: Not on file   Occupational History     Not on file   Tobacco Use     Smoking status: Never Smoker     Smokeless tobacco: Never Used   Substance and Sexual Activity     Alcohol use: No     Drug use: No     Sexual activity: Not on file   Other Topics Concern     Parent/sibling w/ CABG, MI or angioplasty before 65F 55M? Not Asked   Social History Narrative     Not on file     Social Determinants of Health     Financial Resource Strain: Not on file   Food Insecurity: Not on file   Transportation Needs: Not on file   Physical Activity: Not on file   Stress: Not on file   Social Connections: Not on file   Intimate Partner Violence: Not on file   Housing Stability: Not on  file          Medications  Allergies    Allergies   Allergen Reactions     Ticagrelor Other (See Comments)     Dyspnea     Penicillins      Amoxicillin doesn't work for her     Sulfa Drugs          Lab Results    Chemistry/lipid CBC Cardiac Enzymes/BNP/TSH/INR   Lab Results   Component Value Date    CHOL 120 04/30/2021    HDL 63 04/30/2021    TRIG 77 04/30/2021    BUN 18 08/24/2021     08/24/2021    CO2 22 08/24/2021    Lab Results   Component Value Date    WBC 9.5 08/24/2021    HGB 11.8 08/24/2021    HCT 35.5 08/24/2021    MCV 89 08/24/2021     08/24/2021    Lab Results   Component Value Date    TSH 3.38 08/24/2021    INR 0.98 01/07/2021                Thank you for allowing me to participate in the care of your patient.      Sincerely,     Edgar Potter MD     [facility]  cc:   No referring provider defined for this encounter.

## 2022-02-01 NOTE — PROGRESS NOTES
[facility]  [phone]          Assessment/Recommendations   Patient with known coronary artery disease with previous ST elevation MI in the circumflex distribution. She had dramatic worsening of her breathing on Brilinta and has baseline asthma which is at least moderate in nature and follows with a pulmonologist. Her breathing is, she believes, back to baseline off of the Brilinta and she still has limitations with shortness of breath, particularly in cold weather.    I have encouraged her to be as active as she can and she is hoping that some of the places in her building will reopen such as the pool where she can exercise for short periods of time.    Her LDL cholesterol is excellent and her blood pressure is at goal. She takes an antiplatelet agent.    We will plan on seeing her back in 1 year but of course would be happy to see her sooner if questions or problems arise.    Thank you for allowing us to participate in her care.       History of Present Illness/Subjective    Ms. Krystal Virgen is a 81 year old female with known coronary artery disease with previous history of ST elevation MI in the circumflex distribution and this was opened up with urgent percutaneous intervention. She had significant shortness of breath with Brilinta on top of her baseline moderate asthma but stopping the Brilinta has gotten her back close to where she was. We also tapered her off of metoprolol. She denies orthopnea or paroxysmal nocturnal dyspnea but does get short of breath with activity and this is particularly dramatic when it is very cold outside. She denies any chest discomfort, palpitations or syncopal episodes. Her LDL cholesterol is well below 70.    She follows with a pulmonologist.         Physical Examination Review of Systems   /70 (BP Location: Left arm, Patient Position: Sitting, Cuff Size: Adult Regular)   Pulse 82   Resp 18   Wt 80.7 kg (178 lb)   SpO2 96%   BMI 31.53  kg/m    Body mass index is 31.53 kg/m .  Wt Readings from Last 3 Encounters:   02/01/22 80.7 kg (178 lb)   09/24/21 81.2 kg (179 lb)   09/20/21 82.6 kg (182 lb)     General Appearance:   Alert, cooperative and in no acute distress.   ENT/Mouth: Patient wearing a mask.      EYES:  no scleral icterus, normal conjunctivae   Neck: JVP normal. No Hepatojugular reflux. Thyroid not visualized.   Chest/Lungs:   Lungs are clear to auscultation but delayed expiratory phase, equal chest wall expansion.   Cardiovascular:   S1, S2 without murmur ,clicks or rubs. Brachial, radial  pulses are intact and symetric. No carotid bruits noted   Abdomen:  Nontender. BS+.    Extremities: No cyanosis, clubbing and slight ankle edema just above the sock line   Skin: no xanthelasma, warm.    Neurologic: normal arm movement bilateral, no tremors     Psychiatric: Appropriate affect.      Enc Vitals  BP: 124/70  Pulse: 82  Resp: 18  SpO2: 96 %  Weight: 80.7 kg (178 lb)                                           Medical History  Surgical History Family History Social History   Past Medical History:   Diagnosis Date     Anemia      Asthma      Asthma      Asthma with acute exacerbation 8/23/2021    Formatting of this note might be different from the original. Created by Conversion  Replacement Utility updated for latest IMO load     Breast cancer (H) 2017     Chronic bronchitis (H)      Chronic sinusitis      Depression      GERD (gastroesophageal reflux disease)      Migraines      Osteopenia      Osteoporosis      Overactive bladder      Pneumococcal infection      Pneumonia      Sinusitis      Spinal headache      ST elevation MI (STEMI) (H) 1/7/2021    Past Surgical History:   Procedure Laterality Date     ARTHRODESIS FOOT  11/11/2011    Procedure:ARTHRODESIS FOOT; Right First Metatarsophalangeal Joint Fusion with Second and Third Clawtoe Repairs; Surgeon:SARAH CASTRO; Location:[facility] OR     BIOPSY BREAST       CHOLECYSTECTOMY        CHOLECYSTECTOMY       CV CORONARY ANGIOGRAM N/A 1/7/2021    Procedure: Coronary Angiogram;  Surgeon: Slade Yu MD;  [facility]     CV PCI STENT DRUG ELUTING N/A 1/7/2021    Procedure: Percutaneous Coronary Intervention Stent Drug Eluting;  Surgeon: Slade Yu MD;  [facility]     ENT SURGERY      sinus surgery x1     FOOT SURGERY       GYN SURGERY      benign hysterectomy age 43     HYSTERECTOMY  1984     LUMPECTOMY BREAST Right 2017     ORTHOPEDIC SURGERY      fusion of grest toe right     SINUS SURGERY      Family History   Problem Relation Age of Onset     Breast Cancer Paternal Aunt 48.00     Breast Cancer Mother 78.00     Breast Cancer Sister 48.00     Breast Cancer Maternal Aunt 35.00     Heart Disease Father      Macular Degeneration Father      Heart Disease Brother     Social History     Socioeconomic History     Marital status:      Spouse name: Not on file     Number of children: Not on file     Years of education: Not on file     Highest education level: Not on file   Occupational History     Not on file   Tobacco Use     Smoking status: Never Smoker     Smokeless tobacco: Never Used   Substance and Sexual Activity     Alcohol use: No     Drug use: No     Sexual activity: Not on file   Other Topics Concern     Parent/sibling w/ CABG, MI or angioplasty before 65F 55M? Not Asked   Social History Narrative     Not on file     Social Determinants of Health     Financial Resource Strain: Not on file   Food Insecurity: Not on file   Transportation Needs: Not on file   Physical Activity: Not on file   Stress: Not on file   Social Connections: Not on file   Intimate Partner Violence: Not on file   Housing Stability: Not on file          Medications  Allergies    Allergies   Allergen Reactions     Ticagrelor Other (See Comments)     Dyspnea     Penicillins      Amoxicillin doesn't work for her     Sulfa Drugs          Lab Results     Chemistry/lipid CBC Cardiac Enzymes/BNP/TSH/INR   Lab Results   Component Value Date    CHOL 120 04/30/2021    HDL 63 04/30/2021    TRIG 77 04/30/2021    BUN 18 08/24/2021     08/24/2021    CO2 22 08/24/2021    Lab Results   Component Value Date    WBC 9.5 08/24/2021    HGB 11.8 08/24/2021    HCT 35.5 08/24/2021    MCV 89 08/24/2021     08/24/2021    Lab Results   Component Value Date    TSH 3.38 08/24/2021    INR 0.98 01/07/2021

## 2022-02-01 NOTE — TELEPHONE ENCOUNTER
Pharmacy faxed stating insurance requires PA for     tolterodine ER (DETROL LA) 2 MG 24 hr capsule 30 capsule 3 1/28/2022     Please initiate.

## 2022-02-07 NOTE — TELEPHONE ENCOUNTER
Central Prior Authorization Team   Phone: [phone]    PA Initiation    Medication: Tolterodine Tartrate ER 2MG er capsules  Insurance Company: [employer] - Phone [phone] Fax [phone]  Pharmacy Filling the Rx: [place], [place] - [facility] [address]  Filling Pharmacy Phone:    Filling Pharmacy Fax:    Start Date: 2/7/2022  Manually faxed request

## 2022-02-08 NOTE — TELEPHONE ENCOUNTER
I spoke with the pharmacist at [facility].    He re-ran the prescription for Tolterodine Tartrate ER 2mg ER capsules.    It looks like the medications   Oxybutynin  Tolteridone immediate release  And myrbetriq are preferred alternatives    cruzito

## 2022-02-09 RX ORDER — TOLTERODINE TARTRATE 1 MG/1
1 TABLET, EXTENDED RELEASE ORAL 2 TIMES DAILY
Qty: 60 TABLET | Refills: 1 | Status: SHIPPED | OUTPATIENT
Start: 2022-02-09 | End: 2022-08-30

## 2022-02-09 NOTE — TELEPHONE ENCOUNTER
After review will use tolteridine 1 mg IR (immediate release) twice daily.  Please call patient and inform we need to make this slight change in plan.  Basically it should be the same as the previous medication in terms of dose, desired effect and potential side effects.  The dosing will just need to be twice daily.

## 2022-02-09 NOTE — TELEPHONE ENCOUNTER
PRIOR AUTHORIZATION DENIED    Medication: Tolterodine Tartrate ER 2MG er capsules    Denial Date: 2/9/2022    Denial Rational: Patient needs to try and fail 3 medications from list below:

## 2022-02-09 NOTE — TELEPHONE ENCOUNTER
Left message for patient to call back regarding medication. Please relay message to patient from .

## 2022-02-10 NOTE — TELEPHONE ENCOUNTER
Patient called back. She is aware of the medication change due to insurance coverage. Patient verbalizes understanding and has no further questions or concerns at this time.

## 2022-02-11 ENCOUNTER — TELEPHONE (OUTPATIENT)
Dept: FAMILY MEDICINE | Facility: CLINIC | Age: 82
End: 2022-02-11
Payer: COMMERCIAL

## 2022-02-11 DIAGNOSIS — N32.81 OVERACTIVE BLADDER: Primary | ICD-10-CM

## 2022-02-11 NOTE — TELEPHONE ENCOUNTER
PA Initiation    Medication: tolterodine (DETROL) 1 MG tablet  Insurance Company: [employer] MEDICARE ADVANTAGE [[id number]][employer] MEDICARE ADVANTAGE [[id number]]   Pharmacy Filling the Rx:  [facility]  Filling Pharmacy Phone: [phone]    Filling Pharmacy Fax:  [phone]  Start Date:  02/09/22    JUDIE KEBEDE

## 2022-02-17 NOTE — TELEPHONE ENCOUNTER
Central Prior Authorization Team   Phone: [phone]    PA Initiation    Medication: tolterodine (DETROL) 1 MG tablet  Insurance Company: [employer] - Phone [phone] Fax [phone]  Pharmacy Filling the Rx: [facility] [place], [place] - [facility] [address]  Filling Pharmacy Phone: [phone]  Filling Pharmacy Fax:    Start Date: 2/17/2022  Manually faxed request

## 2022-02-21 ENCOUNTER — TELEPHONE (OUTPATIENT)
Dept: FAMILY MEDICINE | Facility: CLINIC | Age: 82
End: 2022-02-21
Payer: COMMERCIAL

## 2022-02-21 RX ORDER — SOLIFENACIN SUCCINATE 5 MG/1
5 TABLET, FILM COATED ORAL DAILY
Qty: 30 TABLET | Refills: 3 | Status: SHIPPED | OUTPATIENT
Start: 2022-02-21 | End: 2022-08-30

## 2022-02-21 NOTE — TELEPHONE ENCOUNTER
I sent a prescription for medication called Vesicare (solifenacin) is 5 mg once daily.  This was one of the covered alternatives.  If there is any further concern let me know.

## 2022-02-21 NOTE — TELEPHONE ENCOUNTER
PRIOR AUTHORIZATION DENIED    Medication: tolterodine (DETROL) 1 MG tablet    Denial Date: 2/21/2022    Denial Rational: Patient needs to use preferred medication from list below:        Appeal Information: If provider would like to appeal please provide a letter of medical necessity stating why formulary alternatives would not be clinically appropriate for patient and route back to the PA team.

## 2022-02-21 NOTE — TELEPHONE ENCOUNTER
Patient is wanting to talk to Peterson regarding her medication that is not being covered by insurance. Patient was told that there are 5 other alternatives she could try but she has no clue how to pronounce them. Patient really would like a call back from Peterson or Dr. Fairchild today if possible so she can start taking her rx.

## 2022-03-04 NOTE — TELEPHONE ENCOUNTER
Reason for Call:  prescription    Detailed comments: Patient states that she has tried and failed the Vesicare and would like a different medication. She states the medication is not working.    Please advise.    Phone Number Patient can be reached at: Cell number on file:    Telephone Information:   Mobile [phone]       Best Time: any    Can we leave a detailed message on this number? YES    Call taken on 3/4/2022 at 1:56 PM by Nalini Levy

## 2022-03-07 NOTE — TELEPHONE ENCOUNTER
Called and left a message.  Will try back later.    If she calls back we can set up a phone visit today or tmw.  Please transfer her to me or I can call her back to set up an appt.

## 2022-03-08 ENCOUNTER — VIRTUAL VISIT (OUTPATIENT)
Dept: FAMILY MEDICINE | Facility: CLINIC | Age: 82
End: 2022-03-08
Payer: COMMERCIAL

## 2022-03-08 DIAGNOSIS — N39.46 MIXED STRESS AND URGE URINARY INCONTINENCE: ICD-10-CM

## 2022-03-08 DIAGNOSIS — N32.81 OVERACTIVE BLADDER: Primary | ICD-10-CM

## 2022-03-08 PROCEDURE — 99214 OFFICE O/P EST MOD 30 MIN: CPT | Mod: 95 | Performed by: FAMILY MEDICINE

## 2022-03-08 RX ORDER — TROSPIUM CHLORIDE 20 MG/1
20 TABLET, FILM COATED ORAL 2 TIMES DAILY
Qty: 60 TABLET | Refills: 3 | Status: SHIPPED | OUTPATIENT
Start: 2022-03-08 | End: 2023-08-31

## 2022-03-08 NOTE — PROGRESS NOTES
Jody is a 81 year old who is being evaluated via a billable telephone visit.      What phone number would you like to be contacted at? [phone]  How would you like to obtain your AVS? Lu    Diagnoses and all orders for this visit:    Overactive bladder  -     trospium (SANCTURA) 20 MG tablet; Take 1 tablet (20 mg) by mouth 2 times daily    Mixed stress and urge urinary incontinence  -     trospium (SANCTURA) 20 MG tablet; Take 1 tablet (20 mg) by mouth 2 times daily           Subjective   Jody is a 81 year old with overactive bladder and history of neck stress and urge incontinence who would like to consider trying different medication for treatment.  We have tried Vesicare recently which seemed to have absolutely no effect on her urinary symptoms.  She had no report of side effects but felt it was largely ineffective.  Prior to that read had difficulty being able to find insurance coverage for such medication.  We discussed options for either further evaluation through specialty provider to decide on course of treatment versus trial of similar medication, she elects for the latter.  She continues to report difficulty with urge and urge incontinence.  Also some degree of stress incontinence.  She also has symptoms that are consistent with overactive bladder.  She is aware of different options for evaluation and treatment through our discussion and would like to proceed at this time.      Review of Systems     Complete review of systems is obtained.  Other than the specific considerations noted above complete review of systems is negative.      Objective           Vitals:  No vitals were obtained today due to virtual visit.    Physical Exam   healthy, alert and no distress  PSYCH: Alert and oriented times 3; coherent speech, normal   rate and volume, able to articulate logical thoughts, able   to abstract reason, no tangential thoughts, no hallucinations   or delusions  Her affect is normal  RESP: No cough,  no audible wheezing, able to talk in full sentences  Remainder of exam unable to be completed due to telephone visits                Phone call duration: 8 minutes

## 2022-03-22 ENCOUNTER — TELEPHONE (OUTPATIENT)
Dept: FAMILY MEDICINE | Facility: CLINIC | Age: 82
End: 2022-03-22
Payer: COMMERCIAL

## 2022-03-22 DIAGNOSIS — N39.0 URINARY TRACT INFECTION WITHOUT HEMATURIA, SITE UNSPECIFIED: ICD-10-CM

## 2022-03-22 RX ORDER — NITROFURANTOIN 25; 75 MG/1; MG/1
100 CAPSULE ORAL 2 TIMES DAILY
Qty: 10 CAPSULE | Refills: 0 | Status: ON HOLD | OUTPATIENT
Start: 2022-03-22 | End: 2023-10-27

## 2022-03-22 NOTE — TELEPHONE ENCOUNTER
Requesting rx for UTI. 10am patient started with sx of dysuria, frequency, urinary pressure and leakage. Requesting Macrobid to be called asap to [facility]

## 2022-03-25 DIAGNOSIS — I21.21 ST ELEVATION MYOCARDIAL INFARCTION INVOLVING LEFT CIRCUMFLEX CORONARY ARTERY (H): ICD-10-CM

## 2022-03-25 RX ORDER — ROSUVASTATIN CALCIUM 40 MG/1
40 TABLET, COATED ORAL DAILY
Qty: 90 TABLET | Refills: 3 | Status: SHIPPED | OUTPATIENT
Start: 2022-03-25 | End: 2022-08-30

## 2022-04-04 ENCOUNTER — TELEPHONE (OUTPATIENT)
Dept: CARDIOLOGY | Facility: CLINIC | Age: 82
End: 2022-04-04
Payer: COMMERCIAL

## 2022-04-04 NOTE — TELEPHONE ENCOUNTER
Advised patient to reach out to PCP regarding unilateral right foot swelling. Understanding and agreement verbalized. -[facility]    Phone Message    May a detailed message be left on voicemail: yes     Reason for Call: Other: Jody called wanting to speak to Dr. Potter's nurse about swelling in her right foot for 3 weeks. Please advise. Thank you.      Action Taken: Message routed to:  Other: Sp    Travel Screening: Not Applicable

## 2022-05-02 ENCOUNTER — HOSPITAL ENCOUNTER (OUTPATIENT)
Dept: MAMMOGRAPHY | Facility: CLINIC | Age: 82
Discharge: HOME | End: 2022-05-02
Attending: INTERNAL MEDICINE | Admitting: INTERNAL MEDICINE
Payer: COMMERCIAL

## 2022-05-02 DIAGNOSIS — Z12.31 VISIT FOR SCREENING MAMMOGRAM: ICD-10-CM

## 2022-05-02 PROCEDURE — 77067 SCR MAMMO BI INCL CAD: CPT

## 2022-05-09 ENCOUNTER — TELEPHONE (OUTPATIENT)
Dept: ONCOLOGY | Facility: CLINIC | Age: 82
End: 2022-05-09
Payer: COMMERCIAL

## 2022-05-09 NOTE — TELEPHONE ENCOUNTER
Jody calls today to ask if she can get her second covid booster at her visit with Dr. Zavala on 5/13 when she is here in the clinic.    Last dosing history is: 1/27/21, 2/24/21. 11/5/21 - Moderna brand.     Will ask Dr. Zavala her thoughts and return call to patient. Kanwal Potter RN

## 2022-05-13 ENCOUNTER — ONCOLOGY VISIT (OUTPATIENT)
Dept: ONCOLOGY | Facility: CLINIC | Age: 82
End: 2022-05-13
Attending: INTERNAL MEDICINE
Payer: COMMERCIAL

## 2022-05-13 VITALS
HEART RATE: 86 BPM | BODY MASS INDEX: 31.54 KG/M2 | DIASTOLIC BLOOD PRESSURE: 85 MMHG | OXYGEN SATURATION: 96 % | WEIGHT: 178 LBS | RESPIRATION RATE: 16 BRPM | SYSTOLIC BLOOD PRESSURE: 141 MMHG | HEIGHT: 63 IN

## 2022-05-13 DIAGNOSIS — C50.411 MALIGNANT NEOPLASM OF UPPER-OUTER QUADRANT OF RIGHT BREAST IN FEMALE, ESTROGEN RECEPTOR POSITIVE (H): ICD-10-CM

## 2022-05-13 DIAGNOSIS — K21.9 GASTROESOPHAGEAL REFLUX DISEASE WITHOUT ESOPHAGITIS: ICD-10-CM

## 2022-05-13 DIAGNOSIS — Z17.0 MALIGNANT NEOPLASM OF UPPER-OUTER QUADRANT OF RIGHT BREAST IN FEMALE, ESTROGEN RECEPTOR POSITIVE (H): ICD-10-CM

## 2022-05-13 DIAGNOSIS — G47.00 INSOMNIA, UNSPECIFIED TYPE: ICD-10-CM

## 2022-05-13 PROCEDURE — 99214 OFFICE O/P EST MOD 30 MIN: CPT | Performed by: INTERNAL MEDICINE

## 2022-05-13 PROCEDURE — G0463 HOSPITAL OUTPT CLINIC VISIT: HCPCS

## 2022-05-13 RX ORDER — TAMOXIFEN CITRATE 20 MG/1
20 TABLET ORAL DAILY
Qty: 90 TABLET | Refills: 1 | Status: SHIPPED | OUTPATIENT
Start: 2022-05-13 | End: 2022-08-25

## 2022-05-13 ASSESSMENT — PAIN SCALES - GENERAL: PAINLEVEL: MODERATE PAIN (5)

## 2022-05-13 NOTE — PROGRESS NOTES
[facility] Hematology and Oncology Progress Note    Patient: Krystal Virgen  MRN: [id number]  Date of Service: May 13, 2022         Reason for Visit    Chief Complaint   Patient presents with     Oncology Clinic Visit       Assessment and Plan    Cancer Staging  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)  Staging form: Breast, AJCC 7th Edition  - Pathologic stage from 9/8/2017: Stage IA (T1c, N0, cM0) - Signed by Herbert Zavala MD on 5/13/2022  ER Status: Positive  MO Status: Positive  HER2 Status: Negative      ECOG Performance    1 - Can't do physically strenuous work, but fully ambyulatory and can do light sedentary work     Pain  Pain Score: Moderate Pain (5)  Pain Loc: Other - see comment (regular pains )    #.  Invasive ductal carcinoma of the right breast.  Stage IA (pT1c, pN0, cM0), grade 1, ER/MO strongly positive, HER2/Uriel negative, with positive inferior margin. Associated DCIS. S/p right breast lumpectomy and right sentinel lymph node biopsy on 8/10/2017.  She declined reexcision or adjuvant radiation treatment.  She started anastrozole in October 2017, then switched to tamoxifen in December 2018 due to intolerable vasomotor symptoms and not to compromise her bone density.      Clinically well.  Exam is unremarkable.  I reviewed the mammogram from 5/2022 and it was benign.     Currently on adjuvant tamoxifen and plan to complete 5 years in October 2022.  She tolerates tamoxifen well.  Refilled today.    Follow-up clinical exam in October at the end of the treatment.     Continue annual screening mammogram.    #.  History of microcytic anemia with iron deficiency.   She was getting IV iron periodically.   Colonoscopy on 11/8/17 showed diverticulosis without evidence of source of bleeding. EGD from 12/14/2017 showed a large hiatal hernia with John's erosion.    Her hemogram has been follow-up by Dr. Fairchild and last lab from August 2021 showed normal  CBC.    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 Krystal Virgen presented today for follow-up.  She does not have unusual or persistent headache, or bone pain.  She takes tamoxifen regularly.  She tolerates well.  No other health issues since last visit a year ago.    Review of systems  Apart from describing in HPI, the remainder of comprehensive ROS was negative.    Past History    Past Medical History:   Diagnosis Date     Anemia      Asthma      Asthma      Asthma with acute exacerbation 8/23/2021    Formatting of this note might be different from the original. Created by Conversion  Replacement Utility updated for latest IMO load     Breast cancer (H) 2017     Chronic bronchitis (H)      Chronic sinusitis      Depression      GERD (gastroesophageal reflux disease)      Migraines      Osteopenia      Osteoporosis      Overactive bladder      Pneumococcal infection      Pneumonia      Sinusitis      Spinal headache      ST elevation MI (STEMI) (H) 1/7/2021       Past Surgical History:   Procedure Laterality Date     ARTHRODESIS FOOT  11/11/2011    Procedure:ARTHRODESIS FOOT; Right First Metatarsophalangeal Joint Fusion with Second and Third Clawtoe Repairs; Surgeon:SARAH CASTRO; Location:[facility]     BIOPSY BREAST       CHOLECYSTECTOMY       CHOLECYSTECTOMY       CV CORONARY ANGIOGRAM N/A 1/7/2021    Procedure: Coronary Angiogram;  Surgeon: Slade Yu MD;  Location: [facility] CARDIAC CATH LAB     CV PCI STENT DRUG ELUTING N/A 1/7/2021    Procedure: Percutaneous Coronary Intervention Stent Drug Eluting;  Surgeon: Slade Yu MD;  Location: [facility]  "CARDIAC CATH LAB     ENT SURGERY      sinus surgery x1     FOOT SURGERY       GYN SURGERY      benign hysterectomy age 43     HYSTERECTOMY  1984     LUMPECTOMY BREAST Right 2017     ORTHOPEDIC SURGERY      fusion of grest toe right     SINUS SURGERY         Physical Exam    BP (!) 141/85   Pulse 86   Resp 16   Ht 1.6 m (5' 3\")   Wt 80.7 kg (178 lb)   SpO2 96%   BMI 31.53 kg/m      General: alert, awake, not in acute distress  HEENT: Head: Normal, normocephalic, atraumatic.  Eye: Normal external eye, conjunctiva, lids cornea, RINKU.  Nose: Normal external nose, mucus membranes and septum.  Pharynx: Normal buccal mucosa. Normal pharynx.  Neck / Thyroid: Supple, no masses, nodes, nodules or enlargement.  Lymphatics: No abnormally enlarged lymph nodes.  Chest: Normal chest wall and respirations. Clear to auscultation.  Breasts: Bilateral pendulous breasts.  No palpable discrete abnormalities.  Heart: S1 S2 RRR, no murmur.   Abdomen: abdomen is soft without significant tenderness, masses, organomegaly or guarding  Extremities: normal strength, tone, and muscle mass  Skin: normal. no rash or abnormalities  CNS: non focal.    Lab Results    No results found for this or any previous visit (from the past 168 hour(s)).    Imaging    MA Screen Bilateral w/Jj    Result Date: 5/3/2022  BILATERAL FULL FIELD DIGITAL SCREENING MAMMOGRAM WITH TOMOSYNTHESIS Performed on: 5/2/22 Compared to: 04/30/2021, 02/18/2020, 07/17/2018, 07/06/2017, 06/29/2017, 06/27/2016, 06/24/2015, 06/18/2014, 06/17/2013, 06/22/2012, and 06/12/2012 Technique:  This study was evaluated with the assistance of Computer-Aided Detection.  Breast Tomosynthesis was used in interpretation. Findings: The breasts have scattered areas of fibroglandular density.  There are breast conservation changes in the right breast. There is no radiographic evidence of malignancy. IMPRESSION: ACR BI-RADS Category 2: Benign RECOMMENDED FOLLOW-UP: Annual routine screening " mammogram The results and recommendations of this examination will be communicated to the patient.       30 minutes spent on the date of the encounter doing chart review, history and exam, documentation and further activities as noted above.    Signed by: Herbert Zavala MD

## 2022-05-13 NOTE — LETTER
"    5/13/2022         RE: Krystal Virgen  [address]  [place] [address]        Dear Colleague,    Thank you for referring your patient, Krystal Virgen, to the [facility] CANCER [facility]. Please see a copy of my visit note below.    Oncology Rooming Note    May 13, 2022 2:06 PM   Krystal Virgen is a 81 year old female who presents for:    Chief Complaint   Patient presents with     Oncology Clinic Visit     Initial Vitals: BP (!) 141/85   Pulse 86   Resp 16   Ht 1.6 m (5' 3\")   Wt 80.7 kg (178 lb)   SpO2 96%   BMI 31.53 kg/m   Estimated body mass index is 31.53 kg/m  as calculated from the following:    Height as of this encounter: 1.6 m (5' 3\").    Weight as of this encounter: 80.7 kg (178 lb). Body surface area is 1.89 meters squared.  Moderate Pain (5) Comment: Data Unavailable   No LMP recorded.  Allergies reviewed: Yes  Medications reviewed: Yes    Medications: MEDICATION REFILLS NEEDED TODAY. Provider was notified.  Pharmacy name entered into [employer]:    [facility] - [place], [place] - [address]  [facility] [place] [place] - [address] AT PORTAL TO [facility] SITES    Clinical concerns:  1 year     Wen Clifford              [facility] Hematology and Oncology Progress Note    Patient: Krystal Virgen  MRN: [id number]  Date of Service: May 13, 2022         Reason for Visit    Chief Complaint   Patient presents with     Oncology Clinic Visit       Assessment and Plan    Cancer Staging  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)  Staging form: Breast, AJCC 7th Edition  - Pathologic stage from 9/8/2017: Stage IA (T1c, N0, cM0) - Signed by Herbert Zavala MD on 5/13/2022  ER Status: Positive  FL Status: Positive  HER2 Status: Negative      ECOG Performance    1 - Can't do physically strenuous work, but fully ambyulatory and can do light sedentary work     Pain  Pain Score: " Moderate Pain (5)  Pain Loc: Other - see comment (regular pains )    #.  Invasive ductal carcinoma of the right breast.  Stage IA (pT1c, pN0, cM0), grade 1, ER/WA strongly positive, HER2/Uriel negative, with positive inferior margin. Associated DCIS. S/p right breast lumpectomy and right sentinel lymph node biopsy on 8/10/2017.  She declined reexcision or adjuvant radiation treatment.  She started anastrozole in October 2017, then switched to tamoxifen in December 2018 due to intolerable vasomotor symptoms and not to compromise her bone density.      Clinically well.  Exam is unremarkable.  I reviewed the mammogram from 5/2022 and it was benign.     Currently on adjuvant tamoxifen and plan to complete 5 years in October 2022.  She tolerates tamoxifen well.  Refilled today.    Follow-up clinical exam in October at the end of the treatment.     Continue annual screening mammogram.    #.  History of microcytic anemia with iron deficiency.   She was getting IV iron periodically.   Colonoscopy on 11/8/17 showed diverticulosis without evidence of source of bleeding. EGD from 12/14/2017 showed a large hiatal hernia with John's erosion.    Her hemogram has been follow-up by Dr. Fairchild and last lab from August 2021 showed normal CBC.    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 Krystal Virgen presented today for follow-up.  She does not have unusual or persistent headache, or bone pain.  She takes tamoxifen regularly.  She tolerates well.  No other health issues since last visit a year ago.    Review of systems  Apart from describing in HPI, the remainder of comprehensive ROS was  "negative.    Past History    Past Medical History:   Diagnosis Date     Anemia      Asthma      Asthma      Asthma with acute exacerbation 8/23/2021    Formatting of this note might be different from the original. Created by Conversion  Replacement Utility updated for latest IMO load     Breast cancer (H) 2017     Chronic bronchitis (H)      Chronic sinusitis      Depression      GERD (gastroesophageal reflux disease)      Migraines      Osteopenia      Osteoporosis      Overactive bladder      Pneumococcal infection      Pneumonia      Sinusitis      Spinal headache      ST elevation MI (STEMI) (H) 1/7/2021       Past Surgical History:   Procedure Laterality Date     ARTHRODESIS FOOT  11/11/2011    Procedure:ARTHRODESIS FOOT; Right First Metatarsophalangeal Joint Fusion with Second and Third Clawtoe Repairs; Surgeon:SARAH CASTRO; Location:[facility]     BIOPSY BREAST       CHOLECYSTECTOMY       CHOLECYSTECTOMY       CV CORONARY ANGIOGRAM N/A 1/7/2021    Procedure: Coronary Angiogram;  Surgeon: Slade Yu MD;  Location: [facility] CARDIAC CATH LAB     CV PCI STENT DRUG ELUTING N/A 1/7/2021    Procedure: Percutaneous Coronary Intervention Stent Drug Eluting;  Surgeon: Slade Yu MD;  Location: [facility] CARDIAC CATH LAB     ENT SURGERY      sinus surgery x1     FOOT SURGERY       GYN SURGERY      benign hysterectomy age 43     HYSTERECTOMY  1984     LUMPECTOMY BREAST Right 2017     ORTHOPEDIC SURGERY      fusion of grest toe right     SINUS SURGERY         Physical Exam    BP (!) 141/85   Pulse 86   Resp 16   Ht 1.6 m (5' 3\")   Wt 80.7 kg (178 lb)   SpO2 96%   BMI 31.53 kg/m      General: alert, awake, not in acute distress  HEENT: Head: Normal, normocephalic, atraumatic.  Eye: Normal external eye, conjunctiva, lids cornea, RINKU.  Nose: Normal external nose, mucus membranes and septum.  Pharynx: Normal buccal mucosa. Normal pharynx.  Neck / Thyroid: Supple, no masses, nodes, nodules or " enlargement.  Lymphatics: No abnormally enlarged lymph nodes.  Chest: Normal chest wall and respirations. Clear to auscultation.  Breasts: Bilateral pendulous breasts.  No palpable discrete abnormalities.  Heart: S1 S2 RRR, no murmur.   Abdomen: abdomen is soft without significant tenderness, masses, organomegaly or guarding  Extremities: normal strength, tone, and muscle mass  Skin: normal. no rash or abnormalities  CNS: non focal.    Lab Results    No results found for this or any previous visit (from the past 168 hour(s)).    Imaging    MA Screen Bilateral w/Jj    Result Date: 5/3/2022  BILATERAL FULL FIELD DIGITAL SCREENING MAMMOGRAM WITH TOMOSYNTHESIS Performed on: 5/2/22 Compared to: 04/30/2021, 02/18/2020, 07/17/2018, 07/06/2017, 06/29/2017, 06/27/2016, 06/24/2015, 06/18/2014, 06/17/2013, 06/22/2012, and 06/12/2012 Technique:  This study was evaluated with the assistance of Computer-Aided Detection.  Breast Tomosynthesis was used in interpretation. Findings: The breasts have scattered areas of fibroglandular density.  There are breast conservation changes in the right breast. There is no radiographic evidence of malignancy. IMPRESSION: ACR BI-RADS Category 2: Benign RECOMMENDED FOLLOW-UP: Annual routine screening mammogram The results and recommendations of this examination will be communicated to the patient.       30 minutes spent on the date of the encounter doing chart review, history and exam, documentation and further activities as noted above.    Signed by: Herbert Zavala MD        Again, thank you for allowing me to participate in the care of your patient.        Sincerely,        Herbert Zavala MD

## 2022-05-13 NOTE — PROGRESS NOTES
"Oncology Rooming Note    May 13, 2022 2:06 PM   Krystal Virgen is a 81 year old female who presents for:    Chief Complaint   Patient presents with     Oncology Clinic Visit     Initial Vitals: BP (!) 141/85   Pulse 86   Resp 16   Ht 1.6 m (5' 3\")   Wt 80.7 kg (178 lb)   SpO2 96%   BMI 31.53 kg/m   Estimated body mass index is 31.53 kg/m  as calculated from the following:    Height as of this encounter: 1.6 m (5' 3\").    Weight as of this encounter: 80.7 kg (178 lb). Body surface area is 1.89 meters squared.  Moderate Pain (5) Comment: Data Unavailable   No LMP recorded.  Allergies reviewed: Yes  Medications reviewed: Yes    Medications: MEDICATION REFILLS NEEDED TODAY. Provider was notified.  Pharmacy name entered into [employer]:    [facility] - [place], [place] - [address]  [facility] - [place], [place] - [address] AT PORTAL TO [facility] SITES    Clinical concerns:  1 year     Wen Clifford            "

## 2022-05-15 RX ORDER — TRAZODONE HYDROCHLORIDE 50 MG/1
TABLET, FILM COATED ORAL
Qty: 90 TABLET | Refills: 2 | Status: SHIPPED | OUTPATIENT
Start: 2022-05-15 | End: 2022-07-27

## 2022-05-15 RX ORDER — LANSOPRAZOLE 30 MG/1
CAPSULE, DELAYED RELEASE ORAL
Qty: 90 CAPSULE | Refills: 2 | Status: SHIPPED | OUTPATIENT
Start: 2022-05-15 | End: 2022-07-27

## 2022-05-15 NOTE — TELEPHONE ENCOUNTER
"Routing refill request to provider for review/approval because:  Drug interaction warning  Last Written Prescription Date:  8/22/21  Last Fill Quantity: 90,  # refills: 2   Last office visit provider:  3/8/22     Requested Prescriptions   Pending Prescriptions Disp Refills     LANsoprazole (PREVACID) 30 MG DR capsule [Pharmacy Med Name: LANSOPRAZ DR CAP 30MG RX] 90 capsule 2     Sig: TAKE 1 CAPSULE DAILY       PPI Protocol Passed - 5/13/2022  1:12 AM        Passed - Not on Clopidogrel (unless Pantoprazole ordered)        Passed - No diagnosis of osteoporosis on record        Passed - Recent (12 mo) or future (30 days) visit within the authorizing provider's specialty     Patient has had an office visit with the authorizing provider or a provider within the authorizing providers department within the previous 12 mos or has a future within next 30 days. See \"Patient Info\" tab in [employer], or \"Choose Columns\" in Meds & Orders section of the refill encounter.              Passed - Medication is active on med list        Passed - Patient is age 18 or older        Passed - No active pregnacy on record        Passed - No positive pregnancy test in past 12 months           traZODone (DESYREL) 50 MG tablet [Pharmacy Med Name: TRAZODONE TAB 50MG] 90 tablet 2     Sig: TAKE 1 TABLET AT BEDTIME       Serotonin Modulators Passed - 5/13/2022  1:12 AM        Passed - Recent (12 mo) or future (30 days) visit within the authorizing provider's specialty     Patient has had an office visit with the authorizing provider or a provider within the authorizing providers department within the previous 12 mos or has a future within next 30 days. See \"Patient Info\" tab in inbasket, or \"Choose Columns\" in Meds & Orders section of the refill encounter.              Passed - Medication is active on med list        Passed - Patient is age 18 or older        Passed - No active pregnancy on record        Passed - No positive pregnancy test in past " 12 months             Ana Dumont, RN 05/15/22 1:30 PM

## 2022-05-19 ENCOUNTER — PATIENT OUTREACH (OUTPATIENT)
Dept: ONCOLOGY | Facility: CLINIC | Age: 82
End: 2022-05-19
Payer: COMMERCIAL

## 2022-05-19 NOTE — PROGRESS NOTES
[facility]: Cancer Care Short Note                                    Discussion with Patient:                                                         Jody called and relayed that her [facility] did not receive the prescription for the Tamoxifen sent on 5/13. She relayed that [facility] told her that provider needs to call regarding the prescription.  Writer called and spoke with [facility] and after a lengthy  Conversation determined that  they have her name in their system as DANIEL Virgen, ID is [id number], which is consistent with her medicare card. Per [facility], she needs to call customer service and have her name reflected to her name in our system (Jody Virgen) so that her prescriptions will route to her account, otherwise they are discarded in the system.  Called Jody and relayed the above update and she will call customer service at [facility] to rectify the issue with her name.  Writer did speak with [facility], pharmacist and called in the prescription for Tamoxifen that was ordered last week, they will send in 5-7 days. Jody verbalized understanding.             Intervention/Education provided during outreach:                                                       Jody will call customer service at [facility] to rectify her name in their system.  Tamoxifen will be mailed to her in 5-7 days/Zaida Rader RN

## 2022-07-27 DIAGNOSIS — K21.9 GASTROESOPHAGEAL REFLUX DISEASE WITHOUT ESOPHAGITIS: ICD-10-CM

## 2022-07-27 DIAGNOSIS — G47.00 INSOMNIA, UNSPECIFIED TYPE: ICD-10-CM

## 2022-07-27 RX ORDER — TRAZODONE HYDROCHLORIDE 50 MG/1
50 TABLET, FILM COATED ORAL AT BEDTIME
Qty: 90 TABLET | Refills: 2 | Status: SHIPPED | OUTPATIENT
Start: 2022-07-27 | End: 2023-02-09

## 2022-07-27 RX ORDER — LANSOPRAZOLE 30 MG/1
30 CAPSULE, DELAYED RELEASE ORAL DAILY
Qty: 90 CAPSULE | Refills: 2 | Status: SHIPPED | OUTPATIENT
Start: 2022-07-27 | End: 2023-06-20

## 2022-07-31 DIAGNOSIS — F32.5 MAJOR DEPRESSIVE DISORDER, SINGLE EPISODE IN FULL REMISSION (H): ICD-10-CM

## 2022-08-01 RX ORDER — CITALOPRAM HYDROBROMIDE 20 MG/1
TABLET ORAL
Qty: 90 TABLET | Refills: 3 | Status: SHIPPED | OUTPATIENT
Start: 2022-08-01 | End: 2023-02-08

## 2022-08-01 NOTE — TELEPHONE ENCOUNTER
"Routing refill request to provider for review/approval because:  phq 9    Last Written Prescription Date:  7/26/21  Last Fill Quantity: 90,  # refills: 3   Last office visit provider:  3/8/22     Requested Prescriptions   Pending Prescriptions Disp Refills     citalopram (CELEXA) 20 MG tablet [Pharmacy Med Name: CITALOPRAM TAB 20MG] 90 tablet 3     Sig: TAKE 1 TABLET DAILY       SSRIs Protocol Failed - 7/31/2022  7:19 AM        Failed - PHQ-9 score less than 5 in past 6 months     Please review last PHQ-9 score.           Passed - Medication is active on med list        Passed - Patient is age 18 or older        Passed - No active pregnancy on record        Passed - No positive pregnancy test in last 12 months        Passed - Recent (6 mo) or future (30 days) visit within the authorizing provider's specialty     Patient had office visit in the last 6 months or has a visit in the next 30 days with authorizing provider or within the authorizing provider's specialty.  See \"Patient Info\" tab in inbasket, or \"Choose Columns\" in Meds & Orders section of the refill encounter.                 Janine Grajeda RN 07/31/22 9:20 PM  "

## 2022-08-15 DIAGNOSIS — F32.5 MAJOR DEPRESSIVE DISORDER, SINGLE EPISODE, IN REMISSION (H): ICD-10-CM

## 2022-08-15 RX ORDER — BUPROPION HYDROCHLORIDE 300 MG/1
TABLET ORAL
Qty: 90 TABLET | Refills: 2 | Status: SHIPPED | OUTPATIENT
Start: 2022-08-15 | End: 2023-02-08

## 2022-08-15 NOTE — TELEPHONE ENCOUNTER
"Routing refill request to provider for review/approval because:  Failed-PHQ score less than 5 in past 6 month    Last Written Prescription Date:  9/8/2021  Last Fill Quantity: 90  # refills: 3   Last office visit provider:  3/8/2022     Requested Prescriptions   Pending Prescriptions Disp Refills     buPROPion (WELLBUTRIN XL) 300 MG 24 hr tablet [Pharmacy Med Name: BUPROP 24 XL TAB 300MG] 90 tablet 2     Sig: TAKE 1 TABLET EVERY MORNING       SSRIs Protocol Failed - 8/15/2022 10:47 AM        Failed - PHQ-9 score less than 5 in past 6 months     Please review last PHQ-9 score.           Passed - Medication is Bupropion     If the medication is Bupropion (Wellbutrin), and the patient is taking for smoking cessation; OK to refill.          Passed - Medication is active on med list        Passed - Patient is age 18 or older        Passed - No active pregnancy on record        Passed - No positive pregnancy test in last 12 months        Passed - Recent (6 mo) or future (30 days) visit within the authorizing provider's specialty     Patient had office visit in the last 6 months or has a visit in the next 30 days with authorizing provider or within the authorizing provider's specialty.  See \"Patient Info\" tab in inbasket, or \"Choose Columns\" in Meds & Orders section of the refill encounter.                 Tamera Fofana RN 08/15/22 10:48 AM  "

## 2022-08-22 ENCOUNTER — TRANSFERRED RECORDS (OUTPATIENT)
Dept: HEALTH INFORMATION MANAGEMENT | Facility: CLINIC | Age: 82
End: 2022-08-22

## 2022-08-25 DIAGNOSIS — Z17.0 MALIGNANT NEOPLASM OF UPPER-OUTER QUADRANT OF RIGHT BREAST IN FEMALE, ESTROGEN RECEPTOR POSITIVE (H): ICD-10-CM

## 2022-08-25 DIAGNOSIS — C50.411 MALIGNANT NEOPLASM OF UPPER-OUTER QUADRANT OF RIGHT BREAST IN FEMALE, ESTROGEN RECEPTOR POSITIVE (H): ICD-10-CM

## 2022-08-25 RX ORDER — TAMOXIFEN CITRATE 20 MG/1
TABLET ORAL
Qty: 90 TABLET | Refills: 3 | Status: SHIPPED | OUTPATIENT
Start: 2022-08-25 | End: 2022-12-01

## 2022-08-25 NOTE — TELEPHONE ENCOUNTER
Unable to get a hold of pharmacy to verify if patient just filled a 90 day supply which seems likely based on last RX.   Per office visit note on 5/13/22-Currently on adjuvant tamoxifen and plan to complete 5 years in October 2022.   Will send to provider to review and refuse if warranted.    Last Written Prescription Date:  5/13/22  Last Fill Quantity: 90,  # refills: 1   Last office visit provider:  5/13/22     Requested Prescriptions   Pending Prescriptions Disp Refills     tamoxifen (NOLVADEX) 20 MG tablet [Pharmacy Med Name: TAMOXIFEN TAB 20MG] 90 tablet 3     Sig: TAKE 1 TABLET DAILY       There is no refill protocol information for this order          Rae Thakur RN 08/25/22 12:22 PM

## 2022-08-30 ENCOUNTER — OFFICE VISIT (OUTPATIENT)
Dept: FAMILY MEDICINE | Facility: CLINIC | Age: 82
End: 2022-08-30
Payer: COMMERCIAL

## 2022-08-30 VITALS
WEIGHT: 177.7 LBS | SYSTOLIC BLOOD PRESSURE: 134 MMHG | HEIGHT: 63 IN | HEART RATE: 76 BPM | BODY MASS INDEX: 31.48 KG/M2 | DIASTOLIC BLOOD PRESSURE: 82 MMHG | OXYGEN SATURATION: 98 %

## 2022-08-30 DIAGNOSIS — J45.40 MODERATE PERSISTENT ASTHMA WITHOUT COMPLICATION: Primary | ICD-10-CM

## 2022-08-30 DIAGNOSIS — I21.21 ST ELEVATION MYOCARDIAL INFARCTION INVOLVING LEFT CIRCUMFLEX CORONARY ARTERY (H): ICD-10-CM

## 2022-08-30 DIAGNOSIS — D64.9 ANEMIA, UNSPECIFIED TYPE: ICD-10-CM

## 2022-08-30 DIAGNOSIS — I25.10 CORONARY ARTERY DISEASE INVOLVING NATIVE HEART WITHOUT ANGINA PECTORIS, UNSPECIFIED VESSEL OR LESION TYPE: ICD-10-CM

## 2022-08-30 LAB
BASOPHILS # BLD AUTO: 0.1 10E3/UL (ref 0–0.2)
BASOPHILS NFR BLD AUTO: 1 %
CHOLEST SERPL-MCNC: 143 MG/DL
EOSINOPHIL # BLD AUTO: 0.3 10E3/UL (ref 0–0.7)
EOSINOPHIL NFR BLD AUTO: 3 %
ERYTHROCYTE [DISTWIDTH] IN BLOOD BY AUTOMATED COUNT: 13.2 % (ref 10–15)
HCT VFR BLD AUTO: 35.7 % (ref 35–47)
HDLC SERPL-MCNC: 86 MG/DL
HGB BLD-MCNC: 11.6 G/DL (ref 11.7–15.7)
IMM GRANULOCYTES # BLD: 0 10E3/UL
IMM GRANULOCYTES NFR BLD: 0 %
LDLC SERPL CALC-MCNC: 43 MG/DL
LYMPHOCYTES # BLD AUTO: 1.3 10E3/UL (ref 0.8–5.3)
LYMPHOCYTES NFR BLD AUTO: 15 %
MCH RBC QN AUTO: 28.9 PG (ref 26.5–33)
MCHC RBC AUTO-ENTMCNC: 32.5 G/DL (ref 31.5–36.5)
MCV RBC AUTO: 89 FL (ref 78–100)
MONOCYTES # BLD AUTO: 0.6 10E3/UL (ref 0–1.3)
MONOCYTES NFR BLD AUTO: 7 %
NEUTROPHILS # BLD AUTO: 6.4 10E3/UL (ref 1.6–8.3)
NEUTROPHILS NFR BLD AUTO: 74 %
NONHDLC SERPL-MCNC: 57 MG/DL
PLATELET # BLD AUTO: 279 10E3/UL (ref 150–450)
RBC # BLD AUTO: 4.01 10E6/UL (ref 3.8–5.2)
TRIGL SERPL-MCNC: 69 MG/DL
WBC # BLD AUTO: 8.7 10E3/UL (ref 4–11)

## 2022-08-30 PROCEDURE — 36415 COLL VENOUS BLD VENIPUNCTURE: CPT | Performed by: FAMILY MEDICINE

## 2022-08-30 PROCEDURE — 85025 COMPLETE CBC W/AUTO DIFF WBC: CPT | Performed by: FAMILY MEDICINE

## 2022-08-30 PROCEDURE — 80053 COMPREHEN METABOLIC PANEL: CPT | Performed by: FAMILY MEDICINE

## 2022-08-30 PROCEDURE — 99214 OFFICE O/P EST MOD 30 MIN: CPT | Performed by: FAMILY MEDICINE

## 2022-08-30 PROCEDURE — 80061 LIPID PANEL: CPT | Performed by: FAMILY MEDICINE

## 2022-08-30 RX ORDER — ROSUVASTATIN CALCIUM 40 MG/1
40 TABLET, COATED ORAL DAILY
Qty: 90 TABLET | Refills: 3 | Status: SHIPPED | OUTPATIENT
Start: 2022-08-30 | End: 2023-02-07

## 2022-08-30 RX ORDER — MONTELUKAST SODIUM 10 MG/1
10 TABLET ORAL AT BEDTIME
Qty: 90 TABLET | Refills: 3 | Status: SHIPPED | OUTPATIENT
Start: 2022-08-30 | End: 2023-04-17

## 2022-08-30 RX ORDER — MONTELUKAST SODIUM 10 MG/1
10 TABLET ORAL
COMMUNITY
Start: 2022-07-13 | End: 2022-08-30

## 2022-08-30 ASSESSMENT — PAIN SCALES - GENERAL: PAINLEVEL: NO PAIN (0)

## 2022-08-30 NOTE — PROGRESS NOTES
"Krystal Virgen  /83   Pulse 76   Ht 1.6 m (5' 3\")   Wt 80.6 kg (177 lb 11.2 oz)   SpO2 98%   BMI 31.48 kg/m       Assessment/Plan:                Krystal was seen today for recheck medication and refill request.    Diagnoses and all orders for this visit:    Moderate persistent asthma without complication  -     montelukast (SINGULAIR) 10 MG tablet; Take 1 tablet (10 mg) by mouth At Bedtime    ST elevation myocardial infarction involving left circumflex coronary artery (H)  -     rosuvastatin (CRESTOR) 40 MG tablet; Take 1 tablet (40 mg) by mouth daily    Anemia, unspecified type  -     CBC with Platelets & Differential    Coronary artery disease involving native heart without angina pectoris, unspecified vessel or lesion type  -     Comprehensive metabolic panel  -     Lipid panel reflex to direct LDL Fasting         DISCUSSION  See discussion below.  Obtain labs as above.  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    She overall reports she is doing well.  Reports generally good control of asthma symptoms no recent infections from a respiratory standpoint or urinary standpoint.  She has seen her pulmonologist and oncologist recently.  Discussed updating some labs.  She needs refills of medications.  No indication for any change in treatment currently.  Reviewed some recent consultation notes and plans for changes in medication namely discontinuation of tamoxifen when she reaches the 5-year germaine in October 2022.    She does " feel that the current [place] helps a little bit with some urinary symptoms but we both agree that there is no indication to try and change medication therapy at this point in time.  ROS:  Complete review of systems is obtained.  Other than the specific considerations noted above complete review of systems is negative.          Objective:   Medications:  Current Outpatient Medications   Medication     albuterol (PROAIR HFA/PROVENTIL HFA/VENTOLIN HFA) 108 (90 Base) MCG/ACT inhaler     aspirin (ASA) 81 MG EC tablet     budesonide-formoterol (SYMBICORT) 160-4.5 MCG/ACT inhaler     buPROPion (WELLBUTRIN XL) 300 MG 24 hr tablet     citalopram (CELEXA) 20 MG tablet     clopidogrel (PLAVIX) 75 MG tablet     fluticasone (FLONASE) 50 MCG/ACT nasal spray     LANsoprazole (PREVACID) 30 MG DR capsule     montelukast (SINGULAIR) 10 MG tablet     nitroFURantoin macrocrystal-monohydrate (MACROBID) 100 MG capsule     nitroGLYcerin (NITROSTAT) 0.4 MG sublingual tablet     Omeprazole (PRILOSEC PO)     rosuvastatin (CRESTOR) 40 MG tablet     sumatriptan (IMITREX) 100 MG tablet     tamoxifen (NOLVADEX) 20 MG tablet     traZODone (DESYREL) 50 MG tablet     trospium (SANCTURA) 20 MG tablet     No current facility-administered medications for this visit.        Allergies:     Allergies   Allergen Reactions     Ticagrelor Other (See Comments)     Dyspnea     Penicillins      Amoxicillin doesn't work for her     Sulfa Drugs         Social History     Socioeconomic History     Marital status:      Spouse name: Not on file     Number of children: Not on file     Years of education: Not on file     Highest education level: Not on file   Occupational History     Not on file   Tobacco Use     Smoking status: Never Smoker     Smokeless tobacco: Never Used   Substance and Sexual Activity     Alcohol use: No     Drug use: No     Sexual activity: Not on file   Other Topics Concern     Parent/sibling w/ CABG, MI or angioplasty before 65F 55M?  "Not Asked   Social History Narrative     Not on file     Social Determinants of Health     Financial Resource Strain: Not on file   Food Insecurity: Not on file   Transportation Needs: Not on file   Physical Activity: Not on file   Stress: Not on file   Social Connections: Not on file   Intimate Partner Violence: Not on file   Housing Stability: Not on file       Family History   Problem Relation Age of Onset     Breast Cancer Paternal Aunt 48.00     Breast Cancer Mother 78.00     Breast Cancer Sister 48.00     Breast Cancer Maternal Aunt 35.00     Heart Disease Father      Macular Degeneration Father      Heart Disease Brother         Most Recent Immunizations   Administered Date(s) Administered     COVID-19,PF,Moderna 06/01/2022     FLU 6-35 months 10/01/2010     Influenza (H1N1) 01/05/2010     Influenza (High Dose) 3 valent vaccine 10/01/2020     Influenza (IIV3) PF 10/17/2013     Influenza Vaccine, 6+MO IM (QUADRIVALENT W/PRESERVATIVES) 10/17/2013     Influenza, Quad, High Dose, Pf, 65yr+ (Fluzone HD) 09/20/2021     Mantoux Tuberculin Skin Test 04/13/2006     Pneumo Conj 13-V (2010&after) 09/11/2018     Pneumococcal 23 valent 10/01/2019     Tdap (Adacel,Boostrix) 04/28/2014     Zoster vaccine, live 10/06/2009        Wt Readings from Last 3 Encounters:   08/30/22 80.6 kg (177 lb 11.2 oz)   05/13/22 80.7 kg (178 lb)   02/01/22 80.7 kg (178 lb)        BP Readings from Last 6 Encounters:   08/30/22 134/82   05/13/22 (!) 141/85   02/01/22 124/70   09/24/21 100/62   09/20/21 130/74   08/24/21 126/76        Hemoglobin A1C   Date Value Ref Range Status   01/08/2021 5.3 0 - 5.6 % Final     Comment:     Normal <5.7% Prediabetes 5.7-6.4%  Diabetes 6.5% or higher - adopted from ADA   consensus guidelines.     02/01/2013 5.8 4.2 - 6.1 % Final              PHYSICAL EXAM:    /82   Pulse 76   Ht 1.6 m (5' 3\")   Wt 80.6 kg (177 lb 11.2 oz)   SpO2 98%   BMI 31.48 kg/m           General Appearance:    Alert, " cooperative, no distress   Eyes:   No scleral icterus or conjunctival irritation       Lungs:     Clear to auscultation bilaterally, respirations unlabored, no wheezes or crackles   Heart:    Regular rate and rhythm,  No murmur   Extremities:  No edema, no joint swelling or redness, no evidence of any injuries   Skin:  No concerning skin findings, no suspicious moles, no rashes   Neurologic:  On gross examination there is no motor or sensory deficit.  Patient walks with a normal gait

## 2022-08-30 NOTE — LETTER
September 2, 2022      Jody Virgen  [address]   [place] [address]        Dear ,    We are writing to inform you of your test results.    Cholesterol numbers are excellent.  The kidney function is declined from normal is stable in comparison.  Blood counts are also stable.  No new problem or concern has been identified.       Resulted Orders   Comprehensive metabolic panel   Result Value Ref Range    Sodium 137 136 - 145 mmol/L    Potassium 4.7 3.4 - 5.3 mmol/L    Creatinine 1.46 (H) 0.51 - 0.95 mg/dL    Urea Nitrogen 15.1 8.0 - 23.0 mg/dL    Chloride 105 98 - 107 mmol/L    Carbon Dioxide (CO2) 24 22 - 29 mmol/L    Anion Gap 8 7 - 15 mmol/L    Glucose 85 70 - 99 mg/dL    Calcium 8.9 8.8 - 10.2 mg/dL    Protein Total 5.9 (L) 6.4 - 8.3 g/dL    Albumin 3.7 3.5 - 5.2 g/dL    Bilirubin Total 0.4 <=1.2 mg/dL    Alkaline Phosphatase 54 35 - 104 U/L    AST 18 10 - 35 U/L    ALT 9 (L) 10 - 35 U/L    GFR Estimate 36 (L) >60 mL/min/1.73m2      Comment:      Effective December 21, 2021 eGFRcr in adults is calculated using the 2021 CKD-EPI creatinine equation which includes age and gender (Aydin et al., NE, DOI: 10.1056/VQLKhr4139581)   Lipid panel reflex to direct LDL Fasting   Result Value Ref Range    Cholesterol 143 <200 mg/dL    Triglycerides 69 <150 mg/dL    Direct Measure HDL 86 >=50 mg/dL    LDL Cholesterol Calculated 43 <=100 mg/dL    Non HDL Cholesterol 57 <130 mg/dL    Narrative    Cholesterol  Desirable:  <200 mg/dL    Triglycerides  Normal:  Less than 150 mg/dL  Borderline High:  150-199 mg/dL  High:  200-499 mg/dL  Very High:  Greater than or equal to 500 mg/dL    Direct Measure HDL  Female:  Greater than or equal to 50 mg/dL   Male:  Greater than or equal to 40 mg/dL    LDL Cholesterol  Desirable:  <100mg/dL  Above Desirable:  100-129 mg/dL   Borderline High:  130-159 mg/dL   High:  160-189 mg/dL   Very High:  >= 190 mg/dL    Non HDL Cholesterol  Desirable:  130 mg/dL  Above  Desirable:  130-159 mg/dL  Borderline High:  160-189 mg/dL  High:  190-219 mg/dL  Very High:  Greater than or equal to 220 mg/dL   CBC with platelets and differential   Result Value Ref Range    WBC Count 8.7 4.0 - 11.0 10e3/uL    RBC Count 4.01 3.80 - 5.20 10e6/uL    Hemoglobin 11.6 (L) 11.7 - 15.7 g/dL    Hematocrit 35.7 35.0 - 47.0 %    MCV 89 78 - 100 fL    MCH 28.9 26.5 - 33.0 pg    MCHC 32.5 31.5 - 36.5 g/dL    RDW 13.2 10.0 - 15.0 %    Platelet Count 279 150 - 450 10e3/uL    % Neutrophils 74 %    % Lymphocytes 15 %    % Monocytes 7 %    % Eosinophils 3 %    % Basophils 1 %    % Immature Granulocytes 0 %    Absolute Neutrophils 6.4 1.6 - 8.3 10e3/uL    Absolute Lymphocytes 1.3 0.8 - 5.3 10e3/uL    Absolute Monocytes 0.6 0.0 - 1.3 10e3/uL    Absolute Eosinophils 0.3 0.0 - 0.7 10e3/uL    Absolute Basophils 0.1 0.0 - 0.2 10e3/uL    Absolute Immature Granulocytes 0.0 <=0.4 10e3/uL       If you have any questions or concerns, please call the clinic at the number listed above.       Sincerely,      Juan C Fairchild MD

## 2022-08-31 LAB
ALBUMIN SERPL BCG-MCNC: 3.7 G/DL (ref 3.5–5.2)
ALP SERPL-CCNC: 54 U/L (ref 35–104)
ALT SERPL W P-5'-P-CCNC: 9 U/L (ref 10–35)
ANION GAP SERPL CALCULATED.3IONS-SCNC: 8 MMOL/L (ref 7–15)
AST SERPL W P-5'-P-CCNC: 18 U/L (ref 10–35)
BILIRUB SERPL-MCNC: 0.4 MG/DL
BUN SERPL-MCNC: 15.1 MG/DL (ref 8–23)
CALCIUM SERPL-MCNC: 8.9 MG/DL (ref 8.8–10.2)
CHLORIDE SERPL-SCNC: 105 MMOL/L (ref 98–107)
CREAT SERPL-MCNC: 1.46 MG/DL (ref 0.51–0.95)
DEPRECATED HCO3 PLAS-SCNC: 24 MMOL/L (ref 22–29)
GFR SERPL CREATININE-BSD FRML MDRD: 36 ML/MIN/1.73M2
GLUCOSE SERPL-MCNC: 85 MG/DL (ref 70–99)
POTASSIUM SERPL-SCNC: 4.7 MMOL/L (ref 3.4–5.3)
PROT SERPL-MCNC: 5.9 G/DL (ref 6.4–8.3)
SODIUM SERPL-SCNC: 137 MMOL/L (ref 136–145)

## 2022-09-02 ENCOUNTER — TELEPHONE (OUTPATIENT)
Dept: FAMILY MEDICINE | Facility: CLINIC | Age: 82
End: 2022-09-02

## 2022-09-02 NOTE — TELEPHONE ENCOUNTER
"Called and left a message.    Please relay results/message from DR Fairchild when she calls back, thank you.    \"Cholesterol numbers are excellent.  The kidney function is declined from normal is stable in comparison.  Blood counts are also stable.  No new problem or concern has been identified\"    Results letter also mailed out  "

## 2022-09-02 NOTE — TELEPHONE ENCOUNTER
----- Message from Juan C Fairchild MD sent at 9/1/2022  2:55 PM CDT -----  Please call patient: Cholesterol numbers are excellent.  The kidney function is declined from normal is stable in comparison.  Blood counts are also stable.  No new problem or concern has been identified.

## 2022-09-21 ENCOUNTER — PATIENT OUTREACH (OUTPATIENT)
Dept: ONCOLOGY | Facility: CLINIC | Age: 82
End: 2022-09-21

## 2022-09-21 NOTE — PROGRESS NOTES
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     From OV 5/13/22:    #.  Invasive ductal carcinoma of the right breast.  Stage IA (pT1c, pN0, cM0), grade 1, ER/ND strongly positive, HER2/Uriel negative, with positive inferior margin. Associated DCIS. S/p right breast lumpectomy and right sentinel lymph node biopsy on 8/10/2017.  She declined reexcision or adjuvant radiation treatment.  She started anastrozole in October 2017, then switched to tamoxifen in December 2018 due to intolerable vasomotor symptoms and not to compromise her bone density.                            Clinically well.  Exam is unremarkable.  I reviewed the mammogram from 5/2022 and it was benign.                Currently on adjuvant tamoxifen and plan to complete 5 years in October 2022.  She tolerates tamoxifen well.  Refilled today.               Follow-up clinical exam in October at the end of the treatment.                Continue annual screening mammogram.    Signature:  Brigid Montoya RN

## 2022-09-26 ENCOUNTER — TELEPHONE (OUTPATIENT)
Dept: ONCOLOGY | Facility: CLINIC | Age: 82
End: 2022-09-26

## 2022-09-26 NOTE — PROGRESS NOTES
Patient returns call, she is advised that she needs to schedule a follow up with Dr. Zavala in October at the end of treatment. Patient verbalizes understanding and denies further questions. She is transferred to scheduling for further assistance.    Rae Thakur RN

## 2022-09-29 DIAGNOSIS — J45.40 MODERATE PERSISTENT ASTHMA WITHOUT COMPLICATION: ICD-10-CM

## 2022-09-29 RX ORDER — ALBUTEROL SULFATE 90 UG/1
AEROSOL, METERED RESPIRATORY (INHALATION)
Qty: 54 G | Refills: 1 | Status: SHIPPED | OUTPATIENT
Start: 2022-09-29

## 2022-09-29 NOTE — TELEPHONE ENCOUNTER
"Routing refill request to provider for review/approval because:  act    Last Written Prescription Date:  9/19/21  Last Fill Quantity: 54,  # refills: 1   Last office visit provider:  8/30/22     Requested Prescriptions   Pending Prescriptions Disp Refills     albuterol (PROAIR HFA/PROVENTIL HFA/VENTOLIN HFA) 108 (90 Base) MCG/ACT inhaler [Pharmacy Med Name: ALBUTEROL(V) INH ] 54 g 1     Sig: USE 2 INHALATIONS ORALLY   EVERY 6 HOURS AS NEEDED       Asthma Maintenance Inhalers - Anticholinergics Failed - 9/29/2022  1:17 AM        Failed - Asthma control assessment score within normal limits in last 6 months     Please review ACT score.           Passed - Patient is age 12 years or older        Passed - Medication is active on med list        Passed - Recent (6 mo) or future (30 days) visit within the authorizing provider's specialty     Patient had office visit in the last 6 months or has a visit in the next 30 days with authorizing provider or within the authorizing provider's specialty.  See \"Patient Info\" tab in inbasket, or \"Choose Columns\" in Meds & Orders section of the refill encounter.           Short-Acting Beta Agonist Inhalers Protocol  Failed - 9/29/2022  1:17 AM        Failed - Asthma control assessment score within normal limits in last 6 months     Please review ACT score.           Passed - Patient is age 12 or older        Passed - Medication is active on med list        Passed - Recent (6 mo) or future (30 days) visit within the authorizing provider's specialty     Patient had office visit in the last 6 months or has a visit in the next 30 days with authorizing provider or within the authorizing provider's specialty.  See \"Patient Info\" tab in inbasket, or \"Choose Columns\" in Meds & Orders section of the refill encounter.                 Janine Grajeda RN 09/29/22 11:09 AM  "

## 2022-11-11 ENCOUNTER — TELEPHONE (OUTPATIENT)
Dept: ONCOLOGY | Facility: CLINIC | Age: 82
End: 2022-11-11

## 2022-12-01 ENCOUNTER — ONCOLOGY VISIT (OUTPATIENT)
Dept: ONCOLOGY | Facility: CLINIC | Age: 82
End: 2022-12-01
Attending: INTERNAL MEDICINE
Payer: COMMERCIAL

## 2022-12-01 VITALS
OXYGEN SATURATION: 98 % | WEIGHT: 175.9 LBS | HEART RATE: 101 BPM | DIASTOLIC BLOOD PRESSURE: 77 MMHG | SYSTOLIC BLOOD PRESSURE: 120 MMHG | HEIGHT: 63 IN | RESPIRATION RATE: 16 BRPM | BODY MASS INDEX: 31.17 KG/M2 | TEMPERATURE: 98.3 F

## 2022-12-01 DIAGNOSIS — C50.411 MALIGNANT NEOPLASM OF UPPER-OUTER QUADRANT OF RIGHT BREAST IN FEMALE, ESTROGEN RECEPTOR POSITIVE (H): ICD-10-CM

## 2022-12-01 DIAGNOSIS — Z17.0 MALIGNANT NEOPLASM OF UPPER-OUTER QUADRANT OF RIGHT BREAST IN FEMALE, ESTROGEN RECEPTOR POSITIVE (H): ICD-10-CM

## 2022-12-01 DIAGNOSIS — Z12.31 VISIT FOR SCREENING MAMMOGRAM: Primary | ICD-10-CM

## 2022-12-01 PROCEDURE — 99214 OFFICE O/P EST MOD 30 MIN: CPT | Performed by: INTERNAL MEDICINE

## 2022-12-01 PROCEDURE — G0463 HOSPITAL OUTPT CLINIC VISIT: HCPCS

## 2022-12-01 RX ORDER — HYDROCODONE BITARTRATE AND ACETAMINOPHEN 5; 325 MG/1; MG/1
1 TABLET ORAL EVERY 6 HOURS PRN
COMMUNITY
Start: 2022-09-22 | End: 2023-04-17

## 2022-12-01 RX ORDER — AZITHROMYCIN 250 MG/1
250 TABLET, FILM COATED ORAL
COMMUNITY
Start: 2022-10-21 | End: 2023-07-24

## 2022-12-01 ASSESSMENT — PAIN SCALES - GENERAL: PAINLEVEL: NO PAIN (0)

## 2022-12-01 NOTE — LETTER
"    12/1/2022         RE: Krystal Virgen  [address]  [place] [address]        Dear Colleague,    Thank you for referring your patient, Krystal Virgen, to the [facility]CER [facility]. Please see a copy of my visit note below.    Oncology Rooming Note    December 1, 2022 1:24 PM   Krystal Virgen is a 82 year old female who presents for:    Chief Complaint   Patient presents with     Oncology Clinic Visit     Malignant neoplasm of the upper outer quadrant     Initial Vitals: /77 (Patient Position: Sitting)   Pulse 101   Temp 98.3  F (36.8  C) (Oral)   Resp 16   Ht 1.6 m (5' 3\")   Wt 79.8 kg (175 lb 14.4 oz)   SpO2 98%   BMI 31.16 kg/m   Estimated body mass index is 31.16 kg/m  as calculated from the following:    Height as of this encounter: 1.6 m (5' 3\").    Weight as of this encounter: 79.8 kg (175 lb 14.4 oz). Body surface area is 1.88 meters squared.  No Pain (0) Comment: Data Unavailable   No LMP recorded.  Allergies reviewed: Yes  Medications reviewed: Yes    Medications: Medication refills not needed today.  Pharmacy name entered into [employer]:    [facility] - [place], [place] - [address]  [facility] [place] - [facility] AT PORTAL TO [facility] SITES    Clinical concerns:Follow up 6 months.      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  History of invasive ductal carcinoma of the right breast.  Stage IA (pT1c, pN0, cM0), grade 1, ER/NH strongly positive, HER2/Ruiel negative, with positive inferior margin. Associated DCIS. S/p right breast lumpectomy and right sentinel lymph node biopsy on 8/10/2017.  She declined reexcision or adjuvant radiation treatment.  She started anastrozole in October 2017, then switched to tamoxifen in December 2018 due to intolerable vasomotor symptoms and not to compromise her bone density.  Completed 5 years of adjuvant tamoxifen in October 2022.      She is clinically well.  Exam is unremarkable.  She completed adjuvant tamoxifen at this point.    Continue annual screening mammogram which is due in 5/2023.  She will continue follow up with me on an annual basis of clinical exam.    #.  History of microcytic anemia with iron deficiency.   She was getting IV iron periodically.   Colonoscopy on 11/8/17 showed diverticulosis without evidence of source of bleeding. EGD from 12/14/2017 showed a large hiatal hernia with John's erosion.    I reviewed her labs from May 2022 and it showed very mildly low hemoglobin with no clinical significance and normal WBC and platelets.      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 Krystal Virgen presented today for follow-up.  She is overall doing very well.  She just recovered from a bronchitis.  No headaches.  No persistent  "bone pain.  No palpable masses or abnormalities.  She completed tamoxifen about a month ago.    Review of systems  Apart from describing in HPI, the remainder of comprehensive ROS was negative.    Past History    Past Medical History:   Diagnosis Date     Anemia      Asthma      Asthma      Asthma with acute exacerbation 8/23/2021    Formatting of this note might be different from the original. Created by Conversion  Replacement Utility updated for latest IMO load     Breast cancer (H) 2017     Chronic bronchitis (H)      Chronic sinusitis      Depression      GERD (gastroesophageal reflux disease)      Migraines      Osteopenia      Osteoporosis      Overactive bladder      Pneumococcal infection      Pneumonia      Sinusitis      Spinal headache      ST elevation MI (STEMI) (H) 1/7/2021       Past Surgical History:   Procedure Laterality Date     ARTHRODESIS FOOT  11/11/2011    Procedure:ARTHRODESIS FOOT; Right First Metatarsophalangeal Joint Fusion with Second and Third Clawtoe Repairs; Surgeon:SARAH CASTRO; Location:[facility] OR     BIOPSY BREAST       CHOLECYSTECTOMY       CHOLECYSTECTOMY       CV CORONARY ANGIOGRAM N/A 1/7/2021    Procedure: Coronary Angiogram;  Surgeon: Slade Yu MD;  Location: [facility] CARDIAC CATH LAB     CV PCI STENT DRUG ELUTING N/A 1/7/2021    Procedure: Percutaneous Coronary Intervention Stent Drug Eluting;  Surgeon: Slade Yu MD;  Location: [facility] CARDIAC CATH LAB     ENT SURGERY      sinus surgery x1     FOOT SURGERY       GYN SURGERY      benign hysterectomy age 43     HYSTERECTOMY  1984     LUMPECTOMY BREAST Right 2017     ORTHOPEDIC SURGERY      fusion of grest toe right     SINUS SURGERY         Physical Exam    /77 (Patient Position: Sitting)   Pulse 101   Temp 98.3  F (36.8  C) (Oral)   Resp 16   Ht 1.6 m (5' 3\")   Wt 79.8 kg (175 lb 14.4 oz)   SpO2 98%   BMI 31.16 kg/m      General: alert, awake, not in acute distress  HEENT: Head: Normal, " normocephalic, atraumatic.  Eye: Normal external eye, conjunctiva, lids cornea, RINKU.  Pharynx: Normal buccal mucosa. Normal pharynx.  Neck / Thyroid: Supple, no masses, nodes, nodules or enlargement.  Lymphatics: No abnormally enlarged lymph nodes.  Chest: Normal chest wall and respirations. Clear to auscultation.  Breasts: Bilateral pendulous breast.  No palpable masses.  Heart: S1 S2 RRR.   Abdomen: abdomen is soft without significant tenderness, masses, organomegaly or guarding  Extremities: normal strength, tone, and muscle mass  Skin: normal. no rash or abnormalities  CNS: non focal.    Lab Results    No results found for this or any previous visit (from the past 168 hour(s)).    Imaging    No results found.    30 minutes spent on the date of the encounter doing chart review, history and exam, documentation and further activities as noted above.    Signed by: Herbert Zavala MD      Again, thank you for allowing me to participate in the care of your patient.        Sincerely,        Herbert Zavala MD

## 2022-12-01 NOTE — PROGRESS NOTES
"Oncology Rooming Note    December 1, 2022 1:24 PM   Krystal Virgen is a 82 year old female who presents for:    Chief Complaint   Patient presents with     Oncology Clinic Visit     Malignant neoplasm of the upper outer quadrant     Initial Vitals: /77 (Patient Position: Sitting)   Pulse 101   Temp 98.3  F (36.8  C) (Oral)   Resp 16   Ht 1.6 m (5' 3\")   Wt 79.8 kg (175 lb 14.4 oz)   SpO2 98%   BMI 31.16 kg/m   Estimated body mass index is 31.16 kg/m  as calculated from the following:    Height as of this encounter: 1.6 m (5' 3\").    Weight as of this encounter: 79.8 kg (175 lb 14.4 oz). Body surface area is 1.88 meters squared.  No Pain (0) Comment: Data Unavailable   No LMP recorded.  Allergies reviewed: Yes  Medications reviewed: Yes    Medications: Medication refills not needed today.  Pharmacy name entered into [employer]:    [facility] - [place], [place] - [address]  [facility] [place] - [facility] AT PORTAL TO REGISTERED [employer] SITES    Clinical concerns:Follow up 6 months.      Caterina Gallego LPN            "

## 2022-12-01 NOTE — PROGRESS NOTES
[facility] Hematology and Oncology Progress Note    Patient: Krystal Virgen  MRN: [id number]  Date of Service: Dec 1, 2022         Reason for Visit    Chief Complaint   Patient presents with     Oncology Clinic Visit     Malignant neoplasm of the upper outer quadrant       Assessment and Plan     Cancer Staging   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)  Staging form: Breast, AJCC 7th Edition  - Pathologic stage from 9/8/2017: Stage IA (T1c, N0, cM0) - Signed by Herbert Zavala MD on 5/13/2022  ER Status: Positive  CA Status: Positive  HER2 Status: Negative      ECOG Performance    1 - Can't do physically strenuous work, but fully ambyulatory and can do light sedentary work     Pain  Pain Score: No Pain (0)    #.  History of invasive ductal carcinoma of the right breast.  Stage IA (pT1c, pN0, cM0), grade 1, ER/CA strongly positive, HER2/Uriel negative, with positive inferior margin. Associated DCIS. S/p right breast lumpectomy and right sentinel lymph node biopsy on 8/10/2017.  She declined reexcision or adjuvant radiation treatment.  She started anastrozole in October 2017, then switched to tamoxifen in December 2018 due to intolerable vasomotor symptoms and not to compromise her bone density.  Completed 5 years of adjuvant tamoxifen in October 2022.      She is clinically well.  Exam is unremarkable.  She completed adjuvant tamoxifen at this point.    Continue annual screening mammogram which is due in 5/2023.  She will continue follow up with me on an annual basis of clinical exam.    #.  History of microcytic anemia with iron deficiency.   She was getting IV iron periodically.   Colonoscopy on 11/8/17 showed diverticulosis without evidence of source of bleeding. EGD from 12/14/2017 showed a large hiatal hernia with John's erosion.    I reviewed her labs from May 2022 and it showed very mildly low hemoglobin with no clinical significance and normal WBC and platelets.       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 Krystal Virgen presented today for follow-up.  She is overall doing very well.  She just recovered from a bronchitis.  No headaches.  No persistent bone pain.  No palpable masses or abnormalities.  She completed tamoxifen about a month ago.    Review of systems  Apart from describing in HPI, the remainder of comprehensive ROS was negative.    Past History    Past Medical History:   Diagnosis Date     Anemia      Asthma      Asthma      Asthma with acute exacerbation 8/23/2021    Formatting of this note might be different from the original. Created by Conversion  Replacement Utility updated for latest IMO load     Breast cancer (H) 2017     Chronic bronchitis (H)      Chronic sinusitis      Depression      GERD (gastroesophageal reflux disease)      Migraines      Osteopenia      Osteoporosis      Overactive bladder      Pneumococcal infection      Pneumonia      Sinusitis      Spinal headache      ST elevation MI (STEMI) (H) 1/7/2021       Past Surgical History:   Procedure Laterality Date     ARTHRODESIS FOOT  11/11/2011    Procedure:ARTHRODESIS FOOT; Right First Metatarsophalangeal Joint Fusion with Second and Third Clawtoe Repairs; Surgeon:SARAH CASTRO; Location:[facility] OR     BIOPSY BREAST       CHOLECYSTECTOMY       CHOLECYSTECTOMY       CV CORONARY ANGIOGRAM N/A 1/7/2021    Procedure: Coronary Angiogram;  Surgeon: Slade Yu MD;  Location:  [facility]     CV PCI STENT DRUG ELUTING N/A 1/7/2021    Procedure: Percutaneous Coronary Intervention Stent Drug Eluting;   "Surgeon: Slade Yu MD;  Location:  [facility]     ENT SURGERY      sinus surgery x1     FOOT SURGERY       GYN SURGERY      benign hysterectomy age 43     HYSTERECTOMY  1984     LUMPECTOMY BREAST Right 2017     ORTHOPEDIC SURGERY      fusion of grest toe right     SINUS SURGERY         Physical Exam    /77 (Patient Position: Sitting)   Pulse 101   Temp 98.3  F (36.8  C) (Oral)   Resp 16   Ht 1.6 m (5' 3\")   Wt 79.8 kg (175 lb 14.4 oz)   SpO2 98%   BMI 31.16 kg/m      General: alert, awake, not in acute distress  HEENT: Head: Normal, normocephalic, atraumatic.  Eye: Normal external eye, conjunctiva, lids cornea, RINKU.  Pharynx: Normal buccal mucosa. Normal pharynx.  Neck / Thyroid: Supple, no masses, nodes, nodules or enlargement.  Lymphatics: No abnormally enlarged lymph nodes.  Chest: Normal chest wall and respirations. Clear to auscultation.  Breasts: Bilateral pendulous breast.  No palpable masses.  Heart: S1 S2 RRR.   Abdomen: abdomen is soft without significant tenderness, masses, organomegaly or guarding  Extremities: normal strength, tone, and muscle mass  Skin: normal. no rash or abnormalities  CNS: non focal.    Lab Results    No results found for this or any previous visit (from the past 168 hour(s)).    Imaging    No results found.    30 minutes spent on the date of the encounter doing chart review, history and exam, documentation and further activities as noted above.    Signed by: Herbert Zavala MD  "

## 2022-12-26 ENCOUNTER — TELEPHONE (OUTPATIENT)
Dept: FAMILY MEDICINE | Facility: CLINIC | Age: 82
End: 2022-12-26

## 2022-12-26 NOTE — TELEPHONE ENCOUNTER
Reason for Call:  Medication or medication refill:      Name of the medication requested: guanfacine    Other request: patient requesting guanfacine for her cough. She is coughing so much and so hard that its hurting her lungs and ribs and throat. If she is able to get a script for this she would like this sent to the [facility] drug store off of [address]. She is asking that it be delivered to her. Forwarded to covering provider.    Can we leave a detailed message on this number? YES    Phone number patient can be reached at: Home number on file [phone] (home)    Best Time: anytime.     Call taken on 12/26/2022 at 2:01 PM by Laury Travis

## 2023-01-01 NOTE — PROGRESS NOTES
"I spoke with \"Jody\" to inform her of her malignant breast biopsy pathology results from her 7/12/17, right breast biopsy. She was not surprised to hear it was cancer as she has two sisters who have had mastectomies due to breast cancer.She didn't become emotional or tearful during our conversation. I reviewed breast anatomy and explained the pathology findings, including the grade of cancer. I explained that the receptor results were still pending and that I would inform her of those results once they were available. We discussed seeing Dr Hernandez, the surgeon, as the next step and I offered to make that appointment for her, but she preferred to call and schedule that appointment herself, so I gave her the contact information. I gave her emotional support and mentioned our Breast Cancer support group and encouraged her to attend if she was interested. I offered her a packet of information on Breast Cancer and she accepted and agreed to have me send it to her home address, which I confirmed with her was correct in [place]. I will put that in the [place] mail today. I encouraged her to call me with any questions or if there was any way I could assist her. She mentioned she had plans to go out of town tomorrow and I encouraged her to keep her plans. She is a very pleasant lady. She was grateful for the call today.   " This note was copied from the mother's chart.  BREASTFEEDING SERVICES NOTE:    Time spent with mother/baby: 6 minutes    Weight loss 5.02%, output adequate. Mom reports that baby is feeding well. She is concerned that baby is not getting enough milk. Reassured parents that baby is getting what she needs and reviewed normal  weight loss and diaper and feeding diary to help know that baby is getting enough to eat. Since consult yesterday baby has had at least 5 voids and 5 stool diapers and 7+ breastfeeding sessions. Praised parents for feeding baby well and encouraged them to keep feeding baby on demand, and to continue to track diapers and feedings. Parents felt reassured by this discussion.     Reviewed milk production and feeding patterns in the first few days after birth. Encouraged parents to call for help with feedings as needed. They declined any feeding help at this time. Mom reports no pain with latching baby. She has a breast pump at home to use as needed.     Lactation to follow.

## 2023-02-07 DIAGNOSIS — I21.21 ST ELEVATION MYOCARDIAL INFARCTION INVOLVING LEFT CIRCUMFLEX CORONARY ARTERY (H): ICD-10-CM

## 2023-02-07 RX ORDER — ROSUVASTATIN CALCIUM 40 MG/1
40 TABLET, COATED ORAL DAILY
Qty: 90 TABLET | Refills: 3 | Status: SHIPPED | OUTPATIENT
Start: 2023-02-07 | End: 2023-04-17

## 2023-02-07 NOTE — TELEPHONE ENCOUNTER
Pending Prescriptions:                       Disp   Refills    rosuvastatin (CRESTOR) 40 MG tablet       90 tab*3            Sig: Take 1 tablet (40 mg) by mouth daily    Pt has 3 tabs left. She wants it to be delivered to her home. Requesting a call once sent.

## 2023-02-08 DIAGNOSIS — F32.5 MAJOR DEPRESSIVE DISORDER, SINGLE EPISODE IN FULL REMISSION (H): ICD-10-CM

## 2023-02-08 DIAGNOSIS — G47.00 INSOMNIA, UNSPECIFIED TYPE: ICD-10-CM

## 2023-02-08 DIAGNOSIS — F32.5 MAJOR DEPRESSIVE DISORDER, SINGLE EPISODE, IN REMISSION (H): ICD-10-CM

## 2023-02-08 DIAGNOSIS — G43.909 MIGRAINE WITHOUT STATUS MIGRAINOSUS, NOT INTRACTABLE, UNSPECIFIED MIGRAINE TYPE: Primary | ICD-10-CM

## 2023-02-09 RX ORDER — SUMATRIPTAN SUCCINATE 100 MG/1
100 TABLET ORAL
Qty: 30 TABLET | Refills: 3 | Status: ON HOLD | OUTPATIENT
Start: 2023-02-09 | End: 2023-12-05

## 2023-02-09 RX ORDER — TRAZODONE HYDROCHLORIDE 50 MG/1
50 TABLET, FILM COATED ORAL AT BEDTIME
Qty: 90 TABLET | Refills: 2 | Status: SHIPPED | OUTPATIENT
Start: 2023-02-09 | End: 2023-08-31

## 2023-02-09 RX ORDER — BUPROPION HYDROCHLORIDE 300 MG/1
300 TABLET ORAL EVERY MORNING
Qty: 90 TABLET | Refills: 2 | Status: SHIPPED | OUTPATIENT
Start: 2023-02-09 | End: 2023-12-22

## 2023-02-09 RX ORDER — CITALOPRAM HYDROBROMIDE 20 MG/1
20 TABLET ORAL DAILY
Qty: 90 TABLET | Refills: 3 | Status: SHIPPED | OUTPATIENT
Start: 2023-02-09 | End: 2023-04-17

## 2023-02-09 NOTE — TELEPHONE ENCOUNTER
"Routing refill request to provider for review/approval because:  Drug not on the G refill protocol   phq 9    Last Written Prescription Date:  7/27/22  Last Fill Quantity: 90,  # refills: 2   Last office visit provider:  8/30/22     Requested Prescriptions   Pending Prescriptions Disp Refills     traZODone (DESYREL) 50 MG tablet 90 tablet 2     Sig: Take 1 tablet (50 mg) by mouth At Bedtime       Serotonin Modulators Passed - 2/8/2023  3:31 PM        Passed - Recent (12 mo) or future (30 days) visit within the authorizing provider's specialty     Patient has had an office visit with the authorizing provider or a provider within the authorizing providers department within the previous 12 mos or has a future within next 30 days. See \"Patient Info\" tab in inNetLexsket, or \"Choose Columns\" in Meds & Orders section of the refill encounter.              Passed - Medication is active on med list        Passed - Patient is age 18 or older        Passed - No active pregnancy on record        Passed - No positive pregnancy test in past 12 months           citalopram (CELEXA) 20 MG tablet 90 tablet 3     Sig: Take 1 tablet (20 mg) by mouth daily       SSRIs Protocol Failed - 2/8/2023  3:31 PM        Failed - PHQ-9 score less than 5 in past 6 months     Please review last PHQ-9 score.           Passed - Medication is Bupropion     If the medication is Bupropion (Wellbutrin), and the patient is taking for smoking cessation; OK to refill.          Passed - Medication is active on med list        Passed - Patient is age 18 or older        Passed - No active pregnancy on record        Passed - No positive pregnancy test in last 12 months        Passed - Recent (6 mo) or future (30 days) visit within the authorizing provider's specialty     Patient had office visit in the last 6 months or has a visit in the next 30 days with authorizing provider or within the authorizing provider's specialty.  See \"Patient Info\" tab in inHackerHANDet, or " "\"Choose Columns\" in Meds & Orders section of the refill encounter.               buPROPion (WELLBUTRIN XL) 300 MG 24 hr tablet 90 tablet 2     Sig: Take 1 tablet (300 mg) by mouth every morning       SSRIs Protocol Failed - 2/8/2023  3:31 PM        Failed - PHQ-9 score less than 5 in past 6 months     Please review last PHQ-9 score.           Passed - Medication is Bupropion     If the medication is Bupropion (Wellbutrin), and the patient is taking for smoking cessation; OK to refill.          Passed - Medication is active on med list        Passed - Patient is age 18 or older        Passed - No active pregnancy on record        Passed - No positive pregnancy test in last 12 months        Passed - Recent (6 mo) or future (30 days) visit within the authorizing provider's specialty     Patient had office visit in the last 6 months or has a visit in the next 30 days with authorizing provider or within the authorizing provider's specialty.  See \"Patient Info\" tab in inbasket, or \"Choose Columns\" in Meds & Orders section of the refill encounter.               SUMAtriptan (IMITREX) 100 MG tablet       Sig: Take 1 tablet (100 mg) by mouth at onset of headache May repeat in 2 hours if needed: max 2/day; average number of headaches monthly ?       Serotonin Agonists Failed - 2/8/2023  3:31 PM        Failed - Serotonin Agonist request needs review.     Please review patient's record. If patient has had 8 or more treatments in the past month, please forward to provider.          Passed - Blood pressure under 140/90 in past 12 months     BP Readings from Last 3 Encounters:   12/01/22 120/77   08/30/22 134/82   05/13/22 (!) 141/85                 Passed - Recent (12 mo) or future (30 days) visit within the authorizing provider's specialty     Patient has had an office visit with the authorizing provider or a provider within the authorizing providers department within the previous 12 mos or has a future within next 30 days. See " "\"Patient Info\" tab in inbasket, or \"Choose Columns\" in Meds & Orders section of the refill encounter.              Passed - Medication is active on med list        Passed - Patient is age 18 or older        Passed - No active pregnancy on record        Passed - No positive pregnancy test in past 12 months             Janine Grajeda RN 02/09/23 9:00 AM  "

## 2023-02-20 ENCOUNTER — TELEPHONE (OUTPATIENT)
Dept: FAMILY MEDICINE | Facility: CLINIC | Age: 83
End: 2023-02-20
Payer: COMMERCIAL

## 2023-02-20 NOTE — TELEPHONE ENCOUNTER
Called and spoke with patient.    Having foot pain    Wondering if [facility] is a good option.    Spoke with Dr khalil.     He says [facility] is a great option.    Called pt

## 2023-02-21 ENCOUNTER — TRANSFERRED RECORDS (OUTPATIENT)
Dept: HEALTH INFORMATION MANAGEMENT | Facility: CLINIC | Age: 83
End: 2023-02-21

## 2023-03-14 ENCOUNTER — TELEPHONE (OUTPATIENT)
Dept: FAMILY MEDICINE | Facility: CLINIC | Age: 83
End: 2023-03-14
Payer: COMMERCIAL

## 2023-03-14 DIAGNOSIS — M54.42 ACUTE LEFT-SIDED LOW BACK PAIN WITH LEFT-SIDED SCIATICA: Primary | ICD-10-CM

## 2023-03-14 RX ORDER — TIZANIDINE 2 MG/1
2 TABLET ORAL 3 TIMES DAILY
Qty: 30 TABLET | Refills: 0 | Status: SHIPPED | OUTPATIENT
Start: 2023-03-14 | End: 2023-11-04

## 2023-03-14 RX ORDER — OXYCODONE AND ACETAMINOPHEN 5; 325 MG/1; MG/1
1 TABLET ORAL DAILY PRN
Qty: 5 TABLET | Refills: 0 | Status: SHIPPED | OUTPATIENT
Start: 2023-03-14 | End: 2023-03-17

## 2023-03-14 NOTE — TELEPHONE ENCOUNTER
"Spoke with Jody for awhile.  Offered visit for symptoms. Doesn't really want to come in, \"hard to move around or leave the house.\"    Her Sciatica down her leg is getting worse. She did bring up a suggestion of starting a muscle relaxer.  Her insomnia is chronic, on/off.  She has been dealing with her toe pain for awhile.    I again offered her an appointment. I said perhaps this Friday (maybe at the hold spot in the afternoon).  She said she didn't really want to come. She was wondering if Dr Fairchild could call her.    I did not promise anything, and said you were seeing patients.  "

## 2023-03-14 NOTE — TELEPHONE ENCOUNTER
Patient requesting call from [place] regarding pain, unable to sleep, has broken left toe, Lt hip pain and sciatica down left leg.

## 2023-03-15 DIAGNOSIS — M54.42 ACUTE LEFT-SIDED LOW BACK PAIN WITH LEFT-SIDED SCIATICA: ICD-10-CM

## 2023-03-15 RX ORDER — OXYCODONE AND ACETAMINOPHEN 5; 325 MG/1; MG/1
1 TABLET ORAL DAILY PRN
Qty: 5 TABLET | Refills: 0 | OUTPATIENT
Start: 2023-03-15 | End: 2023-03-18

## 2023-03-15 RX ORDER — HYDROCODONE BITARTRATE AND ACETAMINOPHEN 5; 325 MG/1; MG/1
1 TABLET ORAL DAILY PRN
Qty: 5 TABLET | Refills: 0 | Status: SHIPPED | OUTPATIENT
Start: 2023-03-15 | End: 2023-03-18

## 2023-03-29 ENCOUNTER — TELEPHONE (OUTPATIENT)
Dept: FAMILY MEDICINE | Facility: CLINIC | Age: 83
End: 2023-03-29
Payer: COMMERCIAL

## 2023-03-29 NOTE — TELEPHONE ENCOUNTER
Reason for Call:  Other appointment    Detailed comments: Jody would like to get an earlier appt with pcp in regards to her pain on all extremedies.     Phone Number Patient can be reached at: Cell number on file:    Telephone Information:   Mobile [phone]       Best Time: Anytime in the afternoon except Thursday afternoon    Can we leave a detailed message on this number? YES    Call taken on 3/29/2023 at 4:20 PM by Kristen Sneed

## 2023-03-30 NOTE — TELEPHONE ENCOUNTER
Patient is scheduled to see  on 4/17/2023. Patient was added to the wait list to be seen sooner. Patient refused appointment with other providers.

## 2023-04-17 ENCOUNTER — OFFICE VISIT (OUTPATIENT)
Dept: FAMILY MEDICINE | Facility: CLINIC | Age: 83
End: 2023-04-17
Payer: COMMERCIAL

## 2023-04-17 DIAGNOSIS — G89.29 CHRONIC MIDLINE LOW BACK PAIN WITH LEFT-SIDED SCIATICA: Primary | ICD-10-CM

## 2023-04-17 DIAGNOSIS — I21.21 ST ELEVATION MYOCARDIAL INFARCTION INVOLVING LEFT CIRCUMFLEX CORONARY ARTERY (H): ICD-10-CM

## 2023-04-17 DIAGNOSIS — F32.5 MAJOR DEPRESSIVE DISORDER, SINGLE EPISODE IN FULL REMISSION (H): ICD-10-CM

## 2023-04-17 DIAGNOSIS — M79.671 RIGHT FOOT PAIN: ICD-10-CM

## 2023-04-17 DIAGNOSIS — M54.42 CHRONIC MIDLINE LOW BACK PAIN WITH LEFT-SIDED SCIATICA: Primary | ICD-10-CM

## 2023-04-17 DIAGNOSIS — G47.26 PHASE-SHIFT DISRUPTION OF 24-HOUR SLEEP-WAKE CYCLE: ICD-10-CM

## 2023-04-17 DIAGNOSIS — D80.3: ICD-10-CM

## 2023-04-17 DIAGNOSIS — J45.40 MODERATE PERSISTENT ASTHMA WITHOUT COMPLICATION: ICD-10-CM

## 2023-04-17 PROBLEM — E21.3 HYPERPARATHYROIDISM (H): Status: RESOLVED | Noted: 2021-08-23 | Resolved: 2023-04-17

## 2023-04-17 PROCEDURE — 99214 OFFICE O/P EST MOD 30 MIN: CPT | Performed by: FAMILY MEDICINE

## 2023-04-17 RX ORDER — MONTELUKAST SODIUM 10 MG/1
10 TABLET ORAL AT BEDTIME
Qty: 90 TABLET | Refills: 3 | Status: SHIPPED | OUTPATIENT
Start: 2023-04-17 | End: 2023-12-22

## 2023-04-17 RX ORDER — CITALOPRAM HYDROBROMIDE 20 MG/1
20 TABLET ORAL DAILY
Qty: 90 TABLET | Refills: 3 | Status: SHIPPED | OUTPATIENT
Start: 2023-04-17 | End: 2023-11-04

## 2023-04-17 RX ORDER — HYDROCODONE BITARTRATE AND ACETAMINOPHEN 5; 325 MG/1; MG/1
1 TABLET ORAL 2 TIMES DAILY PRN
Qty: 30 TABLET | Refills: 0 | Status: SHIPPED | OUTPATIENT
Start: 2023-04-17 | End: 2023-07-24

## 2023-04-17 RX ORDER — ROSUVASTATIN CALCIUM 40 MG/1
40 TABLET, COATED ORAL DAILY
Qty: 90 TABLET | Refills: 3 | Status: SHIPPED | OUTPATIENT
Start: 2023-04-17 | End: 2024-04-10

## 2023-04-17 NOTE — PROGRESS NOTES
Krystal Virgen  There were no vitals taken for this visit.     Assessment/Plan:                Krystal was seen today for toe pain, back pain and refill request.    Diagnoses and all orders for this visit:    Chronic midline low back pain with left-sided sciatica  -     HYDROcodone-acetaminophen (NORCO) 5-325 MG tablet; Take 1 tablet by mouth 2 times daily as needed    Major depressive disorder, single episode in full remission (H)  -     citalopram (CELEXA) 20 MG tablet; Take 1 tablet (20 mg) by mouth daily    ST elevation myocardial infarction involving left circumflex coronary artery (H)  -     rosuvastatin (CRESTOR) 40 MG tablet; Take 1 tablet (40 mg) by mouth daily    Moderate persistent asthma without complication  -     montelukast (SINGULAIR) 10 MG tablet; Take 1 tablet (10 mg) by mouth At Bedtime    IgG1 subclass deficiency (H)    Right foot pain  -     HYDROcodone-acetaminophen (NORCO) 5-325 MG tablet; Take 1 tablet by mouth 2 times daily as needed    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    Utilize hydrocodone acetaminophen 1 tablet nightly but may use up to 2 tablets daily if indicated for pain control.  Discussed extensively side effects of medication for which she is well aware.  If there are any concerns she will notify me immediately.  We will get her set up to do physical therapy at her place of residence to help with overall pain management otherwise.  We will plan shorter-term follow-up to reassess the situation.  Subjective:     HPI:    Krystal Virgen is a 82 year old female with a complex medical history including ST elevation myocardial infarction in January 2021, moderate persistent asthma, IgG subclass deficiency resulting in frequent respiratory infections, migraine headaches, breast cancer for which she has been treated and remains on tamoxifen, recurrent urinary tract  infections currently infection free now for several months but with a history of overactive bladder and mixture of stress and urge urinary incontinence.     She needs refills of medications to manage her chronic medical concerns including depression, ST elevation myocardial infarction.    She is here today primarily to discuss sleep and pain control.    She is having a lot of difficulty with sleep.  Patient states her whole life she is really prefer to go to bed very late and wake up late.  She has never really been able to change this.  She states she seems to fall asleep usually about 3 AM and sleeps till 10 or 11.  If she does that she feels well usually but does note that she misses out on activities because of this.  She is tried medication therapies including trazodone and others in the past to try to help fix the situation but it has generally not been successful.  We discussed that we could try to continue to work on this, send her to see a sleep specialist or even continue to try pharmacologic therapy but it is also reasonable just to settle in with her current schedule and make it work for her.  She really wants to do this.  We discussed stopping trazodone which had been used for a long time as it likely does not have any positive effect overall.    She is having pain issues stemming from chronic back pain with sciatica which is currently flared up and has been so for the last 3 to 4 weeks.  This involves the left leg.  She reports pain from the hip to the knee region.  She denies significant weakness.  This causes difficulty being able to get to sleep.  She has used hydrocodone acetaminophen under my direction on a very limited basis over the course of the symptoms most recently.  Today we discussed having her undergo physical therapy as well as continued cautious use of pain medication.    She also has had significant toe pain in the right foot.  She has stress fractures in 2 of her toes.  She has been  seen by orthopedic specialist.  This compounds her pain issues on top of her sciatica described above.    ROS:  Complete review of systems is obtained.  Other than the specific considerations noted above complete review of systems is negative.          Objective:   Medications:  Current Outpatient Medications   Medication     albuterol (PROAIR HFA/PROVENTIL HFA/VENTOLIN HFA) 108 (90 Base) MCG/ACT inhaler     aspirin (ASA) 81 MG EC tablet     budesonide-formoterol (SYMBICORT) 160-4.5 MCG/ACT inhaler     buPROPion (WELLBUTRIN XL) 300 MG 24 hr tablet     citalopram (CELEXA) 20 MG tablet     fluticasone (FLONASE) 50 MCG/ACT nasal spray     HYDROcodone-acetaminophen (NORCO) 5-325 MG tablet     LANsoprazole (PREVACID) 30 MG DR capsule     montelukast (SINGULAIR) 10 MG tablet     nitroGLYcerin (NITROSTAT) 0.4 MG sublingual tablet     Omeprazole (PRILOSEC PO)     rosuvastatin (CRESTOR) 40 MG tablet     SUMAtriptan (IMITREX) 100 MG tablet     tiZANidine (ZANAFLEX) 2 MG tablet     traZODone (DESYREL) 50 MG tablet     trospium (SANCTURA) 20 MG tablet     azithromycin (ZITHROMAX) 250 MG tablet     clopidogrel (PLAVIX) 75 MG tablet     nitroFURantoin macrocrystal-monohydrate (MACROBID) 100 MG capsule     No current facility-administered medications for this visit.        Allergies:     Allergies   Allergen Reactions     Ticagrelor Other (See Comments)     Dyspnea     Penicillins      Amoxicillin doesn't work for her     Sulfa Drugs         Social History     Socioeconomic History     Marital status:      Spouse name: Not on file     Number of children: Not on file     Years of education: Not on file     Highest education level: Not on file   Occupational History     Not on file   Tobacco Use     Smoking status: Never     Smokeless tobacco: Never   Vaping Use     Vaping status: Not on file   Substance and Sexual Activity     Alcohol use: No     Drug use: No     Sexual activity: Not on file   Other Topics Concern      Parent/sibling w/ CABG, MI or angioplasty before 65F 55M? Not Asked   Social History Narrative     Not on file     Social Determinants of Health     Financial Resource Strain: Not on file   Food Insecurity: Not on file   Transportation Needs: Not on file   Physical Activity: Not on file   Stress: Not on file   Social Connections: Not on file   Intimate Partner Violence: Not on file   Housing Stability: Not on file       Family History   Problem Relation Age of Onset     Breast Cancer Paternal Aunt 48.00     Breast Cancer Mother 78.00     Breast Cancer Sister 48.00     Breast Cancer Maternal Aunt 35.00     Heart Disease Father      Macular Degeneration Father      Heart Disease Brother         Most Recent Immunizations   Administered Date(s) Administered     COVID-19 Vaccine 18+ (Moderna) 06/01/2022     COVID-19 Vaccine Bivalent Booster 18+ (Moderna) 10/25/2022     FLU 6-35 months 10/01/2010     Influenza (H1N1) 01/05/2010     Influenza (High Dose) 3 valent vaccine 10/01/2020     Influenza (IIV3) PF 10/17/2013     Influenza Vaccine 65+ (Fluzone HD) 10/25/2022     Influenza Vaccine, 6+MO IM (QUADRIVALENT W/PRESERVATIVES) 10/17/2013     Mantoux Tuberculin Skin Test 04/13/2006     Pneumo Conj 13-V (2010&after) 09/11/2018     Pneumococcal 23 valent 10/01/2019     TDAP (Adacel,Boostrix) 04/28/2014     Zoster vaccine, live 10/06/2009        Wt Readings from Last 3 Encounters:   12/01/22 79.8 kg (175 lb 14.4 oz)   08/30/22 80.6 kg (177 lb 11.2 oz)   05/13/22 80.7 kg (178 lb)        BP Readings from Last 6 Encounters:   12/01/22 120/77   08/30/22 134/82   05/13/22 (!) 141/85   02/01/22 124/70   09/24/21 100/62   09/20/21 130/74        Hemoglobin A1C   Date Value Ref Range Status   01/08/2021 5.3 0 - 5.6 % Final     Comment:     Normal <5.7% Prediabetes 5.7-6.4%  Diabetes 6.5% or higher - adopted from ADA   consensus guidelines.     02/01/2013 5.8 4.2 - 6.1 % Final              PHYSICAL EXAM:    There were no vitals taken for  this visit.         General Appearance:    Alert, cooperative, no distress   Eyes:   No scleral icterus or conjunctival irritation       Extremities:  No edema, no joint swelling or redness, no evidence of any injuries   Skin:  No concerning skin findings, no suspicious moles, no rashes   Neurologic:  On gross examination there is no motor or sensory deficit.  Patient walks with a normal gait

## 2023-05-10 ENCOUNTER — NURSE TRIAGE (OUTPATIENT)
Dept: FAMILY MEDICINE | Facility: CLINIC | Age: 83
End: 2023-05-10
Payer: COMMERCIAL

## 2023-05-10 NOTE — TELEPHONE ENCOUNTER
"Nurse Triage SBAR    Is this a 2nd Level Triage? YES, LICENSED PRACTITIONER REVIEW IS REQUIRED    Situation:   Patient calling with concerns of ongoing cough and shortness of breath.     Background:   Has had a cough for about 3 weeks   Prednisone taken 2 rounds of 1 tab a day for 5 days   Long history of pulmonary concerns     Assessment:   1. ONSET:       About 3 weeks   2. SEVERITY:       Moderate/Severe - worse when laying down   3. SPUTUM:      Copious amounts, very thick but clear   4. HEMOPTYSIS:       Denies blood   5. DIFFICULTY BREATHING:       Some shortness of breath, okay if she is sitting down, but increased shortness of breath with exertion.  Able to speak in full sentences   6. FEVER:       Denies fever   7. CARDIAC HISTORY:       Bypass about 2 years   8. LUNG HISTORY:       Chronic lungs concerns   9. PE RISK FACTORS:      Denies PE factors   Moving around less due to fatigue/SOB   10. OTHER SYMPTOMS:         Continual sinus drainage    Protocol Recommended Disposition:   Go To Office Now    Recommendation:   Home care advice given   Recommend patient to be seen in office to be evaluated due to shortness of breath  Patient denied going to clinic/urgent care stating \"if Dr. Fairchild was taking care this he would not make me be seen\"   Routing to covering provider for recommendations    Routed to provider    Does the patient meet one of the following criteria for ADS visit consideration? 16+ years old, with an [facility] PCP     TIP  Providers, please consider if this condition is appropriate for management at one of our Acute and Diagnostic Services sites.     If patient is a good candidate, please use dotphrase <dot>triageresponse and select Refer to ADS to document.    Reason for Disposition    MILD difficulty breathing (e.g., minimal/no SOB at rest, SOB with walking, pulse <100) and still present when not coughing    Additional Information    Negative: Bluish (or gray) lips or face    Negative: SEVERE " difficulty breathing (e.g., struggling for each breath, speaks in single words)    Negative: Rapid onset of cough and has hives    Negative: Coughing started suddenly after medicine, an allergic food or bee sting    Negative: Difficulty breathing after exposure to flames, smoke, or fumes    Negative: Sounds like a life-threatening emergency to the triager    Negative: MODERATE difficulty breathing (e.g., speaks in phrases, SOB even at rest, pulse 100-120) and still present when not coughing    Negative: Chest pain present when not coughing    Negative: Passed out (i.e., fainted, collapsed and was not responding)    Negative: Patient sounds very sick or weak to the triager    Answer Assessment - Initial Assessment Questions  1. ONSET:       About 3 weeks   2. SEVERITY:       Moderate/Severe - worse when laying down   3. SPUTUM:      Copious amounts, very thick but clear   4. HEMOPTYSIS:       Denies blood   5. DIFFICULTY BREATHING:       Some shortness of breath, okay if she is sitting down, but increased shortness of breath with exertion.  Able to speak in full sentences   6. FEVER:       Denies fever   7. CARDIAC HISTORY:       Bypass about 2 years   8. LUNG HISTORY:       Chronic lungs concerns   9. PE RISK FACTORS:      Denies PE factors   Moving around less due to fatigue/SOB   10. OTHER SYMPTOMS:         Continual sinus drainage    Protocols used: COUGH-A-CONSTANTINE Madrigal, RN  [facility]

## 2023-05-11 ENCOUNTER — TELEPHONE (OUTPATIENT)
Dept: FAMILY MEDICINE | Facility: CLINIC | Age: 83
End: 2023-05-11

## 2023-05-11 ENCOUNTER — OFFICE VISIT (OUTPATIENT)
Dept: FAMILY MEDICINE | Facility: CLINIC | Age: 83
End: 2023-05-11
Payer: COMMERCIAL

## 2023-05-11 VITALS
WEIGHT: 170 LBS | SYSTOLIC BLOOD PRESSURE: 124 MMHG | BODY MASS INDEX: 30.11 KG/M2 | TEMPERATURE: 98.5 F | DIASTOLIC BLOOD PRESSURE: 75 MMHG | HEART RATE: 86 BPM | OXYGEN SATURATION: 96 % | RESPIRATION RATE: 16 BRPM

## 2023-05-11 DIAGNOSIS — J20.9 ACUTE BRONCHITIS, UNSPECIFIED ORGANISM: Primary | ICD-10-CM

## 2023-05-11 PROCEDURE — 99214 OFFICE O/P EST MOD 30 MIN: CPT | Performed by: FAMILY MEDICINE

## 2023-05-11 RX ORDER — DOXYCYCLINE HYCLATE 100 MG
100 TABLET ORAL 2 TIMES DAILY
Qty: 14 TABLET | Refills: 0 | Status: SHIPPED | OUTPATIENT
Start: 2023-05-11 | End: 2023-05-30

## 2023-05-11 NOTE — TELEPHONE ENCOUNTER
Informed pt of provider's message and offered to call 911/ambulance for pt, which she declined. Patient stated she cannot make it to today's appt with Dr Velazquez and will make ride arrangement to go to the [facility]. Informed her of the closest [facility]- [place] and , which pt stated she knows where they are and it is up to her  to see whichever one her  decides to take her to.    Reminded pt multiple times of provider's message and that it is important for her to be seen and evaluated at a medical facility.    Rosangela Fields, BSN, RN  [facility]

## 2023-05-11 NOTE — TELEPHONE ENCOUNTER
Provider Recommendation Follow Up:   Unable to reach patient/caregiver. Left message to return call to [phone] to see if she can make it in today to see Dr Velazquez at 1:40 PM (she has been added to Dr Velazquez's schedule for today). If not then she should be seen at [facility]/ER.    Please route to RN queue with any questions when she returns call.    JAZMÍN VelizN, RN  [facility]

## 2023-05-11 NOTE — TELEPHONE ENCOUNTER
Reason for call:  Other   Patient called regarding (reason for call): call back  Additional comments: patient is calling and asking for something for her bronchitis.patient states that she is to weak to drive and dont want to pay for an ambulance ride either. Please advise and call patient back if needed please and thank you.     Phone number to reach patient:  Cell number on file:    Telephone Information:   Mobile [phone]        Best Time:  any    Can we leave a detailed message on this number?  YES

## 2023-05-11 NOTE — TELEPHONE ENCOUNTER
Left detailed message of Dr Fairchild's message for pt. To call back if pt have any questions.    JAZMÍN VelizN, RN  [facility]

## 2023-05-11 NOTE — PROGRESS NOTES
Assessment:       Acute bronchitis, unspecified organism  - doxycycline hyclate (VIBRA-TABS) 100 MG tablet  Dispense: 14 tablet; Refill: 0         Plan:     Times consistent with acute bronchitis complicated by her asthma.  .  Given duration of a cough and the fact that she is still continuing to have productive cough despite being on a couple of courses of prednisone I did elect to treat her with a course of doxycycline.  Lung exam is clear.  Follow-up if symptoms getting worse or not improving over the next week with PCP.    MEDICATIONS:   Orders Placed This Encounter   Medications     doxycycline hyclate (VIBRA-TABS) 100 MG tablet     Sig: Take 1 tablet (100 mg) by mouth 2 times daily for 7 days     Dispense:  14 tablet     Refill:  0     Please deliver to patient's home.         Subjective:       82 year old female with a history of asthma presents for evaluation of a couple week history of cough.  At times she coughs so hard she cannot catch her breath.  Her cough has been productive of yellowish sputum.  She has been on a couple of courses of prednisone without significant relief of her symptoms.  She has not had any fevers but has been feeling very rundown.    Patient Active Problem List   Diagnosis     ST elevation myocardial infarction involving left circumflex coronary artery (H)     Adverse effect of other drugs, medicaments and biological substances, initial encounter     Asthma     John lesion, chronic     Colon, diverticulosis     Coronary atherosclerosis     Cough     Diaphragmatic hernia     Disorder of bone and cartilage     Gastric polyps     Hyperlipidemia with target LDL less than 70     Hypertonicity of bladder     Hypogammaglobulinemia (H)     Major depressive disorder, single episode in full remission (H)     Lung nodule     Iron deficiency anemia     Insomnia     IgG1 subclass deficiency (H)     Other malaise and fatigue     Need for prophylactic hormone replacement therapy  (postmenopausal)     Moderate persistent asthma without complication     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 Created by Conversion  Replacement Utility updated for latest IMO load     Breast cancer (H) 2017     Chronic bronchitis (H)      Chronic sinusitis      Depression      GERD (gastroesophageal reflux disease)      Migraines      Osteopenia      Osteoporosis      Overactive bladder      Pneumococcal infection      Pneumonia      Sinusitis      Spinal headache      ST elevation MI (STEMI) (H) 1/7/2021       Past Surgical History:   Procedure Laterality Date     ARTHRODESIS FOOT  11/11/2011    Procedure:ARTHRODESIS FOOT; Right First Metatarsophalangeal Joint Fusion with Second and Third Clawtoe Repairs; Surgeon:SARAH CASTRO; Location:[facility]     BIOPSY BREAST       CHOLECYSTECTOMY       CHOLECYSTECTOMY       CV CORONARY ANGIOGRAM N/A 1/7/2021    Procedure: Coronary Angiogram;  Surgeon: Slade Yu MD;  Location:  [facility]     CV PCI STENT DRUG ELUTING N/A 1/7/2021    Procedure: Percutaneous Coronary Intervention Stent Drug Eluting;  Surgeon: Slade Yu MD;  Location:  HEART CARDIAC CATH LAB     ENT SURGERY      sinus surgery x1     FOOT SURGERY       GYN SURGERY      benign hysterectomy age 43     HYSTERECTOMY  1984     LUMPECTOMY BREAST Right 2017     ORTHOPEDIC SURGERY      fusion of grest toe right     SINUS SURGERY          Current Outpatient Medications   Medication     albuterol (PROAIR HFA/PROVENTIL HFA/VENTOLIN HFA) 108 (90 Base) MCG/ACT inhaler     aspirin (ASA) 81 MG EC tablet     azithromycin (ZITHROMAX) 250 MG tablet     budesonide-formoterol (SYMBICORT) 160-4.5 MCG/ACT inhaler     buPROPion (WELLBUTRIN XL) 300 MG 24 hr tablet     citalopram (CELEXA) 20 MG tablet     clopidogrel (PLAVIX) 75 MG tablet     doxycycline hyclate (VIBRA-TABS) 100 MG tablet     fluticasone (FLONASE) 50 MCG/ACT nasal spray     HYDROcodone-acetaminophen (NORCO) 5-325 MG tablet     LANsoprazole (PREVACID) 30 MG DR capsule     montelukast (SINGULAIR) 10 MG tablet     nitroFURantoin macrocrystal-monohydrate (MACROBID) 100 MG capsule     nitroGLYcerin (NITROSTAT) 0.4 MG sublingual tablet     Omeprazole (PRILOSEC PO)     rosuvastatin (CRESTOR) 40 MG tablet     SUMAtriptan (IMITREX) 100 MG tablet     tiZANidine (ZANAFLEX) 2 MG tablet     traZODone (DESYREL) 50 MG tablet     trospium (SANCTURA) 20 MG tablet     No current facility-administered medications for this visit.       Allergies   Allergen Reactions     Ticagrelor Other (See Comments)     Dyspnea     Penicillins      Amoxicillin doesn't work for her     Sulfa Antibiotics        Family History   Problem Relation Age of Onset     Breast Cancer Paternal Aunt 48.00     Breast Cancer Mother 78.00     Breast Cancer Sister 48.00     Breast Cancer Maternal Aunt 35.00     Heart Disease Father      Macular Degeneration Father      Heart Disease Brother        Social History     Socioeconomic History     Marital status:      Spouse name: None     Number of children: None     Years of education: None     Highest education level: None   Tobacco Use     Smoking status: Never     Smokeless tobacco: Never   Substance and Sexual Activity     Alcohol use: No     Drug use: No         Review of Systems  Pertinent items are noted in HPI.      Objective:                 General Appearance:    /75   Pulse  86   Temp 98.5  F (36.9  C) (Oral)   Resp 16   Wt 77.1 kg (170 lb)   SpO2 96%   BMI 30.11 kg/m          Alert, mildly ill-appearing but in no distress.   Head:    Normocephalic, without obvious abnormality, atraumatic   Eyes:    Conjunctiva/corneas clear   Ears:    Normal TM's without erythema or bulging. Normal external ear canals, both ears   Nose:   Nares normal, septum midline, mucosa normal, no drainage    or sinus tenderness   Throat:   Lips, mucosa, and tongue normal; teeth and gums normal.  No tonsilar hypertrophy or exudate.   Neck:   Supple, symmetrical, trachea midline, no adenopathy    Lungs:     Clear to auscultation bilaterally without wheezes, rales, or rhonchi, respirations unlabored    Heart:    Regular rate and rhythm, S1 and S2 normal, no murmur, rub or gallop       Extremities:   Extremities normal, atraumatic, no cyanosis or edema   Skin:   Skin color, texture, turgor normal, no rashes or lesions       This note has been dictated using voice recognition software. Any grammatical or context distortions are unintentional and inherent to the software

## 2023-05-11 NOTE — TELEPHONE ENCOUNTER
Please call patient: She needs to be evaluated at a medical facility.  There is no medication that we could prescribe that would substitute for an immediate in person evaluation.  I recommend she call an ambulance if she is unable to drive.

## 2023-05-30 ENCOUNTER — TELEPHONE (OUTPATIENT)
Dept: FAMILY MEDICINE | Facility: CLINIC | Age: 83
End: 2023-05-30
Payer: COMMERCIAL

## 2023-05-30 DIAGNOSIS — J20.9 ACUTE BRONCHITIS, UNSPECIFIED ORGANISM: ICD-10-CM

## 2023-05-30 RX ORDER — DOXYCYCLINE HYCLATE 100 MG
100 TABLET ORAL 2 TIMES DAILY
Qty: 20 TABLET | Refills: 0 | Status: SHIPPED | OUTPATIENT
Start: 2023-05-30 | End: 2023-07-24

## 2023-05-30 NOTE — TELEPHONE ENCOUNTER
"Nurse SBAR    Situation:   Patient calling about her SOB and cough that is not improving. Patient would like another round of Doxycycline to help with her cough and SOB.    Background:   Asthma and COPD  Acute Bronchitis     Assessment:   Patient called in and stated she have been doing everything Dr Fairchild was saying to do. She is currently in the middle of 10 day push of prednisone and was wondering if she can get another round of doxycycline?    Patient stated she has been dealing with bronchitis x 2 months.    Breathing is still bad and wheezing even when sittin. She is \"panting and puffing\" when walking from bathroom.    \"Coughing is interesting\" per patient. \"Cough pretty up brain out without taste and no color.\" Coughing is better, but still coughing up clear phlegm.    Denied asthma attack as she's familiar with that.     Completed doxycycline 100 mg BID for 7 days. Slight improvement, especially with coughing, but SOB is the same.    Oxygen is the only think she have not tried.    Pulse ox at home is 96%.     Been using nebulizer, which helps. Been using it BID for a week and a half (since she have completed the doxycycline).    Been using Symbicort BID and Albuterol about Q6H as well.    \"SOB feels separate from the cough.\" Patient is feeling she needs another round of Doxycycline to take care of the cough and that will help with her SOB too.    Patient is able to talk in full sentence with being SOB on the phone.    So out of energy per patient. Still able to eat and drink okay.    Denied N/V/D. Denied fever and chest pain.    Appt with pulmonologist is not until another 2 weeks.       Recommendation:   Advised pt to be seen as instructed per [facility] with no improvement and pt declined. She stated to check with PCP to see if she can get another round of doxycycline to help. She stated she have tried everything and was told her lungs were cleared at [facility]. Patient stated the only thing she have not try is home " oxygen, but her pulse ox is at 96%.    Patient would like to see what Dr Fairchild recommend as she is not sure what else he can do for her. She will be seeing pulmonologist in 2 weeks.    OK to leave detailed messages.    JAZMÍN MittalN, RN  [facility]

## 2023-05-30 NOTE — TELEPHONE ENCOUNTER
I have sent a prescription to [facility] for additional round of doxycycline.  If her symptoms deteriorates she should seek evaluation.  She should give me an update later this week to let me know how she is doing.

## 2023-06-19 DIAGNOSIS — K21.9 GASTROESOPHAGEAL REFLUX DISEASE WITHOUT ESOPHAGITIS: ICD-10-CM

## 2023-06-19 NOTE — TELEPHONE ENCOUNTER
Pending Prescriptions:                       Disp   Refills    LANsoprazole (PREVACID) 30 MG DR capsule  90 cap*2            Sig: Take 1 capsule (30 mg) by mouth daily

## 2023-06-20 RX ORDER — LANSOPRAZOLE 30 MG/1
30 CAPSULE, DELAYED RELEASE ORAL DAILY
Qty: 90 CAPSULE | Refills: 2 | Status: SHIPPED | OUTPATIENT
Start: 2023-06-20 | End: 2024-01-15

## 2023-06-20 NOTE — TELEPHONE ENCOUNTER
"Routing refill request to provider for review/approval because:  Needs review    Last Written Prescription Date:  7/27/22  Last Fill Quantity: 90,  # refills: 2   Last office visit provider:  4/17/23     Requested Prescriptions   Pending Prescriptions Disp Refills     LANsoprazole (PREVACID) 30 MG DR capsule 90 capsule 2     Sig: Take 1 capsule (30 mg) by mouth daily       PPI Protocol Failed - 6/19/2023  4:41 PM        Failed - Not on Clopidogrel (unless Pantoprazole ordered)        Passed - No diagnosis of osteoporosis on record        Passed - Recent (12 mo) or future (30 days) visit within the authorizing provider's specialty     Patient has had an office visit with the authorizing provider or a provider within the authorizing providers department within the previous 12 mos or has a future within next 30 days. See \"Patient Info\" tab in inbasket, or \"Choose Columns\" in Meds & Orders section of the refill encounter.              Passed - Medication is active on med list        Passed - Patient is age 18 or older        Passed - No active pregnacy on record        Passed - No positive pregnancy test in past 12 months             Janine Grajeda, RN 06/20/23 12:10 PM  "

## 2023-06-26 ENCOUNTER — NURSE TRIAGE (OUTPATIENT)
Dept: FAMILY MEDICINE | Facility: CLINIC | Age: 83
End: 2023-06-26

## 2023-06-26 NOTE — TELEPHONE ENCOUNTER
Nurse Triage SBAR    Is this a 2nd Level Triage? YES, LICENSED PRACTITIONER REVIEW IS REQUIRED    Situation:   Blood in stools x2 BMs.    Background:   Currently taking robitussin and Vicodin  Have not taken Plavix in well over a year per pt; but is taking ASA 81 mg daily.     Assessment:   Today is the 2nd day of noticing blood in stool with BM this morning. Also noticed blood in stools with yesterday's BM as well. The stools looked dark and when she flushed the toilet, with the water moving, she noticed the redness.   Noticed a little bit of blood on toilet paper when wiping self.  Denied noticed bloody underwear.   Patient stated she has been constipated for 2 months. Regular BM is every 2-3 days and stools are very hard and big. The BM yesterday and today are soft stools.    Denied abdominal pain.  Abdomen feels puffy since being on and off of prednisone.    Can't breathe; SOB with exertion and is seeing pulmonologist on Wednesday. Patient is able to speak in full sentence while on the phone.    Denied N/V/D.  Denied fever or chills.  Denied CP, tightness or pressure.    Protocol Recommended Disposition:   Go to ED Now    Recommendation:   Care advices given. Patient declined going to ER to be seen. Patient stated she will be seen for her blood in stools once she is seen by pulmonologist. She believed the blood in stools are d/t her constipations x2 months. Will route to PCP to review and advise as pt is adamant she is not going to ER. Patient encouraged to seek care right away if she continues to notice blood with BM or increase in amount or if she develops abdominal pain.    Routed to provider    Does the patient meet one of the following criteria for ADS visit consideration? 16+ years old, with an [facility] PCP     TIP  Providers, please consider if this condition is appropriate for management at one of our Acute and Diagnostic Services sites.     If patient is a good candidate, please use dotphrasmarciano  <dot>triageresponse and select Refer to ADS to document.    Reason for Disposition    Bloody, black, or tarry bowel movements  (Exception: Chronic-unchanged black-grey bowel movements and is taking iron pills or Pepto-Bismol.)    Additional Information    Negative: Passed out (i.e., fainted, collapsed and was not responding)    Negative: Shock suspected (e.g., cold/pale/clammy skin, too weak to stand, low BP, rapid pulse)    Negative: Vomiting red blood or black (coffee ground) material    Negative: Sounds like a life-threatening emergency to the triager    Negative: Diarrhea is main symptom    Negative: Rectal symptoms    Negative: SEVERE rectal bleeding (large blood clots; constant or on and off bleeding)    Negative: SEVERE dizziness (e.g., unable to stand, requires support to walk, feels like passing out now)    Negative: MODERATE rectal bleeding (small blood clots, passing blood without stool, or toilet water turns red) more than once a day    Negative: High-risk adult (e.g., prior surgery on aorta, abdominal aortic aneurysm)    Negative: Rectal foreign body (inserted or swallowed)    Negative: SEVERE abdominal pain (e.g., excruciating)    Negative: Constant abdominal pain lasting > 2 hours    Negative: Pale skin (pallor) of new-onset or worsening    Negative: Patient sounds very sick or weak to the triager    Negative: Taking Coumadin (warfarin) or other strong blood thinner, or known bleeding disorder (e.g., thrombocytopenia)    Negative: MODERATE rectal bleeding (small blood clots, passing blood without stool, or toilet water turns red)    Negative: Colonoscopy in past 72 hours    Negative: Known cirrhosis of the liver (or history of liver failure or ascites)    Negative: Patient wants to be seen    Negative: MILD rectal bleeding (more than just a few drops or streaks)    Negative: Cancer of rectum or intestines (colon)    Negative: Radiation therapy to lower abdomen or pelvis    Negative: Normal formed BM  with a few streaks or drops of blood on surface of BM    Negative: Rectal bleeding is minimal (e.g., blood just on toilet paper, a few drops in toilet bowl)    Protocols used: RECTAL BLEEDING-JOSY-ELE Fields, JAZMÍNN, RN  [facility]

## 2023-06-27 ENCOUNTER — TRANSFERRED RECORDS (OUTPATIENT)
Dept: HEALTH INFORMATION MANAGEMENT | Facility: CLINIC | Age: 83
End: 2023-06-27

## 2023-06-27 NOTE — TELEPHONE ENCOUNTER
Called and left a detailed message about perhaps coming in today for an office visit.   If she calls back please let me know and we get her scheduled.

## 2023-06-27 NOTE — TELEPHONE ENCOUNTER
Called back a second time this afternoon, but was unable to reach Jody about scheduling an appointment.

## 2023-06-28 NOTE — TELEPHONE ENCOUNTER
Left message to call clinic regarding appointment. Please help schedule Friday around noon, see below message

## 2023-07-03 ENCOUNTER — TELEPHONE (OUTPATIENT)
Dept: FAMILY MEDICINE | Facility: CLINIC | Age: 83
End: 2023-07-03

## 2023-07-03 ENCOUNTER — TELEPHONE (OUTPATIENT)
Dept: FAMILY MEDICINE | Facility: CLINIC | Age: 83
End: 2023-07-03
Payer: COMMERCIAL

## 2023-07-03 NOTE — TELEPHONE ENCOUNTER
Spoke with Jody.    She was recently in [place] for 4-5 days for a gastrointestinal hemorrhage.      We have her set up for a f/up office visit on Thursday with DR Hansen to recheck labs. She will seek further advice or be seen soon if she feels weaker, more fatigue, or notices new bleeding.      She is requesting an antibiotic for new UTI x 1 day. Urgency, Frequency, Dysuria, cramps. Hx of uti's. Would like rx to go to [facility] and delivered to her place.(I am unsure if they deliver, maybe she can figure that out).

## 2023-07-03 NOTE — TELEPHONE ENCOUNTER
Reason for call:  Other     Patient called regarding (reason for call): call back    Additional comments: Pt requesting a call back to discuss follow up from the hospital. Refusing to speak with RN triage, she only wants to speak with Peterson or Dr. Fairchild.      Phone number to reach patient:  Cell number on file:    Telephone Information:   Mobile [phone]       Best Time:  Any    Can we leave a detailed message on this number?  YES

## 2023-07-06 ENCOUNTER — OFFICE VISIT (OUTPATIENT)
Dept: FAMILY MEDICINE | Facility: CLINIC | Age: 83
End: 2023-07-06
Payer: COMMERCIAL

## 2023-07-06 VITALS — HEART RATE: 97 BPM | TEMPERATURE: 97.5 F | OXYGEN SATURATION: 97 %

## 2023-07-06 DIAGNOSIS — K92.1 GASTROINTESTINAL HEMORRHAGE WITH MELENA: ICD-10-CM

## 2023-07-06 DIAGNOSIS — D62 ANEMIA DUE TO BLOOD LOSS, ACUTE: ICD-10-CM

## 2023-07-06 DIAGNOSIS — I21.21 ST ELEVATION MYOCARDIAL INFARCTION INVOLVING LEFT CIRCUMFLEX CORONARY ARTERY (H): Primary | ICD-10-CM

## 2023-07-06 DIAGNOSIS — N30.00 ACUTE CYSTITIS WITHOUT HEMATURIA: ICD-10-CM

## 2023-07-06 DIAGNOSIS — K92.1 MELENA: ICD-10-CM

## 2023-07-06 LAB
ALBUMIN UR-MCNC: 100 MG/DL
APPEARANCE UR: CLEAR
BILIRUB UR QL STRIP: ABNORMAL
COLOR UR AUTO: YELLOW
ERYTHROCYTE [DISTWIDTH] IN BLOOD BY AUTOMATED COUNT: 17 % (ref 10–15)
GLUCOSE UR STRIP-MCNC: NEGATIVE MG/DL
HCT VFR BLD AUTO: 31.2 % (ref 35–47)
HGB BLD-MCNC: 9.4 G/DL (ref 11.7–15.7)
HGB UR QL STRIP: ABNORMAL
KETONES UR STRIP-MCNC: NEGATIVE MG/DL
LEUKOCYTE ESTERASE UR QL STRIP: NEGATIVE
MCH RBC QN AUTO: 28.1 PG (ref 26.5–33)
MCHC RBC AUTO-ENTMCNC: 30.1 G/DL (ref 31.5–36.5)
MCV RBC AUTO: 93 FL (ref 78–100)
NITRATE UR QL: NEGATIVE
PH UR STRIP: 5.5 [PH] (ref 5–8)
PLATELET # BLD AUTO: 293 10E3/UL (ref 150–450)
RBC # BLD AUTO: 3.34 10E6/UL (ref 3.8–5.2)
SP GR UR STRIP: >=1.03 (ref 1–1.03)
UROBILINOGEN UR STRIP-ACNC: 1 E.U./DL
WBC # BLD AUTO: 14.1 10E3/UL (ref 4–11)

## 2023-07-06 PROCEDURE — 85027 COMPLETE CBC AUTOMATED: CPT | Performed by: STUDENT IN AN ORGANIZED HEALTH CARE EDUCATION/TRAINING PROGRAM

## 2023-07-06 PROCEDURE — 99214 OFFICE O/P EST MOD 30 MIN: CPT | Performed by: STUDENT IN AN ORGANIZED HEALTH CARE EDUCATION/TRAINING PROGRAM

## 2023-07-06 PROCEDURE — 36415 COLL VENOUS BLD VENIPUNCTURE: CPT | Performed by: STUDENT IN AN ORGANIZED HEALTH CARE EDUCATION/TRAINING PROGRAM

## 2023-07-06 PROCEDURE — 81003 URINALYSIS AUTO W/O SCOPE: CPT | Performed by: STUDENT IN AN ORGANIZED HEALTH CARE EDUCATION/TRAINING PROGRAM

## 2023-07-06 PROCEDURE — 87086 URINE CULTURE/COLONY COUNT: CPT | Performed by: STUDENT IN AN ORGANIZED HEALTH CARE EDUCATION/TRAINING PROGRAM

## 2023-07-06 RX ORDER — CIPROFLOXACIN 250 MG/1
250 TABLET, FILM COATED ORAL 2 TIMES DAILY
Qty: 14 TABLET | Refills: 0 | Status: SHIPPED | OUTPATIENT
Start: 2023-07-06 | End: 2023-07-13

## 2023-07-06 ASSESSMENT — PAIN SCALES - GENERAL: PAINLEVEL: EXTREME PAIN (8)

## 2023-07-06 ASSESSMENT — ASTHMA QUESTIONNAIRES
ACT_TOTALSCORE: 25
QUESTION_2 LAST FOUR WEEKS HOW OFTEN HAVE YOU HAD SHORTNESS OF BREATH: NOT AT ALL
QUESTION_3 LAST FOUR WEEKS HOW OFTEN DID YOUR ASTHMA SYMPTOMS (WHEEZING, COUGHING, SHORTNESS OF BREATH, CHEST TIGHTNESS OR PAIN) WAKE YOU UP AT NIGHT OR EARLIER THAN USUAL IN THE MORNING: NOT AT ALL
QUESTION_4 LAST FOUR WEEKS HOW OFTEN HAVE YOU USED YOUR RESCUE INHALER OR NEBULIZER MEDICATION (SUCH AS ALBUTEROL): NOT AT ALL
ACT_TOTALSCORE: 25
QUESTION_1 LAST FOUR WEEKS HOW MUCH OF THE TIME DID YOUR ASTHMA KEEP YOU FROM GETTING AS MUCH DONE AT WORK, SCHOOL OR AT HOME: NONE OF THE TIME
QUESTION_5 LAST FOUR WEEKS HOW WOULD YOU RATE YOUR ASTHMA CONTROL: COMPLETELY CONTROLLED

## 2023-07-06 ASSESSMENT — PATIENT HEALTH QUESTIONNAIRE - PHQ9
SUM OF ALL RESPONSES TO PHQ QUESTIONS 1-9: 16
SUM OF ALL RESPONSES TO PHQ QUESTIONS 1-9: 16
10. IF YOU CHECKED OFF ANY PROBLEMS, HOW DIFFICULT HAVE THESE PROBLEMS MADE IT FOR YOU TO DO YOUR WORK, TAKE CARE OF THINGS AT HOME, OR GET ALONG WITH OTHER PEOPLE: EXTREMELY DIFFICULT

## 2023-07-06 NOTE — PROGRESS NOTES
"Hospital Follow-up Visit:    Assessment and Plan   Complex 82 year old female with PMH of MI, chronic SOB possibly asthma, depression, recurrent UTI's who presents after hospitalization for acute GI bleed. Melena on presentation but EGD and and colonoscopy showing no source. We will recheck her hemoglobin today which was 8.3 on discharge. She is off her aspirin. Depending on level we will address accordingly. She also is having some urinary symptoms and was put on Macrobid by PCP.  Symptoms have only improved mildly and about to finish course.     Addendum: 1:35 PM  hgb returned improved at 9.4. Recommended she Follow-up with GI, continue to hold aspirin with Follow-up with cardiology to determine long term anticoagulation plan, and repeat hgn in 2- 4 weeks    2. Acute cystitis without hematuria  - ciprofloxacin (CIPRO) 250 MG tablet; Take 1 tablet (250 mg) by mouth 2 times daily for 7 days  Dispense: 14 tablet; Refill: 0  - UA Macroscopic with reflex to Microscopic and Culture; Future  - Urine Culture Aerobic Bacterial - lab collect; Future    3. Melena  4. Gastrointestinal hemorrhage with melena  1. ST elevation myocardial infarction involving left circumflex coronary artery (H)  5. Anemia due to blood loss, acute  - CBC with platelets; Future    Tomasz Arnold MD    Subjective  Hospital/Nursing Home/IP Rehab Facility: [facility]  Date of Admission: 6/27/23  Date of Discharge: 7/2/23  Reason(s) for Admission:     HPI [ied from H&P:  \"Krystal Virgen is a 82 y.o. female patient with history of asthma, history of MI s/p PCI x1, depression, chronic dyspnea who presents due to weakness, rectal bleeding.    Starting two days prior to admission she has had five episodes of abnormal stools. These have ranged from dark like coffee grounds to bloody appearing. Denies any associated abdominal pain. Today has been feeling very lightheaded and weak, particularly with movement. Denies any previous history of rectal " "bleeding or dark stools. No hematemesis. Does note that she suffers from constipation due to her opioid medications (Robitussin with codeine) and this alternates with diarrhea. No formal diagnosis of IBS. On ASA 81 mg day for cardioprotection but not on Plavix or anticoagulation. Denies NSAID or alcohol use.\"     Post Discharge Medication Reconciliation: completed by nurse and myself    Summary of hospitalization:  [facility] discharge summary reviewed and copied below    Hospital Course, by problem:     Diverticular bleeding  Acute Blood Loss Anemia   Pt reported 3 days of melena and hematochezia prior to admit. Hemoglobin from 7.7-8.1, no recent baseline to compare to. Vitally stable but feels very weak and lightheaded if she tries to get out of bed. EGD without clear source 6/28, colonoscopy 6/29 with large clots, diverticuli as likely source, no active bleeding at time of scope but clotted blood present. transfused 1u prbc 6/29 and 7/1. Received one dose IV iron during stay, patient declined additional dose due to loss of IV access on day of discharge.   - GI f/u with repeat colonoscopy with extended prep in 3 months as outpatient   - HOLD ASA for 1 week given severity of bleed, if no recurrence can resume at PCP follow up     Non-Oliguric RAMON   Suspect prerenal, improved with IVF     Persistent Leukocytosis   Noted from admit, no localizing infectious symptoms, suspect stress vs steroid induced  - repeat CBC with pcp f/u     Chronic dyspnea  Asthma  Chronic dyspnea ongoing for several months. Has been following with Dr. Thakkar in  Pulmonology. Feels different from asthma, more like she can't take a full breath versus wheezing/shortness of breath. Had follow up scheduled with Dr. Thakkar on 6/28 which will need to be rescheduled.   - Home inhalers  - cont pred taper, to complete 7/3    History of MI s/p PCI x1  Several years ago. Currently only on daily baby aspirin and statin. Continue statin, holding ASA " as above    Depression  Continue duloxetine, bupropion.    Primary care/TCU recommendations for follow up, including significant medication changes, medications being held, or recommended imaging or labs:     - repeat cbc at f/u, leukocytosis and anemia as above   - if stable consider resumption ASA       Diagnostic Tests/Treatments reviewed:  Hemoglobin 8.3 on dischrge    Other Healthcare Providers Involved in Patient's Care:  GI    Update since discharge: stable.    patient telling me she is still feeling very SOB which is chronic for her. No wheezing or coughing.  She has also noticed symptoms of dysuria and bladder spasm.  As well as swelling in her lower extermities     Answers for HPI/ROS submitted by the patient on 7/6/2023  If you checked off any problems, how difficult have these problems made it for you to do your work, take care of things at home, or get along with other people?: Extremely difficult  PHQ9 TOTAL SCORE: 16             Review of Systems:   Complete ROS negative except as noted in the HPI            Physical Exam:   Pulse 97   Temp 97.5  F (36.4  C)   SpO2 97%     General appearance: Alert, cooperative, no distress, appears stated age, obese  Head: Normocephalic, atraumatic, without obvious abnormality  Eyes: Pupils equal round, reactive.  Conjunctiva clear.  Nose: Nares normal, no drainage.  Throat: Lips, mucosa, tongue normal mucosa pink and moist  Neck: Supple, symmetric, trachea midline, no adenopathy.  No thyroid enlargement, tenderness or nodules.    Lungs: Clear to auscultation bilaterally, no wheezing or crackles present.  Respirations unlabored  Heart: Regular rate and rhythm, normal S1 and S2, no murmur, rub or gallop.

## 2023-07-08 LAB — BACTERIA UR CULT: NORMAL

## 2023-07-13 ENCOUNTER — TELEPHONE (OUTPATIENT)
Dept: FAMILY MEDICINE | Facility: CLINIC | Age: 83
End: 2023-07-13
Payer: COMMERCIAL

## 2023-07-13 PROBLEM — K62.5 RECTAL BLEEDING: Status: ACTIVE | Noted: 2023-06-27

## 2023-07-13 PROBLEM — N17.9 AKI (ACUTE KIDNEY INJURY) (H): Status: ACTIVE | Noted: 2023-06-28

## 2023-07-13 RX ORDER — IPRATROPIUM BROMIDE AND ALBUTEROL SULFATE 2.5; .5 MG/3ML; MG/3ML
3 SOLUTION RESPIRATORY (INHALATION) EVERY 4 HOURS PRN
Status: ON HOLD | COMMUNITY
Start: 2023-06-26 | End: 2023-12-05

## 2023-07-13 RX ORDER — PREDNISONE 10 MG/1
TABLET ORAL
COMMUNITY
Start: 2023-06-21 | End: 2023-10-20

## 2023-07-14 ENCOUNTER — TELEPHONE (OUTPATIENT)
Dept: ONCOLOGY | Facility: CLINIC | Age: 83
End: 2023-07-14
Payer: COMMERCIAL

## 2023-07-14 DIAGNOSIS — D50.0 IRON DEFICIENCY ANEMIA DUE TO CHRONIC BLOOD LOSS: Primary | ICD-10-CM

## 2023-07-14 NOTE — TELEPHONE ENCOUNTER
[facility]: Cancer Care                                                                                           Called patient. She said she just got out of the hospital and has blood leak somewhere. She said they are unable to find source of bleeding. Patient reports she was notified today her GI tract is fine but they aren't addressing her trouble breathing. They told her once her numbers go up, her breathing should improve.  She said she has received iron infusions in the past and wondering if she needs again? She would like to see Dr. Zavala to discuss. Patient overdue for follow-up with . Recommended she schedule lab and follow-up appointment. Patient agreed.  Explained to patient that she may not get in to see Dr. Zavala right away so to keep a close eye on her breathing and continue to follow-up with her PCP. Patient verbalized understanding.    Schedulers not available. Told patient will have someone call her back to schedule. Patient verbalized understanding.    Patient scheduled for labs, Dr. Zavala on 8/16/23.    Discussed with Dr. Zavala and agreed with plan. Labs ordered.    Signature:  Ramila Aguiar RN

## 2023-07-17 ENCOUNTER — TELEPHONE (OUTPATIENT)
Dept: ONCOLOGY | Facility: HOSPITAL | Age: 83
End: 2023-07-17
Payer: COMMERCIAL

## 2023-07-17 NOTE — TELEPHONE ENCOUNTER
----- Message from Ramila Aguiar RN sent at 7/14/2023  5:18 PM CDT -----  Jody is scheduled for labs and Thaw on 8/16. I'm wondering if she could come in for labs on 8/14 instead so we have results for her appt on 8/16?    Thanks,    Ramila

## 2023-07-24 ENCOUNTER — OFFICE VISIT (OUTPATIENT)
Dept: FAMILY MEDICINE | Facility: CLINIC | Age: 83
End: 2023-07-24
Payer: COMMERCIAL

## 2023-07-24 VITALS
DIASTOLIC BLOOD PRESSURE: 76 MMHG | HEART RATE: 97 BPM | WEIGHT: 176.6 LBS | TEMPERATURE: 97.9 F | HEIGHT: 63 IN | SYSTOLIC BLOOD PRESSURE: 124 MMHG | OXYGEN SATURATION: 96 % | BODY MASS INDEX: 31.29 KG/M2 | RESPIRATION RATE: 18 BRPM

## 2023-07-24 DIAGNOSIS — M54.42 CHRONIC MIDLINE LOW BACK PAIN WITH LEFT-SIDED SCIATICA: ICD-10-CM

## 2023-07-24 DIAGNOSIS — D50.0 IRON DEFICIENCY ANEMIA DUE TO CHRONIC BLOOD LOSS: ICD-10-CM

## 2023-07-24 DIAGNOSIS — D80.3: ICD-10-CM

## 2023-07-24 DIAGNOSIS — J45.40 MODERATE PERSISTENT ASTHMA WITHOUT COMPLICATION: ICD-10-CM

## 2023-07-24 DIAGNOSIS — M79.671 RIGHT FOOT PAIN: ICD-10-CM

## 2023-07-24 DIAGNOSIS — D62 ANEMIA DUE TO BLOOD LOSS, ACUTE: ICD-10-CM

## 2023-07-24 DIAGNOSIS — N18.32 STAGE 3B CHRONIC KIDNEY DISEASE (H): ICD-10-CM

## 2023-07-24 DIAGNOSIS — I25.2 HISTORY OF MI (MYOCARDIAL INFARCTION): ICD-10-CM

## 2023-07-24 DIAGNOSIS — Z85.3 HX: BREAST CANCER: ICD-10-CM

## 2023-07-24 DIAGNOSIS — K92.1 GASTROINTESTINAL HEMORRHAGE WITH MELENA: Primary | ICD-10-CM

## 2023-07-24 DIAGNOSIS — G89.29 CHRONIC MIDLINE LOW BACK PAIN WITH LEFT-SIDED SCIATICA: ICD-10-CM

## 2023-07-24 LAB
ALBUMIN SERPL BCG-MCNC: 3.8 G/DL (ref 3.5–5.2)
ALP SERPL-CCNC: 94 U/L (ref 35–104)
ALT SERPL W P-5'-P-CCNC: 10 U/L (ref 0–50)
ANION GAP SERPL CALCULATED.3IONS-SCNC: 11 MMOL/L (ref 7–15)
ANION GAP SERPL CALCULATED.3IONS-SCNC: 14 MMOL/L (ref 7–15)
AST SERPL W P-5'-P-CCNC: 28 U/L (ref 0–45)
BASOPHILS # BLD AUTO: 0.1 10E3/UL (ref 0–0.2)
BASOPHILS NFR BLD AUTO: 1 %
BILIRUB SERPL-MCNC: 0.4 MG/DL
BUN SERPL-MCNC: 17 MG/DL (ref 8–23)
BUN SERPL-MCNC: 17.4 MG/DL (ref 8–23)
CALCIUM SERPL-MCNC: 9.3 MG/DL (ref 8.8–10.2)
CALCIUM SERPL-MCNC: 9.5 MG/DL (ref 8.8–10.2)
CHLORIDE SERPL-SCNC: 106 MMOL/L (ref 98–107)
CHLORIDE SERPL-SCNC: 106 MMOL/L (ref 98–107)
CREAT SERPL-MCNC: 1.44 MG/DL (ref 0.51–0.95)
CREAT SERPL-MCNC: 1.5 MG/DL (ref 0.51–0.95)
DEPRECATED HCO3 PLAS-SCNC: 19 MMOL/L (ref 22–29)
DEPRECATED HCO3 PLAS-SCNC: 23 MMOL/L (ref 22–29)
EOSINOPHIL # BLD AUTO: 0.5 10E3/UL (ref 0–0.7)
EOSINOPHIL NFR BLD AUTO: 6 %
ERYTHROCYTE [DISTWIDTH] IN BLOOD BY AUTOMATED COUNT: 15.2 % (ref 10–15)
FERRITIN SERPL-MCNC: 55 NG/ML (ref 11–328)
GFR SERPL CREATININE-BSD FRML MDRD: 35 ML/MIN/1.73M2
GFR SERPL CREATININE-BSD FRML MDRD: 36 ML/MIN/1.73M2
GLUCOSE SERPL-MCNC: 89 MG/DL (ref 70–99)
GLUCOSE SERPL-MCNC: 99 MG/DL (ref 70–99)
HCT VFR BLD AUTO: 33.9 % (ref 35–47)
HGB BLD-MCNC: 10.6 G/DL (ref 11.7–15.7)
IMM GRANULOCYTES # BLD: 0 10E3/UL
IMM GRANULOCYTES NFR BLD: 0 %
IRON BINDING CAPACITY (ROCHE): 407 UG/DL (ref 240–430)
IRON SATN MFR SERPL: 11 % (ref 15–46)
IRON SERPL-MCNC: 43 UG/DL (ref 37–145)
LYMPHOCYTES # BLD AUTO: 1.5 10E3/UL (ref 0.8–5.3)
LYMPHOCYTES NFR BLD AUTO: 18 %
MCH RBC QN AUTO: 27.7 PG (ref 26.5–33)
MCHC RBC AUTO-ENTMCNC: 31.3 G/DL (ref 31.5–36.5)
MCV RBC AUTO: 89 FL (ref 78–100)
MONOCYTES # BLD AUTO: 0.6 10E3/UL (ref 0–1.3)
MONOCYTES NFR BLD AUTO: 8 %
NEUTROPHILS # BLD AUTO: 5.7 10E3/UL (ref 1.6–8.3)
NEUTROPHILS NFR BLD AUTO: 68 %
PLATELET # BLD AUTO: 414 10E3/UL (ref 150–450)
POTASSIUM SERPL-SCNC: 4.7 MMOL/L (ref 3.4–5.3)
POTASSIUM SERPL-SCNC: 5.1 MMOL/L (ref 3.4–5.3)
PROT SERPL-MCNC: 6.3 G/DL (ref 6.4–8.3)
RBC # BLD AUTO: 3.82 10E6/UL (ref 3.8–5.2)
SODIUM SERPL-SCNC: 139 MMOL/L (ref 136–145)
SODIUM SERPL-SCNC: 140 MMOL/L (ref 136–145)
WBC # BLD AUTO: 8.4 10E3/UL (ref 4–11)

## 2023-07-24 PROCEDURE — 36415 COLL VENOUS BLD VENIPUNCTURE: CPT | Performed by: FAMILY MEDICINE

## 2023-07-24 PROCEDURE — 99214 OFFICE O/P EST MOD 30 MIN: CPT | Performed by: FAMILY MEDICINE

## 2023-07-24 PROCEDURE — 80053 COMPREHEN METABOLIC PANEL: CPT | Performed by: FAMILY MEDICINE

## 2023-07-24 PROCEDURE — 85025 COMPLETE CBC W/AUTO DIFF WBC: CPT | Performed by: FAMILY MEDICINE

## 2023-07-24 PROCEDURE — 83550 IRON BINDING TEST: CPT | Performed by: FAMILY MEDICINE

## 2023-07-24 PROCEDURE — 83540 ASSAY OF IRON: CPT | Performed by: FAMILY MEDICINE

## 2023-07-24 PROCEDURE — 80048 BASIC METABOLIC PNL TOTAL CA: CPT | Performed by: FAMILY MEDICINE

## 2023-07-24 PROCEDURE — 82728 ASSAY OF FERRITIN: CPT | Performed by: FAMILY MEDICINE

## 2023-07-24 RX ORDER — FERROUS SULFATE 325(65) MG
325 TABLET, DELAYED RELEASE (ENTERIC COATED) ORAL DAILY
Qty: 30 TABLET | Refills: 3 | Status: SHIPPED | OUTPATIENT
Start: 2023-07-24

## 2023-07-24 RX ORDER — HYDROCODONE BITARTRATE AND ACETAMINOPHEN 5; 325 MG/1; MG/1
1 TABLET ORAL 2 TIMES DAILY PRN
Qty: 30 TABLET | Refills: 0 | Status: SHIPPED | OUTPATIENT
Start: 2023-07-24 | End: 2023-09-07

## 2023-07-24 ASSESSMENT — PAIN SCALES - GENERAL: PAINLEVEL: NO PAIN (0)

## 2023-07-24 NOTE — PROGRESS NOTES
"Krystal Virgen  /76   Pulse 97   Temp 97.9  F (36.6  C)   Resp 18   Ht 1.6 m (5' 3\")   Wt 80.1 kg (176 lb 9.6 oz)   SpO2 96%   BMI 31.28 kg/m       Assessment/Plan:                Diagnoses and all orders for this visit:    Stage 3b chronic kidney disease (H)  -     Basic metabolic panel    Gastrointestinal hemorrhage with melena    Anemia due to blood loss, acute  -     ferrous sulfate (FE TABS) 325 (65 Fe) MG EC tablet; Take 1 tablet (325 mg) by mouth daily    IgG1 subclass deficiency (H)    Moderate persistent asthma without complication    HX: breast cancer    Right foot pain  -     HYDROcodone-acetaminophen (NORCO) 5-325 MG tablet; Take 1 tablet by mouth 2 times daily as needed    Chronic midline low back pain with left-sided sciatica  -     HYDROcodone-acetaminophen (NORCO) 5-325 MG tablet; Take 1 tablet by mouth 2 times daily as needed    Iron deficiency anemia due to chronic blood loss  -     CBC with Platelets & Differential  -     Ferritin  -     Iron and iron binding capacity  -     Comprehensive metabolic panel (BMP + Alb, Alk Phos, ALT, AST, Total. Bili, TP)    History of MI (myocardial infarction)         DISCUSSION  see discussion below. We did have discussion today regarding quality of life in general. We will arrange for laboratory testing as noted cautious use of pain medication to treat pain. No indication of any significant new or ongoing problems at this point, considerations discussed seem to overall be improving to some extent.  Subjective:     HPI:    Krystal Virgen is a 82 year old female is here today for follow-up. She has a complex medical history.    She was recently hospitalized at [facility] with gastrointestinal bleeding that was thought to be caused by diverticular bleeding based on the investigation. She was discharged with hemoglobin in the eight range when she came for follow-up on July 4 there was 9.4. Patient still feels tired and rundown but feels " overall things are improving she has not noticed any additional signs or symptoms to suggest any new or ongoing gastrointestinal bleeding. She has resumed her daily aspirin.    Today we discussed the importance of follow-up with gastroenterology for repeat colonoscopy in approximately three months. We discussed arranging for oral iron supplementation to help build up her blood count more quickly.    She is a history of moderate persistent asthma and an IGA subclass deficiency leading to frequent infections. Patient reports that over the course of the spring she had dealt with chronic infections and was very miserable. About a month ago her symptoms finally improved. At this time she is breathing well. She continues to follow with pulmonology at [facility] Dr. Leal    She is a history of chronic kidney disease consistent with stage IIIB. GFR is typically in the upper 30s to mid 40s range. This is been stable over a number of years. We discussed the importance of monitoring. We also discussed how this can contribute to anemia chronically.      She is a history of breast cancer which has been treated she will follow-up with her oncologist hematologist and will discuss her recent anemia concerns there as well.    She continues to struggle with some pain issues involving the right arm, left hip as well as foot pain and back pain. She is utilized on rare occasion hydrocodone acetaminophen for management of more severe pain. NSAIDs are contraindicated due to her chronic kidney disease. Acetaminophen alone is insufficient to control pain when it is severe. We discussed this extensively today. Utilization of the medication allows for improvement in her overall quality of life and restoration of some degree of normal function and it does not cause significant side effects. We had a lengthy conversation about the potential dangers of the medication.    As mentioned she has returned taking her aspirin therapy following  her history of the gastrointestinal bleeding. She did weight the week as recommended by the hospital at the time of discharge. Patient states that since resuming she has not noted any bleeding concerns as mentioned above. It seems logical that the benefit lies in favor of continuing the aspirin therapy for cardiac risk reduction at this point in time.      ROS:  Complete review of systems is obtained.  Other than the specific considerations noted above complete review of systems is negative.          Objective:   Medications:  Current Outpatient Medications   Medication    ferrous sulfate (FE TABS) 325 (65 Fe) MG EC tablet    HYDROcodone-acetaminophen (NORCO) 5-325 MG tablet    albuterol (PROAIR HFA/PROVENTIL HFA/VENTOLIN HFA) 108 (90 Base) MCG/ACT inhaler    aspirin (ASA) 81 MG EC tablet    budesonide-formoterol (SYMBICORT) 160-4.5 MCG/ACT inhaler    buPROPion (WELLBUTRIN XL) 300 MG 24 hr tablet    citalopram (CELEXA) 20 MG tablet    fluticasone (FLONASE) 50 MCG/ACT nasal spray    guaiFENesin-codeine (ROBITUSSIN AC) 100-10 MG/5ML solution    ipratropium - albuterol 0.5 mg/2.5 mg/3 mL (DUONEB) 0.5-2.5 (3) MG/3ML neb solution    LANsoprazole (PREVACID) 30 MG DR capsule    montelukast (SINGULAIR) 10 MG tablet    nitroFURantoin macrocrystal-monohydrate (MACROBID) 100 MG capsule    nitroGLYcerin (NITROSTAT) 0.4 MG sublingual tablet    Omeprazole (PRILOSEC PO)    predniSONE (DELTASONE) 10 MG tablet    rosuvastatin (CRESTOR) 40 MG tablet    SUMAtriptan (IMITREX) 100 MG tablet    tiZANidine (ZANAFLEX) 2 MG tablet    traZODone (DESYREL) 50 MG tablet    trospium (SANCTURA) 20 MG tablet     No current facility-administered medications for this visit.        Allergies:     Allergies   Allergen Reactions    Ticagrelor Other (See Comments)     Dyspnea    Penicillins      Amoxicillin doesn't work for her    Sulfa Antibiotics         Social History     Socioeconomic History    Marital status:      Spouse name: Not on file     Number of children: Not on file    Years of education: Not on file    Highest education level: Not on file   Occupational History    Not on file   Tobacco Use    Smoking status: Never    Smokeless tobacco: Never   Substance and Sexual Activity    Alcohol use: No    Drug use: No    Sexual activity: Not on file   Other Topics Concern    Parent/sibling w/ CABG, MI or angioplasty before 65F 55M? Not Asked   Social History Narrative    Not on file     Social Determinants of Health     Financial Resource Strain: Not on file   Food Insecurity: Not on file   Transportation Needs: Not on file   Physical Activity: Not on file   Stress: Not on file   Social Connections: Not on file   Intimate Partner Violence: Not on file   Housing Stability: Not on file       Family History   Problem Relation Age of Onset    Breast Cancer Paternal Aunt 48.00    Breast Cancer Mother 78.00    Breast Cancer Sister 48.00    Breast Cancer Maternal Aunt 35.00    Heart Disease Father     Macular Degeneration Father     Heart Disease Brother         Most Recent Immunizations   Administered Date(s) Administered    COVID-19 Bivalent 18+ (Moderna) 10/25/2022    COVID-19 Monovalent 18+ (Moderna) 06/01/2022    FLU 6-35 months 10/01/2010    Influenza (H1N1) 01/05/2010    Influenza (High Dose) 3 valent vaccine 10/01/2020    Influenza (IIV3) PF 10/17/2013    Influenza Vaccine 65+ (Fluzone HD) 10/25/2022    Influenza Vaccine, 6+MO IM (QUADRIVALENT W/PRESERVATIVES) 10/17/2013    Mantoux Tuberculin Skin Test 04/13/2006    Pneumo Conj 13-V (2010&after) 09/11/2018    Pneumococcal 23 valent 10/01/2019    TDAP (Adacel,Boostrix) 04/28/2014    Zoster vaccine, live 10/06/2009        Wt Readings from Last 3 Encounters:   07/24/23 80.1 kg (176 lb 9.6 oz)   05/11/23 77.1 kg (170 lb)   12/01/22 79.8 kg (175 lb 14.4 oz)        BP Readings from Last 6 Encounters:   07/24/23 124/76   05/11/23 124/75   12/01/22 120/77   08/30/22 134/82   05/13/22 (!) 141/85   02/01/22  "124/70        Hemoglobin A1C   Date Value Ref Range Status   01/08/2021 5.3 0 - 5.6 % Final     Comment:     Normal <5.7% Prediabetes 5.7-6.4%  Diabetes 6.5% or higher - adopted from ADA   consensus guidelines.     02/01/2013 5.8 4.2 - 6.1 % Final       PHYSICAL EXAM:    /76   Pulse 97   Temp 97.9  F (36.6  C)   Resp 18   Ht 1.6 m (5' 3\")   Wt 80.1 kg (176 lb 9.6 oz)   SpO2 96%   BMI 31.28 kg/m           General Appearance:    Alert, cooperative, no distress   Eyes:   No scleral icterus or conjunctival irritation       Lungs:     Clear to auscultation bilaterally, respirations unlabored, no wheezes or crackles   Heart:    Regular rate and rhythm,  No murmur   Abdomen:    Soft, no distention,     Extremities:  imitation range of motion in the right arm. She can only abduct and forward flex arm to less than 90  without significant pain in the lateral aspect of the arm. She is positive Tucker test. Difficult to fully assess strength. This seems to be limited range of motion in the left hip as compared to the right. No pain on palpation over the trochanter.   Skin:  No concerning skin findings, no suspicious moles, no rashes   Neurologic:  On gross examination there is no motor or sensory deficit.  Patient walks with a normal gait                                     "

## 2023-07-27 ENCOUNTER — TELEPHONE (OUTPATIENT)
Dept: FAMILY MEDICINE | Facility: CLINIC | Age: 83
End: 2023-07-27
Payer: COMMERCIAL

## 2023-07-27 NOTE — TELEPHONE ENCOUNTER
"Called and left a detailed message regarding results.    \"Your red blood cell count has continued to improve which is great to see, now 10.6 for the hemoglobin. Your iron levels are only mildly low. I think taking the iron supplement for a couple of weeks would help to build up your iron but I don't think you should need to take iron supplement long-term. Obviously we will also see what Dr. Zavala has to say about your blood. \"  "

## 2023-08-10 ENCOUNTER — TRANSFERRED RECORDS (OUTPATIENT)
Dept: HEALTH INFORMATION MANAGEMENT | Facility: CLINIC | Age: 83
End: 2023-08-10
Payer: COMMERCIAL

## 2023-08-14 ENCOUNTER — MEDICAL CORRESPONDENCE (OUTPATIENT)
Dept: HEALTH INFORMATION MANAGEMENT | Facility: CLINIC | Age: 83
End: 2023-08-14
Payer: COMMERCIAL

## 2023-08-18 ENCOUNTER — TELEPHONE (OUTPATIENT)
Dept: ONCOLOGY | Facility: CLINIC | Age: 83
End: 2023-08-18
Payer: COMMERCIAL

## 2023-08-18 NOTE — TELEPHONE ENCOUNTER
Patient calls triage line to say that she needs to cancel appointment with Dr. Zavala. She is currently in her car and having diarrhea and needs to go home. Patient will call back to reschedule.    Rae Thakur RN

## 2023-08-21 ENCOUNTER — PATIENT OUTREACH (OUTPATIENT)
Dept: ONCOLOGY | Facility: CLINIC | Age: 83
End: 2023-08-21
Payer: COMMERCIAL

## 2023-08-21 NOTE — PROGRESS NOTES
"[facility]: Cancer Care Short Note                                    Discussion with Patient:                                                      Patient called Cancer Care requesting call back from CC.      Called patient. See assessment below.    Assessment:                                                      Patient said she needs to cancel her appointment on Thurs because she is unable to walk due to hip pain. She reports she is currently having \"insane pain\" when she walks.  She needs to have surgery and is scheduled to meet with the surgeon on Fri. No surgery date scheduled yet.    Intervention/Education provided during outreach:                                                       Offered patient virtual visit with Dr. Zavala instead of canceling to review lab results from 7/24/23.  Patient agreed. She doesn't have [employer] but says she has a smart phone.    Message to Schedulers.    Patient to follow up as scheduled at next appt    Signature:  Ramila Aguiar RN    "

## 2023-08-24 ENCOUNTER — VIRTUAL VISIT (OUTPATIENT)
Dept: ONCOLOGY | Facility: CLINIC | Age: 83
End: 2023-08-24
Attending: INTERNAL MEDICINE
Payer: COMMERCIAL

## 2023-08-24 VITALS — BODY MASS INDEX: 31.01 KG/M2 | WEIGHT: 175 LBS | HEIGHT: 63 IN

## 2023-08-24 DIAGNOSIS — D50.0 IRON DEFICIENCY ANEMIA DUE TO CHRONIC BLOOD LOSS: Primary | ICD-10-CM

## 2023-08-24 PROCEDURE — 99214 OFFICE O/P EST MOD 30 MIN: CPT | Mod: VID | Performed by: INTERNAL MEDICINE

## 2023-08-24 ASSESSMENT — PAIN SCALES - GENERAL: PAINLEVEL: SEVERE PAIN (6)

## 2023-08-24 NOTE — NURSING NOTE
Is the patient currently in the state of [place]? YES    Visit mode:VIDEO    If the visit is dropped, the patient can be reconnected by: VIDEO VISIT: Send to e-mail at: No e-mail address on record    Will anyone else be joining the visit? NO  (If patient encounters technical issues they should call [phone])    How would you like to obtain your AVS? MyChart    Are changes needed to the allergy or medication list? No    Reason for visit: Video Visit (Follow Up )    Francesca Louise VVF       Francesca Louise

## 2023-08-24 NOTE — PROGRESS NOTES
"Virtual Visit Details    Type of service:  Video Visit   Video Start Time: {video visit start/end time for provider to select:040325}  Video End Time:{video visit start/end time for provider to select:248133}    Originating Location (pt. Location): {video visit patient location:108758::\"Home\"}  {PROVIDER LOCATION On-site should be selected for visits conducted from your clinic location or adjoining [facility] hospital, academic office, or other nearby [facility] building. Off-site should be selected for all other provider locations, including home:604677}  Distant Location (provider location):  {virtual location provider:452700}  Platform used for Video Visit: {Virtual Visit Platforms:152429::\"In[employer]\"}  "

## 2023-08-24 NOTE — PROGRESS NOTES
[facility] Hematology and Oncology Progress Note    Patient: Krystal Virgen  MRN: [id number]  Date of Service: Aug 24, 2023       Jody is a 83 year old who is being evaluated via a billable video visit.      Video-Visit Details    Type of service:  Video Visit   Video Start Time: 1:25 PM  Video End Time:1:39 PM    Originating Location (pt. Location): Home    Distant Location (provider location):  On-site  Platform used for Video Visit: AmWell     Reason for Visit    Chief Complaint   Patient presents with    Video Visit     Follow Up        Assessment and Plan     Cancer Staging   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)  Staging form: Breast, AJCC 7th Edition  - Pathologic stage from 9/8/2017: Stage IA (T1c, N0, cM0) - Signed by Herbert Zavala MD on 5/13/2022  ER Status: Positive  AK Status: Positive  HER2 Status: Negative      ECOG Performance    1 - Can't do physically strenuous work, but fully ambyulatory and can do light sedentary work     Pain  Pain Score: Severe Pain (6)    #.  History of microcytic anemia with iron deficiency.   She was getting IV iron periodically.  #.  Large hiatal hernia with John's erosion  #.  CKD-3     Colonoscopy on 11/8/17 showed diverticulosis without evidence of source of bleeding. EGD from 12/14/2017 showed a large hiatal hernia with John's erosion.      I reviewed her hospitalization in [facility] with anemia and GI bleeding and anemia with hemoglobin drop to 7.7 g/dL.  I reviewed the EGD (6/28/2023) and it showed findings of large hiatal hernia but no John lesions.  There was no evidence of blood or current active bleeding.  Colonoscopy (6/29/2023) showed old maroon clotted blood found in the rectal, in the sigmoid colon, in the descending colon and in the transverse colon.  There are multiple diverticular without evidence of diverticulosis.  She was found 2 sessile polyps in the mid ascending colon and ileocecal valve.  She received  2 units of packed RBC on 6/29/2023 and 7/1/2023.     I discussed with her that she has occult and intermittent bleeding and blood loss.  I reviewed the most recent iron study and it showed low iron saturation of 11% with normal ferritin of 55, hemoglobin of 10.6.  I recommended to repeat these labs since it has been about a month at this point.  She will complete labs at [facility].  We will determine whether she needs IV iron therapy.  I advised that it is very important to maintain her hemoglobin well above 10 g/dL with upcoming right total hip arthroplasty.     Will communicate once the results are available.    #.  History of invasive ductal carcinoma of the right breast.  Stage IA (pT1c, pN0, cM0), grade 1, ER/SD strongly positive, HER2/Uriel negative, with positive inferior margin. Associated DCIS. S/p right breast lumpectomy and right sentinel lymph node biopsy on 8/10/2017.  She declined reexcision or adjuvant radiation treatment.  She started anastrozole in October 2017, then switched to tamoxifen in December 2018 due to intolerable vasomotor symptoms and not to compromise her bone density.  Completed 5 years of adjuvant tamoxifen in October 2022.   I reviewed the annual screening mammogram from May 2023 and it was benign.   She will continue follow up with me on an annual basis of clinical exam.    Encounter Diagnoses:    Problem List Items Addressed This Visit       Iron deficiency anemia - Primary    Relevant Orders    CBC with platelets (Completed)    Ferritin (Completed)    Iron & Iron Binding Capacity (Completed)            CC: Juan C Fairchild MD, MD   ______________________________________________________________________________    History of Present Illness    Ms. Krystal Virgen presented today for follow-up for anemia after GI bleeding in end of June 2023.  She reported it was multiple bouts of dark brown to bloody diarrhea.  No associated abdominal pain.  She was not taking any NSAIDs, or  "anticoagulant.  Upon further questioning, she feels better.  She reported her mobility is very limited due to hip pain.  She is going to be seen by orthopedics and plan for total hip arthroplasty.    Review of systems  Apart from describing in HPI, the remainder of comprehensive ROS was negative.    Past History    Past Medical History:   Diagnosis Date    Anemia     Asthma     Asthma     Asthma with acute exacerbation 8/23/2021    Formatting of this note might be different from the original. Created by Conversion  Replacement Utility updated for latest IMO load    Breast cancer (H) 2017    Chronic bronchitis (H)     Chronic sinusitis     Depression     GERD (gastroesophageal reflux disease)     Migraines     Osteopenia     Osteoporosis     Overactive bladder     Pneumococcal infection     Pneumonia     Sinusitis     Spinal headache     ST elevation MI (STEMI) (H) 1/7/2021       Past Surgical History:   Procedure Laterality Date    ARTHRODESIS FOOT  11/11/2011    Procedure:ARTHRODESIS FOOT; Right First Metatarsophalangeal Joint Fusion with Second and Third Clawtoe Repairs; Surgeon:SARAH CASTRO; Location:[facility] OR    BIOPSY BREAST      CHOLECYSTECTOMY      CHOLECYSTECTOMY      CV CORONARY ANGIOGRAM N/A 1/7/2021    Procedure: Coronary Angiogram;  Surgeon: Slade Yu MD;  Location: [facility] CARDIAC CATH LAB    CV PCI STENT DRUG ELUTING N/A 1/7/2021    Procedure: Percutaneous Coronary Intervention Stent Drug Eluting;  Surgeon: Slade Yu MD;  Location: [facility] CARDIAC CATH LAB    ENT SURGERY      sinus surgery x1    FOOT SURGERY      GYN SURGERY      benign hysterectomy age 43    HYSTERECTOMY  1984    LUMPECTOMY BREAST Right 2017    ORTHOPEDIC SURGERY      fusion of grest toe right    SINUS SURGERY         Physical Exam    Ht 1.6 m (5' 3\")   Wt 79.4 kg (175 lb)   BMI 31.00 kg/m      General: alert, awake, not in acute distress  HEENT: Head: Normal, normocephalic, atraumatic.  Eye: Normal " external eye, conjunctiva, lids cornea, RINKU.  Pharynx: Normal buccal mucosa. Normal pharynx.  Neck / Thyroid: Supple, no masses, nodes, nodules or enlargement.  Lymphatics: No abnormally enlarged lymph nodes.  Chest: Normal chest wall and respirations. Clear to auscultation.  Breasts: Bilateral pendulous breast.  No palpable masses.  Heart: S1 S2 RRR.   Abdomen: abdomen is soft without significant tenderness, masses, organomegaly or guarding  Extremities: normal strength, tone, and muscle mass  Skin: normal. no rash or abnormalities  CNS: non focal.    Lab Results    Recent Results (from the past 168 hour(s))   CBC with platelets   Result Value Ref Range    WBC Count 10.8 4.0 - 11.0 10e3/uL    RBC Count 4.30 3.80 - 5.20 10e6/uL    Hemoglobin 11.8 11.7 - 15.7 g/dL    Hematocrit 37.7 35.0 - 47.0 %    MCV 88 78 - 100 fL    MCH 27.4 26.5 - 33.0 pg    MCHC 31.3 (L) 31.5 - 36.5 g/dL    RDW 14.8 10.0 - 15.0 %    Platelet Count 372 150 - 450 10e3/uL   Ferritin   Result Value Ref Range    Ferritin 16 11 - 328 ng/mL   Iron & Iron Binding Capacity   Result Value Ref Range    Iron 48 37 - 145 ug/dL    Iron Binding Capacity 398 240 - 430 ug/dL    Iron Sat Index 12 (L) 15 - 46 %       Imaging    No results found.    30 minutes spent on the date of the encounter doing chart review, history and exam, documentation and further activities as noted above.    Signed by: Herbert Zavala MD

## 2023-08-25 ENCOUNTER — TRANSFERRED RECORDS (OUTPATIENT)
Dept: HEALTH INFORMATION MANAGEMENT | Facility: CLINIC | Age: 83
End: 2023-08-25

## 2023-08-25 ENCOUNTER — TELEPHONE (OUTPATIENT)
Dept: ONCOLOGY | Facility: CLINIC | Age: 83
End: 2023-08-25
Payer: COMMERCIAL

## 2023-08-25 NOTE — TELEPHONE ENCOUNTER
----- Message from Leonila Hutchinson sent at 8/25/2023  8:23 AM CDT -----  Tele msg for pt to call and schedule lab appt.  Leonila  ----- Message -----  From: Ramila Aguiar RN  Sent: 8/24/2023   5:57 PM CDT  To: [facility] Scheduling    Jody needs a lab only appt scheduled, please? Can you call her to schedule?    Thanks,    Ramila

## 2023-08-28 ENCOUNTER — TRANSFERRED RECORDS (OUTPATIENT)
Dept: HEALTH INFORMATION MANAGEMENT | Facility: CLINIC | Age: 83
End: 2023-08-28
Payer: COMMERCIAL

## 2023-08-30 ENCOUNTER — TELEPHONE (OUTPATIENT)
Dept: FAMILY MEDICINE | Facility: CLINIC | Age: 83
End: 2023-08-30

## 2023-08-31 ENCOUNTER — OFFICE VISIT (OUTPATIENT)
Dept: CARDIOLOGY | Facility: CLINIC | Age: 83
End: 2023-08-31
Payer: COMMERCIAL

## 2023-08-31 VITALS
WEIGHT: 175 LBS | RESPIRATION RATE: 16 BRPM | BODY MASS INDEX: 31 KG/M2 | HEART RATE: 84 BPM | SYSTOLIC BLOOD PRESSURE: 134 MMHG | DIASTOLIC BLOOD PRESSURE: 84 MMHG

## 2023-08-31 DIAGNOSIS — Z01.810 PRE-OPERATIVE CARDIOVASCULAR EXAMINATION: ICD-10-CM

## 2023-08-31 DIAGNOSIS — I25.10 CORONARY ARTERY DISEASE INVOLVING NATIVE CORONARY ARTERY OF NATIVE HEART, UNSPECIFIED WHETHER ANGINA PRESENT: Primary | ICD-10-CM

## 2023-08-31 DIAGNOSIS — I10 BENIGN ESSENTIAL HYPERTENSION: ICD-10-CM

## 2023-08-31 DIAGNOSIS — R06.09 DYSPNEA ON EXERTION: ICD-10-CM

## 2023-08-31 DIAGNOSIS — N18.32 STAGE 3B CHRONIC KIDNEY DISEASE (H): ICD-10-CM

## 2023-08-31 DIAGNOSIS — D50.0 IRON DEFICIENCY ANEMIA DUE TO CHRONIC BLOOD LOSS: ICD-10-CM

## 2023-08-31 LAB
ERYTHROCYTE [DISTWIDTH] IN BLOOD BY AUTOMATED COUNT: 14.8 % (ref 10–15)
FERRITIN SERPL-MCNC: 16 NG/ML (ref 11–328)
HCT VFR BLD AUTO: 37.7 % (ref 35–47)
HGB BLD-MCNC: 11.8 G/DL (ref 11.7–15.7)
IRON BINDING CAPACITY (ROCHE): 398 UG/DL (ref 240–430)
IRON SATN MFR SERPL: 12 % (ref 15–46)
IRON SERPL-MCNC: 48 UG/DL (ref 37–145)
MCH RBC QN AUTO: 27.4 PG (ref 26.5–33)
MCHC RBC AUTO-ENTMCNC: 31.3 G/DL (ref 31.5–36.5)
MCV RBC AUTO: 88 FL (ref 78–100)
PLATELET # BLD AUTO: 372 10E3/UL (ref 150–450)
RBC # BLD AUTO: 4.3 10E6/UL (ref 3.8–5.2)
WBC # BLD AUTO: 10.8 10E3/UL (ref 4–11)

## 2023-08-31 PROCEDURE — 82728 ASSAY OF FERRITIN: CPT | Performed by: INTERNAL MEDICINE

## 2023-08-31 PROCEDURE — 99214 OFFICE O/P EST MOD 30 MIN: CPT | Performed by: INTERNAL MEDICINE

## 2023-08-31 PROCEDURE — 83550 IRON BINDING TEST: CPT | Performed by: INTERNAL MEDICINE

## 2023-08-31 PROCEDURE — 36415 COLL VENOUS BLD VENIPUNCTURE: CPT | Performed by: INTERNAL MEDICINE

## 2023-08-31 PROCEDURE — 83540 ASSAY OF IRON: CPT | Performed by: INTERNAL MEDICINE

## 2023-08-31 PROCEDURE — 85027 COMPLETE CBC AUTOMATED: CPT | Performed by: INTERNAL MEDICINE

## 2023-08-31 NOTE — LETTER
8/31/2023    Juan C Fairchild MD, MD  [address]  [facility] [address]    RE: Krystal Virgen       Dear Colleague,     I had the pleasure of seeing Krystal Virgen in the [facility].    [facility]   [address]  [place] [address]   [email or web address]   OFFICE: [phone]     CARDIOLOGY CLINIC NOTE     Thank you, Dr. Fairchild, Juan C MARTÍNEZ, for asking the [facility] team to see Ms. Krystal Virgen to evaluate Pre-Op Exam         Assessment/Recommendations   Assessment:    Coronary artery disease with prior PCI to LCx coronary artery - has not had her prior chest pressure, but has had worsening of her HOOPER which may be an anginal equivalent or progression of her reactive airway disease.  Hypertension - controlled.  Dyslipidemia  CKD stage IIIb - overall stable.  Moderate persistent asthma - with worsening HOOPER  Pre-operative cardiovascular risk assessment - prior to total hip arthroplasty, which is an intermediate risk surgery for cardiovascular complications. Per Ms. Virgen, the plan is for regional/spinal anesthesia and not general. She does not have valvular heart disease by prior echo or suggested by physical exam today. Her rhythm is stable. She does have chronic and worsened dyspnea on exertion which may be related to her reactive airway disease or an anginal equivalent. Furthermore, she is unable to perform >4 METS of physical activity.    Plan:  Recommend lexiscan NM stress test prior to proceeding with surgery. If the stress test is low risk or normal then okay to proceed without additional delay. If intermediate or high risk then recommend coronary angiogram to further define coronary anatomy prior to surgery.  Follow up with Dr. Potter in 6 months.         History of Present Illness   Ms. Krystal Virgen is a 83 year old female with a significant past history of CAD with prior MI, hypertension,  hyperlipidemia, and COPD who presents for pre-operative cardiovascular risk assessment.    Ms. Virgen presented with an acute ST elevation myocardial infarction in early January 2021 due to total occlusion of the proximal circumflex coronary artery.  This treated with a drug-eluting stent.  She had preserved LV ejection fraction.  Her last cardiac functional assessment was in April 2021 and revealed a normal LVEF of 55% with mildly increased RV systolic pressure and no hemodynamically significant valve disease.  She is currently planning on a total L hip arthroplasty.    Jody has been experiencing severe pain due to arthritis in her left hip for some time.  She is also had progression in her chronic shortness of breath over the past 6 months.  She believes this is due to bronchitis and progression of her asthma.  She has not had any recurrent chest pressure since her MI more than 2-1/2 years ago.  She is unable to walk up a flight of stairs without severe pain in her hip and becoming short of breath.    Other than noted above, Ms. Virgen denies any chest pain/pressure/tightness, shortness of breath at rest or with exertion, light headedness/dizziness, pre-syncope, syncope, lower extremity swelling, palpitations, paroxysmal nocturnal dyspnea (PND), or orthopnea.     Cardiac Problems and Cardiac Diagnostics     Most Recent Cardiac testing:  ECG dated 6/27/23 (personaly reviewed and interpreted): sinus rhythm without Q waves or acute ischemic changes.    ECHO (report reviewed):   TTE 4/9/21    No previous study for comparison.    Technically challenging study. Definity contrast not utilized on this examination.    Normal left ventricular size. Mild left ventricular hypertrophy    Left ventricle ejection fraction is lower limits of normal. The calculated left ventricular ejection fraction is 55%.    Normal right ventricular size and systolic function.    Nonspecific thickening the mitral valve leaflets with mitral  annular calcification and mild to moderate mitral regurgitation. Not fully visualized on this examination.    Mild tricuspid regurgitation. Estimate of RV systolic pressure 37 mmHg plus right atrial pressure.    Mild enlargement of the ascending aorta. Mild enlargement of the transverse aorta.    Possible patent foramen ovale suboptimally visualized.      Cardiac cath: from 1/7/21 demonstrated   Left Main   The vessel was visualized by selective angiography and is moderate in size. There was 0% vessel disease.      Left Anterior Descending   The vessel was visualized by selective angiography and is moderate in size. There was 40% vessel disease.      Left Circumflex   Ost Cx to Prox Cx lesion is 100% stenosed. The lesion is type C - high risk, located at the bend, bifurcated and thrombotic.      Right Coronary Artery   The vessel was visualized by selective angiography and is moderate in size. There was 0% vessel disease.      Treated with a JUAN to the LCx Coronary artery       Medications  Allergies   Current Outpatient Medications   Medication Sig Dispense Refill    albuterol (PROAIR HFA/PROVENTIL HFA/VENTOLIN HFA) 108 (90 Base) MCG/ACT inhaler USE 2 INHALATIONS ORALLY   EVERY 6 HOURS AS NEEDED 54 g 1    aspirin (ASA) 81 MG EC tablet Take 1 tablet (81 mg) by mouth daily      budesonide-formoterol (SYMBICORT) 160-4.5 MCG/ACT inhaler Inhale 2 puffs into the lungs 2 times daily.      buPROPion (WELLBUTRIN XL) 300 MG 24 hr tablet Take 1 tablet (300 mg) by mouth every morning 90 tablet 2    citalopram (CELEXA) 20 MG tablet Take 1 tablet (20 mg) by mouth daily 90 tablet 3    fluticasone (FLONASE) 50 MCG/ACT nasal spray continuous prn       HYDROcodone-acetaminophen (NORCO) 5-325 MG tablet Take 1 tablet by mouth 2 times daily as needed 30 tablet 0    LANsoprazole (PREVACID) 30 MG DR capsule Take 1 capsule (30 mg) by mouth daily 90 capsule 2    montelukast (SINGULAIR) 10 MG tablet Take 1 tablet (10 mg) by mouth At Bedtime  90 tablet 3    Omeprazole (PRILOSEC PO) Take 1 tablet by mouth daily       rosuvastatin (CRESTOR) 40 MG tablet Take 1 tablet (40 mg) by mouth daily 90 tablet 3    SUMAtriptan (IMITREX) 100 MG tablet Take 1 tablet (100 mg) by mouth at onset of headache May repeat in 2 hours if needed: max 2/day; 30 tablet 3    tiZANidine (ZANAFLEX) 2 MG tablet Take 1 tablet (2 mg) by mouth 3 times daily 30 tablet 0    ferrous sulfate (FE TABS) 325 (65 Fe) MG EC tablet Take 1 tablet (325 mg) by mouth daily (Patient not taking: Reported on 8/31/2023) 30 tablet 3    guaiFENesin-codeine (ROBITUSSIN AC) 100-10 MG/5ML solution TAKE 10 ML BY MOUTH EVERY 4 HOURS AS NEEDED (COUGH). (Patient not taking: Reported on 8/31/2023)      ipratropium - albuterol 0.5 mg/2.5 mg/3 mL (DUONEB) 0.5-2.5 (3) MG/3ML neb solution Inhale 3 mLs into the lungs (Patient not taking: Reported on 8/31/2023)      nitroFURantoin macrocrystal-monohydrate (MACROBID) 100 MG capsule Take 1 capsule (100 mg) by mouth 2 times daily for 5 days (Patient not taking: Reported on 8/31/2023) 10 capsule 0    nitroGLYcerin (NITROSTAT) 0.4 MG sublingual tablet For chest pain place 1 tablet under the tongue every 5 minutes for 3 doses. If symptoms persist 5 minutes after 1st dose call 911. (Patient not taking: Reported on 8/31/2023) 25 tablet 0    predniSONE (DELTASONE) 10 MG tablet 40 mg daily x 3 days, then 30 mg daily x 3 days, then 20 mg daily 3 days, then 10 mg daily x 3 days (Patient not taking: Reported on 8/31/2023)        Allergies   Allergen Reactions    Ticagrelor Other (See Comments)     Dyspnea    Penicillins      Amoxicillin doesn't work for her    Sulfa Antibiotics         Physical Examination Review of Systems   Vitals: /84 (BP Location: Right arm, Patient Position: Sitting, Cuff Size: Adult Large)   Pulse 84   Resp 16   Wt 79.4 kg (175 lb)   BMI 31.00 kg/m    BMI= Body mass index is 31 kg/m .  Wt Readings from Last 3 Encounters:   08/31/23 79.4 kg (175 lb)    08/24/23 79.4 kg (175 lb)   07/24/23 80.1 kg (176 lb 9.6 oz)       General: pleasant female. No acute distress.  HENT: external ears normal. Nares patent. Mucous membranes moist.  Eyes: perrla, extraocular muscles intact. No scleral icterus.   Neck: No JVD  Lungs: clear to auscultation with normal air entry.  COR: regular rate and rhythm, No murmurs, rubs, or gallops  Abd: nondistended, BS present  Extrem: No edema        Please refer above for cardiac ROS details.       Past History   Past Medical History:   Past Medical History:   Diagnosis Date    Anemia     Asthma     Asthma     Asthma with acute exacerbation 8/23/2021    Formatting of this note might be different from the original. Created by Conversion  Replacement Utility updated for latest IMO load    Breast cancer (H) 2017    Chronic bronchitis (H)     Chronic sinusitis     Depression     GERD (gastroesophageal reflux disease)     Migraines     Osteopenia     Osteoporosis     Overactive bladder     Pneumococcal infection     Pneumonia     Sinusitis     Spinal headache     ST elevation MI (STEMI) (H) 1/7/2021        Past Surgical History:   Past Surgical History:   Procedure Laterality Date    ARTHRODESIS FOOT  11/11/2011    Procedure:ARTHRODESIS FOOT; Right First Metatarsophalangeal Joint Fusion with Second and Third Clawtoe Repairs; Surgeon:SARAH CASTRO; Location:[facility] OR    BIOPSY BREAST      CHOLECYSTECTOMY      CHOLECYSTECTOMY      CV CORONARY ANGIOGRAM N/A 1/7/2021    Procedure: Coronary Angiogram;  Surgeon: Slade Yu MD;  Location:  HEART CARDIAC CATH LAB    CV PCI STENT DRUG ELUTING N/A 1/7/2021    Procedure: Percutaneous Coronary Intervention Stent Drug Eluting;  Surgeon: Slade Yu MD;  Location:  HEART CARDIAC CATH LAB    ENT SURGERY      sinus surgery x1    FOOT SURGERY      GYN SURGERY      benign hysterectomy age 43    HYSTERECTOMY  1984    LUMPECTOMY BREAST Right 2017    ORTHOPEDIC SURGERY      fusion of grest  toe right    SINUS SURGERY          Family History:   Family History   Problem Relation Age of Onset    Breast Cancer Paternal Aunt 48.00    Breast Cancer Mother 78.00    Breast Cancer Sister 48.00    Breast Cancer Maternal Aunt 35.00    Heart Disease Father     Macular Degeneration Father     Heart Disease Brother         Social History:   Social History     Socioeconomic History    Marital status:      Spouse name: Not on file    Number of children: Not on file    Years of education: Not on file    Highest education level: Not on file   Occupational History    Not on file   Tobacco Use    Smoking status: Never    Smokeless tobacco: Never   Substance and Sexual Activity    Alcohol use: No    Drug use: No    Sexual activity: Not on file   Other Topics Concern    Parent/sibling w/ CABG, MI or angioplasty before 65F 55M? Not Asked   Social History Narrative    Not on file     Social Determinants of Health     Financial Resource Strain: Not on file   Food Insecurity: Not on file   Transportation Needs: Not on file   Physical Activity: Not on file   Stress: Not on file   Social Connections: Not on file   Intimate Partner Violence: Not on file   Housing Stability: Not on file            Lab Results    Chemistry/lipid CBC Cardiac Enzymes/BNP/TSH/INR   Lab Results   Component Value Date    CHOL 143 08/30/2022    HDL 86 08/30/2022    TRIG 69 08/30/2022    BUN 17.0 07/24/2023     07/24/2023    CO2 19 (L) 07/24/2023    Lab Results   Component Value Date    WBC 8.4 07/24/2023    HGB 10.6 (L) 07/24/2023    HCT 33.9 (L) 07/24/2023    MCV 89 07/24/2023     07/24/2023    Lab Results   Component Value Date    TSH 3.38 08/24/2021    INR 0.98 01/07/2021                Thank you for allowing me to participate in the care of your patient.      Sincerely,     Beka Alan MD     [facility]  cc:   Referred Self,

## 2023-08-31 NOTE — TELEPHONE ENCOUNTER
Can you review and place any lab orders you would like for Jody for upcoming lab appointment on 9/5/23. Thank you

## 2023-08-31 NOTE — PATIENT INSTRUCTIONS
It was a pleasure to meet with you today.      Below is a summary of your visit.   Schedule a stress test prior to your surgery provided this is normal or low risk you wouldn't need additional cardiac evaluation prior to surgery.  Follow up with Dr. Potter in 6 months.    We will call you to inform you of your test or procedure results within 3 business days of the test being performed.  If you do not hear from our office with the test results within 1 week please do not hesitate to call asking for these results.     Please do not hesitate to call the [facility] with any questions or concerns at [phone].     Sincerely,

## 2023-08-31 NOTE — PROGRESS NOTES
[facility] HEART CARE   [address]  [place] [address]   [email or web address]   OFFICE: [phone]     CARDIOLOGY CLINIC NOTE     Thank you, Juan C Barnes, for asking the [facility] Heart Care team to see Ms. Krystal Virgen to evaluate Pre-Op Exam         Assessment/Recommendations   Assessment:    Coronary artery disease with prior PCI to LCx coronary artery - has not had her prior chest pressure, but has had worsening of her HOOPER which may be an anginal equivalent or progression of her reactive airway disease.  Hypertension - controlled.  Dyslipidemia  CKD stage IIIb - overall stable.  Moderate persistent asthma - with worsening HOOPER  Pre-operative cardiovascular risk assessment - prior to total hip arthroplasty, which is an intermediate risk surgery for cardiovascular complications. Per Ms. Virgen, the plan is for regional/spinal anesthesia and not general. She does not have valvular heart disease by prior echo or suggested by physical exam today. Her rhythm is stable. She does have chronic and worsened dyspnea on exertion which may be related to her reactive airway disease or an anginal equivalent. Furthermore, she is unable to perform >4 METS of physical activity.    Plan:  Recommend lexiscan NM stress test prior to proceeding with surgery. If the stress test is low risk or normal then okay to proceed without additional delay. If intermediate or high risk then recommend coronary angiogram to further define coronary anatomy prior to surgery.  Follow up with Dr. Potter in 6 months.         History of Present Illness   Ms. Krystal Virgen is a 83 year old female with a significant past history of CAD with prior MI, hypertension, hyperlipidemia, and COPD who presents for pre-operative cardiovascular risk assessment.    Ms. Virgen presented with an acute ST elevation myocardial infarction in early January 2021 due to total occlusion of the proximal  circumflex coronary artery.  This treated with a drug-eluting stent.  She had preserved LV ejection fraction.  Her last cardiac functional assessment was in April 2021 and revealed a normal LVEF of 55% with mildly increased RV systolic pressure and no hemodynamically significant valve disease.  She is currently planning on a total L hip arthroplasty.    Jody has been experiencing severe pain due to arthritis in her left hip for some time.  She is also had progression in her chronic shortness of breath over the past 6 months.  She believes this is due to bronchitis and progression of her asthma.  She has not had any recurrent chest pressure since her MI more than 2-1/2 years ago.  She is unable to walk up a flight of stairs without severe pain in her hip and becoming short of breath.    Other than noted above, Ms. Virgen denies any chest pain/pressure/tightness, shortness of breath at rest or with exertion, light headedness/dizziness, pre-syncope, syncope, lower extremity swelling, palpitations, paroxysmal nocturnal dyspnea (PND), or orthopnea.     Cardiac Problems and Cardiac Diagnostics     Most Recent Cardiac testing:  ECG dated 6/27/23 (personaly reviewed and interpreted): sinus rhythm without Q waves or acute ischemic changes.    ECHO (report reviewed):   TTE 4/9/21    No previous study for comparison.    Technically challenging study. Definity contrast not utilized on this examination.    Normal left ventricular size. Mild left ventricular hypertrophy    Left ventricle ejection fraction is lower limits of normal. The calculated left ventricular ejection fraction is 55%.    Normal right ventricular size and systolic function.    Nonspecific thickening the mitral valve leaflets with mitral annular calcification and mild to moderate mitral regurgitation. Not fully visualized on this examination.    Mild tricuspid regurgitation. Estimate of RV systolic pressure 37 mmHg plus right atrial pressure.    Mild  enlargement of the ascending aorta. Mild enlargement of the transverse aorta.    Possible patent foramen ovale suboptimally visualized.      Cardiac cath: from 1/7/21 demonstrated   Left Main   The vessel was visualized by selective angiography and is moderate in size. There was 0% vessel disease.      Left Anterior Descending   The vessel was visualized by selective angiography and is moderate in size. There was 40% vessel disease.      Left Circumflex   Ost Cx to Prox Cx lesion is 100% stenosed. The lesion is type C - high risk, located at the bend, bifurcated and thrombotic.      Right Coronary Artery   The vessel was visualized by selective angiography and is moderate in size. There was 0% vessel disease.      Treated with a JUAN to the LCx Coronary artery       Medications  Allergies   Current Outpatient Medications   Medication Sig Dispense Refill    albuterol (PROAIR HFA/PROVENTIL HFA/VENTOLIN HFA) 108 (90 Base) MCG/ACT inhaler USE 2 INHALATIONS ORALLY   EVERY 6 HOURS AS NEEDED 54 g 1    aspirin (ASA) 81 MG EC tablet Take 1 tablet (81 mg) by mouth daily      budesonide-formoterol (SYMBICORT) 160-4.5 MCG/ACT inhaler Inhale 2 puffs into the lungs 2 times daily.      buPROPion (WELLBUTRIN XL) 300 MG 24 hr tablet Take 1 tablet (300 mg) by mouth every morning 90 tablet 2    citalopram (CELEXA) 20 MG tablet Take 1 tablet (20 mg) by mouth daily 90 tablet 3    fluticasone (FLONASE) 50 MCG/ACT nasal spray continuous prn       HYDROcodone-acetaminophen (NORCO) 5-325 MG tablet Take 1 tablet by mouth 2 times daily as needed 30 tablet 0    LANsoprazole (PREVACID) 30 MG DR capsule Take 1 capsule (30 mg) by mouth daily 90 capsule 2    montelukast (SINGULAIR) 10 MG tablet Take 1 tablet (10 mg) by mouth At Bedtime 90 tablet 3    Omeprazole (PRILOSEC PO) Take 1 tablet by mouth daily       rosuvastatin (CRESTOR) 40 MG tablet Take 1 tablet (40 mg) by mouth daily 90 tablet 3    SUMAtriptan (IMITREX) 100 MG tablet Take 1 tablet  (100 mg) by mouth at onset of headache May repeat in 2 hours if needed: max 2/day; 30 tablet 3    tiZANidine (ZANAFLEX) 2 MG tablet Take 1 tablet (2 mg) by mouth 3 times daily 30 tablet 0    ferrous sulfate (FE TABS) 325 (65 Fe) MG EC tablet Take 1 tablet (325 mg) by mouth daily (Patient not taking: Reported on 8/31/2023) 30 tablet 3    guaiFENesin-codeine (ROBITUSSIN AC) 100-10 MG/5ML solution TAKE 10 ML BY MOUTH EVERY 4 HOURS AS NEEDED (COUGH). (Patient not taking: Reported on 8/31/2023)      ipratropium - albuterol 0.5 mg/2.5 mg/3 mL (DUONEB) 0.5-2.5 (3) MG/3ML neb solution Inhale 3 mLs into the lungs (Patient not taking: Reported on 8/31/2023)      nitroFURantoin macrocrystal-monohydrate (MACROBID) 100 MG capsule Take 1 capsule (100 mg) by mouth 2 times daily for 5 days (Patient not taking: Reported on 8/31/2023) 10 capsule 0    nitroGLYcerin (NITROSTAT) 0.4 MG sublingual tablet For chest pain place 1 tablet under the tongue every 5 minutes for 3 doses. If symptoms persist 5 minutes after 1st dose call 911. (Patient not taking: Reported on 8/31/2023) 25 tablet 0    predniSONE (DELTASONE) 10 MG tablet 40 mg daily x 3 days, then 30 mg daily x 3 days, then 20 mg daily 3 days, then 10 mg daily x 3 days (Patient not taking: Reported on 8/31/2023)        Allergies   Allergen Reactions    Ticagrelor Other (See Comments)     Dyspnea    Penicillins      Amoxicillin doesn't work for her    Sulfa Antibiotics         Physical Examination Review of Systems   Vitals: /84 (BP Location: Right arm, Patient Position: Sitting, Cuff Size: Adult Large)   Pulse 84   Resp 16   Wt 79.4 kg (175 lb)   BMI 31.00 kg/m    BMI= Body mass index is 31 kg/m .  Wt Readings from Last 3 Encounters:   08/31/23 79.4 kg (175 lb)   08/24/23 79.4 kg (175 lb)   07/24/23 80.1 kg (176 lb 9.6 oz)       General: pleasant female. No acute distress.  HENT: external ears normal. Nares patent. Mucous membranes moist.  Eyes: perrla, extraocular muscles  intact. No scleral icterus.   Neck: No JVD  Lungs: clear to auscultation with normal air entry.  COR: regular rate and rhythm, No murmurs, rubs, or gallops  Abd: nondistended, BS present  Extrem: No edema        Please refer above for cardiac ROS details.       Past History   Past Medical History:   Past Medical History:   Diagnosis Date    Anemia     Asthma     Asthma     Asthma with acute exacerbation 8/23/2021    Formatting of this note might be different from the original. Created by Conversion  Replacement Utility updated for latest IMO load    Breast cancer (H) 2017    Chronic bronchitis (H)     Chronic sinusitis     Depression     GERD (gastroesophageal reflux disease)     Migraines     Osteopenia     Osteoporosis     Overactive bladder     Pneumococcal infection     Pneumonia     Sinusitis     Spinal headache     ST elevation MI (STEMI) (H) 1/7/2021        Past Surgical History:   Past Surgical History:   Procedure Laterality Date    ARTHRODESIS FOOT  11/11/2011    Procedure:ARTHRODESIS FOOT; Right First Metatarsophalangeal Joint Fusion with Second and Third Clawtoe Repairs; Surgeon:SARAH CASTRO; Location:[facility] OR    BIOPSY BREAST      CHOLECYSTECTOMY      CHOLECYSTECTOMY      CV CORONARY ANGIOGRAM N/A 1/7/2021    Procedure: Coronary Angiogram;  Surgeon: Slade Yu MD;  Location:  HEART CARDIAC CATH LAB    CV PCI STENT DRUG ELUTING N/A 1/7/2021    Procedure: Percutaneous Coronary Intervention Stent Drug Eluting;  Surgeon: Slade Yu MD;  Location:  HEART CARDIAC CATH LAB    ENT SURGERY      sinus surgery x1    FOOT SURGERY      GYN SURGERY      benign hysterectomy age 43    HYSTERECTOMY  1984    LUMPECTOMY BREAST Right 2017    ORTHOPEDIC SURGERY      fusion of grest toe right    SINUS SURGERY          Family History:   Family History   Problem Relation Age of Onset    Breast Cancer Paternal Aunt 48.00    Breast Cancer Mother 78.00    Breast Cancer Sister 48.00    Breast Cancer  Maternal Aunt 35.00    Heart Disease Father     Macular Degeneration Father     Heart Disease Brother         Social History:   Social History     Socioeconomic History    Marital status:      Spouse name: Not on file    Number of children: Not on file    Years of education: Not on file    Highest education level: Not on file   Occupational History    Not on file   Tobacco Use    Smoking status: Never    Smokeless tobacco: Never   Substance and Sexual Activity    Alcohol use: No    Drug use: No    Sexual activity: Not on file   Other Topics Concern    Parent/sibling w/ CABG, MI or angioplasty before 65F 55M? Not Asked   Social History Narrative    Not on file     Social Determinants of Health     Financial Resource Strain: Not on file   Food Insecurity: Not on file   Transportation Needs: Not on file   Physical Activity: Not on file   Stress: Not on file   Social Connections: Not on file   Intimate Partner Violence: Not on file   Housing Stability: Not on file            Lab Results    Chemistry/lipid CBC Cardiac Enzymes/BNP/TSH/INR   Lab Results   Component Value Date    CHOL 143 08/30/2022    HDL 86 08/30/2022    TRIG 69 08/30/2022    BUN 17.0 07/24/2023     07/24/2023    CO2 19 (L) 07/24/2023    Lab Results   Component Value Date    WBC 8.4 07/24/2023    HGB 10.6 (L) 07/24/2023    HCT 33.9 (L) 07/24/2023    MCV 89 07/24/2023     07/24/2023    Lab Results   Component Value Date    TSH 3.38 08/24/2021    INR 0.98 01/07/2021

## 2023-08-31 NOTE — LETTER
8/31/2023        RE: Krystal Virgen  [address]  [place] [address]          [facility] HEART CARE   [address]  [place] [address]   [email or web address]   OFFICE: [phone]     CARDIOLOGY CLINIC NOTE     Thank you, Juan C Barnes, for asking the [facility] Heart [facility] team to see Ms. Krystal Virgen to evaluate Pre-Op Exam         Assessment/Recommendations   Assessment:    Coronary artery disease with prior PCI to LCx coronary artery - has not had her prior chest pressure, but has had worsening of her HOOPER which may be an anginal equivalent or progression of her reactive airway disease.  Hypertension - controlled.  Dyslipidemia  CKD stage IIIb - overall stable.  Moderate persistent asthma - with worsening HOOPER  Pre-operative cardiovascular risk assessment - prior to total hip arthroplasty, which is an intermediate risk surgery for cardiovascular complications. Per Ms. Virgen, the plan is for regional/spinal anesthesia and not general. She does not have valvular heart disease by prior echo or suggested by physical exam today. Her rhythm is stable. She does have chronic and worsened dyspnea on exertion which may be related to her reactive airway disease or an anginal equivalent. Furthermore, she is unable to perform >4 METS of physical activity.    Plan:  Recommend lexiscan NM stress test prior to proceeding with surgery. If the stress test is low risk or normal then okay to proceed without additional delay. If intermediate or high risk then recommend coronary angiogram to further define coronary anatomy prior to surgery.  Follow up with Dr. Potter in 6 months.         History of Present Illness   Ms. Krystal Virgen is a 83 year old female with a significant past history of CAD with prior MI, hypertension, hyperlipidemia, and COPD who presents for pre-operative cardiovascular risk assessment.    Ms. Virgen presented with an acute ST  elevation myocardial infarction in early January 2021 due to total occlusion of the proximal circumflex coronary artery.  This treated with a drug-eluting stent.  She had preserved LV ejection fraction.  Her last cardiac functional assessment was in April 2021 and revealed a normal LVEF of 55% with mildly increased RV systolic pressure and no hemodynamically significant valve disease.  She is currently planning on a total L hip arthroplasty.    Jody has been experiencing severe pain due to arthritis in her left hip for some time.  She is also had progression in her chronic shortness of breath over the past 6 months.  She believes this is due to bronchitis and progression of her asthma.  She has not had any recurrent chest pressure since her MI more than 2-1/2 years ago.  She is unable to walk up a flight of stairs without severe pain in her hip and becoming short of breath.    Other than noted above, Ms. Virgen denies any chest pain/pressure/tightness, shortness of breath at rest or with exertion, light headedness/dizziness, pre-syncope, syncope, lower extremity swelling, palpitations, paroxysmal nocturnal dyspnea (PND), or orthopnea.     Cardiac Problems and Cardiac Diagnostics     Most Recent Cardiac testing:  ECG dated 6/27/23 (personaly reviewed and interpreted): sinus rhythm without Q waves or acute ischemic changes.    ECHO (report reviewed):   TTE 4/9/21    No previous study for comparison.    Technically challenging study. Definity contrast not utilized on this examination.    Normal left ventricular size. Mild left ventricular hypertrophy    Left ventricle ejection fraction is lower limits of normal. The calculated left ventricular ejection fraction is 55%.    Normal right ventricular size and systolic function.    Nonspecific thickening the mitral valve leaflets with mitral annular calcification and mild to moderate mitral regurgitation. Not fully visualized on this examination.    Mild tricuspid  regurgitation. Estimate of RV systolic pressure 37 mmHg plus right atrial pressure.    Mild enlargement of the ascending aorta. Mild enlargement of the transverse aorta.    Possible patent foramen ovale suboptimally visualized.      Cardiac cath: from 1/7/21 demonstrated   Left Main   The vessel was visualized by selective angiography and is moderate in size. There was 0% vessel disease.      Left Anterior Descending   The vessel was visualized by selective angiography and is moderate in size. There was 40% vessel disease.      Left Circumflex   Ost Cx to Prox Cx lesion is 100% stenosed. The lesion is type C - high risk, located at the bend, bifurcated and thrombotic.      Right Coronary Artery   The vessel was visualized by selective angiography and is moderate in size. There was 0% vessel disease.      Treated with a JUAN to the LCx Coronary artery       Medications  Allergies   Current Outpatient Medications   Medication Sig Dispense Refill     albuterol (PROAIR HFA/PROVENTIL HFA/VENTOLIN HFA) 108 (90 Base) MCG/ACT inhaler USE 2 INHALATIONS ORALLY   EVERY 6 HOURS AS NEEDED 54 g 1     aspirin (ASA) 81 MG EC tablet Take 1 tablet (81 mg) by mouth daily       budesonide-formoterol (SYMBICORT) 160-4.5 MCG/ACT inhaler Inhale 2 puffs into the lungs 2 times daily.       buPROPion (WELLBUTRIN XL) 300 MG 24 hr tablet Take 1 tablet (300 mg) by mouth every morning 90 tablet 2     citalopram (CELEXA) 20 MG tablet Take 1 tablet (20 mg) by mouth daily 90 tablet 3     fluticasone (FLONASE) 50 MCG/ACT nasal spray continuous prn        HYDROcodone-acetaminophen (NORCO) 5-325 MG tablet Take 1 tablet by mouth 2 times daily as needed 30 tablet 0     LANsoprazole (PREVACID) 30 MG DR capsule Take 1 capsule (30 mg) by mouth daily 90 capsule 2     montelukast (SINGULAIR) 10 MG tablet Take 1 tablet (10 mg) by mouth At Bedtime 90 tablet 3     Omeprazole (PRILOSEC PO) Take 1 tablet by mouth daily        rosuvastatin (CRESTOR) 40 MG tablet  Take 1 tablet (40 mg) by mouth daily 90 tablet 3     SUMAtriptan (IMITREX) 100 MG tablet Take 1 tablet (100 mg) by mouth at onset of headache May repeat in 2 hours if needed: max 2/day; 30 tablet 3     tiZANidine (ZANAFLEX) 2 MG tablet Take 1 tablet (2 mg) by mouth 3 times daily 30 tablet 0     ferrous sulfate (FE TABS) 325 (65 Fe) MG EC tablet Take 1 tablet (325 mg) by mouth daily (Patient not taking: Reported on 8/31/2023) 30 tablet 3     guaiFENesin-codeine (ROBITUSSIN AC) 100-10 MG/5ML solution TAKE 10 ML BY MOUTH EVERY 4 HOURS AS NEEDED (COUGH). (Patient not taking: Reported on 8/31/2023)       ipratropium - albuterol 0.5 mg/2.5 mg/3 mL (DUONEB) 0.5-2.5 (3) MG/3ML neb solution Inhale 3 mLs into the lungs (Patient not taking: Reported on 8/31/2023)       nitroFURantoin macrocrystal-monohydrate (MACROBID) 100 MG capsule Take 1 capsule (100 mg) by mouth 2 times daily for 5 days (Patient not taking: Reported on 8/31/2023) 10 capsule 0     nitroGLYcerin (NITROSTAT) 0.4 MG sublingual tablet For chest pain place 1 tablet under the tongue every 5 minutes for 3 doses. If symptoms persist 5 minutes after 1st dose call 911. (Patient not taking: Reported on 8/31/2023) 25 tablet 0     predniSONE (DELTASONE) 10 MG tablet 40 mg daily x 3 days, then 30 mg daily x 3 days, then 20 mg daily 3 days, then 10 mg daily x 3 days (Patient not taking: Reported on 8/31/2023)        Allergies   Allergen Reactions     Ticagrelor Other (See Comments)     Dyspnea     Penicillins      Amoxicillin doesn't work for her     Sulfa Antibiotics         Physical Examination Review of Systems   Vitals: /84 (BP Location: Right arm, Patient Position: Sitting, Cuff Size: Adult Large)   Pulse 84   Resp 16   Wt 79.4 kg (175 lb)   BMI 31.00 kg/m    BMI= Body mass index is 31 kg/m .  Wt Readings from Last 3 Encounters:   08/31/23 79.4 kg (175 lb)   08/24/23 79.4 kg (175 lb)   07/24/23 80.1 kg (176 lb 9.6 oz)       General: pleasant female. No  acute distress.  HENT: external ears normal. Nares patent. Mucous membranes moist.  Eyes: perrla, extraocular muscles intact. No scleral icterus.   Neck: No JVD  Lungs: clear to auscultation with normal air entry.  COR: regular rate and rhythm, No murmurs, rubs, or gallops  Abd: nondistended, BS present  Extrem: No edema        Please refer above for cardiac ROS details.       Past History   Past Medical History:   Past Medical History:   Diagnosis Date     Anemia      Asthma      Asthma      Asthma with acute exacerbation 8/23/2021    Formatting of this note might be different from the original. Created by Conversion  Replacement Utility updated for latest IMO load     Breast cancer (H) 2017     Chronic bronchitis (H)      Chronic sinusitis      Depression      GERD (gastroesophageal reflux disease)      Migraines      Osteopenia      Osteoporosis      Overactive bladder      Pneumococcal infection      Pneumonia      Sinusitis      Spinal headache      ST elevation MI (STEMI) (H) 1/7/2021        Past Surgical History:   Past Surgical History:   Procedure Laterality Date     ARTHRODESIS FOOT  11/11/2011    Procedure:ARTHRODESIS FOOT; Right First Metatarsophalangeal Joint Fusion with Second and Third Clawtoe Repairs; Surgeon:SARAH CASTRO; Location:[facility] OR     BIOPSY BREAST       CHOLECYSTECTOMY       CHOLECYSTECTOMY       CV CORONARY ANGIOGRAM N/A 1/7/2021    Procedure: Coronary Angiogram;  Surgeon: Slade Yu MD;  Location:  HEART CARDIAC CATH LAB     CV PCI STENT DRUG ELUTING N/A 1/7/2021    Procedure: Percutaneous Coronary Intervention Stent Drug Eluting;  Surgeon: Slade Yu MD;  Location: [facility] CARDIAC CATH LAB     ENT SURGERY      sinus surgery x1     FOOT SURGERY       GYN SURGERY      benign hysterectomy age 43     HYSTERECTOMY  1984     LUMPECTOMY BREAST Right 2017     ORTHOPEDIC SURGERY      fusion of grest toe right     SINUS SURGERY          Family History:   Family  History   Problem Relation Age of Onset     Breast Cancer Paternal Aunt 48.00     Breast Cancer Mother 78.00     Breast Cancer Sister 48.00     Breast Cancer Maternal Aunt 35.00     Heart Disease Father      Macular Degeneration Father      Heart Disease Brother         Social History:   Social History     Socioeconomic History     Marital status:      Spouse name: Not on file     Number of children: Not on file     Years of education: Not on file     Highest education level: Not on file   Occupational History     Not on file   Tobacco Use     Smoking status: Never     Smokeless tobacco: Never   Substance and Sexual Activity     Alcohol use: No     Drug use: No     Sexual activity: Not on file   Other Topics Concern     Parent/sibling w/ CABG, MI or angioplasty before 65F 55M? Not Asked   Social History Narrative     Not on file     Social Determinants of Health     Financial Resource Strain: Not on file   Food Insecurity: Not on file   Transportation Needs: Not on file   Physical Activity: Not on file   Stress: Not on file   Social Connections: Not on file   Intimate Partner Violence: Not on file   Housing Stability: Not on file            Lab Results    Chemistry/lipid CBC Cardiac Enzymes/BNP/TSH/INR   Lab Results   Component Value Date    CHOL 143 08/30/2022    HDL 86 08/30/2022    TRIG 69 08/30/2022    BUN 17.0 07/24/2023     07/24/2023    CO2 19 (L) 07/24/2023    Lab Results   Component Value Date    WBC 8.4 07/24/2023    HGB 10.6 (L) 07/24/2023    HCT 33.9 (L) 07/24/2023    MCV 89 07/24/2023     07/24/2023    Lab Results   Component Value Date    TSH 3.38 08/24/2021    INR 0.98 01/07/2021                Sincerely,        Beka Alan MD

## 2023-09-01 ENCOUNTER — PATIENT OUTREACH (OUTPATIENT)
Dept: ONCOLOGY | Facility: CLINIC | Age: 83
End: 2023-09-01
Payer: COMMERCIAL

## 2023-09-01 NOTE — PROGRESS NOTES
[facility]: Cancer Care                                                                                          Patient had labs ordered by Dr. Zavala drawn yesterday when at another appointment. Patient also scheduled for a lab appointment on 9/5/23.  Called patient to let her know that since she had her labs drawn yesterday, she doesn't need a lab appointment on 9/5/23. Told patient will cancel this lab appointment.  Told patient Dr. Zavala out of the office until next Tues so will review labs with her at that time and call her back with an update.  Patient verbalized understanding.  Did tell patient her Hgb improved to 11.8.  Patient was happy to hear this.    Signature:  Ramila Aguiar RN

## 2023-09-05 ENCOUNTER — HOSPITAL ENCOUNTER (OUTPATIENT)
Dept: CARDIOLOGY | Facility: HOSPITAL | Age: 83
Discharge: HOME | End: 2023-09-05
Attending: INTERNAL MEDICINE
Payer: COMMERCIAL

## 2023-09-05 ENCOUNTER — HOSPITAL ENCOUNTER (OUTPATIENT)
Dept: NUCLEAR MEDICINE | Facility: HOSPITAL | Age: 83
Discharge: HOME | End: 2023-09-05
Attending: INTERNAL MEDICINE
Payer: COMMERCIAL

## 2023-09-05 DIAGNOSIS — I25.10 CORONARY ARTERY DISEASE INVOLVING NATIVE CORONARY ARTERY OF NATIVE HEART, UNSPECIFIED WHETHER ANGINA PRESENT: ICD-10-CM

## 2023-09-05 DIAGNOSIS — R06.09 DYSPNEA ON EXERTION: ICD-10-CM

## 2023-09-05 DIAGNOSIS — Z01.810 PRE-OPERATIVE CARDIOVASCULAR EXAMINATION: ICD-10-CM

## 2023-09-05 LAB
CV STRESS CURRENT BP HE: NORMAL
CV STRESS CURRENT HR HE: 100
CV STRESS CURRENT HR HE: 103
CV STRESS CURRENT HR HE: 104
CV STRESS CURRENT HR HE: 82
CV STRESS CURRENT HR HE: 84
CV STRESS CURRENT HR HE: 85
CV STRESS CURRENT HR HE: 86
CV STRESS CURRENT HR HE: 87
CV STRESS CURRENT HR HE: 88
CV STRESS CURRENT HR HE: 90
CV STRESS CURRENT HR HE: 90
CV STRESS CURRENT HR HE: 93
CV STRESS CURRENT HR HE: 94
CV STRESS CURRENT HR HE: 94
CV STRESS CURRENT HR HE: 96
CV STRESS CURRENT HR HE: 96
CV STRESS CURRENT HR HE: 97
CV STRESS CURRENT HR HE: 97
CV STRESS CURRENT HR HE: 99
CV STRESS DEVIATION TIME HE: NORMAL
CV STRESS ECHO PERCENT HR HE: NORMAL
CV STRESS EXERCISE STAGE HE: NORMAL
CV STRESS FINAL RESTING BP HE: NORMAL
CV STRESS FINAL RESTING HR HE: 90
CV STRESS MAX HR HE: 104
CV STRESS MAX TREADMILL GRADE HE: 0
CV STRESS MAX TREADMILL SPEED HE: 0
CV STRESS PEAK DIA BP HE: NORMAL
CV STRESS PEAK SYS BP HE: NORMAL
CV STRESS PHASE HE: NORMAL
CV STRESS PROTOCOL HE: NORMAL
CV STRESS RESTING PT POSITION HE: NORMAL
CV STRESS ST DEVIATION AMOUNT HE: NORMAL
CV STRESS ST DEVIATION ELEVATION HE: NORMAL
CV STRESS ST EVELATION AMOUNT HE: NORMAL
CV STRESS TEST TYPE HE: NORMAL
CV STRESS TOTAL STAGE TIME MIN 1 HE: NORMAL
NUC STRESS EJECTION FRACTION: 55 %
RATE PRESSURE PRODUCT: NORMAL
STRESS ECHO BASELINE DIASTOLIC HE: 75
STRESS ECHO BASELINE HR: 89
STRESS ECHO BASELINE SYSTOLIC BP: 154
STRESS ECHO CALCULATED PERCENT HR: 75 %
STRESS ECHO LAST STRESS DIASTOLIC BP: 78
STRESS ECHO LAST STRESS HR: 97
STRESS ECHO LAST STRESS SYSTOLIC BP: 138
STRESS ECHO TARGET HR: 138

## 2023-09-05 PROCEDURE — A9500 TC99M SESTAMIBI: HCPCS | Performed by: INTERNAL MEDICINE

## 2023-09-05 PROCEDURE — 343N000001 HC RX 343: Performed by: INTERNAL MEDICINE

## 2023-09-05 PROCEDURE — 78452 HT MUSCLE IMAGE SPECT MULT: CPT

## 2023-09-05 PROCEDURE — 250N000011 HC RX IP 250 OP 636: Mod: JZ | Performed by: INTERNAL MEDICINE

## 2023-09-05 PROCEDURE — 93018 CV STRESS TEST I&R ONLY: CPT | Performed by: INTERNAL MEDICINE

## 2023-09-05 PROCEDURE — 93016 CV STRESS TEST SUPVJ ONLY: CPT | Performed by: INTERNAL MEDICINE

## 2023-09-05 PROCEDURE — 93017 CV STRESS TEST TRACING ONLY: CPT

## 2023-09-05 PROCEDURE — 78452 HT MUSCLE IMAGE SPECT MULT: CPT | Mod: 26 | Performed by: INTERNAL MEDICINE

## 2023-09-05 RX ORDER — AMINOPHYLLINE 25 MG/ML
50 INJECTION, SOLUTION INTRAVENOUS
Status: DISCONTINUED | OUTPATIENT
Start: 2023-09-05 | End: 2023-09-05 | Stop reason: HOSPADM

## 2023-09-05 RX ORDER — REGADENOSON 0.08 MG/ML
0.4 INJECTION, SOLUTION INTRAVENOUS ONCE
Status: COMPLETED | OUTPATIENT
Start: 2023-09-05 | End: 2023-09-05

## 2023-09-05 RX ORDER — CAFFEINE 200 MG
200 TABLET ORAL
Status: DISCONTINUED | OUTPATIENT
Start: 2023-09-05 | End: 2023-09-05 | Stop reason: HOSPADM

## 2023-09-05 RX ORDER — CAFFEINE CITRATE 20 MG/ML
60 SOLUTION INTRAVENOUS
Status: DISCONTINUED | OUTPATIENT
Start: 2023-09-05 | End: 2023-09-05 | Stop reason: HOSPADM

## 2023-09-05 RX ORDER — ALBUTEROL SULFATE 0.83 MG/ML
2.5 SOLUTION RESPIRATORY (INHALATION)
Status: DISCONTINUED | OUTPATIENT
Start: 2023-09-05 | End: 2023-09-05 | Stop reason: HOSPADM

## 2023-09-05 RX ADMIN — Medication 29.5 MILLICURIE: at 15:31

## 2023-09-05 RX ADMIN — REGADENOSON 0.4 MG: 0.08 INJECTION, SOLUTION INTRAVENOUS at 14:36

## 2023-09-05 RX ADMIN — Medication 8.1 MILLICURIE: at 12:47

## 2023-09-05 RX ADMIN — AMINOPHYLLINE 50 MG: 25 INJECTION, SOLUTION INTRAVENOUS at 14:40

## 2023-09-06 ENCOUNTER — PATIENT OUTREACH (OUTPATIENT)
Dept: ONCOLOGY | Facility: CLINIC | Age: 83
End: 2023-09-06
Payer: COMMERCIAL

## 2023-09-06 NOTE — PROGRESS NOTES
"[facility]: Cancer Care Short Note                                    Discussion with Patient:                                                      Reviewed lab results from 8/31/23 with Dr. Zavala.  Dr. Zavala would like patient to receive IV iron.    Called patient. See assessment below.    Assessment:                                                      Patient reports she is unable to walk due to L hip pain. She said she's \"bone on bone\".  She said she needs to have surgery and it takes priority. She said she will call to schedule iron infusions after she has her hip surgery.    Intervention/Education provided during outreach:                                                       Dr. Zavala updated. She is ok with this plan.     Patient to call/[facility] message with updates    Signature:  Ramila Aguiar RN    "

## 2023-09-07 DIAGNOSIS — G89.29 CHRONIC MIDLINE LOW BACK PAIN WITH LEFT-SIDED SCIATICA: ICD-10-CM

## 2023-09-07 DIAGNOSIS — M79.671 RIGHT FOOT PAIN: ICD-10-CM

## 2023-09-07 DIAGNOSIS — M54.42 CHRONIC MIDLINE LOW BACK PAIN WITH LEFT-SIDED SCIATICA: ICD-10-CM

## 2023-09-07 RX ORDER — HYDROCODONE BITARTRATE AND ACETAMINOPHEN 5; 325 MG/1; MG/1
1 TABLET ORAL 2 TIMES DAILY PRN
Qty: 30 TABLET | Refills: 0 | Status: SHIPPED | OUTPATIENT
Start: 2023-09-07 | End: 2023-09-28

## 2023-09-07 NOTE — TELEPHONE ENCOUNTER
Pending Prescriptions:                       Disp   Refills    HYDROcodone-acetaminophen (NORCO) 5-325 M*30 tab*0            Sig: Take 1 tablet by mouth 2 times daily as needed    Pt is calling to get a refill on medication. Pt states that she wants to talk with Dr. Fairchild about her pain management and how her pain has increased. She would like to adjust dosage if possible.

## 2023-09-08 NOTE — TELEPHONE ENCOUNTER
Patient Returning Call    Reason for call:  concerns with pain    Information relayed to patient:  patient states she is having increased pain, and does not have a surgery date at this time. Patient would like to have PCP call her to discuss pain management.    Declined to schedule a visit at this time

## 2023-09-11 ENCOUNTER — TELEPHONE (OUTPATIENT)
Dept: FAMILY MEDICINE | Facility: CLINIC | Age: 83
End: 2023-09-11
Payer: COMMERCIAL

## 2023-09-11 NOTE — TELEPHONE ENCOUNTER
She needs to contact pulmonology.    I attempted to reach [facility], but there phone system does not transfer me to appointment line. I tried 3x.    I ended up faxing them a detailed fax asking them to contact patient about a sooner appointment

## 2023-09-11 NOTE — TELEPHONE ENCOUNTER
Pt is calling wanting to talk with Dr. Fairchild about a pulmonary test. Pt states that she can't wait until 9/22 to be seen and she can't get through to the pulmonologist via phone. She wants to get in like tomorrow to get this completed. Ade advise and call patient back to discuss concerns further.

## 2023-09-12 NOTE — TELEPHONE ENCOUNTER
Contacted pulmonology. She was able to get a PFT 9/13/23. She has a f/up with them next week to go over results.

## 2023-09-18 ENCOUNTER — TELEPHONE (OUTPATIENT)
Dept: FAMILY MEDICINE | Facility: CLINIC | Age: 83
End: 2023-09-18

## 2023-09-18 NOTE — TELEPHONE ENCOUNTER
Reason for call:  Other     Patient called regarding (reason for call): call back    Additional comments: Patient is calling and asking if she can get all the information from pulmonology assessment fax to [facility]. Please advise and call patient back please and thank you.    Phone number to reach patient:  Cell number on file:    Telephone Information:   Mobile [phone]       Best Time:  any    Can we leave a detailed message on this number?  YES

## 2023-09-19 NOTE — TELEPHONE ENCOUNTER
Spoke with [facility] records. They recommended a faxed request. Consent on file.    Request sent to  pulm to send over pulm notes to [facility], attn dr drogt.

## 2023-09-28 DIAGNOSIS — M79.671 RIGHT FOOT PAIN: ICD-10-CM

## 2023-09-28 DIAGNOSIS — G89.29 CHRONIC MIDLINE LOW BACK PAIN WITH LEFT-SIDED SCIATICA: ICD-10-CM

## 2023-09-28 DIAGNOSIS — M54.42 CHRONIC MIDLINE LOW BACK PAIN WITH LEFT-SIDED SCIATICA: ICD-10-CM

## 2023-09-28 RX ORDER — HYDROCODONE BITARTRATE AND ACETAMINOPHEN 5; 325 MG/1; MG/1
1 TABLET ORAL 2 TIMES DAILY PRN
Qty: 30 TABLET | Refills: 0 | Status: SHIPPED | OUTPATIENT
Start: 2023-09-28 | End: 2023-10-04

## 2023-09-28 NOTE — TELEPHONE ENCOUNTER
Medication last filled:09/07/2023    Last clinic visit: 08/31/2023     Clinic visit frequency required: Q 6  months    Next clinic visit: none    Controlled substance agreement on file: No.    Urine Drug Screen on file:  No    Pending Prescriptions:                       Disp   Refills    HYDROcodone-acetaminophen (NORCO) 5-325 M*30 tab*0            Sig: Take 1 tablet by mouth 2 times daily as needed

## 2023-10-03 DIAGNOSIS — G89.29 CHRONIC MIDLINE LOW BACK PAIN WITH LEFT-SIDED SCIATICA: ICD-10-CM

## 2023-10-03 DIAGNOSIS — M79.671 RIGHT FOOT PAIN: ICD-10-CM

## 2023-10-03 DIAGNOSIS — M54.42 CHRONIC MIDLINE LOW BACK PAIN WITH LEFT-SIDED SCIATICA: ICD-10-CM

## 2023-10-04 RX ORDER — HYDROCODONE BITARTRATE AND ACETAMINOPHEN 5; 325 MG/1; MG/1
1 TABLET ORAL 2 TIMES DAILY PRN
Qty: 30 TABLET | Refills: 0 | Status: SHIPPED | OUTPATIENT
Start: 2023-10-04 | End: 2023-10-20

## 2023-10-05 ENCOUNTER — PATIENT OUTREACH (OUTPATIENT)
Dept: ONCOLOGY | Facility: CLINIC | Age: 83
End: 2023-10-05
Payer: COMMERCIAL

## 2023-10-05 ENCOUNTER — TELEPHONE (OUTPATIENT)
Dept: ONCOLOGY | Facility: CLINIC | Age: 83
End: 2023-10-05
Payer: COMMERCIAL

## 2023-10-05 NOTE — PROGRESS NOTES
[facility]: Cancer Care Short Note                                    Discussion with Patient:                                                      Patient called and left message with Rae requesting to talk to [facility].  Called patient. See assessment below.    Assessment:                                                      Patient called with an update about her hip surgery.  She reports she continues to be in constant pain in her hip.  She said she can't sit or stand. She said she is finally scheduled for surgery at the end of Oct. [10/27/23] and sees Dr. Fairchild for pre-op physical on 10/20/23.  She said she will likely have pre-op labs when she sees Dr. Fairchild. She said she will contact us if she needs anything (blood, iron) before her surgery.  Otherwise, she will call to schedule follow-up here after surgery.     Intervention/Education provided during outreach:                                                       Patient to follow up as scheduled at next appt    Signature:  Ramila Aguiar RN

## 2023-10-05 NOTE — TELEPHONE ENCOUNTER
Patient calls requesting to speak will Ramila Aguiar RN care coordinator. She reports that she has questions regarding upcoming appointments/surgery and her Hgb.    Rae Thakur RN

## 2023-10-06 NOTE — TELEPHONE ENCOUNTER
Patient spoke with Ramila Aguiar RN care coordinator yesterday, see patient outreach encounter.    Rae Thakur RN

## 2023-10-11 NOTE — PROGRESS NOTES
Discharge plan according to [facility]:     10/02/23 0710   Discharge Planning   Patient/Family Anticipates Transition to home   Concerns to be Addressed all concerns addressed in this encounter   Living Arrangements   People in Home sibling(s)   Type of Residence Private Residence   Is your private residence a single family home or apartment? Single family home   Number of Stairs, Within Home, Primary none   Stair Railings, Within Home, Primary none   Once home, are you able to live on one level? Yes   Which level? Main Level   Bathroom Shower/Tub Walk-in shower   Equipment Currently Used at Home wheelchair, manual;other (see comments)  (walking stick)   Support System   Support Systems Family Members   Do you have someone available to stay with you one or two nights once you are home? Yes

## 2023-10-20 ENCOUNTER — OFFICE VISIT (OUTPATIENT)
Dept: FAMILY MEDICINE | Facility: CLINIC | Age: 83
End: 2023-10-20
Payer: COMMERCIAL

## 2023-10-20 VITALS
SYSTOLIC BLOOD PRESSURE: 136 MMHG | HEART RATE: 99 BPM | WEIGHT: 175 LBS | OXYGEN SATURATION: 97 % | HEIGHT: 63 IN | BODY MASS INDEX: 31.01 KG/M2 | RESPIRATION RATE: 18 BRPM | DIASTOLIC BLOOD PRESSURE: 78 MMHG | TEMPERATURE: 98.6 F

## 2023-10-20 DIAGNOSIS — N18.32 STAGE 3B CHRONIC KIDNEY DISEASE (H): ICD-10-CM

## 2023-10-20 DIAGNOSIS — I25.2 HISTORY OF MI (MYOCARDIAL INFARCTION): ICD-10-CM

## 2023-10-20 DIAGNOSIS — Z85.3 HX: BREAST CANCER: ICD-10-CM

## 2023-10-20 DIAGNOSIS — D80.3: ICD-10-CM

## 2023-10-20 DIAGNOSIS — G89.29 OTHER CHRONIC PAIN: ICD-10-CM

## 2023-10-20 DIAGNOSIS — Z01.818 PREOP GENERAL PHYSICAL EXAM: Primary | ICD-10-CM

## 2023-10-20 DIAGNOSIS — Z23 HIGH PRIORITY FOR 2019-NCOV VACCINE: ICD-10-CM

## 2023-10-20 DIAGNOSIS — J45.40 MODERATE PERSISTENT ASTHMA WITHOUT COMPLICATION: ICD-10-CM

## 2023-10-20 DIAGNOSIS — M16.32 OSTEOARTHRITIS RESULTING FROM LEFT HIP DYSPLASIA: ICD-10-CM

## 2023-10-20 DIAGNOSIS — D64.9 ANEMIA, UNSPECIFIED TYPE: ICD-10-CM

## 2023-10-20 LAB
ANION GAP SERPL CALCULATED.3IONS-SCNC: 13 MMOL/L (ref 7–15)
ATRIAL RATE - MUSE: 98 BPM
BUN SERPL-MCNC: 22.1 MG/DL (ref 8–23)
CALCIUM SERPL-MCNC: 11.7 MG/DL (ref 8.8–10.2)
CHLORIDE SERPL-SCNC: 103 MMOL/L (ref 98–107)
CREAT SERPL-MCNC: 1.31 MG/DL (ref 0.51–0.95)
DEPRECATED HCO3 PLAS-SCNC: 23 MMOL/L (ref 22–29)
DIASTOLIC BLOOD PRESSURE - MUSE: NORMAL MMHG
EGFRCR SERPLBLD CKD-EPI 2021: 40 ML/MIN/1.73M2
ERYTHROCYTE [DISTWIDTH] IN BLOOD BY AUTOMATED COUNT: 14.7 % (ref 10–15)
GLUCOSE SERPL-MCNC: 92 MG/DL (ref 70–99)
HCT VFR BLD AUTO: 34.7 % (ref 35–47)
HGB BLD-MCNC: 11.1 G/DL (ref 11.7–15.7)
INTERPRETATION ECG - MUSE: NORMAL
MCH RBC QN AUTO: 27.3 PG (ref 26.5–33)
MCHC RBC AUTO-ENTMCNC: 32 G/DL (ref 31.5–36.5)
MCV RBC AUTO: 86 FL (ref 78–100)
P AXIS - MUSE: 67 DEGREES
PLATELET # BLD AUTO: 325 10E3/UL (ref 150–450)
POTASSIUM SERPL-SCNC: 5 MMOL/L (ref 3.4–5.3)
PR INTERVAL - MUSE: 152 MS
QRS DURATION - MUSE: 86 MS
QT - MUSE: 330 MS
QTC - MUSE: 421 MS
R AXIS - MUSE: -10 DEGREES
RBC # BLD AUTO: 4.06 10E6/UL (ref 3.8–5.2)
SODIUM SERPL-SCNC: 139 MMOL/L (ref 135–145)
SYSTOLIC BLOOD PRESSURE - MUSE: NORMAL MMHG
T AXIS - MUSE: 113 DEGREES
VENTRICULAR RATE- MUSE: 98 BPM
WBC # BLD AUTO: 9.1 10E3/UL (ref 4–11)

## 2023-10-20 PROCEDURE — 80048 BASIC METABOLIC PNL TOTAL CA: CPT | Performed by: FAMILY MEDICINE

## 2023-10-20 PROCEDURE — 93005 ELECTROCARDIOGRAM TRACING: CPT | Performed by: FAMILY MEDICINE

## 2023-10-20 PROCEDURE — 36415 COLL VENOUS BLD VENIPUNCTURE: CPT | Performed by: FAMILY MEDICINE

## 2023-10-20 PROCEDURE — 85027 COMPLETE CBC AUTOMATED: CPT | Performed by: FAMILY MEDICINE

## 2023-10-20 PROCEDURE — 99214 OFFICE O/P EST MOD 30 MIN: CPT | Mod: 25 | Performed by: FAMILY MEDICINE

## 2023-10-20 PROCEDURE — 93010 ELECTROCARDIOGRAM REPORT: CPT | Performed by: GENERAL ACUTE CARE HOSPITAL

## 2023-10-20 RX ORDER — OXYCODONE HYDROCHLORIDE 5 MG/1
5 TABLET ORAL EVERY 8 HOURS PRN
Qty: 30 TABLET | Refills: 0 | Status: ON HOLD | OUTPATIENT
Start: 2023-10-20 | End: 2023-10-27

## 2023-10-20 ASSESSMENT — PATIENT HEALTH QUESTIONNAIRE - PHQ9
10. IF YOU CHECKED OFF ANY PROBLEMS, HOW DIFFICULT HAVE THESE PROBLEMS MADE IT FOR YOU TO DO YOUR WORK, TAKE CARE OF THINGS AT HOME, OR GET ALONG WITH OTHER PEOPLE: VERY DIFFICULT
SUM OF ALL RESPONSES TO PHQ QUESTIONS 1-9: 13
SUM OF ALL RESPONSES TO PHQ QUESTIONS 1-9: 13

## 2023-10-20 ASSESSMENT — PAIN SCALES - GENERAL: PAINLEVEL: SEVERE PAIN (6)

## 2023-10-20 NOTE — H&P (VIEW-ONLY)
[facility]  [address]  [facility] [address]  Phone: [phone]  Fax: [phone]  Primary Provider: Lai Haley  Pre-op Performing Provider: LAI HALEY      PREOPERATIVE EVALUATION:  Today's date: 10/20/2023    Jody is a 83 year old female who presents for a preoperative evaluation.      Surgical Information:  Surgery/Procedure: LEFT TOTAL HIP ARTHROPLASTY DIRECT ANTERIOR   Surgery Location:   Surgeon: Dr. Cornejo  Surgery Date: 10/27/23  Time of Surgery: 10:10 am  Where patient plans to recover: At home with family  Fax number for surgical facility: Note does not need to be faxed, will be available electronically in Epic.    Assessment & Plan     The proposed surgical procedure is considered INTERMEDIATE risk.    Jody was seen today for pre-op exam, recheck medication, pain and imm/inj.    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  -     oxyCODONE (ROXICODONE) 5 MG tablet; Take 1 tablet (5 mg) by mouth every 8 hours as needed for severe pain    High priority for 2019-nCoV vaccine    Stage 3b chronic kidney disease (H)    IgG1 subclass deficiency (H)    Moderate persistent asthma without complication    HX: breast cancer    History of MI (myocardial infarction)    Anemia, unspecified type    Other chronic pain  -     oxyCODONE (ROXICODONE) 5 MG tablet; Take 1 tablet (5 mg) by mouth every 8 hours as needed for severe pain         - No identified additional risk factors other than previously addressed    Antiplatelet or Anticoagulation Medication Instructions:   - aspirin: Discontinue aspirin 7-10 days prior to procedure to reduce bleeding risk. It  should be resumed postoperatively.     Additional Medication Instructions:   - Statins: Continue taking on the day of surgery.    - SSRIs, SNRIs, TCAs, Antipsychotics: Continue without modification.     RECOMMENDATION:  APPROVAL GIVEN to proceed with proposed procedure, without further diagnostic evaluation.    Subjective       HPI related to upcoming procedure:     She has significant hip osteoarthritis and has exhausted conservative means for treatment and is now scheduled for hip arthroplasty.    She is a history of coronary vascular disease with a history of non-ST elevation myocardial infarction. She is on medications for risk reduction and reports no symptoms at this time. She had a stress test performed September 5, 2023. The test does show an area of transmittal infarction involving the lateral and inferior wall without any ischemia identified. Her ejection fraction is normal at 55% with lateral and inferolateral wall hypokinesis report was reviewed by her. Cardiologist Dr. Alan we do not recommend any additional action and that it was fine to proceed with surgical intervention for her hip based on the findings.    She has anemia. Hemoglobin has recently been noted to increase with the current level of 11.1. No signs or symptoms of blood loss. Hemoglobin measurement today is consistent with other recent measurements.    She has a history of moderate persistent asthma and a history of IgG subclass deficiency leading to frequent respiratory infections. At this point in time there is no signs or symptoms of an infection process and asthma symptoms are controlled. She has baseline shortness of breath and limited activity because of it but it is unchanged. She follows with pulmonology.    She has a history of chronic kidney disease consistent with stage IIIB. Recommend avoidance of NSAIDs and other potential renal insults.    She has a history of breast cancer which has been treated.    She has chronic  musculoskeletal pain in addition to her known osteoarthritis. She has utilized hydrocodone acetaminophen for pain control. Today we discussed pain control in more detail and have elected to change her to oxycodone to avoid the necessity of utilization of acetaminophen. We elected to increase her overall dosage up to a total of 15 mg per day divided in three separate doses. She is been on hydrocodone typically 1 to 2 tablets daily which is inadequate for pain control and requiring additional doses of acetaminophen. There is no evidence of misuse or diversion of medication.    There are no signs or symptoms of an acute infection.    She does not have any significant noted problems associated with previous anesthesia or surgical procedures. She does not have a history of a blood clot or bleeding disorder. She does have a history of gastrointestinal bleeding from diverticular bleeding but there is no sign of any current concern.            10/20/2023     2:23 PM   Preop Questions   1. Have you ever had a heart attack or stroke? YES -    2. Have you ever had surgery on your heart or blood vessels, such as a stent placement, a coronary artery bypass, or surgery on an artery in your head, neck, heart, or legs? YES -    3. Do you have chest pain with activity? No   4. Do you have a history of  heart failure? No   5. Do you currently have a cold, bronchitis or symptoms of other infection? No   6. Do you have a cough, shortness of breath, or wheezing? YES -    7. Do you or anyone in your family have previous history of blood clots? No   8. Do you or does anyone in your family have a serious bleeding problem such as prolonged bleeding following surgeries or cuts? No known bleeding problem   9. Have you ever had problems with anemia or been told to take iron pills? YES -    10. Have you had any abnormal blood loss such as black, tarry or bloody stools, or abnormal vaginal bleeding? YES - Diverticular bleeding in the past   11.  Have you ever had a blood transfusion? YES -    11a. Have you ever had a transfusion reaction? No   12. Are you willing to have a blood transfusion if it is medically needed before, during, or after your surgery? Yes   13. Have you or any of your relatives ever had problems with anesthesia? No know reaction   14. Do you have sleep apnea, excessive snoring or daytime drowsiness? No   15. Do you have any artifical heart valves or other implanted medical devices like a pacemaker, defibrillator, or continuous glucose monitor? No   16. Do you have artificial joints? No   17. Are you allergic to latex? No       Health Care Directive:  Patient does not have a Health Care Directive or Living Will:     Preoperative Review of :   reviewed - controlled substances reflected in medication list.      Status of Chronic Conditions:  See problem list for active medical problems.  Problems all longstanding and stable, except as noted/documented.  See ROS for pertinent symptoms related to these conditions.    Review of Systems  Complete review of systems is obtained.  Other than the specific considerations noted above complete review of systems is negative.      Patient Active Problem List    Diagnosis Date Noted    RAMON (acute kidney injury) (H24) 06/28/2023     Priority: Medium    Rectal bleeding 06/27/2023     Priority: Medium    SOB (shortness of breath) 11/16/2021     Priority: Medium    Irregular heart rate 11/16/2021     Priority: Medium    Cough 08/23/2021     Priority: Medium     Formatting of this note might be different from the original.  Created by Conversion      Disorder of bone and cartilage 08/23/2021     Priority: Medium     Formatting of this note might be different from the original.  Created by Conversion    Replacement Utility updated for latest IMO load      Hypertonicity of bladder 08/23/2021     Priority: Medium     Formatting of this note might be different from the original.  Created by Conversion       Hypogammaglobulinemia (H24) 08/23/2021     Priority: Medium     Formatting of this note might be different from the original.  Created by Conversion    Replacement Utility updated for latest IMO load      Major depressive disorder, single episode in full remission (H24) 08/23/2021     Priority: Medium     Formatting of this note might be different from the original.  Created by Conversion      Other malaise and fatigue 08/23/2021     Priority: Medium     Formatting of this note might be different from the original.  Created by Conversion      Need for prophylactic hormone replacement therapy (postmenopausal) 08/23/2021     Priority: Medium     Formatting of this note might be different from the original.  Created by Conversion      Migraine headache 08/23/2021     Priority: Medium     Formatting of this note might be different from the original.  Created by Conversion    Replacement Utility updated for latest IMO load      Vitamin D deficiency 08/23/2021     Priority: Medium     Formatting of this note might be different from the original.  Created by Conversion    Replacement Utility updated for latest IMO load      Coronary atherosclerosis 03/03/2021     Priority: Medium    Hyperlipidemia with target LDL less than 70 03/03/2021     Priority: Medium    ST elevation myocardial infarction involving left circumflex coronary artery (H) 01/07/2021     Priority: Medium    Adverse effect of other drugs, medicaments and biological substances, initial encounter 02/20/2018     Priority: Medium    John lesion, chronic 01/04/2018     Priority: Medium    Colon, diverticulosis 01/04/2018     Priority: Medium    Gastric polyps 01/04/2018     Priority: Medium    Diaphragmatic hernia 12/14/2017     Priority: Medium    Polyp of duodenum 12/14/2017     Priority: Medium    Iron deficiency anemia 09/08/2017     Priority: Medium    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    Alise Thakkar MD  Date of service: 10/19/2016      Insomnia 08/20/2016     Priority: Medium    Lung nodule 06/24/2008     Priority: Medium    Pulmonary infiltrate on chest x-ray 06/24/2008     Priority: Medium    Thrombocytosis 06/24/2008     Priority: Medium    Asthma 09/19/2006     Priority: Medium     Formatting of this note might be different from the original.  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  Overview:   [facility]        Past Medical History:   Diagnosis Date    Anemia     Arthritis     Asthma     Asthma     Asthma with acute exacerbation 08/23/2021    Formatting of this note might be different from the original. Created by Conversion  Replacement Utility updated for latest IMO load    Breast cancer (H) 2017    Chronic bronchitis (H)     Chronic sinusitis     COPD (chronic obstructive pulmonary disease) (H)     Depression     GERD (gastroesophageal reflux disease)     Hiatal hernia     Hypertension     Migraines     Osteopenia     Osteoporosis     Overactive bladder     Pneumococcal infection     Pneumonia     PONV (postoperative nausea and vomiting)     Renal disease     Sinusitis     Spinal headache     ST elevation MI (STEMI) (H) 01/07/2021    Stented coronary artery      Past Surgical History:   Procedure Laterality Date    ARTHRODESIS FOOT  11/11/2011    Procedure:ARTHRODESIS FOOT; Right First Metatarsophalangeal Joint Fusion with Second and Third Clawtoe  Repairs; Surgeon:SARAH CASTRO; Location:[facility] OR    BIOPSY BREAST      CHOLECYSTECTOMY      CHOLECYSTECTOMY      CV CORONARY ANGIOGRAM N/A 1/7/2021    Procedure: Coronary Angiogram;  Surgeon: Slade Yu MD;  Location:  [facility]    CV PCI STENT DRUG ELUTING N/A 1/7/2021    Procedure: Percutaneous Coronary Intervention Stent Drug Eluting;  Surgeon: Slade Yu MD;  Location:  [facility]    ENT SURGERY      sinus surgery x1    FOOT SURGERY      GYN SURGERY      benign hysterectomy age 43    HYSTERECTOMY  1984    LUMPECTOMY BREAST Right 2017    ORTHOPEDIC SURGERY      fusion of grest toe right    SINUS SURGERY       Current Outpatient Medications   Medication Sig Dispense Refill    albuterol (PROAIR HFA/PROVENTIL HFA/VENTOLIN HFA) 108 (90 Base) MCG/ACT inhaler USE 2 INHALATIONS ORALLY   EVERY 6 HOURS AS NEEDED 54 g 1    budesonide-formoterol (SYMBICORT) 160-4.5 MCG/ACT inhaler Inhale 2 puffs into the lungs 2 times daily.      buPROPion (WELLBUTRIN XL) 300 MG 24 hr tablet Take 1 tablet (300 mg) by mouth every morning 90 tablet 2    citalopram (CELEXA) 20 MG tablet Take 1 tablet (20 mg) by mouth daily 90 tablet 3    ferrous sulfate (FE TABS) 325 (65 Fe) MG EC tablet Take 1 tablet (325 mg) by mouth daily 30 tablet 3    fluticasone (FLONASE) 50 MCG/ACT nasal spray continuous prn       ipratropium - albuterol 0.5 mg/2.5 mg/3 mL (DUONEB) 0.5-2.5 (3) MG/3ML neb solution Inhale 3 mLs into the lungs      LANsoprazole (PREVACID) 30 MG DR capsule Take 1 capsule (30 mg) by mouth daily 90 capsule 2    montelukast (SINGULAIR) 10 MG tablet Take 1 tablet (10 mg) by mouth At Bedtime 90 tablet 3    nitroFURantoin macrocrystal-monohydrate (MACROBID) 100 MG capsule Take 1 capsule (100 mg) by mouth 2 times daily for 5 days 10 capsule 0    nitroGLYcerin (NITROSTAT) 0.4 MG sublingual tablet For chest pain place 1 tablet under the tongue every 5 minutes for 3 doses. If symptoms persist 5  "minutes after 1st dose call 911. 25 tablet 0    Omeprazole (PRILOSEC PO) Take 1 tablet by mouth daily       oxyCODONE (ROXICODONE) 5 MG tablet Take 1 tablet (5 mg) by mouth every 8 hours as needed for severe pain 30 tablet 0    rosuvastatin (CRESTOR) 40 MG tablet Take 1 tablet (40 mg) by mouth daily 90 tablet 3    SUMAtriptan (IMITREX) 100 MG tablet Take 1 tablet (100 mg) by mouth at onset of headache May repeat in 2 hours if needed: max 2/day; 30 tablet 3    tiZANidine (ZANAFLEX) 2 MG tablet Take 1 tablet (2 mg) by mouth 3 times daily 30 tablet 0    aspirin (ASA) 81 MG EC tablet Take 1 tablet (81 mg) by mouth daily (Patient not taking: Reported on 10/20/2023)         Allergies   Allergen Reactions    Ticagrelor Other (See Comments)     Dyspnea    Penicillins      Amoxicillin doesn't work for her    Sulfa Antibiotics         Social History     Tobacco Use    Smoking status: Never    Smokeless tobacco: Never   Substance Use Topics    Alcohol use: No     Family History   Problem Relation Age of Onset    Breast Cancer Paternal Aunt 48.00    Breast Cancer Mother 78.00    Breast Cancer Sister 48.00    Breast Cancer Maternal Aunt 35.00    Heart Disease Father     Macular Degeneration Father     Heart Disease Brother      History   Drug Use No         Objective     /78   Pulse 99   Temp 98.6  F (37  C)   Resp 18   Ht 1.6 m (5' 3\")   Wt 79.4 kg (175 lb)   SpO2 97%   BMI 31.00 kg/m      Physical Exam        General Appearance:    Alert, cooperative, no distress   Eyes:   No scleral icterus or conjunctival irritation       Ears:    Normal TM's and external ear canals, both ears   Throat:   Lips, mucosa, and tongue normal; teeth and gums normal   Neck:   Supple, symmetrical, trachea midline, no adenopathy;        thyroid:  No enlargement/tenderness/nodules   Lungs:     Clear to auscultation bilaterally, respirations unlabored, no wheezes or crackles   Heart:    Regular rate and rhythm,  No murmur   Abdomen:    " Soft, no distention, no tenderness on palpation, no masses, no organomegaly     Extremities:  No edema, no joint swelling or redness, no evidence of any Injuries, patient is limited mobility regarding in relation to a weight put on her hip. She is able to climb up and down from the exam table.   Skin:  No concerning skin findings, no suspicious moles, no rashes   Neurologic:  On gross examination there is no motor or sensory deficit.                 Recent Labs   Lab Test 08/31/23  0830 07/24/23  1447 07/24/23  1441   HGB 11.8 10.6*  --     414  --    NA  --  139 140   POTASSIUM  --  4.7 5.1   CR  --  1.44* 1.50*      Diagnostics:  Labs pending at this time.  Results will be reviewed when available.     Recent Results (from the past 240 hour(s))   EKG 12-lead, tracing only    Collection Time: 10/20/23  2:42 PM   Result Value Ref Range    Systolic Blood Pressure  mmHg    Diastolic Blood Pressure  mmHg    Ventricular Rate 98 BPM    Atrial Rate 98 BPM    ND Interval 152 ms    QRS Duration 86 ms     ms    QTc 421 ms    P Axis 67 degrees    R AXIS -10 degrees    T Axis 113 degrees    Interpretation ECG       Sinus rhythm with marked sinus arrhythmia  T wave abnormality, consider lateral ischemia  Abnormal ECG  When compared with ECG of 03-MAR-2021 14:39,  No significant change was found  Confirmed by ALEXX BYRNES MD LOC:[facility] ([id number]) on 10/20/2023 3:18:47 PM     CBC with platelets    Collection Time: 10/20/23  2:45 PM   Result Value Ref Range    WBC Count 9.1 4.0 - 11.0 10e3/uL    RBC Count 4.06 3.80 - 5.20 10e6/uL    Hemoglobin 11.1 (L) 11.7 - 15.7 g/dL    Hematocrit 34.7 (L) 35.0 - 47.0 %    MCV 86 78 - 100 fL    MCH 27.3 26.5 - 33.0 pg    MCHC 32.0 31.5 - 36.5 g/dL    RDW 14.7 10.0 - 15.0 %    Platelet Count 325 150 - 450 10e3/uL   Basic metabolic panel  (Ca, Cl, CO2, Creat, Gluc, K, Na, BUN)    Collection Time: 10/20/23  2:45 PM   Result Value Ref Range    Sodium 139 135 - 145 mmol/L    Potassium 5.0 3.4 -  5.3 mmol/L    Chloride 103 98 - 107 mmol/L    Carbon Dioxide (CO2) 23 22 - 29 mmol/L    Anion Gap 13 7 - 15 mmol/L    Urea Nitrogen 22.1 8.0 - 23.0 mg/dL    Creatinine 1.31 (H) 0.51 - 0.95 mg/dL    GFR Estimate 40 (L) >60 mL/min/1.73m2    Calcium 11.7 (H) 8.8 - 10.2 mg/dL    Glucose 92 70 - 99 mg/dL         Revised Cardiac Risk Index (RCRI):  The patient has the following serious cardiovascular risks for perioperative complications:   - No serious cardiac risks = 0 points     RCRI Interpretation: 0 points: Class I (very low risk - 0.4% complication rate)       Signed Electronically by: Juan C Fairchild MD, MD  Copy of this evaluation report is provided to requesting physician.      Answers submitted by the patient for this visit:  Patient Health Questionnaire (Submitted on 10/20/2023)  If you checked off any problems, how difficult have these problems made it for you to do your work, take care of things at home, or get along with other people?: Very difficult  PHQ9 TOTAL SCORE: 13  A

## 2023-10-20 NOTE — PROGRESS NOTES
"Prior to immunization administration, verified patients identity using patient s name and date of birth. Please see Immunization Activity for additional information.     Screening Questionnaire for Adult Immunization    Are you sick today?   {:335461}   Do you have allergies to medications, food, a vaccine component or latex?   {:963829}   Have you ever had a serious reaction after receiving a vaccination?   {:265539}   Do you have a long-term health problem with heart, lung, kidney, or metabolic disease (e.g., diabetes), asthma, a blood disorder, no spleen, complement component deficiency, a cochlear implant, or a spinal fluid leak?  Are you on long-term aspirin therapy?   {:747876}   Do you have cancer, leukemia, HIV/AIDS, or any other immune system problem?   {:859374}   Do you have a parent, brother, or sister with an immune system problem?   {:718084}   In the past 3 months, have you taken medications that affect  your immune system, such as prednisone, other steroids, or anticancer drugs; drugs for the treatment of rheumatoid arthritis, Crohn s disease, or psoriasis; or have you had radiation treatments?   {:067110}   Have you had a seizure, or a brain or other nervous system problem?   {:549480}   During the past year, have you received a transfusion of blood or blood    products, or been given immune (gamma) globulin or antiviral drug?   {:108665}   For women: Are you pregnant or is there a chance you could become       pregnant during the next month?   {:289415}   Have you received any vaccinations in the past 4 weeks?   {:484370}     Immunization questionnaire { :056841::\"answers were all negative.\"}      Patient instructed to remain in clinic for 15 minutes afterwards, and to report any adverse reactions.     Screening performed by Kirt Cabrera MA on 10/20/2023 at 2:52 PM.   Answers submitted by the patient for this visit:  Patient Health Questionnaire (Submitted on 10/20/2023)  If you checked off any " problems, how difficult have these problems made it for you to do your work, take care of things at home, or get along with other people?: Very difficult  PHQ9 TOTAL SCORE: 13

## 2023-10-20 NOTE — PROGRESS NOTES
[facility]  [address]  [facility] [address]  Phone: [phone]  Fax: [phone]  Primary Provider: Lai Haley  Pre-op Performing Provider: LAI HALEY      PREOPERATIVE EVALUATION:  Today's date: 10/20/2023    Jody is a 83 year old female who presents for a preoperative evaluation.      Surgical Information:  Surgery/Procedure: LEFT TOTAL HIP ARTHROPLASTY DIRECT ANTERIOR   Surgery Location:   Surgeon: Dr. Cornejo  Surgery Date: 10/27/23  Time of Surgery: 10:10 am  Where patient plans to recover: At home with family  Fax number for surgical facility: Note does not need to be faxed, will be available electronically in Epic.    Assessment & Plan     The proposed surgical procedure is considered INTERMEDIATE risk.    Jody was seen today for pre-op exam, recheck medication, pain and imm/inj.    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  -     oxyCODONE (ROXICODONE) 5 MG tablet; Take 1 tablet (5 mg) by mouth every 8 hours as needed for severe pain    High priority for 2019-nCoV vaccine    Stage 3b chronic kidney disease (H)    IgG1 subclass deficiency (H)    Moderate persistent asthma without complication    HX: breast cancer    History of MI (myocardial infarction)    Anemia, unspecified type    Other chronic pain  -     oxyCODONE (ROXICODONE) 5 MG tablet; Take 1 tablet (5 mg) by mouth every 8 hours as needed for severe pain         - No identified additional risk factors other than previously addressed    Antiplatelet or Anticoagulation Medication Instructions:   - aspirin: Discontinue aspirin 7-10 days prior to procedure to reduce bleeding risk. It  should be resumed postoperatively.     Additional Medication Instructions:   - Statins: Continue taking on the day of surgery.    - SSRIs, SNRIs, TCAs, Antipsychotics: Continue without modification.     RECOMMENDATION:  APPROVAL GIVEN to proceed with proposed procedure, without further diagnostic evaluation.    Subjective       HPI related to upcoming procedure:     She has significant hip osteoarthritis and has exhausted conservative means for treatment and is now scheduled for hip arthroplasty.    She is a history of coronary vascular disease with a history of non-ST elevation myocardial infarction. She is on medications for risk reduction and reports no symptoms at this time. She had a stress test performed September 5, 2023. The test does show an area of transmittal infarction involving the lateral and inferior wall without any ischemia identified. Her ejection fraction is normal at 55% with lateral and inferolateral wall hypokinesis report was reviewed by her. Cardiologist Dr. Alan we do not recommend any additional action and that it was fine to proceed with surgical intervention for her hip based on the findings.    She has anemia. Hemoglobin has recently been noted to increase with the current level of 11.1. No signs or symptoms of blood loss. Hemoglobin measurement today is consistent with other recent measurements.    She has a history of moderate persistent asthma and a history of IgG subclass deficiency leading to frequent respiratory infections. At this point in time there is no signs or symptoms of an infection process and asthma symptoms are controlled. She has baseline shortness of breath and limited activity because of it but it is unchanged. She follows with pulmonology.    She has a history of chronic kidney disease consistent with stage IIIB. Recommend avoidance of NSAIDs and other potential renal insults.    She has a history of breast cancer which has been treated.    She has chronic  musculoskeletal pain in addition to her known osteoarthritis. She has utilized hydrocodone acetaminophen for pain control. Today we discussed pain control in more detail and have elected to change her to oxycodone to avoid the necessity of utilization of acetaminophen. We elected to increase her overall dosage up to a total of 15 mg per day divided in three separate doses. She is been on hydrocodone typically 1 to 2 tablets daily which is inadequate for pain control and requiring additional doses of acetaminophen. There is no evidence of misuse or diversion of medication.    There are no signs or symptoms of an acute infection.    She does not have any significant noted problems associated with previous anesthesia or surgical procedures. She does not have a history of a blood clot or bleeding disorder. She does have a history of gastrointestinal bleeding from diverticular bleeding but there is no sign of any current concern.            10/20/2023     2:23 PM   Preop Questions   1. Have you ever had a heart attack or stroke? YES -    2. Have you ever had surgery on your heart or blood vessels, such as a stent placement, a coronary artery bypass, or surgery on an artery in your head, neck, heart, or legs? YES -    3. Do you have chest pain with activity? No   4. Do you have a history of  heart failure? No   5. Do you currently have a cold, bronchitis or symptoms of other infection? No   6. Do you have a cough, shortness of breath, or wheezing? YES -    7. Do you or anyone in your family have previous history of blood clots? No   8. Do you or does anyone in your family have a serious bleeding problem such as prolonged bleeding following surgeries or cuts? No known bleeding problem   9. Have you ever had problems with anemia or been told to take iron pills? YES -    10. Have you had any abnormal blood loss such as black, tarry or bloody stools, or abnormal vaginal bleeding? YES - Diverticular bleeding in the past   11.  Have you ever had a blood transfusion? YES -    11a. Have you ever had a transfusion reaction? No   12. Are you willing to have a blood transfusion if it is medically needed before, during, or after your surgery? Yes   13. Have you or any of your relatives ever had problems with anesthesia? No know reaction   14. Do you have sleep apnea, excessive snoring or daytime drowsiness? No   15. Do you have any artifical heart valves or other implanted medical devices like a pacemaker, defibrillator, or continuous glucose monitor? No   16. Do you have artificial joints? No   17. Are you allergic to latex? No       Health Care Directive:  Patient does not have a Health Care Directive or Living Will:     Preoperative Review of :   reviewed - controlled substances reflected in medication list.      Status of Chronic Conditions:  See problem list for active medical problems.  Problems all longstanding and stable, except as noted/documented.  See ROS for pertinent symptoms related to these conditions.    Review of Systems  Complete review of systems is obtained.  Other than the specific considerations noted above complete review of systems is negative.      Patient Active Problem List    Diagnosis Date Noted    RAMON (acute kidney injury) (H24) 06/28/2023     Priority: Medium    Rectal bleeding 06/27/2023     Priority: Medium    SOB (shortness of breath) 11/16/2021     Priority: Medium    Irregular heart rate 11/16/2021     Priority: Medium    Cough 08/23/2021     Priority: Medium     Formatting of this note might be different from the original.  Created by Conversion      Disorder of bone and cartilage 08/23/2021     Priority: Medium     Formatting of this note might be different from the original.  Created by Conversion    Replacement Utility updated for latest IMO load      Hypertonicity of bladder 08/23/2021     Priority: Medium     Formatting of this note might be different from the original.  Created by Conversion       Hypogammaglobulinemia (H24) 08/23/2021     Priority: Medium     Formatting of this note might be different from the original.  Created by Conversion    Replacement Utility updated for latest IMO load      Major depressive disorder, single episode in full remission (H24) 08/23/2021     Priority: Medium     Formatting of this note might be different from the original.  Created by Conversion      Other malaise and fatigue 08/23/2021     Priority: Medium     Formatting of this note might be different from the original.  Created by Conversion      Need for prophylactic hormone replacement therapy (postmenopausal) 08/23/2021     Priority: Medium     Formatting of this note might be different from the original.  Created by Conversion      Migraine headache 08/23/2021     Priority: Medium     Formatting of this note might be different from the original.  Created by Conversion    Replacement Utility updated for latest IMO load      Vitamin D deficiency 08/23/2021     Priority: Medium     Formatting of this note might be different from the original.  Created by Conversion    Replacement Utility updated for latest IMO load      Coronary atherosclerosis 03/03/2021     Priority: Medium    Hyperlipidemia with target LDL less than 70 03/03/2021     Priority: Medium    ST elevation myocardial infarction involving left circumflex coronary artery (H) 01/07/2021     Priority: Medium    Adverse effect of other drugs, medicaments and biological substances, initial encounter 02/20/2018     Priority: Medium    John lesion, chronic 01/04/2018     Priority: Medium    Colon, diverticulosis 01/04/2018     Priority: Medium    Gastric polyps 01/04/2018     Priority: Medium    Diaphragmatic hernia 12/14/2017     Priority: Medium    Polyp of duodenum 12/14/2017     Priority: Medium    Iron deficiency anemia 09/08/2017     Priority: Medium    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    Alise Thakkar MD  Date of service: 10/19/2016      Insomnia 08/20/2016     Priority: Medium    Lung nodule 06/24/2008     Priority: Medium    Pulmonary infiltrate on chest x-ray 06/24/2008     Priority: Medium    Thrombocytosis 06/24/2008     Priority: Medium    Asthma 09/19/2006     Priority: Medium     Formatting of this note might be different from the original.  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  Overview:   [facility]        Past Medical History:   Diagnosis Date    Anemia     Arthritis     Asthma     Asthma     Asthma with acute exacerbation 08/23/2021    Formatting of this note might be different from the original. Created by Conversion  Replacement Utility updated for latest IMO load    Breast cancer (H) 2017    Chronic bronchitis (H)     Chronic sinusitis     COPD (chronic obstructive pulmonary disease) (H)     Depression     GERD (gastroesophageal reflux disease)     Hiatal hernia     Hypertension     Migraines     Osteopenia     Osteoporosis     Overactive bladder     Pneumococcal infection     Pneumonia     PONV (postoperative nausea and vomiting)     Renal disease     Sinusitis     Spinal headache     ST elevation MI (STEMI) (H) 01/07/2021    Stented coronary artery      Past Surgical History:   Procedure Laterality Date    ARTHRODESIS FOOT  11/11/2011    Procedure:ARTHRODESIS FOOT; Right First Metatarsophalangeal Joint Fusion with Second and Third Clawtoe  Repairs; Surgeon:SARAH CASTRO; Location:[facility] OR    BIOPSY BREAST      CHOLECYSTECTOMY      CHOLECYSTECTOMY      CV CORONARY ANGIOGRAM N/A 1/7/2021    Procedure: Coronary Angiogram;  Surgeon: Slade Yu MD;  Location:  [facility]    CV PCI STENT DRUG ELUTING N/A 1/7/2021    Procedure: Percutaneous Coronary Intervention Stent Drug Eluting;  Surgeon: Slade Yu MD;  Location:  [facility]    ENT SURGERY      sinus surgery x1    FOOT SURGERY      GYN SURGERY      benign hysterectomy age 43    HYSTERECTOMY  1984    LUMPECTOMY BREAST Right 2017    ORTHOPEDIC SURGERY      fusion of grest toe right    SINUS SURGERY       Current Outpatient Medications   Medication Sig Dispense Refill    albuterol (PROAIR HFA/PROVENTIL HFA/VENTOLIN HFA) 108 (90 Base) MCG/ACT inhaler USE 2 INHALATIONS ORALLY   EVERY 6 HOURS AS NEEDED 54 g 1    budesonide-formoterol (SYMBICORT) 160-4.5 MCG/ACT inhaler Inhale 2 puffs into the lungs 2 times daily.      buPROPion (WELLBUTRIN XL) 300 MG 24 hr tablet Take 1 tablet (300 mg) by mouth every morning 90 tablet 2    citalopram (CELEXA) 20 MG tablet Take 1 tablet (20 mg) by mouth daily 90 tablet 3    ferrous sulfate (FE TABS) 325 (65 Fe) MG EC tablet Take 1 tablet (325 mg) by mouth daily 30 tablet 3    fluticasone (FLONASE) 50 MCG/ACT nasal spray continuous prn       ipratropium - albuterol 0.5 mg/2.5 mg/3 mL (DUONEB) 0.5-2.5 (3) MG/3ML neb solution Inhale 3 mLs into the lungs      LANsoprazole (PREVACID) 30 MG DR capsule Take 1 capsule (30 mg) by mouth daily 90 capsule 2    montelukast (SINGULAIR) 10 MG tablet Take 1 tablet (10 mg) by mouth At Bedtime 90 tablet 3    nitroFURantoin macrocrystal-monohydrate (MACROBID) 100 MG capsule Take 1 capsule (100 mg) by mouth 2 times daily for 5 days 10 capsule 0    nitroGLYcerin (NITROSTAT) 0.4 MG sublingual tablet For chest pain place 1 tablet under the tongue every 5 minutes for 3 doses. If symptoms persist 5  "minutes after 1st dose call 911. 25 tablet 0    Omeprazole (PRILOSEC PO) Take 1 tablet by mouth daily       oxyCODONE (ROXICODONE) 5 MG tablet Take 1 tablet (5 mg) by mouth every 8 hours as needed for severe pain 30 tablet 0    rosuvastatin (CRESTOR) 40 MG tablet Take 1 tablet (40 mg) by mouth daily 90 tablet 3    SUMAtriptan (IMITREX) 100 MG tablet Take 1 tablet (100 mg) by mouth at onset of headache May repeat in 2 hours if needed: max 2/day; 30 tablet 3    tiZANidine (ZANAFLEX) 2 MG tablet Take 1 tablet (2 mg) by mouth 3 times daily 30 tablet 0    aspirin (ASA) 81 MG EC tablet Take 1 tablet (81 mg) by mouth daily (Patient not taking: Reported on 10/20/2023)         Allergies   Allergen Reactions    Ticagrelor Other (See Comments)     Dyspnea    Penicillins      Amoxicillin doesn't work for her    Sulfa Antibiotics         Social History     Tobacco Use    Smoking status: Never    Smokeless tobacco: Never   Substance Use Topics    Alcohol use: No     Family History   Problem Relation Age of Onset    Breast Cancer Paternal Aunt 48.00    Breast Cancer Mother 78.00    Breast Cancer Sister 48.00    Breast Cancer Maternal Aunt 35.00    Heart Disease Father     Macular Degeneration Father     Heart Disease Brother      History   Drug Use No         Objective     /78   Pulse 99   Temp 98.6  F (37  C)   Resp 18   Ht 1.6 m (5' 3\")   Wt 79.4 kg (175 lb)   SpO2 97%   BMI 31.00 kg/m      Physical Exam        General Appearance:    Alert, cooperative, no distress   Eyes:   No scleral icterus or conjunctival irritation       Ears:    Normal TM's and external ear canals, both ears   Throat:   Lips, mucosa, and tongue normal; teeth and gums normal   Neck:   Supple, symmetrical, trachea midline, no adenopathy;        thyroid:  No enlargement/tenderness/nodules   Lungs:     Clear to auscultation bilaterally, respirations unlabored, no wheezes or crackles   Heart:    Regular rate and rhythm,  No murmur   Abdomen:    " Soft, no distention, no tenderness on palpation, no masses, no organomegaly     Extremities:  No edema, no joint swelling or redness, no evidence of any Injuries, patient is limited mobility regarding in relation to a weight put on her hip. She is able to climb up and down from the exam table.   Skin:  No concerning skin findings, no suspicious moles, no rashes   Neurologic:  On gross examination there is no motor or sensory deficit.                 Recent Labs   Lab Test 08/31/23  0830 07/24/23  1447 07/24/23  1441   HGB 11.8 10.6*  --     414  --    NA  --  139 140   POTASSIUM  --  4.7 5.1   CR  --  1.44* 1.50*      Diagnostics:  Labs pending at this time.  Results will be reviewed when available.     Recent Results (from the past 240 hour(s))   EKG 12-lead, tracing only    Collection Time: 10/20/23  2:42 PM   Result Value Ref Range    Systolic Blood Pressure  mmHg    Diastolic Blood Pressure  mmHg    Ventricular Rate 98 BPM    Atrial Rate 98 BPM    TN Interval 152 ms    QRS Duration 86 ms     ms    QTc 421 ms    P Axis 67 degrees    R AXIS -10 degrees    T Axis 113 degrees    Interpretation ECG       Sinus rhythm with marked sinus arrhythmia  T wave abnormality, consider lateral ischemia  Abnormal ECG  When compared with ECG of 03-MAR-2021 14:39,  No significant change was found  Confirmed by ALEXX BYRNES MD LOC:[facility] ([id number]) on 10/20/2023 3:18:47 PM     CBC with platelets    Collection Time: 10/20/23  2:45 PM   Result Value Ref Range    WBC Count 9.1 4.0 - 11.0 10e3/uL    RBC Count 4.06 3.80 - 5.20 10e6/uL    Hemoglobin 11.1 (L) 11.7 - 15.7 g/dL    Hematocrit 34.7 (L) 35.0 - 47.0 %    MCV 86 78 - 100 fL    MCH 27.3 26.5 - 33.0 pg    MCHC 32.0 31.5 - 36.5 g/dL    RDW 14.7 10.0 - 15.0 %    Platelet Count 325 150 - 450 10e3/uL   Basic metabolic panel  (Ca, Cl, CO2, Creat, Gluc, K, Na, BUN)    Collection Time: 10/20/23  2:45 PM   Result Value Ref Range    Sodium 139 135 - 145 mmol/L    Potassium 5.0 3.4 -  5.3 mmol/L    Chloride 103 98 - 107 mmol/L    Carbon Dioxide (CO2) 23 22 - 29 mmol/L    Anion Gap 13 7 - 15 mmol/L    Urea Nitrogen 22.1 8.0 - 23.0 mg/dL    Creatinine 1.31 (H) 0.51 - 0.95 mg/dL    GFR Estimate 40 (L) >60 mL/min/1.73m2    Calcium 11.7 (H) 8.8 - 10.2 mg/dL    Glucose 92 70 - 99 mg/dL         Revised Cardiac Risk Index (RCRI):  The patient has the following serious cardiovascular risks for perioperative complications:   - No serious cardiac risks = 0 points     RCRI Interpretation: 0 points: Class I (very low risk - 0.4% complication rate)       Signed Electronically by: Juan C Fairchild MD, MD  Copy of this evaluation report is provided to requesting physician.      Answers submitted by the patient for this visit:  Patient Health Questionnaire (Submitted on 10/20/2023)  If you checked off any problems, how difficult have these problems made it for you to do your work, take care of things at home, or get along with other people?: Very difficult  PHQ9 TOTAL SCORE: 13  A

## 2023-10-22 DIAGNOSIS — E83.52 HYPERCALCEMIA: Primary | ICD-10-CM

## 2023-10-23 ENCOUNTER — TELEPHONE (OUTPATIENT)
Dept: FAMILY MEDICINE | Facility: CLINIC | Age: 83
End: 2023-10-23
Payer: COMMERCIAL

## 2023-10-23 NOTE — TELEPHONE ENCOUNTER
Called and left a message.    Please relay message from Dr Fairchild if she calls back.    <the kidney function measurement proved slightly. Calcium level mildly elevated the reason for this is not completely clear. Given the degree of elevation it should be rechecked prior to the procedure. She should schedule a lab appointment Tuesday or Wednesday to recheck. She should make sure she is well hydrated at the time of the repeat lab test. I believe this is a transient elevation and will correct on its own but we need to confirm that it is correcting. >    Please help patient make a lab appointment if she calls back. Does not need to be fasting. Please make sure she drinks a good amount before she comes.    Thank you

## 2023-10-23 NOTE — TELEPHONE ENCOUNTER
Patient states that she is bedridden and that she has been depending on rides from others and that she is running out of people to drive her. I did not make the patient a lab appointment. She said that she will try and make it when she can find a ride.

## 2023-10-23 NOTE — TELEPHONE ENCOUNTER
Can we try to reach out to patient to schedule a lab-only either Tuesday or Wednesday here at our lab per DR Fairchild to recheck her labs?     Does not need to be fasting.    Thank you

## 2023-10-23 NOTE — TELEPHONE ENCOUNTER
----- Message from Juan C Fairchild MD sent at 10/22/2023  6:41 PM CDT -----  Please call patient: the kidney function measurement proved slightly. Calcium level mildly elevated the reason for this is not completely clear. Given the degree of elevation it should be rechecked prior to the procedure. She should schedule a lab appointment Tuesday or Wednesday to recheck. She should make sure she is well hydrated at the time of the repeat lab test. I believe this is a transient elevation and will correct on its own but we need to confirm that it is correcting.

## 2023-10-26 NOTE — OR NURSING
Alisson from [facility] called and stated that Dr. Cornejo is okay to proceed with surgery. He just wanted a DOS T&S d/t low pre op Hgb. Alisson stated he didn't order any other labs in regards to the other abnormal labs. She said he knew her medical hx/complexity and a T&S dos would be fine. Note was placed on front of chart to draw a T&S STAT upon arrival to [facility] on DOS. Pt is scheduled to arrive 3 hours prior to OR so that should be plenty of time to process lab.

## 2023-10-27 ENCOUNTER — ANESTHESIA EVENT (OUTPATIENT)
Dept: SURGERY | Facility: CLINIC | Age: 83
DRG: 470 | End: 2023-10-27
Payer: COMMERCIAL

## 2023-10-27 ENCOUNTER — APPOINTMENT (OUTPATIENT)
Dept: RADIOLOGY | Facility: CLINIC | Age: 83
DRG: 470 | End: 2023-10-27
Payer: COMMERCIAL

## 2023-10-27 ENCOUNTER — HOSPITAL ENCOUNTER (INPATIENT)
Facility: CLINIC | Age: 83
LOS: 4 days | Discharge: NURSING, REHAB OR LONG-TERM CARE | DRG: 470 | End: 2023-10-31
Attending: ORTHOPAEDIC SURGERY | Admitting: ORTHOPAEDIC SURGERY
Payer: COMMERCIAL

## 2023-10-27 ENCOUNTER — APPOINTMENT (OUTPATIENT)
Dept: PHYSICAL THERAPY | Facility: CLINIC | Age: 83
DRG: 470 | End: 2023-10-27
Payer: COMMERCIAL

## 2023-10-27 ENCOUNTER — ANESTHESIA (OUTPATIENT)
Dept: SURGERY | Facility: CLINIC | Age: 83
DRG: 470 | End: 2023-10-27
Payer: COMMERCIAL

## 2023-10-27 DIAGNOSIS — Z96.642 S/P TOTAL LEFT HIP ARTHROPLASTY: Primary | ICD-10-CM

## 2023-10-27 LAB
ABO/RH(D): NORMAL
ANION GAP SERPL CALCULATED.3IONS-SCNC: 10 MMOL/L (ref 7–15)
ANTIBODY SCREEN: NEGATIVE
BUN SERPL-MCNC: 21.4 MG/DL (ref 8–23)
CALCIUM SERPL-MCNC: 8.8 MG/DL (ref 8.8–10.2)
CHLORIDE SERPL-SCNC: 108 MMOL/L (ref 98–107)
CREAT SERPL-MCNC: 1.27 MG/DL (ref 0.51–0.95)
DEPRECATED HCO3 PLAS-SCNC: 22 MMOL/L (ref 22–29)
EGFRCR SERPLBLD CKD-EPI 2021: 42 ML/MIN/1.73M2
ERYTHROCYTE [DISTWIDTH] IN BLOOD BY AUTOMATED COUNT: 15.2 % (ref 10–15)
GLUCOSE SERPL-MCNC: 112 MG/DL (ref 70–99)
HCT VFR BLD AUTO: 34.4 % (ref 35–47)
HGB BLD-MCNC: 10.9 G/DL (ref 11.7–15.7)
INR PPP: 0.9 (ref 0.85–1.15)
LACTATE SERPL-SCNC: 1.3 MMOL/L (ref 0.7–2)
MCH RBC QN AUTO: 27 PG (ref 26.5–33)
MCHC RBC AUTO-ENTMCNC: 31.7 G/DL (ref 31.5–36.5)
MCV RBC AUTO: 85 FL (ref 78–100)
PLATELET # BLD AUTO: 299 10E3/UL (ref 150–450)
POTASSIUM SERPL-SCNC: 3.8 MMOL/L (ref 3.4–5.3)
RBC # BLD AUTO: 4.04 10E6/UL (ref 3.8–5.2)
SODIUM SERPL-SCNC: 140 MMOL/L (ref 135–145)
SPECIMEN EXPIRATION DATE: NORMAL
WBC # BLD AUTO: 9.6 10E3/UL (ref 4–11)

## 2023-10-27 PROCEDURE — 86901 BLOOD TYPING SEROLOGIC RH(D): CPT | Performed by: ORTHOPAEDIC SURGERY

## 2023-10-27 PROCEDURE — 80048 BASIC METABOLIC PNL TOTAL CA: CPT

## 2023-10-27 PROCEDURE — C1763 CONN TISS, NON-HUMAN: HCPCS | Performed by: ORTHOPAEDIC SURGERY

## 2023-10-27 PROCEDURE — 250N000011 HC RX IP 250 OP 636: Performed by: ANESTHESIOLOGY

## 2023-10-27 PROCEDURE — 258N000001 HC RX 258

## 2023-10-27 PROCEDURE — 272N000001 HC OR GENERAL SUPPLY STERILE: Performed by: ORTHOPAEDIC SURGERY

## 2023-10-27 PROCEDURE — 99222 1ST HOSP IP/OBS MODERATE 55: CPT | Performed by: FAMILY MEDICINE

## 2023-10-27 PROCEDURE — 250N000013 HC RX MED GY IP 250 OP 250 PS 637: Performed by: ANESTHESIOLOGY

## 2023-10-27 PROCEDURE — 250N000011 HC RX IP 250 OP 636: Performed by: NURSE ANESTHETIST, CERTIFIED REGISTERED

## 2023-10-27 PROCEDURE — 83605 ASSAY OF LACTIC ACID: CPT | Performed by: FAMILY MEDICINE

## 2023-10-27 PROCEDURE — 710N000010 HC RECOVERY PHASE 1, LEVEL 2, PER MIN: Performed by: ORTHOPAEDIC SURGERY

## 2023-10-27 PROCEDURE — 258N000003 HC RX IP 258 OP 636: Performed by: ANESTHESIOLOGY

## 2023-10-27 PROCEDURE — 250N000009 HC RX 250

## 2023-10-27 PROCEDURE — 360N000077 HC SURGERY LEVEL 4, PER MIN: Performed by: ORTHOPAEDIC SURGERY

## 2023-10-27 PROCEDURE — 250N000011 HC RX IP 250 OP 636

## 2023-10-27 PROCEDURE — C1776 JOINT DEVICE (IMPLANTABLE): HCPCS | Performed by: ORTHOPAEDIC SURGERY

## 2023-10-27 PROCEDURE — 250N000009 HC RX 250: Performed by: NURSE ANESTHETIST, CERTIFIED REGISTERED

## 2023-10-27 PROCEDURE — 36415 COLL VENOUS BLD VENIPUNCTURE: CPT

## 2023-10-27 PROCEDURE — 999N000181 XR SURGERY CARM FLUORO GREATER THAN 5 MIN W STILLS: Mod: TC

## 2023-10-27 PROCEDURE — 258N000003 HC RX IP 258 OP 636

## 2023-10-27 PROCEDURE — 0SRB019 REPLACEMENT OF LEFT HIP JOINT WITH METAL SYNTHETIC SUBSTITUTE, CEMENTED, OPEN APPROACH: ICD-10-PCS | Performed by: ORTHOPAEDIC SURGERY

## 2023-10-27 PROCEDURE — 85027 COMPLETE CBC AUTOMATED: CPT

## 2023-10-27 PROCEDURE — 370N000017 HC ANESTHESIA TECHNICAL FEE, PER MIN: Performed by: ORTHOPAEDIC SURGERY

## 2023-10-27 PROCEDURE — 97530 THERAPEUTIC ACTIVITIES: CPT | Mod: GP

## 2023-10-27 PROCEDURE — 250N000013 HC RX MED GY IP 250 OP 250 PS 637: Performed by: FAMILY MEDICINE

## 2023-10-27 PROCEDURE — 97161 PT EVAL LOW COMPLEX 20 MIN: CPT | Mod: GP

## 2023-10-27 PROCEDURE — 36415 COLL VENOUS BLD VENIPUNCTURE: CPT | Performed by: FAMILY MEDICINE

## 2023-10-27 PROCEDURE — 85610 PROTHROMBIN TIME: CPT

## 2023-10-27 PROCEDURE — 999N000141 HC STATISTIC PRE-PROCEDURE NURSING ASSESSMENT: Performed by: ORTHOPAEDIC SURGERY

## 2023-10-27 PROCEDURE — 250N000011 HC RX IP 250 OP 636: Mod: JZ

## 2023-10-27 PROCEDURE — C1713 ANCHOR/SCREW BN/BN,TIS/BN: HCPCS | Performed by: ORTHOPAEDIC SURGERY

## 2023-10-27 PROCEDURE — 120N000001 HC R&B MED SURG/OB

## 2023-10-27 PROCEDURE — 250N000013 HC RX MED GY IP 250 OP 250 PS 637

## 2023-10-27 PROCEDURE — 258N000003 HC RX IP 258 OP 636: Performed by: NURSE ANESTHETIST, CERTIFIED REGISTERED

## 2023-10-27 PROCEDURE — 250N000013 HC RX MED GY IP 250 OP 250 PS 637: Performed by: CLINICAL NURSE SPECIALIST

## 2023-10-27 DEVICE — 132 DEGREE CEMENTED HIP STEM
Type: IMPLANTABLE DEVICE | Site: HIP | Status: FUNCTIONAL
Brand: ACCOLADE

## 2023-10-27 DEVICE — UNIVERSAL DISTAL CEMENT SPACER
Type: IMPLANTABLE DEVICE | Site: HIP | Status: FUNCTIONAL
Brand: OMNIFIT

## 2023-10-27 DEVICE — BONE CEMENT SIMPLEX W/TOBRAMYCIN 6197-9-001: Type: IMPLANTABLE DEVICE | Site: HIP | Status: FUNCTIONAL

## 2023-10-27 DEVICE — CANAL -CEMENTED
Type: IMPLANTABLE DEVICE | Site: HIP | Status: FUNCTIONAL
Brand: BONE PLUG

## 2023-10-27 DEVICE — TRIDENT II TRITANIUM CLUSTER 52E
Type: IMPLANTABLE DEVICE | Site: HIP | Status: FUNCTIONAL
Brand: TRIDENT II

## 2023-10-27 DEVICE — HEX DOME HOLE PLUG
Type: IMPLANTABLE DEVICE | Site: HIP | Status: FUNCTIONAL
Brand: TRIDENT II

## 2023-10-27 DEVICE — 6.5MM LOW PROFILE HEX SCREW 20MM
Type: IMPLANTABLE DEVICE | Site: HIP | Status: FUNCTIONAL
Brand: TRIDENT II

## 2023-10-27 DEVICE — TRIDENT X3 0 DEGREE POLYETHYLENE INSERT
Type: IMPLANTABLE DEVICE | Site: HIP | Status: FUNCTIONAL
Brand: TRIDENT X3 INSERT

## 2023-10-27 DEVICE — CERAMIC V40 FEMORAL HEAD
Type: IMPLANTABLE DEVICE | Site: HIP | Status: FUNCTIONAL
Brand: BIOLOX

## 2023-10-27 RX ORDER — CITALOPRAM HYDROBROMIDE 20 MG/1
20 TABLET ORAL DAILY
Status: DISCONTINUED | OUTPATIENT
Start: 2023-10-28 | End: 2023-10-31 | Stop reason: HOSPADM

## 2023-10-27 RX ORDER — TRANEXAMIC ACID 650 MG/1
1950 TABLET ORAL ONCE
Status: COMPLETED | OUTPATIENT
Start: 2023-10-27 | End: 2023-10-27

## 2023-10-27 RX ORDER — ASPIRIN 81 MG/1
162 TABLET ORAL ONCE
Status: COMPLETED | OUTPATIENT
Start: 2023-10-27 | End: 2023-10-27

## 2023-10-27 RX ORDER — LIDOCAINE 40 MG/G
CREAM TOPICAL
Status: DISCONTINUED | OUTPATIENT
Start: 2023-10-27 | End: 2023-10-31 | Stop reason: HOSPADM

## 2023-10-27 RX ORDER — SODIUM CHLORIDE, SODIUM LACTATE, POTASSIUM CHLORIDE, CALCIUM CHLORIDE 600; 310; 30; 20 MG/100ML; MG/100ML; MG/100ML; MG/100ML
INJECTION, SOLUTION INTRAVENOUS CONTINUOUS
Status: DISCONTINUED | OUTPATIENT
Start: 2023-10-27 | End: 2023-10-27 | Stop reason: HOSPADM

## 2023-10-27 RX ORDER — HYDROMORPHONE HCL IN WATER/PF 6 MG/30 ML
0.4 PATIENT CONTROLLED ANALGESIA SYRINGE INTRAVENOUS EVERY 5 MIN PRN
Status: DISCONTINUED | OUTPATIENT
Start: 2023-10-27 | End: 2023-10-27 | Stop reason: HOSPADM

## 2023-10-27 RX ORDER — ACETAMINOPHEN 325 MG/1
975 TABLET ORAL ONCE
Status: COMPLETED | OUTPATIENT
Start: 2023-10-27 | End: 2023-10-27

## 2023-10-27 RX ORDER — OXYCODONE HYDROCHLORIDE 5 MG/1
5 TABLET ORAL EVERY 4 HOURS PRN
Status: DISCONTINUED | OUTPATIENT
Start: 2023-10-27 | End: 2023-10-27

## 2023-10-27 RX ORDER — OXYCODONE HYDROCHLORIDE 5 MG/1
5 TABLET ORAL
Status: DISCONTINUED | OUTPATIENT
Start: 2023-10-27 | End: 2023-10-31 | Stop reason: HOSPADM

## 2023-10-27 RX ORDER — DEXAMETHASONE SODIUM PHOSPHATE 10 MG/ML
INJECTION, SOLUTION INTRAMUSCULAR; INTRAVENOUS PRN
Status: DISCONTINUED | OUTPATIENT
Start: 2023-10-27 | End: 2023-10-27

## 2023-10-27 RX ORDER — ASPIRIN 81 MG/1
81 TABLET ORAL 2 TIMES DAILY
Qty: 60 TABLET | Refills: 0 | Status: SHIPPED | OUTPATIENT
Start: 2023-10-27 | End: 2023-10-30

## 2023-10-27 RX ORDER — LIDOCAINE HYDROCHLORIDE 10 MG/ML
INJECTION, SOLUTION INFILTRATION; PERINEURAL PRN
Status: DISCONTINUED | OUTPATIENT
Start: 2023-10-27 | End: 2023-10-27

## 2023-10-27 RX ORDER — GLYCOPYRROLATE 0.2 MG/ML
INJECTION, SOLUTION INTRAMUSCULAR; INTRAVENOUS PRN
Status: DISCONTINUED | OUTPATIENT
Start: 2023-10-27 | End: 2023-10-27

## 2023-10-27 RX ORDER — PROCHLORPERAZINE MALEATE 5 MG
5 TABLET ORAL EVERY 6 HOURS PRN
Status: DISCONTINUED | OUTPATIENT
Start: 2023-10-27 | End: 2023-10-31 | Stop reason: HOSPADM

## 2023-10-27 RX ORDER — SODIUM CHLORIDE, SODIUM LACTATE, POTASSIUM CHLORIDE, CALCIUM CHLORIDE 600; 310; 30; 20 MG/100ML; MG/100ML; MG/100ML; MG/100ML
INJECTION, SOLUTION INTRAVENOUS CONTINUOUS
Status: DISCONTINUED | OUTPATIENT
Start: 2023-10-27 | End: 2023-10-29

## 2023-10-27 RX ORDER — OXYCODONE HCL 10 MG/1
10 TABLET, FILM COATED, EXTENDED RELEASE ORAL EVERY 12 HOURS
Status: COMPLETED | OUTPATIENT
Start: 2023-10-27 | End: 2023-10-27

## 2023-10-27 RX ORDER — KETAMINE HYDROCHLORIDE 10 MG/ML
INJECTION INTRAMUSCULAR; INTRAVENOUS PRN
Status: DISCONTINUED | OUTPATIENT
Start: 2023-10-27 | End: 2023-10-27

## 2023-10-27 RX ORDER — HYDROMORPHONE HYDROCHLORIDE 1 MG/ML
0.5 INJECTION, SOLUTION INTRAMUSCULAR; INTRAVENOUS; SUBCUTANEOUS
Status: DISCONTINUED | OUTPATIENT
Start: 2023-10-27 | End: 2023-10-28

## 2023-10-27 RX ORDER — FENTANYL CITRATE 50 UG/ML
25 INJECTION, SOLUTION INTRAMUSCULAR; INTRAVENOUS EVERY 5 MIN PRN
Status: DISCONTINUED | OUTPATIENT
Start: 2023-10-27 | End: 2023-10-27 | Stop reason: HOSPADM

## 2023-10-27 RX ORDER — ACETAMINOPHEN 325 MG/1
650 TABLET ORAL EVERY 6 HOURS PRN
Qty: 60 TABLET | Refills: 0 | Status: SHIPPED | OUTPATIENT
Start: 2023-10-27 | End: 2023-10-30

## 2023-10-27 RX ORDER — BISACODYL 10 MG
10 SUPPOSITORY, RECTAL RECTAL DAILY PRN
Status: DISCONTINUED | OUTPATIENT
Start: 2023-10-27 | End: 2023-10-31 | Stop reason: HOSPADM

## 2023-10-27 RX ORDER — TIZANIDINE 2 MG/1
2 TABLET ORAL 2 TIMES DAILY PRN
Status: DISCONTINUED | OUTPATIENT
Start: 2023-10-27 | End: 2023-10-31 | Stop reason: HOSPADM

## 2023-10-27 RX ORDER — AMOXICILLIN 250 MG
1-2 CAPSULE ORAL 2 TIMES DAILY
Qty: 15 TABLET | Refills: 0 | Status: SHIPPED | OUTPATIENT
Start: 2023-10-27 | End: 2023-10-30

## 2023-10-27 RX ORDER — ACETAMINOPHEN 325 MG/1
650 TABLET ORAL EVERY 4 HOURS PRN
Status: DISCONTINUED | OUTPATIENT
Start: 2023-10-30 | End: 2023-10-31 | Stop reason: HOSPADM

## 2023-10-27 RX ORDER — BUPROPION HYDROCHLORIDE 300 MG/1
300 TABLET ORAL EVERY MORNING
Status: DISCONTINUED | OUTPATIENT
Start: 2023-10-28 | End: 2023-10-31 | Stop reason: HOSPADM

## 2023-10-27 RX ORDER — ONDANSETRON 2 MG/ML
4 INJECTION INTRAMUSCULAR; INTRAVENOUS EVERY 30 MIN PRN
Status: DISCONTINUED | OUTPATIENT
Start: 2023-10-27 | End: 2023-10-27 | Stop reason: HOSPADM

## 2023-10-27 RX ORDER — CEFADROXIL 500 MG/1
500 CAPSULE ORAL 2 TIMES DAILY
Qty: 14 CAPSULE | Refills: 0 | Status: SHIPPED | OUTPATIENT
Start: 2023-10-27 | End: 2023-10-30

## 2023-10-27 RX ORDER — BUDESONIDE AND FORMOTEROL FUMARATE DIHYDRATE 160; 4.5 UG/1; UG/1
2 AEROSOL RESPIRATORY (INHALATION) 2 TIMES DAILY
Status: DISCONTINUED | OUTPATIENT
Start: 2023-10-27 | End: 2023-10-31 | Stop reason: HOSPADM

## 2023-10-27 RX ORDER — ACETAMINOPHEN 325 MG/1
975 TABLET ORAL EVERY 8 HOURS
Qty: 27 TABLET | Refills: 0 | Status: COMPLETED | OUTPATIENT
Start: 2023-10-27 | End: 2023-10-30

## 2023-10-27 RX ORDER — CEFAZOLIN SODIUM/WATER 2 G/20 ML
2 SYRINGE (ML) INTRAVENOUS
Status: COMPLETED | OUTPATIENT
Start: 2023-10-27 | End: 2023-10-27

## 2023-10-27 RX ORDER — OXYCODONE HYDROCHLORIDE 5 MG/1
10 TABLET ORAL EVERY 4 HOURS PRN
Status: DISCONTINUED | OUTPATIENT
Start: 2023-10-27 | End: 2023-10-27

## 2023-10-27 RX ORDER — POLYETHYLENE GLYCOL 3350 17 G/17G
1 POWDER, FOR SOLUTION ORAL DAILY
Qty: 7 PACKET | Refills: 0 | Status: SHIPPED | OUTPATIENT
Start: 2023-10-27 | End: 2023-10-30

## 2023-10-27 RX ORDER — PANTOPRAZOLE SODIUM 40 MG/1
40 TABLET, DELAYED RELEASE ORAL DAILY
Status: DISCONTINUED | OUTPATIENT
Start: 2023-10-28 | End: 2023-10-31 | Stop reason: HOSPADM

## 2023-10-27 RX ORDER — PROPOFOL 10 MG/ML
INJECTION, EMULSION INTRAVENOUS CONTINUOUS PRN
Status: DISCONTINUED | OUTPATIENT
Start: 2023-10-27 | End: 2023-10-27

## 2023-10-27 RX ORDER — LIDOCAINE 40 MG/G
CREAM TOPICAL
Status: DISCONTINUED | OUTPATIENT
Start: 2023-10-27 | End: 2023-10-27 | Stop reason: HOSPADM

## 2023-10-27 RX ORDER — OXYCODONE HYDROCHLORIDE 5 MG/1
5-10 TABLET ORAL EVERY 6 HOURS PRN
Qty: 28 TABLET | Refills: 0 | Status: SHIPPED | OUTPATIENT
Start: 2023-10-27 | End: 2023-10-30

## 2023-10-27 RX ORDER — SUMATRIPTAN 50 MG/1
100 TABLET, FILM COATED ORAL
Status: DISCONTINUED | OUTPATIENT
Start: 2023-10-27 | End: 2023-10-31 | Stop reason: HOSPADM

## 2023-10-27 RX ORDER — NITROGLYCERIN 0.4 MG/1
0.4 TABLET SUBLINGUAL EVERY 5 MIN PRN
Status: DISCONTINUED | OUTPATIENT
Start: 2023-10-27 | End: 2023-10-31 | Stop reason: HOSPADM

## 2023-10-27 RX ORDER — ONDANSETRON 2 MG/ML
4 INJECTION INTRAMUSCULAR; INTRAVENOUS EVERY 6 HOURS PRN
Status: DISCONTINUED | OUTPATIENT
Start: 2023-10-27 | End: 2023-10-31 | Stop reason: HOSPADM

## 2023-10-27 RX ORDER — IPRATROPIUM BROMIDE AND ALBUTEROL SULFATE 2.5; .5 MG/3ML; MG/3ML
3 SOLUTION RESPIRATORY (INHALATION) EVERY 4 HOURS PRN
Status: DISCONTINUED | OUTPATIENT
Start: 2023-10-27 | End: 2023-10-29

## 2023-10-27 RX ORDER — AMOXICILLIN 250 MG
1 CAPSULE ORAL 2 TIMES DAILY
Status: DISCONTINUED | OUTPATIENT
Start: 2023-10-27 | End: 2023-10-31 | Stop reason: HOSPADM

## 2023-10-27 RX ORDER — HALOPERIDOL 5 MG/ML
1 INJECTION INTRAMUSCULAR ONCE
Status: COMPLETED | OUTPATIENT
Start: 2023-10-27 | End: 2023-10-27

## 2023-10-27 RX ORDER — FENTANYL CITRATE 50 UG/ML
INJECTION, SOLUTION INTRAMUSCULAR; INTRAVENOUS PRN
Status: DISCONTINUED | OUTPATIENT
Start: 2023-10-27 | End: 2023-10-27

## 2023-10-27 RX ORDER — FENTANYL CITRATE 50 UG/ML
50 INJECTION, SOLUTION INTRAMUSCULAR; INTRAVENOUS EVERY 5 MIN PRN
Status: DISCONTINUED | OUTPATIENT
Start: 2023-10-27 | End: 2023-10-27 | Stop reason: HOSPADM

## 2023-10-27 RX ORDER — ONDANSETRON 4 MG/1
4 TABLET, ORALLY DISINTEGRATING ORAL EVERY 30 MIN PRN
Status: DISCONTINUED | OUTPATIENT
Start: 2023-10-27 | End: 2023-10-27 | Stop reason: HOSPADM

## 2023-10-27 RX ORDER — ACETAMINOPHEN 325 MG/1
975 TABLET ORAL ONCE
Status: DISCONTINUED | OUTPATIENT
Start: 2023-10-27 | End: 2023-10-27 | Stop reason: HOSPADM

## 2023-10-27 RX ORDER — PROPOFOL 10 MG/ML
INJECTION, EMULSION INTRAVENOUS PRN
Status: DISCONTINUED | OUTPATIENT
Start: 2023-10-27 | End: 2023-10-27

## 2023-10-27 RX ORDER — ONDANSETRON 4 MG/1
4 TABLET, ORALLY DISINTEGRATING ORAL EVERY 6 HOURS PRN
Status: DISCONTINUED | OUTPATIENT
Start: 2023-10-27 | End: 2023-10-31 | Stop reason: HOSPADM

## 2023-10-27 RX ORDER — OXYCODONE HYDROCHLORIDE 5 MG/1
10 TABLET ORAL
Status: DISCONTINUED | OUTPATIENT
Start: 2023-10-27 | End: 2023-10-31 | Stop reason: HOSPADM

## 2023-10-27 RX ORDER — MONTELUKAST SODIUM 10 MG/1
10 TABLET ORAL AT BEDTIME
Status: DISCONTINUED | OUTPATIENT
Start: 2023-10-27 | End: 2023-10-31 | Stop reason: HOSPADM

## 2023-10-27 RX ORDER — CEFAZOLIN SODIUM/WATER 2 G/20 ML
2 SYRINGE (ML) INTRAVENOUS SEE ADMIN INSTRUCTIONS
Status: DISCONTINUED | OUTPATIENT
Start: 2023-10-27 | End: 2023-10-27 | Stop reason: HOSPADM

## 2023-10-27 RX ORDER — ROSUVASTATIN CALCIUM 10 MG/1
40 TABLET, COATED ORAL AT BEDTIME
Status: DISCONTINUED | OUTPATIENT
Start: 2023-10-27 | End: 2023-10-31 | Stop reason: HOSPADM

## 2023-10-27 RX ORDER — HYDROXYZINE HYDROCHLORIDE 10 MG/1
10 TABLET, FILM COATED ORAL EVERY 6 HOURS PRN
Status: DISCONTINUED | OUTPATIENT
Start: 2023-10-27 | End: 2023-10-31 | Stop reason: HOSPADM

## 2023-10-27 RX ORDER — ONDANSETRON 2 MG/ML
INJECTION INTRAMUSCULAR; INTRAVENOUS PRN
Status: DISCONTINUED | OUTPATIENT
Start: 2023-10-27 | End: 2023-10-27

## 2023-10-27 RX ORDER — HYDROXYZINE HYDROCHLORIDE 10 MG/1
10 TABLET, FILM COATED ORAL EVERY 6 HOURS PRN
Qty: 30 TABLET | Refills: 0 | Status: SHIPPED | OUTPATIENT
Start: 2023-10-27 | End: 2023-10-30

## 2023-10-27 RX ORDER — FERROUS SULFATE 325(65) MG
325 TABLET ORAL DAILY
Status: DISCONTINUED | OUTPATIENT
Start: 2023-10-27 | End: 2023-10-31 | Stop reason: HOSPADM

## 2023-10-27 RX ORDER — CEFAZOLIN SODIUM 1 G/3ML
1 INJECTION, POWDER, FOR SOLUTION INTRAMUSCULAR; INTRAVENOUS EVERY 8 HOURS
Qty: 15 ML | Refills: 0 | Status: COMPLETED | OUTPATIENT
Start: 2023-10-27 | End: 2023-10-28

## 2023-10-27 RX ORDER — ALBUTEROL SULFATE 90 UG/1
2 AEROSOL, METERED RESPIRATORY (INHALATION) EVERY 6 HOURS PRN
Status: DISCONTINUED | OUTPATIENT
Start: 2023-10-27 | End: 2023-10-31 | Stop reason: HOSPADM

## 2023-10-27 RX ORDER — VANCOMYCIN HYDROCHLORIDE 1 G/20ML
INJECTION, POWDER, LYOPHILIZED, FOR SOLUTION INTRAVENOUS
Status: DISCONTINUED
Start: 2023-10-27 | End: 2023-10-27 | Stop reason: HOSPADM

## 2023-10-27 RX ORDER — BUPIVACAINE HYDROCHLORIDE 5 MG/ML
INJECTION, SOLUTION EPIDURAL; INTRACAUDAL
Status: COMPLETED | OUTPATIENT
Start: 2023-10-27 | End: 2023-10-27

## 2023-10-27 RX ORDER — TIZANIDINE 2 MG/1
2 TABLET ORAL 3 TIMES DAILY PRN
Status: DISCONTINUED | OUTPATIENT
Start: 2023-10-27 | End: 2023-10-27

## 2023-10-27 RX ORDER — METHOCARBAMOL 500 MG/1
500 TABLET, FILM COATED ORAL 4 TIMES DAILY PRN
Status: DISCONTINUED | OUTPATIENT
Start: 2023-10-27 | End: 2023-10-31 | Stop reason: HOSPADM

## 2023-10-27 RX ORDER — HYDROCODONE BITARTRATE AND ACETAMINOPHEN 5; 325 MG/1; MG/1
1 TABLET ORAL EVERY 6 HOURS PRN
Status: ON HOLD | COMMUNITY
End: 2023-10-27

## 2023-10-27 RX ORDER — HYDROMORPHONE HCL IN WATER/PF 6 MG/30 ML
0.2 PATIENT CONTROLLED ANALGESIA SYRINGE INTRAVENOUS EVERY 5 MIN PRN
Status: DISCONTINUED | OUTPATIENT
Start: 2023-10-27 | End: 2023-10-27 | Stop reason: HOSPADM

## 2023-10-27 RX ORDER — ASPIRIN 81 MG/1
81 TABLET ORAL 2 TIMES DAILY
Qty: 60 TABLET | Refills: 0 | Status: DISCONTINUED | OUTPATIENT
Start: 2023-10-28 | End: 2023-10-31 | Stop reason: HOSPADM

## 2023-10-27 RX ORDER — MAGNESIUM HYDROXIDE/ALUMINUM HYDROXICE/SIMETHICONE 120; 1200; 1200 MG/30ML; MG/30ML; MG/30ML
30 SUSPENSION ORAL EVERY 4 HOURS PRN
Status: DISCONTINUED | OUTPATIENT
Start: 2023-10-27 | End: 2023-10-31 | Stop reason: HOSPADM

## 2023-10-27 RX ORDER — POLYETHYLENE GLYCOL 3350 17 G/17G
17 POWDER, FOR SOLUTION ORAL DAILY
Status: DISCONTINUED | OUTPATIENT
Start: 2023-10-28 | End: 2023-10-31 | Stop reason: HOSPADM

## 2023-10-27 RX ORDER — HYDROMORPHONE HCL IN WATER/PF 6 MG/30 ML
0.2 PATIENT CONTROLLED ANALGESIA SYRINGE INTRAVENOUS
Status: DISCONTINUED | OUTPATIENT
Start: 2023-10-27 | End: 2023-10-28

## 2023-10-27 RX ADMIN — KETAMINE HYDROCHLORIDE 20 MG: 10 INJECTION INTRAMUSCULAR; INTRAVENOUS at 10:18

## 2023-10-27 RX ADMIN — ONDANSETRON 4 MG: 2 INJECTION INTRAMUSCULAR; INTRAVENOUS at 13:12

## 2023-10-27 RX ADMIN — BUPIVACAINE HYDROCHLORIDE 2.6 ML: 5 INJECTION, SOLUTION EPIDURAL; INTRACAUDAL; PERINEURAL at 10:22

## 2023-10-27 RX ADMIN — Medication 2 G: at 10:29

## 2023-10-27 RX ADMIN — SODIUM CHLORIDE, POTASSIUM CHLORIDE, SODIUM LACTATE AND CALCIUM CHLORIDE: 600; 310; 30; 20 INJECTION, SOLUTION INTRAVENOUS at 19:00

## 2023-10-27 RX ADMIN — ONDANSETRON 4 MG: 2 INJECTION INTRAMUSCULAR; INTRAVENOUS at 10:47

## 2023-10-27 RX ADMIN — DEXAMETHASONE SODIUM PHOSPHATE 10 MG: 10 INJECTION, SOLUTION INTRAMUSCULAR; INTRAVENOUS at 10:47

## 2023-10-27 RX ADMIN — FENTANYL CITRATE 25 MCG: 50 INJECTION, SOLUTION INTRAMUSCULAR; INTRAVENOUS at 13:13

## 2023-10-27 RX ADMIN — HYDROMORPHONE HYDROCHLORIDE 0.5 MG: 1 INJECTION, SOLUTION INTRAMUSCULAR; INTRAVENOUS; SUBCUTANEOUS at 15:42

## 2023-10-27 RX ADMIN — ROSUVASTATIN CALCIUM 40 MG: 10 TABLET, FILM COATED ORAL at 20:21

## 2023-10-27 RX ADMIN — SODIUM CHLORIDE, POTASSIUM CHLORIDE, SODIUM LACTATE AND CALCIUM CHLORIDE: 600; 310; 30; 20 INJECTION, SOLUTION INTRAVENOUS at 09:03

## 2023-10-27 RX ADMIN — TRANEXAMIC ACID 1950 MG: 650 TABLET ORAL at 07:49

## 2023-10-27 RX ADMIN — ACETAMINOPHEN 975 MG: 325 TABLET ORAL at 15:35

## 2023-10-27 RX ADMIN — PROPOFOL 20 MG: 10 INJECTION, EMULSION INTRAVENOUS at 10:30

## 2023-10-27 RX ADMIN — SENNOSIDES AND DOCUSATE SODIUM 1 TABLET: 8.6; 5 TABLET ORAL at 20:21

## 2023-10-27 RX ADMIN — KETAMINE HYDROCHLORIDE 10 MG: 10 INJECTION INTRAMUSCULAR; INTRAVENOUS at 10:24

## 2023-10-27 RX ADMIN — LIDOCAINE HYDROCHLORIDE 3 ML: 10 INJECTION, SOLUTION INFILTRATION; PERINEURAL at 10:18

## 2023-10-27 RX ADMIN — SODIUM CHLORIDE, POTASSIUM CHLORIDE, SODIUM LACTATE AND CALCIUM CHLORIDE: 600; 310; 30; 20 INJECTION, SOLUTION INTRAVENOUS at 12:29

## 2023-10-27 RX ADMIN — ACETAMINOPHEN 975 MG: 325 TABLET ORAL at 07:49

## 2023-10-27 RX ADMIN — CEFAZOLIN 1 G: 1 INJECTION, POWDER, FOR SOLUTION INTRAMUSCULAR; INTRAVENOUS at 17:10

## 2023-10-27 RX ADMIN — GLYCOPYRROLATE 0.2 MG: 0.2 INJECTION INTRAMUSCULAR; INTRAVENOUS at 10:49

## 2023-10-27 RX ADMIN — PROPOFOL 30 MG: 10 INJECTION, EMULSION INTRAVENOUS at 10:25

## 2023-10-27 RX ADMIN — FENTANYL CITRATE 50 MCG: 50 INJECTION INTRAMUSCULAR; INTRAVENOUS at 10:18

## 2023-10-27 RX ADMIN — PHENYLEPHRINE HYDROCHLORIDE 100 MCG: 10 INJECTION INTRAVENOUS at 11:00

## 2023-10-27 RX ADMIN — ASPIRIN 162 MG: 81 TABLET, COATED ORAL at 17:09

## 2023-10-27 RX ADMIN — FERROUS SULFATE TAB 325 MG (65 MG ELEMENTAL FE) 325 MG: 325 (65 FE) TAB at 17:10

## 2023-10-27 RX ADMIN — HALOPERIDOL LACTATE 1 MG: 5 INJECTION, SOLUTION INTRAMUSCULAR at 13:54

## 2023-10-27 RX ADMIN — PHENYLEPHRINE HYDROCHLORIDE 0.5 MCG/KG/MIN: 10 INJECTION INTRAVENOUS at 11:00

## 2023-10-27 RX ADMIN — ACETAMINOPHEN 975 MG: 325 TABLET ORAL at 22:38

## 2023-10-27 RX ADMIN — MONTELUKAST SODIUM 10 MG: 10 TABLET, COATED ORAL at 20:21

## 2023-10-27 RX ADMIN — PROPOFOL 125 MCG/KG/MIN: 10 INJECTION, EMULSION INTRAVENOUS at 10:30

## 2023-10-27 RX ADMIN — OXYCODONE HYDROCHLORIDE 5 MG: 5 TABLET ORAL at 20:21

## 2023-10-27 RX ADMIN — OXYCODONE HYDROCHLORIDE 10 MG: 10 TABLET, FILM COATED, EXTENDED RELEASE ORAL at 07:49

## 2023-10-27 ASSESSMENT — ACTIVITIES OF DAILY LIVING (ADL)
ADLS_ACUITY_SCORE: 33
ADLS_ACUITY_SCORE: 31
ADLS_ACUITY_SCORE: 31
ADLS_ACUITY_SCORE: 33
ADLS_ACUITY_SCORE: 31
ADLS_ACUITY_SCORE: 33

## 2023-10-27 ASSESSMENT — COPD QUESTIONNAIRES: COPD: 1

## 2023-10-27 NOTE — ANESTHESIA POSTPROCEDURE EVALUATION
Patient: Krystal Virgen    Procedure: Procedure(s):  LEFT TOTAL HIP ARTHROPLASTY DIRECT ANTERIOR       Anesthesia Type:  Spinal    Note:  Disposition: Inpatient   Postop Pain Control: Uneventful            Sign Out: Well controlled pain   PONV: No   Neuro/Psych: Uneventful            Sign Out: Acceptable/Baseline neuro status   Airway/Respiratory: Uneventful            Sign Out: Acceptable/Baseline resp. status; O2 supplementation               Oxygen: Nasal Cannula   CV/Hemodynamics: Uneventful            Sign Out: Acceptable CV status; No obvious hypovolemia; No obvious fluid overload   Other NRE: NONE   DID A NON-ROUTINE EVENT OCCUR? No       Last vitals:  Vitals Value Taken Time   BP 93/42 10/27/23 1300   Temp 36.3  C (97.4  F) 10/27/23 1253   Pulse 99 10/27/23 1307   Resp 24 10/27/23 1307   SpO2 95 % 10/27/23 1307   Vitals shown include unfiled device data.    Electronically Signed By: Marcos Galeana MD  October 27, 2023  1:09 PM

## 2023-10-27 NOTE — INTERVAL H&P NOTE
"I have reviewed the surgical (or preoperative) H&P that is linked to this encounter, and examined the patient. There are no significant changes    Clinical Conditions Present on Arrival:  Clinically Significant Risk Factors Present on Admission          # Hypercalcemia: Highest Ca = 11.7 mg/dL in last 30 days, will monitor as appropriate        # Drug Induced Platelet Defect: home medication list includes an antiplatelet medication  # Obesity: Estimated body mass index is 31 kg/m  as calculated from the following:    Height as of this encounter: 1.6 m (5' 3\").    Weight as of this encounter: 79.4 kg (175 lb).       "

## 2023-10-27 NOTE — PHARMACY-ADMISSION MEDICATION HISTORY
Pharmacist completed medication history with the patient while in the [facility].  Prior to admission (PTA) med list completed and updated in the electronic medical record (EMR).  Pharmacy Note - Admission Medication History  Pertinent Provider Information: none   ______________________________________________________________________  Prior To Admission (PTA) med list completed and updated in EMR.     Medications Prior to Admission   Medication Sig Dispense Refill Last Dose    albuterol (PROAIR HFA/PROVENTIL HFA/VENTOLIN HFA) 108 (90 Base) MCG/ACT inhaler USE 2 INHALATIONS ORALLY   EVERY 6 HOURS AS NEEDED 54 g 1 10/25/2023    aspirin (ASA) 81 MG EC tablet Take 1 tablet (81 mg) by mouth daily   10/6/2023 at am    budesonide-formoterol (SYMBICORT) 160-4.5 MCG/ACT inhaler Inhale 2 puffs into the lungs 2 times daily.   10/27/2023 at am (has own med)    buPROPion (WELLBUTRIN XL) 300 MG 24 hr tablet Take 1 tablet (300 mg) by mouth every morning 90 tablet 2 10/27/2023 at am    citalopram (CELEXA) 20 MG tablet Take 1 tablet (20 mg) by mouth daily 90 tablet 3 10/27/2023 at am    ferrous sulfate (FE TABS) 325 (65 Fe) MG EC tablet Take 1 tablet (325 mg) by mouth daily 30 tablet 3 More than a month    fluticasone (FLONASE) 50 MCG/ACT nasal spray Spray 1-2 sprays into both nostrils daily as needed   10/20/2023    HYDROcodone-acetaminophen (NORCO) 5-325 MG tablet Take 1 tablet by mouth every 6 hours as needed for severe pain   10/20/2023    ipratropium - albuterol 0.5 mg/2.5 mg/3 mL (DUONEB) 0.5-2.5 (3) MG/3ML neb solution Inhale 3 mLs into the lungs every 4 hours as needed   10/20/2023    LANsoprazole (PREVACID) 30 MG DR capsule Take 1 capsule (30 mg) by mouth daily 90 capsule 2 10/27/2023 at am    montelukast (SINGULAIR) 10 MG tablet Take 1 tablet (10 mg) by mouth At Bedtime 90 tablet 3 10/26/2023 at hs    oxyCODONE (ROXICODONE) 5 MG tablet Take 1 tablet (5 mg) by mouth every 8 hours as needed for severe pain 30 tablet 0 not started  yet    rosuvastatin (CRESTOR) 40 MG tablet Take 1 tablet (40 mg) by mouth daily 90 tablet 3 10/26/2023 at hs    SUMAtriptan (IMITREX) 100 MG tablet Take 1 tablet (100 mg) by mouth at onset of headache May repeat in 2 hours if needed: max 2/day; 30 tablet 3 More than a month    tiZANidine (ZANAFLEX) 2 MG tablet Take 1 tablet (2 mg) by mouth 3 times daily 30 tablet 0 More than a month    nitroGLYcerin (NITROSTAT) 0.4 MG sublingual tablet For chest pain place 1 tablet under the tongue every 5 minutes for 3 doses. If symptoms persist 5 minutes after 1st dose call 911. 25 tablet 0 never used         Information source(s): Patient and CareEverywhere/SureScripts  Patient was asked about OTC/herbal products specifically.  PTA med list reflects this.  Based on the pharmacist s assessment, the PTA med list information appears reliable  Allergies were reviewed, assessed, and updated with the patient.    Medications available for use during hospital stay: only has own Symbicort nhaler  Thank you for the opportunity to participate in the care of this patient.    The patient was asked about OTC/herbal products specifically and the PTA med list reflects the patient's response.    Allergies were reviewed and assessed with the patient, responses were updated in the EMR.    Thank you for the opportunity to participate in the care of this patient.    Saul Sanchez RPH 10/27/2023 8:28 AM

## 2023-10-27 NOTE — ANESTHESIA CARE TRANSFER NOTE
Patient: Krystal Virgen    Procedure: Procedure(s):  LEFT TOTAL HIP ARTHROPLASTY DIRECT ANTERIOR       Diagnosis: Primary localized osteoarthritis of left hip [M16.12]  Diagnosis Additional Information: No value filed.    Anesthesia Type:   Spinal     Note:    Oropharynx: oropharynx clear of all foreign objects  Level of Consciousness: awake  Oxygen Supplementation: nasal cannula  Level of Supplemental Oxygen (L/min / FiO2): 2  Independent Airway: airway patency satisfactory and stable  Dentition: dentition unchanged  Vital Signs Stable: post-procedure vital signs reviewed and stable  Report to RN Given: handoff report given  Patient transferred to: PACU    Handoff Report: Identifed the Patient, Identified the Reponsible Provider, Reviewed the pertinent medical history, Discussed the surgical course, Reviewed Intra-OP anesthesia mangement and issues during anesthesia, Set expectations for post-procedure period and Allowed opportunity for questions and acknowledgement of understanding    Vitals:  Vitals Value Taken Time   BP 93/42 10/27/23 1300   Temp 36.3  C (97.4  F) 10/27/23 1253   Pulse 90 10/27/23 1301   Resp 21 10/27/23 1301   SpO2 93 % 10/27/23 1301   Vitals shown include unfiled device data.    Electronically Signed By: CESARIO Hunt CRNA  October 27, 2023  1:03 PM

## 2023-10-27 NOTE — PROGRESS NOTES
[facility]  OUTPATIENT PHYSICAL THERAPY EVALUATION  PLAN OF TREATMENT FOR OUTPATIENT REHABILITATION  (COMPLETE FOR INITIAL CLAIMS ONLY)  Patient's Last Name, First Name, M.I.  YOB: 1940  Krystal Virgen                        Provider's Name  [facility] Medical Record No.  [id number]                             Onset Date:  10/27/23   Start of Care Date:  10/27/23   Type:     _X_PT   ___OT   ___SLP Medical Diagnosis:  s/p L JORGE LUIS              PT Diagnosis:  Impaired functional mobility Visits from SOC:  1     See note for plan of treatment, functional goals and certification details    I CERTIFY THE NEED FOR THESE SERVICES FURNISHED UNDER        THIS PLAN OF TREATMENT AND WHILE UNDER MY CARE     (Physician co-signature of this document indicates review and certification of the therapy plan).                10/27/23 1500   Appointment Info   Signing Clinician's Name / Credentials (PT) Delilah Alvarez, PT, DPT   Quick Adds   Quick Adds Certification   Living Environment   People in Home alone   Current Living Arrangements house   Home Accessibility stairs to enter home   Transportation Anticipated family or friend will provide   Living Environment Comments Patient reports she will be staying at her sister's house. All needs met on main level with stairs to enter home.   Self-Care   Usual Activity Tolerance good   Current Activity Tolerance poor   Equipment Currently Used at Home other (see comments);wheelchair, manual  (Walking sticks)   Fall history within last six months no   General Information   Onset of Illness/Injury or Date of Surgery 10/27/23   Referring Physician Stacey Kern PA-C   Patient/Family Therapy Goals Statement (PT) Manage pain   Pertinent History of Current Problem (include personal factors and/or comorbidities that impact the POC) s/p L JORGE LUIS   Existing Precautions/Restrictions no known precautions/restrictions    Weight-Bearing Status - LLE weight-bearing as tolerated   Weight-Bearing Status - RLE full weight-bearing   Range of Motion (ROM)   Range of Motion ROM deficits secondary to surgical procedure   Strength (Manual Muscle Testing)   Strength (Manual Muscle Testing) Deficits observed during functional mobility   Bed Mobility   Bed Mobility supine-sit   Supine-Sit [place] (Bed Mobility) moderate assist (50% patient effort)   Bed Mobility Limitations decreased ability to use legs for bridging/pushing   Impairments Contributing to Impaired Bed Mobility pain;decreased ROM;decreased strength   Assistive Device (Bed Mobility) bed rails;draw sheet   Transfers   Transfers sit-stand transfer   Sit-Stand Transfer   Sit-Stand [place] (Transfers) unable to assess   Comment, (Sit-Stand Transfer) Patient declined to attempt sit<>stand transfer due to 10/10 pain.   Gait/Stairs (Locomotion)   [place] Level (Gait) unable to assess   Comment, (Gait/Stairs) Patient declined to attempt gait/stairs due to 10/10 pain.   Clinical Impression   Criteria for Skilled Therapeutic Intervention Yes, treatment indicated   PT Diagnosis (PT) Impaired functional mobility   Influenced by the following impairments Pain, decreased ROM, decreased strength   Functional limitations due to impairments Bed mobility, transfers, gait, stairs   Clinical Presentation (PT Evaluation Complexity) stable   Clinical Presentation Rationale Patient presents as medically diagnosed.   Clinical Decision Making (Complexity) low complexity   Planned Therapy Interventions (PT) bed mobility training;gait training;home exercise program;patient/family education;ROM (range of motion);stair training;strengthening;transfer training;home program guidelines   Risk & Benefits of therapy have been explained evaluation/treatment results reviewed;care plan/treatment goals reviewed;patient   PT Total Evaluation Time   PT Maikel Low Complexity Minutes (06830) 15   Therapy  Certification   Start of care date 10/27/23   Certification date from 10/27/23   Certification date to 11/03/23   Medical Diagnosis s/p L JORGE LUIS   Physical Therapy Goals   PT Frequency Daily   PT Predicted Duration/Target Date for Goal Attainment 11/03/23   PT Goals Bed Mobility;Transfers;Gait;Stairs   PT: Bed Mobility Supervision/stand-by assist;Supine to/from sit   PT: Transfers Supervision/stand-by assist;Sit to/from stand;Assistive device   PT: Gait Supervision/stand-by assist;Assistive device;150 feet   PT: Stairs Supervision/stand-by assist;4 stairs;Rail on both sides   Interventions   Interventions Quick Adds Therapeutic Activity   Therapeutic Activity   Therapeutic Activities: dynamic activities to improve functional performance Minutes (70077) 10   Symptoms Noted During/After Treatment Increased pain   Treatment Detail/Skilled Intervention Assisted patient with advancing LLE toward EOB. Mod A required. Adjusted bed to encourage weightbearing through BLE while sitting EOB. Patient demonstrated safe, unsupported sitting at EOB x 10 minutes. RN administered pain medications due to 10/10 pain. Sit>supine with Mod A to lift LLE. Total A to boost in supine.   PT Discharge Planning   PT Plan Progress mobility, gait with FWW, stairs, JORGE LUIS HEP   PT Discharge Recommendation (DC Rec)   (Defer to ortho)   PT Rationale for DC Rec Patient only tolerated sitting EOB at time of evaluation due to 10/10 pain. Anticipate mobility will improve once pain is better managed.   PT Brief overview of current status Mod A supine<>sit.   PT Equipment Needed at Discharge walker, rolling   Total Session Time   Timed Code Treatment Minutes 10   Total Session Time (sum of timed and untimed services) 25

## 2023-10-27 NOTE — ANESTHESIA PROCEDURE NOTES
"Intrathecal injection Procedure Note    Pre-Procedure   Staff -        Anesthesiologist:  Marcos Galeana MD       Performed By: anesthesiologist       Location: OR       Procedure Start/Stop Times: 10/27/2023 10:22 AM and 10/27/2023 10:25 AM       Pre-Anesthestic Checklist: patient identified, risks and benefits discussed, informed consent, monitors and equipment checked and pre-op evaluation  Timeout:       Correct Patient: Yes        Correct Procedure: Yes        Correct Site: Yes        Correct Position: Yes   Procedure Documentation  Procedure: intrathecal injection       Diagnosis: hip replacement       Patient Position: sitting       Patient Prep/Sterile Barriers: sterile gloves, mask, patient draped       Skin prep: Chloraprep       Insertion Site: L3-4. (midline approach).       Needle Gauge: 24.        Needle Length (Inches): 4        Spinal Needle Type: Pencan       Introducer used       Introducer: 20 G       # of attempts: 1 and  # of redirects:  0    Assessment/Narrative         Paresthesias: No.       CSF fluid: clear.    Medication(s) Administered   0.5% Bupivacaine PF (Intrathecal) - Intrathecal   2.6 mL - 10/27/2023 10:22:00 AM  Medication Administration Time: 10/27/2023 10:22 AM      FOR [facility] ONLY:   Pain Team Contact information: please page the Pain Team Via [employer]. Search \"Pain\". During daytime hours, please page the attending first. At night please page the resident first.      "

## 2023-10-27 NOTE — PROGRESS NOTES
[facility] ACUTE PAIN SERVICE    ([facility], [facility], [facility])   Consult Note    Date of Admission:  10/27/2023  Date of Consult: 10/27/23  Physician requesting consult:  Stacey Kern PA-C   Reason for consult: eval and treatment     Assessment/Plan:     Krystal Virgen is a 83 year old female who was admitted on 10/27/2023.  Day of Surgery. I was asked to see the patient for post op pain. Admitted for LEFT TOTAL HIP ARTHROPLASTY DIRECT ANTERIOR . History of COPD, breast CA, hiatal hernia, GERD, asthma, CKD, PONV, CAD, opoid tolerant.  Describes pain as 8/10 and more severe than pre-op. The patient doesn't  smoke and denies chemical dependency history.   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 She has utilized hydrocodone acetaminophen for pain control. Today we discussed pain control in more detail and have elected to change her to oxycodone to avoid the necessity of utilization of acetaminophen. We elected to increase her overall dosage up to a total of 15 mg per day divided in three separate doses. She is been on hydrocodone typically 1 to 2 tablets daily which is inadequate for pain control and requiring additional doses of acetaminophen. There is no evidence of misuse or diversion of medication.     Would utilize 10mg oxycodone now. (Hasn't used since preop).  Oxycodone pre op was fairly  beneficial pre op, recent rotation from [place].   Patient reports nausea, not vomiting. Feels she is able to take pills without too much trouble right now despite nausea. Feels pain is all over in hip, does radiate but is not shooting/burning. Has failed both gabapentin and Lyrica in the past. Not interested in retrying. Does have spasms. Will trial robaxin.  Discussed with Britney Montano, CNS, pain  services    PLAN:   1) Pain is consistent with post op pain. The patient's home MME was up to 22 mg daily.   2)Multimodal Medication Therapy  Topical:none  NSAID'S:none, crcl = 34ml/min, chronic anemia  Muscle Relaxants:none, Add Robaxin for reported hip spasms.   Home zanaflex: she is unsure if this works/is helpful. Will reduce to BIDPRN while trialing robaxin for acute post op spasms.   Do not use within 3 hours of the other muscle relaxant.    Adjuvants:APAP 975mg tid  Atarax 10mg q6hprn  Antidepressants/anxiolytics:none  Opioids:oxycodone 5-10mg q4hprn: increase to q3hprn  IV Pain medication: IV Dilaudid 0.2mg-0.5mg q2hprn  3)Non-medication interventions  Pharmacy consult- appreciate recommendations   Acupuncture consult- as available Mon and Friday     Integrative consult - called referral to [phone]   4)Constipation Prophylaxis: miralax and sennaS bid  5) Follow up   -Opioid prescriber has been Juan C Fairchild  -Discharge Recommendations - We recommend prescribing the following at the time of discharge: Max MME per ICSI guidelines = 200 for JORGE LUIS for opioid naive patients (could utilize more for tolerant)         History of Present Illness (HPI):       Krystal Virgen is a 83 year old old female .  The pain is reported to be acute on chronic, located in the hip,  The patient is with chronic pain. Per MN  review. The patient has a  opioid tolerance. Opioid induced side effects noted, including sedation: no, nausea: yes, currently has post op nausea, and constipation: no.  Noted sleep dysfunction : no, and disease of addiction: denies.      Review of medical record/Summary of labs and care everywhere. Looked at clinic note from pre op H&P. This indicated that as above.   Past pain treatments have included pain clinic: no, PT: yes. Pharmacological treatments (in past) have included Norco. Past surgeries include as below.       MN  pulled from system on 10/27/23. Last refill on 10/21. This indicated chronic  opioid use. 3* total number of prescribing providers noted. Most common prescriber is Juan C Fairchild MD.   10/21 oxycodone 5mg #21 for 7d  9/28 Norco 5-325mg #30 for 15 d  9/28 Codeine  9/8 Norco 5-325mg #30  7/25 Norco #30      Medical History  Patient Active Problem List    Diagnosis Date Noted    S/P total left hip arthroplasty 10/27/2023     Priority: Medium    RAMON (acute kidney injury) (H24) 06/28/2023     Priority: Medium    Rectal bleeding 06/27/2023     Priority: Medium    SOB (shortness of breath) 11/16/2021     Priority: Medium    Irregular heart rate 11/16/2021     Priority: Medium    Cough 08/23/2021     Priority: Medium     Formatting of this note might be different from the original.  Created by Conversion      Disorder of bone and cartilage 08/23/2021     Priority: Medium     Formatting of this note might be different from the original.  Created by Conversion    Replacement Utility updated for latest IMO load      Hypertonicity of bladder 08/23/2021     Priority: Medium     Formatting of this note might be different from the original.  Created by Conversion      Hypogammaglobulinemia (H24) 08/23/2021     Priority: Medium     Formatting of this note might be different from the original.  Created by Conversion    Replacement Utility updated for latest IMO load      Major depressive disorder, single episode in full remission (H24) 08/23/2021     Priority: Medium     Formatting of this note might be different from the original.  Created by Conversion      Other malaise and fatigue 08/23/2021     Priority: Medium     Formatting of this note might be different from the original.  Created by Conversion      Need for prophylactic hormone replacement therapy (postmenopausal) 08/23/2021     Priority: Medium     Formatting of this note might be different from the original.  Created by Conversion      Migraine headache 08/23/2021     Priority: Medium     Formatting of this note might be different from the  original.  Created by Conversion    Replacement Utility updated for latest IMO load      Vitamin D deficiency 08/23/2021     Priority: Medium     Formatting of this note might be different from the original.  Created by Conversion    Replacement Utility updated for latest IMO load      Coronary atherosclerosis 03/03/2021     Priority: Medium    Hyperlipidemia with target LDL less than 70 03/03/2021     Priority: Medium    ST elevation myocardial infarction involving left circumflex coronary artery (H) 01/07/2021     Priority: Medium    Adverse effect of other drugs, medicaments and biological substances, initial encounter 02/20/2018     Priority: Medium    John lesion, chronic 01/04/2018     Priority: Medium    Colon, diverticulosis 01/04/2018     Priority: Medium    Gastric polyps 01/04/2018     Priority: Medium    Diaphragmatic hernia 12/14/2017     Priority: Medium    Polyp of duodenum 12/14/2017     Priority: Medium    Iron deficiency anemia 09/08/2017     Priority: Medium    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    Alise Thakkar MD  Date of service: 10/19/2016      Insomnia 08/20/2016     Priority: Medium    Lung nodule 06/24/2008     Priority: Medium    Pulmonary infiltrate on chest x-ray 06/24/2008     Priority: Medium    Thrombocytosis 06/24/2008     Priority: Medium    Asthma 09/19/2006     Priority: Medium     Formatting of this note might be different from the original.  Created by Conversion      Chronic rhinitis 09/19/2006     Priority: Medium    Severe recurrent major depressive disorder with psychotic  features (H) 09/16/2005     Priority: Medium     Formatting of this note might be different from the original.  Overview:   Epic  Epic          Surgical History  She  has a past surgical history that includes ENT surgery; Cholecystectomy; GYN surgery; orthopedic surgery; Arthrodesis foot (11/11/2011); Coronary Angiogram (N/A, 1/7/2021); Percutaneous Coronary Intervention Stent (N/A, 1/7/2021); Hysterectomy (1984); Foot surgery; sinus surgery; Cholecystectomy; Lumpectomy breast (Right, 2017); and Biopsy breast.     Past Surgical History:   Procedure Laterality Date    ARTHRODESIS FOOT  11/11/2011    Procedure:ARTHRODESIS FOOT; Right First Metatarsophalangeal Joint Fusion with Second and Third Clawtoe Repairs; Surgeon:SARAH CASTRO; Location:[facility] OR    BIOPSY BREAST      CHOLECYSTECTOMY      CHOLECYSTECTOMY      CV CORONARY ANGIOGRAM N/A 1/7/2021    Procedure: Coronary Angiogram;  Surgeon: Slade Yu MD;  Location:  [facility]    CV PCI STENT DRUG ELUTING N/A 1/7/2021    Procedure: Percutaneous Coronary Intervention Stent Drug Eluting;  Surgeon: Slade Yu MD;  [facility]    ENT SURGERY      sinus surgery x1    FOOT SURGERY      GYN SURGERY      benign hysterectomy age 43    HYSTERECTOMY  1984    LUMPECTOMY BREAST Right 2017    ORTHOPEDIC SURGERY      fusion of grest toe right    SINUS SURGERY         Allergies  Allergies   Allergen Reactions    Ticagrelor Other (See Comments)     Dyspnea    Penicillins      Amoxicillin doesn't work for her    Sulfa Antibiotics Rash       Prior to Admission Medications   Medications Prior to Admission   Medication Sig Dispense Refill Last Dose    albuterol (PROAIR HFA/PROVENTIL HFA/VENTOLIN HFA) 108 (90 Base) MCG/ACT inhaler USE 2 INHALATIONS ORALLY   EVERY 6 HOURS AS NEEDED 54 g 1 10/25/2023    aspirin (ASA) 81 MG EC tablet Take 1 tablet (81 mg) by mouth daily   10/6/2023 at am    budesonide-formoterol (SYMBICORT)  160-4.5 MCG/ACT inhaler Inhale 2 puffs into the lungs 2 times daily.   10/27/2023 at am (has own med)    buPROPion (WELLBUTRIN XL) 300 MG 24 hr tablet Take 1 tablet (300 mg) by mouth every morning 90 tablet 2 10/27/2023 at am    citalopram (CELEXA) 20 MG tablet Take 1 tablet (20 mg) by mouth daily 90 tablet 3 10/27/2023 at am    ferrous sulfate (FE TABS) 325 (65 Fe) MG EC tablet Take 1 tablet (325 mg) by mouth daily 30 tablet 3 More than a month    fluticasone (FLONASE) 50 MCG/ACT nasal spray Spray 1-2 sprays into both nostrils daily as needed   10/20/2023    HYDROcodone-acetaminophen (NORCO) 5-325 MG tablet Take 1 tablet by mouth every 6 hours as needed for severe pain   10/20/2023    ipratropium - albuterol 0.5 mg/2.5 mg/3 mL (DUONEB) 0.5-2.5 (3) MG/3ML neb solution Inhale 3 mLs into the lungs every 4 hours as needed   10/20/2023    LANsoprazole (PREVACID) 30 MG DR capsule Take 1 capsule (30 mg) by mouth daily 90 capsule 2 10/27/2023 at am    montelukast (SINGULAIR) 10 MG tablet Take 1 tablet (10 mg) by mouth At Bedtime 90 tablet 3 10/26/2023 at hs    oxyCODONE (ROXICODONE) 5 MG tablet Take 1 tablet (5 mg) by mouth every 8 hours as needed for severe pain 30 tablet 0 not started yet    rosuvastatin (CRESTOR) 40 MG tablet Take 1 tablet (40 mg) by mouth daily 90 tablet 3 10/26/2023 at hs    SUMAtriptan (IMITREX) 100 MG tablet Take 1 tablet (100 mg) by mouth at onset of headache May repeat in 2 hours if needed: max 2/day; 30 tablet 3 More than a month    tiZANidine (ZANAFLEX) 2 MG tablet Take 1 tablet (2 mg) by mouth 3 times daily 30 tablet 0 More than a month    nitroGLYcerin (NITROSTAT) 0.4 MG sublingual tablet For chest pain place 1 tablet under the tongue every 5 minutes for 3 doses. If symptoms persist 5 minutes after 1st dose call 911. 25 tablet 0 never used       Social History  Reviewed, and she  reports that she has never smoked. She has never used smokeless tobacco. She reports that she does not drink  alcohol and does not use drugs.  Social History     Tobacco Use    Smoking status: Never    Smokeless tobacco: Never   Substance Use Topics    Alcohol use: No     Paris Nielson PharmD  Acute Care Pain Management Program  [facility] ([facility])   Preference if for [employer] Paging -or Vocera paging

## 2023-10-27 NOTE — ANESTHESIA PREPROCEDURE EVALUATION
Anesthesia Pre-Procedure Evaluation    Patient: Krystal Virgen   MRN: [id number] : 1940        Procedure : Procedure(s):  LEFT TOTAL HIP ARTHROPLASTY DIRECT ANTERIOR          Past Medical History:   Diagnosis Date     Anemia      Arthritis      Asthma      Asthma      Asthma with acute exacerbation 2021    Formatting of this note might be different from the original. Created by Conversion  Replacement Utility updated for latest IMO load     Breast cancer (H) 2017     Chronic bronchitis (H)      Chronic sinusitis      COPD (chronic obstructive pulmonary disease) (H)      Depression      GERD (gastroesophageal reflux disease)      Hiatal hernia      Hypertension      Migraines      Osteopenia      Osteoporosis      Overactive bladder      Pneumococcal infection      Pneumonia      PONV (postoperative nausea and vomiting)      Renal disease      Sinusitis      Spinal headache      ST elevation MI (STEMI) (H) 2021     Stented coronary artery       Past Surgical History:   Procedure Laterality Date     ARTHRODESIS FOOT  2011    Procedure:ARTHRODESIS FOOT; Right First Metatarsophalangeal Joint Fusion with Second and Third Clawtoe Repairs; Surgeon:SARAH CASTRO; Location:[facility]     BIOPSY BREAST       CHOLECYSTECTOMY       CHOLECYSTECTOMY       CV CORONARY ANGIOGRAM N/A 2021    Procedure: Coronary Angiogram;  Surgeon: Slade Yu MD;  Location:  [facility]     CV PCI STENT DRUG ELUTING N/A 2021    Procedure: Percutaneous Coronary Intervention Stent Drug Eluting;  Surgeon: Slade Yu MD;  Location:  [facility]     ENT SURGERY      sinus surgery x1     FOOT SURGERY       GYN SURGERY      benign hysterectomy age 43     HYSTERECTOMY  1984     LUMPECTOMY BREAST Right 2017     ORTHOPEDIC SURGERY      fusion of grest toe right     SINUS SURGERY        Allergies   Allergen Reactions     Ticagrelor Other (See Comments)     Dyspnea      Penicillins      Amoxicillin doesn't work for her     Sulfa Antibiotics Rash      Social History     Tobacco Use     Smoking status: Never     Smokeless tobacco: Never   Substance Use Topics     Alcohol use: No      Wt Readings from Last 1 Encounters:   10/27/23 79.4 kg (175 lb)        Anesthesia Evaluation   Pt has had prior anesthetic.     History of anesthetic complications  - PONV.      ROS/MED HX  ENT/Pulmonary:     (+)                     asthma    COPD,              Neurologic:  - neg neurologic ROS     Cardiovascular:     (+)  hypertension- -  CAD - past MI (negative stress test 9/5/23, EF 55%) - -                                   (-) angina and angina   METS/Exercise Tolerance:     Hematologic:     (+)      anemia,          Musculoskeletal:   (+)  arthritis,             GI/Hepatic:     (+) GERD, Asymptomatic on medication,    hiatal hernia,              Renal/Genitourinary:     (+) renal disease, type: CRI,            Endo:  - neg endo ROS     Psychiatric/Substance Use:     (+) psychiatric history depression  H/O chronic opiod use .     Infectious Disease:  - neg infectious disease ROS     Malignancy:   (+) Malignancy, History of Breast.Breast CA Remission status post.      Other:  - neg other ROS    (+)  , H/O Chronic Pain (current VAS 5/10),       Physical Exam    Airway        Mallampati: I   TM distance: > 3 FB   Neck ROM: full   Mouth opening: > 3 cm    Respiratory Devices and Support         Dental       (+) Edentulous      Cardiovascular          Rhythm and rate: regular and normal     Pulmonary           breath sounds clear to auscultation       OUTSIDE LABS:  CBC:   Lab Results   Component Value Date    WBC 9.6 10/27/2023    WBC 9.1 10/20/2023    HGB 10.9 (L) 10/27/2023    HGB 11.1 (L) 10/20/2023    HCT 34.4 (L) 10/27/2023    HCT 34.7 (L) 10/20/2023     10/27/2023     10/20/2023     BMP:   Lab Results   Component Value Date     10/27/2023     10/20/2023    POTASSIUM 3.8  10/27/2023    POTASSIUM 5.0 10/20/2023    CHLORIDE 108 (H) 10/27/2023    CHLORIDE 103 10/20/2023    CO2 22 10/27/2023    CO2 23 10/20/2023    BUN 21.4 10/27/2023    BUN 22.1 10/20/2023    CR 1.27 (H) 10/27/2023    CR 1.31 (H) 10/20/2023     (H) 10/27/2023    GLC 92 10/20/2023     COAGS:   Lab Results   Component Value Date    PTT 53 (H) 01/07/2021    INR 0.90 10/27/2023     POC:   Lab Results   Component Value Date    BGM 90 01/07/2021     HEPATIC:   Lab Results   Component Value Date    ALBUMIN 3.8 07/24/2023    PROTTOTAL 6.3 (L) 07/24/2023    ALT 10 07/24/2023    AST 28 07/24/2023    ALKPHOS 94 07/24/2023    BILITOTAL 0.4 07/24/2023     OTHER:   Lab Results   Component Value Date    A1C 5.3 01/08/2021    ALINE 8.8 10/27/2023    PHOS 3.8 01/10/2021    MAG 2.2 01/10/2021    TSH 3.38 08/24/2021       Anesthesia Plan    ASA Status:  3       Anesthesia Type: Spinal.         Techniques and Equipment:       - Drips/Meds: Ketamine, Dexmed. bolus     Consents    Anesthesia Plan(s) and associated risks, benefits, and realistic alternatives discussed. Questions answered and patient/representative(s) expressed understanding.     - Discussed:     - Discussed with:  Patient            Postoperative Care    Pain management: IV analgesics.   PONV prophylaxis: Ondansetron (or other 5HT-3)     Comments:    Other Comments: Propofol sedation, ketamine and precedex for chronic pain          Marcos Galeana MD

## 2023-10-27 NOTE — CONSULTS
[facility] MEDICINE CONSULT NOTE   Physician requesting consult: Jairo Cornejo MD    Reason for consult: Postoperative medical management of medical co-morbidities as below    Assessment and Plan    Krystal Virgen is a 83 year old  female with a history of moderate persistent asthma, coronary artery disease, CKD 3, IgG subclass deficiency, dyslipidemia, GERD, anxiety, depression, underwent left total hip arthroplasty due to severe osteoarthritis.   mL.  Moderate left hip pain present.  Has nausea.  Hemodynamically stable.    Procedure(s):  LEFT TOTAL HIP ARTHROPLASTY DIRECT ANTERIOR  Post-operative Day: Day of Surgery    Coronary artery disease  History of MI with left circumflex artery intervention  Stable and asymptomatic  Resume aspirin, statin  Not on beta-blocker at present  Nuclear stress test 9/5/2023 revealed negative for ischemia, EF 55%, large area of transmural infarction.    History of ductal carcinoma of right breast  Treated with right breast lumpectomy on 10/27  Treated with anastrozole then switched to tamoxifen, completed 5 years    Dyslipidemia  Crestor 40 mg daily    GERD  Resume PPI    Moderate persistent asthma  Stable and asymptomatic  Albuterol inhaler/nebulizer every 6 hours as needed  Symbicort 2 puff twice a day  Singulair 10 mg daily    Anxiety and depression  Has stable mood and affect  Celexa 20 mg daily  Wellbutrin  mg daily    Chronic iron deficiency anemia  Hemoglobin 10.9    CKD 3  Creatinine is stable at 1.27    Status post left total hip arthroplasty  Resume routine postoperative care  Physical and Occupational Therapy  Use incentive spirometry frequently  DVT prophylaxis per orthopedics, aspirin 81 mg twice a day  Pain control with Tylenol 975 mg every 8 hours, 650 mg every 4 hours as needed, oxycodone 5 to 10 mg every 4 hours as needed, IV Dilaudid 0.2 to 0.5 mg every 2 hours as needed       -Reviewed the patient's preoperative H and P  and updated missing elements.  -Home medication reconciliation has been reviewed.  Medications have been ordered as noted from the home list and changes are documented above     HISTORY     Krystal Virgen is a 83 year old  female with history of  moderate persistent asthma, coronary artery disease, CKD 3, IgG subclass deficiency, dyslipidemia, GERD, anxiety, depression, underwent left total hip arthroplasty due to severe osteoarthritis.   mL.  Moderate left hip pain present.  Has nausea.  Hemodynamically stable.  Denies headache, chest pain, breathing difficulty, palpitation, abdominal pain, urinary symptoms.  Taking albuterol inhaler/nebulizer as needed, Symbicort inhaler twice a day for asthma.  No recent flareup.  On Crestor 40 mg daily for dyslipidemia.  Taking PPI for GERD.  Symptoms are stable.  Has chronic iron deficiency anemia.  Taking iron supplement.  Valuated by hematology.  History of right breast cancer treated with lumpectomy and tamoxifen for 5 years.  No history of heart disease, diabetes, bleeding or clotting disorders or sleep apnea.  Preop physical is reviewed.  History is provided by the patient.    Past Medical History     Patient Active Problem List    Diagnosis Date Noted    S/P total left hip arthroplasty 10/27/2023     Priority: Medium    RAMON (acute kidney injury) (H24) 06/28/2023     Priority: Medium    Rectal bleeding 06/27/2023     Priority: Medium    SOB (shortness of breath) 11/16/2021     Priority: Medium    Irregular heart rate 11/16/2021     Priority: Medium    Cough 08/23/2021     Priority: Medium     Formatting of this note might be different from the original.  Created by Conversion      Disorder of bone and cartilage 08/23/2021     Priority: Medium     Formatting of this note might be different from the original.  Created by Conversion    Replacement Utility updated for latest IMO load      Hypertonicity of bladder 08/23/2021     Priority: Medium     Formatting of this  note might be different from the original.  Created by Conversion      Hypogammaglobulinemia (H24) 08/23/2021     Priority: Medium     Formatting of this note might be different from the original.  Created by Conversion    Replacement Utility updated for latest IMO load      Major depressive disorder, single episode in full remission (H24) 08/23/2021     Priority: Medium     Formatting of this note might be different from the original.  Created by Conversion      Other malaise and fatigue 08/23/2021     Priority: Medium     Formatting of this note might be different from the original.  Created by Conversion      Need for prophylactic hormone replacement therapy (postmenopausal) 08/23/2021     Priority: Medium     Formatting of this note might be different from the original.  Created by Conversion      Migraine headache 08/23/2021     Priority: Medium     Formatting of this note might be different from the original.  Created by Conversion    Replacement Utility updated for latest IMO load      Vitamin D deficiency 08/23/2021     Priority: Medium     Formatting of this note might be different from the original.  Created by Conversion    Replacement Utility updated for latest IMO load      Coronary atherosclerosis 03/03/2021     Priority: Medium    Hyperlipidemia with target LDL less than 70 03/03/2021     Priority: Medium    ST elevation myocardial infarction involving left circumflex coronary artery (H) 01/07/2021     Priority: Medium    Adverse effect of other drugs, medicaments and biological substances, initial encounter 02/20/2018     Priority: Medium    John lesion, chronic 01/04/2018     Priority: Medium    Colon, diverticulosis 01/04/2018     Priority: Medium    Gastric polyps 01/04/2018     Priority: Medium    Diaphragmatic hernia 12/14/2017     Priority: Medium    Polyp of duodenum 12/14/2017     Priority: Medium    Iron deficiency anemia 09/08/2017     Priority: Medium    Malignant neoplasm of  upper-outer quadrant of right female breast (H) 09/08/2017     Priority: Medium    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    Alise Thakkar MD  Date of service: 10/19/2016      Insomnia 08/20/2016     Priority: Medium    Lung nodule 06/24/2008     Priority: Medium    Pulmonary infiltrate on chest x-ray 06/24/2008     Priority: Medium    Thrombocytosis 06/24/2008     Priority: Medium    Asthma 09/19/2006     Priority: Medium     Formatting of this note might be different from the original.  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  Overview:   Epic  Epic          Surgical History   She  has a past surgical history that includes ENT surgery; Cholecystectomy; GYN surgery; orthopedic surgery; Arthrodesis foot (11/11/2011); Coronary Angiogram (N/A, 1/7/2021); Percutaneous Coronary Intervention Stent (N/A, 1/7/2021); Hysterectomy (1984); Foot surgery; sinus surgery; Cholecystectomy; Lumpectomy breast (Right, 2017); and Biopsy breast.     Past Surgical History:   Procedure Laterality Date    ARTHRODESIS FOOT  11/11/2011    Procedure:ARTHRODESIS FOOT; Right First Metatarsophalangeal Joint Fusion with Second and Third Clawtoe Repairs; Surgeon:SARAH CASTRO; Location:[facility]    BIOPSY BREAST      CHOLECYSTECTOMY      CHOLECYSTECTOMY      CV CORONARY ANGIOGRAM N/A 1/7/2021    Procedure: Coronary Angiogram;  Surgeon: Slade Yu MD;  Location:  [facility]    CV PCI STENT DRUG ELUTING N/A 1/7/2021    Procedure:  Percutaneous Coronary Intervention Stent Drug Eluting;  Surgeon: Slade Yu MD;  Location:  [facility]    ENT SURGERY      sinus surgery x1    FOOT SURGERY      GYN SURGERY      benign hysterectomy age 43    HYSTERECTOMY  1984    LUMPECTOMY BREAST Right 2017    ORTHOPEDIC SURGERY      fusion of grest toe right    SINUS SURGERY         Family History    Reviewed, and family history includes Breast Cancer (age of onset: 35.00) in her maternal aunt; Breast Cancer (age of onset: 48.00) in her paternal aunt and sister; Breast Cancer (age of onset: 78.00) in her mother; Heart Disease in her brother and father; Macular Degeneration in her father.    Social History    Reviewed, and she  reports that she has never smoked. She has never used smokeless tobacco. She reports that she does not drink alcohol and does not use drugs.  Social History     Tobacco Use    Smoking status: Never    Smokeless tobacco: Never   Substance Use Topics    Alcohol use: No     Allergies     Allergies   Allergen Reactions    Ticagrelor Other (See Comments)     Dyspnea    Penicillins      Amoxicillin doesn't work for her    Sulfa Antibiotics Rash       Prior to Admission Medications      Medications Prior to Admission   Medication Sig Dispense Refill Last Dose    albuterol (PROAIR HFA/PROVENTIL HFA/VENTOLIN HFA) 108 (90 Base) MCG/ACT inhaler USE 2 INHALATIONS ORALLY   EVERY 6 HOURS AS NEEDED 54 g 1 10/25/2023    aspirin (ASA) 81 MG EC tablet Take 1 tablet (81 mg) by mouth daily   10/6/2023 at am    budesonide-formoterol (SYMBICORT) 160-4.5 MCG/ACT inhaler Inhale 2 puffs into the lungs 2 times daily.   10/27/2023 at am (has own med)    buPROPion (WELLBUTRIN XL) 300 MG 24 hr tablet Take 1 tablet (300 mg) by mouth every morning 90 tablet 2 10/27/2023 at am    citalopram (CELEXA) 20 MG tablet Take 1 tablet (20 mg) by mouth daily 90 tablet 3 10/27/2023 at am    ferrous sulfate (FE TABS) 325 (65 Fe) MG EC tablet Take 1 tablet  (325 mg) by mouth daily 30 tablet 3 More than a month    fluticasone (FLONASE) 50 MCG/ACT nasal spray Spray 1-2 sprays into both nostrils daily as needed   10/20/2023    HYDROcodone-acetaminophen (NORCO) 5-325 MG tablet Take 1 tablet by mouth every 6 hours as needed for severe pain   10/20/2023    ipratropium - albuterol 0.5 mg/2.5 mg/3 mL (DUONEB) 0.5-2.5 (3) MG/3ML neb solution Inhale 3 mLs into the lungs every 4 hours as needed   10/20/2023    LANsoprazole (PREVACID) 30 MG DR capsule Take 1 capsule (30 mg) by mouth daily 90 capsule 2 10/27/2023 at am    montelukast (SINGULAIR) 10 MG tablet Take 1 tablet (10 mg) by mouth At Bedtime 90 tablet 3 10/26/2023 at hs    oxyCODONE (ROXICODONE) 5 MG tablet Take 1 tablet (5 mg) by mouth every 8 hours as needed for severe pain 30 tablet 0 not started yet    rosuvastatin (CRESTOR) 40 MG tablet Take 1 tablet (40 mg) by mouth daily 90 tablet 3 10/26/2023 at hs    SUMAtriptan (IMITREX) 100 MG tablet Take 1 tablet (100 mg) by mouth at onset of headache May repeat in 2 hours if needed: max 2/day; 30 tablet 3 More than a month    tiZANidine (ZANAFLEX) 2 MG tablet Take 1 tablet (2 mg) by mouth 3 times daily 30 tablet 0 More than a month    nitroGLYcerin (NITROSTAT) 0.4 MG sublingual tablet For chest pain place 1 tablet under the tongue every 5 minutes for 3 doses. If symptoms persist 5 minutes after 1st dose call 911. 25 tablet 0 never used       Review of Systems   A 12 point comprehensive review of systems was negative except as noted above.    OBJECTIVE         Physical Exam   Temp:  [96.6  F (35.9  C)-98.8  F (37.1  C)] 97.9  F (36.6  C)  Pulse:  [] 107  Resp:  [18-29] 22  BP: ()/() 127/73  SpO2:  [88 %-98 %] 98 %  Body mass index is 31 kg/m .    GENERAL:  Alert, appears comfortable, in no acute distress, appears stated age   HEAD:  Normocephalic, without obvious abnormality, atraumatic   EYES:  PERRL, conjunctiva clear,  EOM's intact   NOSE: Nares normal,   mucosa normal, no drainage   THROAT: Lips, mucosa,  gums normal, mouth moist   NECK: Supple, symmetrical, trachea midline   BACK:   Symmetric, no curvature, ROM normal   LUNGS:   Clear to auscultation bilaterally, no rales, rhonchi, or wheezing, symmetric chest rise on inhalation, respirations unlabored   CHEST WALL:  No tenderness or deformity   HEART:  Regular rate and rhythm, S1 and S2 normal, no murmur   ABDOMEN:   Soft, non-tender, bowel sounds active, no masses, no organomegaly, no rebound or guarding   EXTREMITIES: Status post left hip arthroplasty   SKIN: No rashes   NEURO: Alert, oriented x 3   PSYCH: Cooperative, behavior is appropriate        Imaging Reviewed Personally By Myself    Radiology Results:   Recent Results (from the past 24 hour(s))   XR Surgery SALEEM Fluoro Greater Than 5 Min w Stills    Narrative    This exam was marked as non-reportable because it will not be read by a   radiologist or a [facility] non-radiologist provider.             Labs Reviewed Personally By Myself   Most Recent 3 CBC's:  Recent Labs   Lab Test 10/27/23  0823 10/20/23  1445 08/31/23  0830   WBC 9.6 9.1 10.8   HGB 10.9* 11.1* 11.8   MCV 85 86 88    325 372     Most Recent 3 BMP's:  Recent Labs   Lab Test 10/27/23  0823 10/20/23  1445 07/24/23  1447    139 139   POTASSIUM 3.8 5.0 4.7   CHLORIDE 108* 103 106   CO2 22 23 19*   BUN 21.4 22.1 17.0   CR 1.27* 1.31* 1.44*   ANIONGAP 10 13 14   ALINE 8.8 11.7* 9.5   * 92 99       Preoperative Labs Reviewed Personally By Myself     Thank you for this consultation.  Appreciate the opportunity to participate in the care of Krystal Virgen, please feel free to contact us for any questions or concerns.    Debi Menard MD  [facility]  Phone: #[phone]

## 2023-10-27 NOTE — OP NOTE
Operative Note    Name:  Krystal Virgen  PCP:  Juan C Fairchild  Procedure Date:  10/27/2023      Procedure(s):  Left total hip arthroplasty, anterior approach, hybrid    Pre-Procedure Diagnosis:  Primary localized osteoarthritis of left hip [M16.12]  Left hip pain    Post-Procedure Diagnosis:    Same    Surgeon(s):  Jairo Cornejo MD    Assist:  Maritza Cash CFA  CFA assist  was required for this operation due to the complexity of the procedure, for proper positioning of the limb during the operation, and for patient safety.    Anesthesia Type:  Regional    Antibiotics:  Ancef 2 g IVPB on induction  Ancef 3 g and 3 L for irrigation  Tobramycin PMMA    Condition on discharge from OR:  stable    Indications:  The patient is a 83year-old with end-stage arthritis of the left hip and failed the necessary treatments required  meeting the guidelines for medical necessity for treatment with total hip replacement.  We discussed the surgical options as well as the nonoperative care in detail with the use of their history, physical examination, imaging studies, and joint model of a normal hip and a total hip replacement.  They have been provided with appropriate documentation and the hospital-based education guidebook.  After the patient and/or family member read the guidebook I answered their questions.  I instructed them to make an appointment to attend the free preoperative class on joint replacement at [facility].  They received a package containing antiseptic agents including chlorhexidine soap and washcloths for body bathing, and underwent nasal povidone iodine swabbing preoperatively.  The patient acknowledged they understood the risks, benefit and potential benefits, alternatives, shortcomings, limitations and complications of operative and nonoperative treatment and informed consent for the following surgical procedure was obtained.    Findings:  Severe grade 4 change    Operative Report:    Upon informed consent  after review of the procedure along with attendant risk of complication an  informed cooperative decision   was made to proceed.  The leg was marked, labs were checked, my notes were reviewed, consent obtained, the chart reviewed, and the patient was properly premedicated.  The patient had been cleared by primary care and anesthesia.       All standard protocols and regimens as established by  the [facility] were followed.  In preparation, x-rays were templated and plan formulated.  This was communicated with the intraoperative team.  Clinical leg length discrepancy right longer than left noted confirmed radiographically.      Patient was brought to the operating room and placed on the table in a supine position. Pt underwent  anesthetic induction and appropriate medication was provided including antibiotic. The Pt was transferred to the HANA table and properly positioned on the HANA table.  The abdomen was properly positioned given the patient's body habitus.  The left lower quadrant, inguinal area, left hemipelvis, and left proximal thigh were prepped and draped in the usual fashion and Ioban drapes placed on all exposed skin.       We paused for patient identification,  limb, and procedure confirmation.     A fluoroscopically directed x-ray was then obtained of the hip and printed on a transparency.    Starting 3 cm lateral and 2 cm distal to the anterior  superior iliac spine, a 12 cm oblique incision was made. The fascia was incised and the interval (Heuter's) between the tensor muscle, and the sartorius and rectus femoris muscle was developed.  A retractor was placed on the lateral femoral neck and the medial femoral shaft. The circumflex vessels ×3 were then isolated and ligated.  The aponeurosis confluent with the circumflex vessels was then incised distally, mobilizing the tensor muscle laterally allowing safe retraction.  Meticulous attention to preserving the integrity of the  tensor muscle was observed throughout the subsequent procedure.  The fatty interval overlying the capsule was taken down.  The quadriceps tendon was left intact    The capsule was then incised from the anterior inferior iliac spine to the trochanteric tubercle and then along the inter- trochanteric line from the anterior aspect of the medial trochanteric wall to the lesser trochanter, of which the corners were subsequently tagged with a 0 Ethibond suture and reflected for subsequent repair.  The hip was then distracted, externally rotated 70 , subluxed and the inferior capsule was released under direct visualization. A threaded pin was inserted in the femoral head at 20  of rotation and then the hip was rotated back to 0  after which the femoral neck was osteotomized at the appropriate level, noting the position of the neck-trochanter junction, as indicated by preoperative templating.  Careful attention was directed to proper osteotomy distally and medially avoiding the greater trochanter profile posteriorly and laterally. Safe blade swath, kerf and excursion observed. The femoral head was  delivered vertex first to avoid   soft tissue injury and meticulous attention to protecting the pericapsular muscles was observed throughout the procedure.  The medial and lateral capsule flaps were reflected with a self-retaining retractor and the acetabulum was exposed. The retractor was entirely intra-articular. The inferior capsule was left intact and could accommodate subsequent reamers and the acetabular component, and contents of the cotyloid notch were extracted and the redundant labrum was removed anteriorly and posteriorly. All impinging yamile-acetabular osteophytes, ossified labral overhang were removed, leaving a contoured acetabular portal.The posterior structures were left intact. Excellent direct visual, and subsequent fluoroscopic visualization was obtained.      Under fluoroscopic control, in proper orientation  after calibrating C-arm position / rotation, reaming commenced at 45 mm and progressed in 2 mm increments to 51 mm, then line to line 52 mm. Self-centering reamers were appropriately inclined and anteverted under fluoroscopic control to contour and create a  fully contained reamed concentric hemispheric acetabulum with total contact, per templated plan.  Acetabular reaming and subsequent acetabular component placement including position was performed under fluoroscopic control.  The 52 mm Trident 2 shell was then impacted in 40  of inclination and 20  of anteversion under fluoroscopic control seating optimally and fully.  Elliptical profile of the acetabular portal was appropriate fluoroscopically.  Satisfactory interference fit obtained and additional fixation was provided with 1 acetabular screw with good purchase.   The apical hole eliminator was placed. The shell was washed and dried and the neutral neutral polyliner for a 36 mm head was inserted.  Posterior capsular structures left intact.        The proximal lateral femur was dissected and the femoral hook was brought around the proximal femoral shaft distal to the vastus ridge.  The lower extremity was externally   rotated 120  and then was subsequently hyperextended and adducted, simultaneously mechanically lifting the proximal femur obtaining excellent exposure clearing the acetabulum. Retractors were placed laterally and posteriorly on the proximal femur and the lateral capsule was released anterior to posterior along the medial aspect of the trochanter. The obturator externus muscle ,obturator internus and the piriformis tendons were left intact.  We cleared the proximal femur and the entry point into the medullary canal was identified posterior lateral.A blunt file probe was sent down the canal for confirmation of orientation and integrity of the endosteal bone. Broaching commenced with the size 2 broach, gently and in proper anteversion relative to the  posterior calcar.  Routine broaching was performed under direct visualization mindful of anteversion relative to the posterior cortex of the femoral neck.  Broaching progressed sequentially in order to a size 5.  Per templating, the appropriate neck configuration was applied along with the templated head and neck size.  A trial reduction was subsequently performed.     A fluoroscopically directed x-ray printed on transparency for overlay was then produced.  Anatomic maintenance of leg length and offset was obtained. Trial components were removed.  100% O2 administered.  The canal was sounded for centralizer size.  The 18.5 mm cement restrictor was placed distally.  The canal was then irrigated debrided and dried thoroughly.  Suction was applied with Ray-Margoth's in the canal.  Simultaneously, 100 g of tobramycin PMMA was mixed for 90 seconds.  At 3-1/2 minutes, PMMA was injected in a retrograde fashion per routine filling the canal.  At 4 and half minutes, PMMA was pressurized.  At 6 minutes,  the actual #5 x 132 degree Accolade C  stem with a 13 mm centralizer was inserted placing the collar on the calcar.  Cement was pressurized.  Resistance upon insertion indicated proper cement viscosity assuring appropriate cement mantle creation.  Component was held in place until cement thoroughly cured.  The trunion was cleaned and dried meticulously and the +0 mm x 36 mm ceramic head assembly affixed to the trunnion. The hip was reduced.    A final fluoroscopically directed x-ray printed on a transparency was then obtained    We confirmed anatomic maintenance of leg length +4 mm and offset +2 mm.  This was confirmed by superimposing a guide mani over the trans-ischial tuberosity line noting symmetric intersection at the medial femoral cortex relative to the lesser trochanter.    The hip was stable to 70  of external rotation and hyperextension to the floor.  The posterior capsule was left intact.  3.5% aqueous Betadine solution  soak x3 minutes.  Thorough lavage.    We thoroughly lavaged the hip with multiple liters of antibiotic laden irrigant. The capsule, previously left in it's entirety, was then closed with nonabsorbable suture. A drain was placed extracapsular and the tensor fascia was repaired with a running #1 Vicryl suture. Skin and subcutaneous tissue was closed in layers and a Aquacel dressing was applied per routine.     Counts were correct. Bleeding was largely from reamed cancellous surfaces of the acetabulum as well as from the medullary canal of the femur. The patient tolerated the procedure well and was sent to the NC in stable condition.      Estimated Blood Loss:   250 cc    Specimens:    Per routine JORGE LUIS        Drains: X1  Closed/Suction Drain Left Hip Accordion 10 Belarusian (Active)       Complications:    None    Postoperative plan:  1.routine JORGE LUIS DA protocol for rehab, weight-bear as tolerated  2.aspirin 81 mg p.o. twice daily  3.Home with family POD #1  4.return to clinic 2 weeks  5.detailed written instructions provided for patient's family  6.Ancef x24 hours, Duricef x7 days.    Jairo Cornejo MD     Date: 10/27/2023  Time: 12:22 PM      Implant Name Type Inv. Item Serial No.  Lot No. LRB No. Used Action   BONE CEMENT SIMPLEX W/TOBRAMYCIN 6197-9-001 - [id number] Cement, Bone BONE CEMENT SIMPLEX W/TOBRAMYCIN 6197-9-001  [employer] [id number] Left 2 Implanted   BONE CEMENT SIMPLEX W/TOBRAMYCIN 6197-9-001 - [id number] Cement, Bone BONE CEMENT SIMPLEX W/TOBRAMYCIN 6197-9-001  [employer] [id number] Left 1 Implanted   INSERT ACE 36MM 0DEG X3 723-00-36E - [id number] Total Joint Component/Insert INSERT ACE 36MM 0DEG X3 723-00-36E  [employer] [id number] Left 1 Implanted   6.5MM LOW PROFILE HEX SCR 20MM - [id number] Metallic Hardware/[place] 6.5MM LOW PROFILE HEX SCR 20MM  [employer] [id number] Left 1 Implanted   TRIDENT II TRITANIUM CLUSTERHOLE 52E - [id number] Total Joint Component/Insert TRIDENT II  TRITANIUM CLUSTERHOLE 52E  [employer] [id number] Left 1 Implanted   IMP PLUG STRK HEX DOME TRIDENT 4834-5956 - [id number] Metallic Hardware/[place] IMP PLUG STRK HEX DOME TRIDENT 9598-9902  [facility] [id number] Left 1 Implanted   GRAFT BONE PLUG 9-12MM 6215-5-001 - [id number] Graft GRAFT BONE PLUG 9-12MM 6215-5-001  [employer] [id number] Left 1 Implanted   IMP HEAD FEMORAL STRK BIOLOX DELTA CERAMIC 36MM +0MM - [id number] Total Joint Component/Insert IMP HEAD FEMORAL STRK BIOLOX DELTA CERAMIC 36MM +0MM  [employer] [id number] Left 1 Implanted   IMP SPACER OSTEONICS DISTAL CEMENT 13MM 7295-1292 - [id number] Metallic Hardware/[place] IMP SPACER OSTEONICS DISTAL CEMENT 13MM 1243-7724  [employer] [id number] Left 1 Implanted   IMP STEM FEMORAL STRK ACCOLADE-C SAMARIA 132DEG #5 6058-0537D - [id number] Total Joint Component/Insert IMP STEM FEMORAL STRK ACCOLADE-C SAMARIA 132DEG #5 6058-0537D  [employer] [id number] Left 1 Implanted

## 2023-10-28 ENCOUNTER — APPOINTMENT (OUTPATIENT)
Dept: PHYSICAL THERAPY | Facility: CLINIC | Age: 83
DRG: 470 | End: 2023-10-28
Attending: ORTHOPAEDIC SURGERY
Payer: COMMERCIAL

## 2023-10-28 ENCOUNTER — APPOINTMENT (OUTPATIENT)
Dept: OCCUPATIONAL THERAPY | Facility: CLINIC | Age: 83
DRG: 470 | End: 2023-10-28
Attending: ORTHOPAEDIC SURGERY
Payer: COMMERCIAL

## 2023-10-28 LAB
ANION GAP SERPL CALCULATED.3IONS-SCNC: 8 MMOL/L (ref 7–15)
BUN SERPL-MCNC: 18.8 MG/DL (ref 8–23)
CALCIUM SERPL-MCNC: 8.7 MG/DL (ref 8.8–10.2)
CHLORIDE SERPL-SCNC: 104 MMOL/L (ref 98–107)
CREAT SERPL-MCNC: 1.21 MG/DL (ref 0.51–0.95)
DEPRECATED HCO3 PLAS-SCNC: 22 MMOL/L (ref 22–29)
EGFRCR SERPLBLD CKD-EPI 2021: 45 ML/MIN/1.73M2
ERYTHROCYTE [DISTWIDTH] IN BLOOD BY AUTOMATED COUNT: 15.3 % (ref 10–15)
GLUCOSE BLDC GLUCOMTR-MCNC: 140 MG/DL (ref 70–99)
GLUCOSE SERPL-MCNC: 131 MG/DL (ref 70–99)
HCT VFR BLD AUTO: 29.8 % (ref 35–47)
HGB BLD-MCNC: 9.4 G/DL (ref 11.7–15.7)
MCH RBC QN AUTO: 27.1 PG (ref 26.5–33)
MCHC RBC AUTO-ENTMCNC: 31.5 G/DL (ref 31.5–36.5)
MCV RBC AUTO: 86 FL (ref 78–100)
PLATELET # BLD AUTO: 240 10E3/UL (ref 150–450)
POTASSIUM SERPL-SCNC: 4.4 MMOL/L (ref 3.4–5.3)
RBC # BLD AUTO: 3.47 10E6/UL (ref 3.8–5.2)
SODIUM SERPL-SCNC: 134 MMOL/L (ref 135–145)
WBC # BLD AUTO: 15.2 10E3/UL (ref 4–11)

## 2023-10-28 PROCEDURE — 85027 COMPLETE CBC AUTOMATED: CPT | Performed by: FAMILY MEDICINE

## 2023-10-28 PROCEDURE — 82310 ASSAY OF CALCIUM: CPT

## 2023-10-28 PROCEDURE — 36415 COLL VENOUS BLD VENIPUNCTURE: CPT | Performed by: FAMILY MEDICINE

## 2023-10-28 PROCEDURE — 99232 SBSQ HOSP IP/OBS MODERATE 35: CPT | Performed by: FAMILY MEDICINE

## 2023-10-28 PROCEDURE — 258N000003 HC RX IP 258 OP 636

## 2023-10-28 PROCEDURE — 250N000013 HC RX MED GY IP 250 OP 250 PS 637: Performed by: FAMILY MEDICINE

## 2023-10-28 PROCEDURE — 250N000013 HC RX MED GY IP 250 OP 250 PS 637

## 2023-10-28 PROCEDURE — 97535 SELF CARE MNGMENT TRAINING: CPT | Mod: GO

## 2023-10-28 PROCEDURE — 97165 OT EVAL LOW COMPLEX 30 MIN: CPT | Mod: GO

## 2023-10-28 PROCEDURE — 250N000011 HC RX IP 250 OP 636

## 2023-10-28 PROCEDURE — 250N000011 HC RX IP 250 OP 636: Performed by: CLINICAL NURSE SPECIALIST

## 2023-10-28 PROCEDURE — 120N000001 HC R&B MED SURG/OB

## 2023-10-28 PROCEDURE — 250N000013 HC RX MED GY IP 250 OP 250 PS 637: Performed by: CLINICAL NURSE SPECIALIST

## 2023-10-28 PROCEDURE — 97116 GAIT TRAINING THERAPY: CPT | Mod: GP

## 2023-10-28 PROCEDURE — 97530 THERAPEUTIC ACTIVITIES: CPT | Mod: GP

## 2023-10-28 RX ORDER — HYDROMORPHONE HCL IN WATER/PF 6 MG/30 ML
0.2 PATIENT CONTROLLED ANALGESIA SYRINGE INTRAVENOUS EVERY 6 HOURS PRN
Status: DISCONTINUED | OUTPATIENT
Start: 2023-10-28 | End: 2023-10-31 | Stop reason: HOSPADM

## 2023-10-28 RX ORDER — HYDROMORPHONE HYDROCHLORIDE 1 MG/ML
0.5 INJECTION, SOLUTION INTRAMUSCULAR; INTRAVENOUS; SUBCUTANEOUS EVERY 6 HOURS PRN
Status: DISCONTINUED | OUTPATIENT
Start: 2023-10-28 | End: 2023-10-31 | Stop reason: HOSPADM

## 2023-10-28 RX ADMIN — SODIUM CHLORIDE, POTASSIUM CHLORIDE, SODIUM LACTATE AND CALCIUM CHLORIDE: 600; 310; 30; 20 INJECTION, SOLUTION INTRAVENOUS at 03:50

## 2023-10-28 RX ADMIN — HYDROXYZINE HYDROCHLORIDE 10 MG: 10 TABLET ORAL at 12:49

## 2023-10-28 RX ADMIN — MONTELUKAST SODIUM 10 MG: 10 TABLET, COATED ORAL at 20:31

## 2023-10-28 RX ADMIN — CEFAZOLIN 1 G: 1 INJECTION, POWDER, FOR SOLUTION INTRAMUSCULAR; INTRAVENOUS at 09:45

## 2023-10-28 RX ADMIN — SENNOSIDES AND DOCUSATE SODIUM 1 TABLET: 8.6; 5 TABLET ORAL at 20:31

## 2023-10-28 RX ADMIN — SENNOSIDES AND DOCUSATE SODIUM 1 TABLET: 8.6; 5 TABLET ORAL at 09:46

## 2023-10-28 RX ADMIN — HYDROMORPHONE HYDROCHLORIDE 0.5 MG: 1 INJECTION, SOLUTION INTRAMUSCULAR; INTRAVENOUS; SUBCUTANEOUS at 20:41

## 2023-10-28 RX ADMIN — CEFAZOLIN 1 G: 1 INJECTION, POWDER, FOR SOLUTION INTRAMUSCULAR; INTRAVENOUS at 01:54

## 2023-10-28 RX ADMIN — ASPIRIN 81 MG: 81 TABLET, COATED ORAL at 20:31

## 2023-10-28 RX ADMIN — BUDESONIDE AND FORMOTEROL FUMARATE DIHYDRATE 2 PUFF: 160; 4.5 AEROSOL RESPIRATORY (INHALATION) at 09:46

## 2023-10-28 RX ADMIN — PANTOPRAZOLE SODIUM 40 MG: 40 TABLET, DELAYED RELEASE ORAL at 09:46

## 2023-10-28 RX ADMIN — ROSUVASTATIN CALCIUM 40 MG: 10 TABLET, FILM COATED ORAL at 20:31

## 2023-10-28 RX ADMIN — ACETAMINOPHEN 975 MG: 325 TABLET ORAL at 16:28

## 2023-10-28 RX ADMIN — POLYETHYLENE GLYCOL 3350 17 G: 17 POWDER, FOR SOLUTION ORAL at 09:45

## 2023-10-28 RX ADMIN — CITALOPRAM 20 MG: 20 TABLET ORAL at 09:46

## 2023-10-28 RX ADMIN — OXYCODONE HYDROCHLORIDE 10 MG: 5 TABLET ORAL at 12:49

## 2023-10-28 RX ADMIN — FERROUS SULFATE TAB 325 MG (65 MG ELEMENTAL FE) 325 MG: 325 (65 FE) TAB at 09:46

## 2023-10-28 RX ADMIN — ACETAMINOPHEN 975 MG: 325 TABLET ORAL at 06:40

## 2023-10-28 RX ADMIN — OXYCODONE HYDROCHLORIDE 10 MG: 5 TABLET ORAL at 16:28

## 2023-10-28 RX ADMIN — BUPROPION HYDROCHLORIDE 300 MG: 300 TABLET, EXTENDED RELEASE ORAL at 06:43

## 2023-10-28 RX ADMIN — ACETAMINOPHEN 975 MG: 325 TABLET ORAL at 22:57

## 2023-10-28 RX ADMIN — HYDROXYZINE HYDROCHLORIDE 10 MG: 10 TABLET ORAL at 20:41

## 2023-10-28 RX ADMIN — OXYCODONE HYDROCHLORIDE 10 MG: 5 TABLET ORAL at 06:40

## 2023-10-28 RX ADMIN — HYDROMORPHONE HYDROCHLORIDE 0.5 MG: 1 INJECTION, SOLUTION INTRAMUSCULAR; INTRAVENOUS; SUBCUTANEOUS at 04:09

## 2023-10-28 RX ADMIN — OXYCODONE HYDROCHLORIDE 10 MG: 5 TABLET ORAL at 00:52

## 2023-10-28 RX ADMIN — ASPIRIN 81 MG: 81 TABLET, COATED ORAL at 09:45

## 2023-10-28 RX ADMIN — HYDROXYZINE HYDROCHLORIDE 10 MG: 10 TABLET ORAL at 00:53

## 2023-10-28 RX ADMIN — BUDESONIDE AND FORMOTEROL FUMARATE DIHYDRATE 2 PUFF: 160; 4.5 AEROSOL RESPIRATORY (INHALATION) at 20:36

## 2023-10-28 ASSESSMENT — ACTIVITIES OF DAILY LIVING (ADL)
ADLS_ACUITY_SCORE: 38
ADLS_ACUITY_SCORE: 41
ADLS_ACUITY_SCORE: 41
ADLS_ACUITY_SCORE: 38
ADLS_ACUITY_SCORE: 41
ADLS_ACUITY_SCORE: 41
ADLS_ACUITY_SCORE: 38
ADLS_ACUITY_SCORE: 39
ADLS_ACUITY_SCORE: 39
ADLS_ACUITY_SCORE: 41
ADLS_ACUITY_SCORE: 41
ADLS_ACUITY_SCORE: 38

## 2023-10-28 NOTE — PLAN OF CARE
Problem: Hip Arthroplasty  Goal: Absence of Bleeding  Outcome: Progressing  Goal: Fluid and Electrolyte Balance  Outcome: Progressing  Goal: Optimal Functional Ability  Outcome: Progressing  Goal: Absence of Infection Signs and Symptoms  Outcome: Progressing  Goal: Acceptable Pain Control  Outcome: Progressing  Intervention: Prevent or Manage Pain  Recent Flowsheet Documentation  Taken 10/28/2023 1340 by Alanna Troncoso, RN  Pain Management Interventions: cold applied  Taken 10/28/2023 1249 by Alanna Troncoso, RN  Pain Management Interventions: medication (see MAR)  Goal: Nausea and Vomiting Relief  Outcome: Progressing  Goal: Effective Urinary Elimination  Outcome: Progressing  Goal: Effective Oxygenation and Ventilation  Outcome: Progressing  Intervention: Optimize Oxygenation and Ventilation  Recent Flowsheet Documentation  Taken 10/28/2023 0900 by Alanna Troncoso, RN  Head of Bed (HOB) Positioning: HOB at 30-45 degrees

## 2023-10-28 NOTE — PROGRESS NOTES
"[facility] MEDICINE PROGRESS NOTE      Identification/Summary: Krystal Virgen is a 83 year old female with a past medical history of moderate persistent asthma, coronary artery disease, CKD 3, IgG subclass deficiency, dyslipidemia, GERD, anxiety, depression, underwent left total hip arthroplasty due to severe osteoarthritis.  Moderate left hip pain present.    Assessment and Plan:  Coronary artery disease  History of MI with left circumflex artery intervention  Stable and asymptomatic  Resume aspirin, statin  Not on beta-blocker at present  Nuclear stress test 9/5/2023--negative for ischemia, EF 55%, large area of transmural infarction    History of ductal carcinoma of right breast  Treated with right breast lumpectomy on 10/27  Treated with anastrozole then switched to tamoxifen, completed 5 years    Chronic iron deficiency anemia  Acute blood loss anemia  Hemoglobin 9.4 from 11    Dyslipidemia  Crestor 40 mg daily    GERD  Resume PPI     Moderate persistent asthma  Stable and asymptomatic  Albuterol inhaler/nebulizer every 6 hours as needed  Symbicort 2 puff twice a day  Singulair 10 mg daily     Anxiety and depression  Has stable mood and affect  Celexa 20 mg daily  Wellbutrin  mg daily    CKD 3  Creatinine is stable at 1.21     Status post left total hip arthroplasty  Resume routine postoperative care  Physical and Occupational Therapy  Use incentive spirometry frequently  DVT prophylaxis per orthopedics, aspirin 81 mg twice a day  Pain control with Tylenol 975 mg every 8 hours, 650 mg every 4 hours as needed, oxycodone 5 to 10 mg every 4 hours as needed      Clinically Significant Risk Factors                         # Obesity: Estimated body mass index is 31 kg/m  as calculated from the following:    Height as of this encounter: 1.6 m (5' 3\").    Weight as of this encounter: 79.4 kg (175 lb)., PRESENT ON ADMISSION     # Asthma: noted on problem list          Debi Menard, " MD  [facility]  Phone: #[phone]  Securely message with the My[employer] Web Console (learn more here)  Text page via [employer] Paging/Directory     Interval History/Subjective:  Sitting in a recliner comfortably.  Moderate left hip pain present.  No nausea or vomiting.  No other concern.    Physical Exam/Objective:  Temp:  [96.6  F (35.9  C)-99  F (37.2  C)] 97.2  F (36.2  C)  Pulse:  [] 95  Resp:  [17-29] 18  BP: ()/() 103/67  SpO2:  [88 %-98 %] 92 %  Body mass index is 31 kg/m .    GENERAL:  Alert, appears comfortable, in no acute distress, appears stated age   HEAD:  Normocephalic, without obvious abnormality, atraumatic   EYES:  PERRL, conjunctiva clear, EOM's intact   NOSE: Nares normal,  mucosa normal, no drainage   THROAT: Lips, mucosa,   gums normal, mouth moist   NECK: Supple, symmetrical, trachea midline   BACK:   Symmetric, no curvature, ROM normal   LUNGS:   Clear to auscultation bilaterally, no rales, rhonchi, or wheezing, symmetric chest rise on inhalation, respirations unlabored   CHEST WALL:  No tenderness or deformity   HEART:  Regular rate and rhythm, S1 and S2 normal, no murmur    ABDOMEN:   Soft, non-tender, bowel sounds active , no masses, no organomegaly, no rebound or guarding   EXTREMITIES: Status post left total hip arthroplasty   SKIN: No rashes   NEURO: Alert, oriented x3    PSYCH: Cooperative, behavior is appropriate          Labs:  Most Recent 3 CBC's:  Recent Labs   Lab Test 10/28/23  0713 10/27/23  0823 10/20/23  1445   WBC 15.2* 9.6 9.1   HGB 9.4* 10.9* 11.1*   MCV 86 85 86    299 325     Most Recent 3 BMP's:  Recent Labs   Lab Test 10/28/23  0713 10/28/23  0550 10/27/23  0823 10/20/23  1445   *  --  140 139   POTASSIUM 4.4  --  3.8 5.0   CHLORIDE 104  --  108* 103   CO2 22  --  22 23   BUN 18.8  --  21.4 22.1   CR 1.21*  --  1.27* 1.31*   ANIONGAP 8  --  10 13   ALINE 8.7*  --  8.8 11.7*   * 140*  112* 92       Medications:   Personally Reviewed.  Medications      acetaminophen  975 mg Oral Q8H    aspirin  81 mg Oral BID    budesonide-formoterol  2 puff Inhalation BID    buPROPion  300 mg Oral QAM    citalopram  20 mg Oral Daily    ferrous sulfate  325 mg Oral Daily    montelukast  10 mg Oral At Bedtime    pantoprazole  40 mg Oral Daily    polyethylene glycol  17 g Oral Daily    rosuvastatin  40 mg Oral At Bedtime    senna-docusate  1 tablet Oral BID    sodium chloride (PF)  3 mL Intracatheter Q8H      Total time spent 40 min

## 2023-10-28 NOTE — PROGRESS NOTES
[facility] ACUTE PAIN SERVICE    ([facility], [facility], [facility])      Assessment/Plan:     Krystal Virgen is a 83 year old female who was admitted on 10/27/2023.  Day of Surgery. I was asked to see the patient for post op pain. Admitted for LEFT TOTAL HIP ARTHROPLASTY DIRECT ANTERIOR . History of COPD, breast CA, hiatal hernia, GERD, asthma, CKD, PONV, CAD, opoid tolerant.      Patient reports the oxycodone does work well, but doesn't last long enough, disucssed we can utilize more per order if needed and if safety parameters met. She is quite frustrated with care; reports dissatisfaction with IV site, dissatisfaction with needing BP taken, with 'people coming in here all night and not letting me sleep', and many other aspects of care. She reports 'i'm not very brave with pain' and admits that she has a very low tolerance for any amount of discomfort. Reports that getting her BP taken is severe pain and worse than hip pain.   I do not believe that increasing opioid burden--despite her high pain scores--would be a favorable benefit: risk ratio. Also do not believe that utilizing IV Dilaudid for this pain---based on her report--would be beneficial at this time. Will leave IV to be used for pain with movement, but not to be used for pain with rest, based on current clinical picture.   We did discuss expectations and that some degree of discomfort  post op is to be expected (and is a good thing). She is understanding.     PLAN:   1) Pain is consistent with post op pain. The patient's home MME was up to 22 mg daily.   2)Multimodal Medication Therapy  Topical:none  NSAID'S:none, crcl = 35ml/min, chronic anemia  Muscle Relaxants:none, Add Robaxin for reported hip spasms.   Home zanaflex: she is unsure if this works/is helpful. Will reduce to BIDPRN while trialing robaxin for acute post op spasms.   Do not use within 3 hours of the other muscle relaxant.    Adjuvants:APAP 975mg tid  Atarax 10mg  q6hprn  Antidepressants/anxiolytics:none  Opioids:oxycodone 5-10mg  q3hprn  IV Pain medication: IV Dilaudid 0.2mg-0.5mg q2hprn: change to q6hprn  3)Non-medication interventions  Pharmacy consult- appreciate recommendations   Acupuncture consult- as available Mon and Friday     Integrative consult - called referral to [phone]   4)Constipation Prophylaxis: miralax and sennaS bid  5) Follow up   -Opioid prescriber has been Juan C Fairchild  -Discharge Recommendations - We recommend prescribing the following at the time of discharge: Max MME per ICSI guidelines = 200 for JORGE LUIS for opioid naive patients (could utilize more for tolerant).  Scheudled APAP  PRN Robaxin if she finds it helpful  Scripts per ortho.         Paris Nielson, PharmD  Acute Care Pain Management Program  [facility] ([place], [place])   Preference if for [employer] Paging -or Vocera paging

## 2023-10-28 NOTE — PROGRESS NOTES
"Orthopedic Progress Note      Assessment: 1 Day Post-Op  S/P Procedure(s):  LEFT TOTAL HIP ARTHROPLASTY DIRECT ANTERIOR     Plan:   - Continue PT/OT  - Weightbearing status: WBAT  - Anticoagulation: ASA 81 PO BID in addition to SCDs, alba stockings and early ambulation.  - Ancef x24 hours, Duricef x7 days.   - Discharge planning: Home today with family pending pain control and therapy recommendations.       Subjective:  Pain: moderate  Nausea, Vomiting:  No  Lightheadedness, Dizziness:  No  Neuro:  Patient denies new onset numbness or paresthesias    Patient is alert and laying in bed, comfortable at rest. Able to tolerate food and drink. Pain improving but not where she would like it to be yet. Is still hoping she can leave today.     Objective:  BP (!) 147/74 (BP Location: Right arm, Patient Position: Semi-Hodgson's, Cuff Size: Adult Regular)   Pulse 94   Temp 97.2  F (36.2  C) (Oral)   Resp 18   Ht 1.6 m (5' 3\")   Wt 79.4 kg (175 lb)   SpO2 97%   BMI 31.00 kg/m    The patient is A&Ox3. Appears comfortable.   Sensation is intact.  Dorsiflexion and plantar flexion is intact.  Dorsalis pedis pulse intact.  Calves are soft and non-tender. Negative Kp's.  The incision is covered. Dressing C/D/I.  Tolerated passive ROM.     Drain output 60 ml charted this morning, 50 ml charted yesterday. Drain pulled by myself, gauze and ace wrap applied.     Pertinent Labs   Lab Results: personally reviewed.   Lab Results   Component Value Date    INR 0.90 10/27/2023    INR 0.98 01/07/2021     Lab Results   Component Value Date    WBC 15.2 (H) 10/28/2023    HGB 9.4 (L) 10/28/2023    HCT 29.8 (L) 10/28/2023    MCV 86 10/28/2023     10/28/2023     Lab Results   Component Value Date     (L) 10/28/2023    CO2 22 10/28/2023         Report completed by:  SHAHZAD SAL PA-C, MARIA INES  Date: 10/28/2023  Time: 8:29 AM   "

## 2023-10-28 NOTE — PLAN OF CARE
Goal Outcome Evaluation:  Patient alert and oriented.  Patient frustrated and wanting to rest, so declined cares at times.  Patient encouraged and educated regarding plan of care.  Patient's left hip dressing clean, dry, intact.  Hemovac put out total of 175cc bloody drainage on evening shift.  CMS to BLE intact.      Patient vital signs are at baseline: No,  Reason:  Patient still requiring 1-2L per nasal cannula to maintain oxygen saturations >90%.  Encouraged IS use, continuous oximetry in use.   Patient able to ambulate as they were prior to admission or with assist devices provided by therapies during their stay:  No,  Reason:  Patient refused to get up at start of shift due to pain, then wanted to be left alone to rest.  Encouraged ankle pumps.  Patient attempted to sit at edge of bed with 2 assist and partially sat up before requesting to be put back in bed.    Patient MUST void prior to discharge:  Yes  Patient able to tolerate oral intake:  Yes  Pain has adequate pain control using Oral analgesics:  No,  Reason:  Patient reporting pain in left hip 5, but did not tolerate getting up due to pain.  Patient reported 5mg oxycodone improved pain.  Nursing will continue to promote pain management.   Does patient have an identified :  Yes  Has goal D/C date and time been discussed with patient:  Yes, plans to go back home with family assistance when able.

## 2023-10-28 NOTE — PLAN OF CARE
Goal Outcome Evaluation:  Patient vital signs are at baseline: Yes  Patient able to ambulate as they were prior to admission or with assist devices provided by therapies during their stay:  Yes  Patient MUST void prior to discharge:  Yes  Patient able to tolerate oral intake:  Yes  Pain has adequate pain control using Oral analgesics: No: Utilized IV Dilaudid.   Does patient have an identified :  Yes  Has goal D/C date and time been discussed with patient:  Yes   Patient is alert and oriented X 4. Scheduled and PRN oral and IV med's administered for pain control. Ambulated to the bathroom and in room. Voided spontaneously. Output from Hemovac was 60 ml. Tolerated IV antibiotics. IV saline locked. Was on oxygen at night. VSS. Bed alarms activated for safety.

## 2023-10-28 NOTE — PROGRESS NOTES
10/28/23 3752   Appointment Info   Signing Clinician's Name / Credentials (OT) Trini Avila OTR/L   Rehab Comments (OT) OT Evaluation   Quick Adds   Quick Adds Certification   Living Environment   People in Home alone   Living Environment Comments Pt reports she will stay with her sisters home and able to stay on one level, reports her toilet is of good height.   Self-Care   Usual Activity Tolerance good   Current Activity Tolerance fair   Instrumental Activities of Daily Living (IADL)   IADL Comments Pt reports I'ly at baseline.   General Information   Onset of Illness/Injury or Date of Surgery 10/28/23   Referring Physician Jairo Cornejo   Patient/Family Therapy Goal Statement (OT) To not have any pain   Existing Precautions/Restrictions no known precautions/restrictions   Left Lower Extremity (Weight-bearing Status) weight-bearing as tolerated (WBAT)   Cognitive Status Examination   Cognitive Status Comments Pt able to follow directions and advocate forself. Expresses that she is crabby. Seems to be overwhelmed by the pain   Pain Assessment   Patient Currently in Pain Yes, see Vital Sign flowsheet   Range of Motion Comprehensive   General Range of Motion bilateral upper extremity ROM WFL   Coordination   Upper Extremity Coordination No deficits were identified   Bed Mobility   Bed Mobility supine-sit   Supine-Sit [place] (Bed Mobility) minimum assist (75% patient effort)   Transfers   Transfers bed-chair transfer   Transfer Skill: Bed to Chair/Chair to Bed   Bed-Chair [place] (Transfers) minimum assist (75% patient effort)   Balance   Balance Comments CGA   Activities of Daily Living   BADL Assessment/Intervention lower body dressing;toileting   Lower Body Dressing Assessment/Training   Comment, (Lower Body Dressing) Min   Toileting   Comment, (Toileting) Min   Clinical Impression   Criteria for Skilled Therapeutic Interventions Met (OT) Yes, treatment indicated   OT Diagnosis Decreased ADL  independence   Influenced by the following impairments s/p L JORGE LUIS   OT Problem List-Impairments impacting ADL pain;mobility   Assessment of Occupational Performance 1-3 Performance Deficits   Identified Performance Deficits dressing, transfers, bed mob   Planned Therapy Interventions (OT) ADL retraining   Clinical Decision Making Complexity (OT) problem focused assessment/low complexity   Risk & Benefits of therapy have been explained evaluation/treatment results reviewed   OT Total Evaluation Time   OT Eval, Low Complexity Minutes (49334) 10   Therapy Certification   Medical Diagnosis s/p L JORGE LUIS   Start of Care Date 10/28/23   Certification date from 10/28/23   Certification date to 11/28/23   OT Goals   Therapy Frequency (OT) Daily   OT Predicted Duration/Target Date for Goal Attainment 10/28/23   OT Goals Lower Body Dressing;Bed Mobility;Transfers   OT: Lower Body Dressing Modified independent   OT: Bed Mobility Modified independent   OT: Transfer Modified independent   Interventions   Interventions Quick Adds Self-Care/Home Management   Self-Care/Home Management   Self-Care/Home Mgmt/ADL, Compensatory, Meal Prep Minutes (84432) 15   Symptoms Noted During/After Treatment (Meal Preparation/Planning Training) increased pain   Treatment Detail/Skilled Intervention Pt in bed upon arrival, min encouragement for participation w/adls. Pt fearful of pain, pt expressed being crabby, though when given step by step cues of each task and what to do next participated well. Min A for supine to sit w/HOB and use of leg . CGA for STS from bed w/bed elevated. VC for hand placement and safe tech to ambulate around the bed to the chair. VC for plb and to move slowly, pt tends to do large movements and the is startled by pain. CGA for chair transfer and cues to scoot in chair. Pt on 1 liter of oxygens, saturations above 90% w/spot checks. Left pt in chair will call light in reach and alarm on.   OT Discharge Planning   OT Plan  Continue to progress ADLs   OT Discharge Recommendation (DC Rec)   (Defer to ortho)   OT Rationale for DC Rec Pt reports sister will help her upon discharge and she has everything she will need. Pt likely progress ADLs w/additional therapy, fearful of pain and does not to do things because it will cause pain.   OT Brief overview of current status Min A w/bed mob, CGA w/STS/ambulation, cues for PLB   Total Session Time   Timed Code Treatment Minutes 15   Total Session Time (sum of timed and untimed services) 25

## 2023-10-29 ENCOUNTER — APPOINTMENT (OUTPATIENT)
Dept: PHYSICAL THERAPY | Facility: CLINIC | Age: 83
DRG: 470 | End: 2023-10-29
Attending: ORTHOPAEDIC SURGERY
Payer: COMMERCIAL

## 2023-10-29 ENCOUNTER — APPOINTMENT (OUTPATIENT)
Dept: OCCUPATIONAL THERAPY | Facility: CLINIC | Age: 83
DRG: 470 | End: 2023-10-29
Attending: ORTHOPAEDIC SURGERY
Payer: COMMERCIAL

## 2023-10-29 LAB
GLUCOSE BLDC GLUCOMTR-MCNC: 96 MG/DL (ref 70–99)
HGB BLD-MCNC: 8.7 G/DL (ref 11.7–15.7)

## 2023-10-29 PROCEDURE — 85018 HEMOGLOBIN: CPT

## 2023-10-29 PROCEDURE — 97116 GAIT TRAINING THERAPY: CPT | Mod: GP

## 2023-10-29 PROCEDURE — 250N000013 HC RX MED GY IP 250 OP 250 PS 637

## 2023-10-29 PROCEDURE — 97530 THERAPEUTIC ACTIVITIES: CPT | Mod: GP

## 2023-10-29 PROCEDURE — 250N000013 HC RX MED GY IP 250 OP 250 PS 637: Performed by: CLINICAL NURSE SPECIALIST

## 2023-10-29 PROCEDURE — 120N000001 HC R&B MED SURG/OB

## 2023-10-29 PROCEDURE — 97535 SELF CARE MNGMENT TRAINING: CPT | Mod: GO

## 2023-10-29 PROCEDURE — 36415 COLL VENOUS BLD VENIPUNCTURE: CPT

## 2023-10-29 PROCEDURE — 250N000013 HC RX MED GY IP 250 OP 250 PS 637: Performed by: INTERNAL MEDICINE

## 2023-10-29 PROCEDURE — 250N000013 HC RX MED GY IP 250 OP 250 PS 637: Performed by: FAMILY MEDICINE

## 2023-10-29 RX ORDER — CALCIUM CARBONATE 500 MG/1
500 TABLET, CHEWABLE ORAL DAILY PRN
Status: COMPLETED | OUTPATIENT
Start: 2023-10-29 | End: 2023-10-29

## 2023-10-29 RX ORDER — IPRATROPIUM BROMIDE AND ALBUTEROL SULFATE 2.5; .5 MG/3ML; MG/3ML
3 SOLUTION RESPIRATORY (INHALATION) EVERY 4 HOURS PRN
Status: DISCONTINUED | OUTPATIENT
Start: 2023-10-29 | End: 2023-10-31 | Stop reason: HOSPADM

## 2023-10-29 RX ORDER — IPRATROPIUM BROMIDE AND ALBUTEROL SULFATE 2.5; .5 MG/3ML; MG/3ML
3 SOLUTION RESPIRATORY (INHALATION)
Status: DISCONTINUED | OUTPATIENT
Start: 2023-10-29 | End: 2023-10-29

## 2023-10-29 RX ADMIN — SENNOSIDES AND DOCUSATE SODIUM 1 TABLET: 8.6; 5 TABLET ORAL at 09:41

## 2023-10-29 RX ADMIN — ROSUVASTATIN CALCIUM 40 MG: 10 TABLET, FILM COATED ORAL at 21:16

## 2023-10-29 RX ADMIN — BUDESONIDE AND FORMOTEROL FUMARATE DIHYDRATE 2 PUFF: 160; 4.5 AEROSOL RESPIRATORY (INHALATION) at 21:16

## 2023-10-29 RX ADMIN — OXYCODONE HYDROCHLORIDE 5 MG: 5 TABLET ORAL at 11:06

## 2023-10-29 RX ADMIN — ACETAMINOPHEN 975 MG: 325 TABLET ORAL at 22:43

## 2023-10-29 RX ADMIN — ACETAMINOPHEN 975 MG: 325 TABLET ORAL at 06:51

## 2023-10-29 RX ADMIN — POLYETHYLENE GLYCOL 3350 17 G: 17 POWDER, FOR SOLUTION ORAL at 09:41

## 2023-10-29 RX ADMIN — ASPIRIN 81 MG: 81 TABLET, COATED ORAL at 19:23

## 2023-10-29 RX ADMIN — MONTELUKAST SODIUM 10 MG: 10 TABLET, COATED ORAL at 21:16

## 2023-10-29 RX ADMIN — BUDESONIDE AND FORMOTEROL FUMARATE DIHYDRATE 2 PUFF: 160; 4.5 AEROSOL RESPIRATORY (INHALATION) at 09:47

## 2023-10-29 RX ADMIN — METHOCARBAMOL 500 MG: 500 TABLET ORAL at 22:43

## 2023-10-29 RX ADMIN — CITALOPRAM 20 MG: 20 TABLET ORAL at 09:41

## 2023-10-29 RX ADMIN — SENNOSIDES AND DOCUSATE SODIUM 1 TABLET: 8.6; 5 TABLET ORAL at 19:23

## 2023-10-29 RX ADMIN — CALCIUM CARBONATE (ANTACID) CHEW TAB 500 MG 500 MG: 500 CHEW TAB at 19:38

## 2023-10-29 RX ADMIN — FERROUS SULFATE TAB 325 MG (65 MG ELEMENTAL FE) 325 MG: 325 (65 FE) TAB at 09:41

## 2023-10-29 RX ADMIN — BUPROPION HYDROCHLORIDE 300 MG: 300 TABLET, EXTENDED RELEASE ORAL at 06:51

## 2023-10-29 RX ADMIN — ACETAMINOPHEN 975 MG: 325 TABLET ORAL at 15:07

## 2023-10-29 RX ADMIN — OXYCODONE HYDROCHLORIDE 10 MG: 5 TABLET ORAL at 04:55

## 2023-10-29 RX ADMIN — PANTOPRAZOLE SODIUM 40 MG: 40 TABLET, DELAYED RELEASE ORAL at 09:41

## 2023-10-29 RX ADMIN — HYDROXYZINE HYDROCHLORIDE 10 MG: 10 TABLET ORAL at 04:56

## 2023-10-29 RX ADMIN — OXYCODONE HYDROCHLORIDE 10 MG: 5 TABLET ORAL at 21:30

## 2023-10-29 RX ADMIN — ASPIRIN 81 MG: 81 TABLET, COATED ORAL at 09:41

## 2023-10-29 ASSESSMENT — ACTIVITIES OF DAILY LIVING (ADL)
ADLS_ACUITY_SCORE: 41
ADLS_ACUITY_SCORE: 41
ADLS_ACUITY_SCORE: 36
ADLS_ACUITY_SCORE: 41
ADLS_ACUITY_SCORE: 41
ADLS_ACUITY_SCORE: 36
ADLS_ACUITY_SCORE: 41
ADLS_ACUITY_SCORE: 45
ADLS_ACUITY_SCORE: 36
DEPENDENT_IADLS:: INDEPENDENT;TRANSPORTATION
ADLS_ACUITY_SCORE: 41

## 2023-10-29 NOTE — PLAN OF CARE
Problem: Plan of Care - These are the overarching goals to be used throughout the patient stay.    Goal: Absence of Hospital-Acquired Illness or Injury  Outcome: Progressing  Intervention: Identify and Manage Fall Risk  Recent Flowsheet Documentation  Taken 10/29/2023 0941 by Alanna Cash RN  Safety Promotion/Fall Prevention:   assistive device/personal items within reach   clutter free environment maintained   increased rounding and observation   increase visualization of patient   lighting adjusted   mobility aid in reach   nonskid shoes/slippers when out of bed   patient and family education   room door open   room near nurse's station   room organization consistent   safety round/check completed  Intervention: Prevent Skin Injury  Recent Flowsheet Documentation  Taken 10/29/2023 0941 by Alanna Cash RN  Body Position:   position changed independently   supine, head elevated   supine, legs elevated  Intervention: Prevent and Manage VTE (Venous Thromboembolism) Risk  Recent Flowsheet Documentation  Taken 10/29/2023 0941 by Alanna Cash RN  VTE Prevention/Management:   compression stockings on   foot pump device on  Goal: Optimal Comfort and Wellbeing  Outcome: Progressing  Intervention: Monitor Pain and Promote Comfort  Recent Flowsheet Documentation  Taken 10/29/2023 1106 by Alanna Cash RN  Pain Management Interventions:   medication (see MAR)   cold applied     Problem: Risk for Delirium  Goal: Improved Attention and Thought Clarity  Outcome: Progressing  Intervention: Maximize Cognitive Function  Recent Flowsheet Documentation  Taken 10/29/2023 0941 by Alanna Cash RN  Sensory Stimulation Regulation:   care clustered   lighting decreased   quiet environment promoted  Reorientation Measures: clock in view     Problem: Hip Arthroplasty  Goal: Acceptable Pain Control  Outcome: Progressing  Intervention: Prevent or Manage Pain  Recent Flowsheet Documentation  Taken 10/29/2023 1106 by Shailesh  TJ Mondragon  Pain Management Interventions:   medication (see MAR)   cold applied     Goal Outcome Evaluation:    Pt is alert and oriented, calls appropriately for needs. Assist of 1 with ambulation with walker and gait belt. Vitals signs are stable, afebrile, oxygen via 2 liters via N/C. Pt is tolerating regular diet. Pt is voiding appropriately.  Pt complains of pain 5/10, to left hip, PRN oxycodone given for this, with relief. Alarms in place. Contact precautions in place.      Plan: Pt to discharge to TCU, waiting on placement.     Alanna Cash RN  October 29, 2023, 3:47 PM

## 2023-10-29 NOTE — PLAN OF CARE
Goal Outcome Evaluation:  Patient vital signs are at baseline: Yes.Was on Oxygen via NC at night.   Patient able to ambulate as they were prior to admission or with assist devices provided by therapies during their stay:  Yes  Patient MUST void prior to discharge:  Yes  Patient able to tolerate oral intake:  Yes  Pain has adequate pain control using Oral analgesics:  Yes. Scheduled Tylenol, PRN Oxycodone and Hydroxyzine administered for pain control. Utilized ice packs for comfort.   Does patient have an identified :  Yes  Has goal D/C date and time been discussed with patient:  Yes

## 2023-10-29 NOTE — PLAN OF CARE
Patient vital signs are at baseline: yes, but on 2L O2 to keep O2>85%  Patient able to ambulate as they were prior to admission or with assist devices provided by therapies during their stay:  Yes  Patient MUST void prior to discharge:  Yes  Patient able to tolerate oral intake:  Yes  Pain has adequate pain control using Oral analgesics:  No, pain still severe  Does patient have an identified :  Yes  Has goal D/C date and time been discussed with patient:  Yes

## 2023-10-29 NOTE — CONSULTS
Care Management Initial Consult    General Information  Assessment completed with: Patient,    Type of CM/SW Visit: Initial Assessment    Primary Care Provider verified and updated as needed: Yes   Readmission within the last 30 days: no previous admission in last 30 days      Reason for Consult: discharge planning  Advance Care Planning:            Communication Assessment  Patient's communication style: spoken language (English or Bilingual)             Cognitive  Cognitive/Neuro/Behavioral: WDL                      Living Environment:   People in home: alone     Current living Arrangements: apartment      Able to return to prior arrangements:         Family/Social Support:  Care provided by: self  Provides care for: no one  Marital Status:   Sibling(s)          Description of Support System: Supportive, Involved         Current Resources:   Patient receiving home care services:       Community Resources:    Equipment currently used at home: other (see comments), wheelchair, manual (Walking sticks)  Supplies currently used at home:      Employment/Financial:  Employment Status: retired        Financial Concerns: none   Referral to Financial Worker: No       Does the patient's insurance plan have a 3 day qualifying hospital stay waiver?  No    Lifestyle & Psychosocial Needs:  Social Determinants of Health     Food Insecurity: Low Risk  (10/20/2023)    Food Insecurity     Within the past 12 months, did you worry that your food would run out before you got money to buy more?: No     Within the past 12 months, did the food you bought just not last and you didn t have money to get more?: No   Depression: At risk (10/20/2023)    PHQ-2     PHQ-2 Score: 5   Housing Stability: Low Risk  (10/20/2023)    Housing Stability     Do you have housing? : Yes     Are you worried about losing your housing?: No   Tobacco Use: Low Risk  (10/27/2023)    Patient History     Smoking Tobacco Use: Never     Smokeless Tobacco Use:  Never     Passive Exposure: Not on file   Financial Resource Strain: Low Risk  (10/20/2023)    Financial Resource Strain     Within the past 12 months, have you or your family members you live with been unable to get utilities (heat, electricity) when it was really needed?: No   Alcohol Use: Not on file   Transportation Needs: Low Risk  (10/20/2023)    Transportation Needs     Within the past 12 months, has lack of transportation kept you from medical appointments, getting your medicines, non-medical meetings or appointments, work, or from getting things that you need?: No   Physical Activity: Not on file   Interpersonal Safety: Not on file   Stress: Not on file   Social Connections: Not on file       Functional Status:  Prior to admission patient needed assistance:   Dependent ADLs:: Independent  Dependent IADLs:: Independent, Transportation       Mental Health Status:  Mental Health Status: No Current Concerns       Chemical Dependency Status:  Chemical Dependency Status: No Current Concerns             Values/Beliefs:  Spiritual, Cultural Beliefs, Caodaism Practices, Values that affect care: no               Additional Information:  SWCM met and introduced self and CM services to Pt.  Pt lives in an independent Apt at [facility]. Pt is independent at baseline.  Pt can drive.  Pt does not have 02 at home and is currently using.  Pt has been dealing with pain and not being able to be mobile.  Pt's plan was to stay with her sister, Cici and her  until more independent. Pt does not feel she would need TCU or home care.  Pt's sister comes to visit and feels Pt needs TCU.  Pt agrees.  Because Pt lives at [facility], Pt's first choice is [facility] and referral.sent .  PAS needed. Transport- TBD. Sister if can manage Pt.     LIZZY Keenan

## 2023-10-29 NOTE — PROVIDER NOTIFICATION
Pt admitted for total left hip arthroplasty.  Pt hx COPD, asthma, CAD, CKD, GERD, breast CA, chronic anemia.  No current CXR  Pt is on RA, o2 sat 92-93%, RR 18-20, BS diminished.  Pt states she uses [facility] and would like one bedside.  Pt also requests Duoneb changed to PRN.    Will change Duoneb to PRN @ this time and RE-EVAL if needed.       10/29/23 1342   RCAT Assessment   Reason for Assessment COPD   Pulmonary Status 3   Surgical Status 1   Chest X-ray 0   Respiratory Pattern 0   Mental Status 0   Breath Sounds 2   Cough Effectiveness 1   Level of Activity 1   O2 Required for SpO2>=92% 0   Acuity Level (points) 8   Acuity Level  4   Re-eval Interval Guideline   (PRN)   Re-evaluation Date   (PRN)   Clinical Indications/Symptoms   Aerosol Therapy   (Pt requests Duoneb PRN)   Broncho-pulmonary Hygiene History of mucous producing disease   Volume Expansion Prevent atelectasis   Aerosol Therapy Plan   RT Treatment Nebulizer   Aerosol Treatment Frequency Acuity Level 4: PRN [id number] wheezing

## 2023-10-29 NOTE — PROGRESS NOTES
[facility] ACUTE PAIN SERVICE    Chart review     Assessment/Plan:     Krystal Virgen is a 83 year old female who was admitted on 10/27/2023.  Pod 2. I was asked to see the patient for post op pain. Admitted for LEFT TOTAL HIP ARTHROPLASTY DIRECT ANTERIOR . History of COPD, breast CA, hiatal hernia, GERD, asthma, CKD, PONV, CAD, opoid tolerant.    . Chart reviewed, appropriate for discharge from pain perspective. Discharge orders reviewed and appropriate.  PMT will sign off.    PLAN:   1) Pain is consistent with post op pain. The patient's home MME was up to 22 mg daily.   2)Multimodal Medication Therapy  Topical:none  NSAID'S:none, crcl = 35ml/min, chronic anemia  Muscle Relaxants:none, Add Robaxin for reported hip spasms.   Home zanaflex: she is unsure if this works/is helpful. Will reduce to BIDPRN while trialing robaxin for acute post op spasms.   Do not use within 3 hours of the other muscle relaxant.    Adjuvants:APAP 975mg tid  Atarax 10mg q6hprn  Antidepressants/anxiolytics:none  Opioids:oxycodone 5-10mg  q3hprn  IV Pain medication: IV Dilaudid 0.2mg-0.5mg q2hprn: change to q6hprn  3)Non-medication interventions  Pharmacy consult- appreciate recommendations   Acupuncture consult- as available Mon and Friday     Integrative consult - called referral to [phone]   4)Constipation Prophylaxis: miralax and sennaS bid  5) Follow up   -Opioid prescriber has been Juan C Fairchild  -Discharge Recommendations - We recommend prescribing the following at the time of discharge: Max MME per ICSI guidelines = 200 for JORGE LUIS for opioid naive patients (could utilize more for tolerant).  Scheudled APAP  PRN Robaxin if she finds it helpful  Scripts per ortho.         Paris Nielson, Joby  Acute Care Pain Management Program  [facility] (SARATH, Travis, JNs)   Preference if for [employer] Paging -or Vocera paging

## 2023-10-29 NOTE — PROGRESS NOTES
"Assessment: 2 Days Post-Op  S/P Procedure(s):  LEFT TOTAL HIP ARTHROPLASTY DIRECT ANTERIOR     Plan:   - Continue PT/OT  - Weightbearing status: WBAT  - Anticoagulation: ASA 81 PO BID in addition to SCDs, alba stockings and early ambulation.  - Discharge planning: home today      Subjective:  Pain: denies pain at rest, discomfort when ambulatory, feels pinch into the groin  Nausea, Vomiting:  No  Lightheadedness, Dizziness:  No  Neuro:  Patient denies new onset numbness or paresthesias    Patient sitting up in bed, pain improved since yesterday. Continues to have pinch sensation at the anterior groin with weight bearing. Discussed going home today upon successful work with therapy.     Objective:  /76 (BP Location: Left arm)   Pulse 92   Temp 97.8  F (36.6  C) (Oral)   Resp 18   Ht 1.6 m (5' 3\")   Wt 79.4 kg (175 lb)   SpO2 96%   BMI 31.00 kg/m    The patient is A&Ox3. Appears comfortable.   Sensation is intact.  Dorsiflexion and plantar flexion is intact.  Dorsalis pedis pulse intact.  Calves are soft and non-tender. Negative Kp's.  Thigh is soft and non-tender to deep palpation.   Mild anterior groin discomfort with log roll  The incision is covered. Dressing C/D/I.    No drain     Pertinent Labs   Lab Results: personally reviewed.   Lab Results   Component Value Date    INR 0.90 10/27/2023    INR 0.98 01/07/2021     Lab Results   Component Value Date    WBC 15.2 (H) 10/28/2023    HGB 9.4 (L) 10/28/2023    HCT 29.8 (L) 10/28/2023    MCV 86 10/28/2023     10/28/2023     Lab Results   Component Value Date     (L) 10/28/2023    CO2 22 10/28/2023         Report completed by:  Bernadine Nolan PA-C, MARIA INES  Date: 10/29/2023  Time: 7:35 AM    "

## 2023-10-29 NOTE — PLAN OF CARE
Patient vital signs are at baseline: Yes, but on 1L O2 NC to keep O2 >85%  Patient able to ambulate as they were prior to admission or with assist devices provided by therapies during their stay:  Yes  SBA wgb  Patient MUST void prior to discharge:  Yes  Voids without difficulty  Patient able to tolerate oral intake:  Yes  Pain has adequate pain control using Oral analgesics:  Yes  Does patient have an identified :  Yes  Has goal D/C date and time been discussed with patient:  Yes  TBD for TCU

## 2023-10-29 NOTE — PLAN OF CARE
A/O x 4. VSS on 2L via NC. Denies N/V. Dyspnea with exertion.  Pain, 10/10 managed with scheduled and PRN meds. IV dilaudid 0.5 mg @ 2041. Pain improved 5/10.   Ambulated with 1 assist and walker to bathroom. Gait steady.     Goal Outcome Evaluation:    Problem: Plan of Care - These are the overarching goals to be used throughout the patient stay.    Goal: Optimal Comfort and Wellbeing  Intervention: Monitor Pain and Promote Comfort  Recent Flowsheet Documentation  Taken 10/28/2023 2041 by Aliza Louise, RN  Pain Management Interventions:   medication (see MAR)   care clustered   rest     Problem: Hip Arthroplasty  Goal: Optimal Coping  Intervention: Support Psychosocial Response to Surgery and Mobility Changes  Recent Flowsheet Documentation  Taken 10/28/2023 2041 by Aliza Louise, RN  Supportive Measures:   active listening utilized   positive reinforcement provided

## 2023-10-30 ENCOUNTER — APPOINTMENT (OUTPATIENT)
Dept: PHYSICAL THERAPY | Facility: CLINIC | Age: 83
DRG: 470 | End: 2023-10-30
Attending: ORTHOPAEDIC SURGERY
Payer: COMMERCIAL

## 2023-10-30 ENCOUNTER — APPOINTMENT (OUTPATIENT)
Dept: OCCUPATIONAL THERAPY | Facility: CLINIC | Age: 83
DRG: 470 | End: 2023-10-30
Attending: ORTHOPAEDIC SURGERY
Payer: COMMERCIAL

## 2023-10-30 ENCOUNTER — APPOINTMENT (OUTPATIENT)
Dept: RADIOLOGY | Facility: CLINIC | Age: 83
DRG: 470 | End: 2023-10-30
Attending: ORTHOPAEDIC SURGERY
Payer: COMMERCIAL

## 2023-10-30 PROCEDURE — 120N000001 HC R&B MED SURG/OB

## 2023-10-30 PROCEDURE — 250N000013 HC RX MED GY IP 250 OP 250 PS 637: Performed by: CLINICAL NURSE SPECIALIST

## 2023-10-30 PROCEDURE — 97116 GAIT TRAINING THERAPY: CPT | Mod: GP

## 2023-10-30 PROCEDURE — 250N000013 HC RX MED GY IP 250 OP 250 PS 637

## 2023-10-30 PROCEDURE — 97530 THERAPEUTIC ACTIVITIES: CPT | Mod: GP

## 2023-10-30 PROCEDURE — 250N000013 HC RX MED GY IP 250 OP 250 PS 637: Performed by: FAMILY MEDICINE

## 2023-10-30 PROCEDURE — 97535 SELF CARE MNGMENT TRAINING: CPT | Mod: GO

## 2023-10-30 PROCEDURE — 72170 X-RAY EXAM OF PELVIS: CPT

## 2023-10-30 PROCEDURE — 97110 THERAPEUTIC EXERCISES: CPT | Mod: GP

## 2023-10-30 RX ORDER — ASPIRIN 81 MG/1
81 TABLET ORAL 2 TIMES DAILY
Qty: 60 TABLET | Refills: 0 | Status: SHIPPED | OUTPATIENT
Start: 2023-10-30

## 2023-10-30 RX ORDER — OXYCODONE HYDROCHLORIDE 5 MG/1
5-10 TABLET ORAL EVERY 6 HOURS PRN
Qty: 28 TABLET | Refills: 0 | Status: SHIPPED | OUTPATIENT
Start: 2023-10-30 | End: 2023-11-03

## 2023-10-30 RX ORDER — HYDROXYZINE HYDROCHLORIDE 10 MG/1
10 TABLET, FILM COATED ORAL EVERY 6 HOURS PRN
Qty: 30 TABLET | Refills: 0 | Status: SHIPPED | OUTPATIENT
Start: 2023-10-30 | End: 2023-11-04

## 2023-10-30 RX ORDER — AMOXICILLIN 250 MG
1-2 CAPSULE ORAL 2 TIMES DAILY
Qty: 15 TABLET | Refills: 0 | Status: SHIPPED | OUTPATIENT
Start: 2023-10-30

## 2023-10-30 RX ORDER — ACETAMINOPHEN 325 MG/1
650 TABLET ORAL EVERY 6 HOURS PRN
Qty: 60 TABLET | Refills: 0 | Status: SHIPPED | OUTPATIENT
Start: 2023-10-30 | End: 2023-11-07

## 2023-10-30 RX ORDER — CEFADROXIL 500 MG/1
500 CAPSULE ORAL 2 TIMES DAILY
Qty: 14 CAPSULE | Refills: 0 | Status: SHIPPED | OUTPATIENT
Start: 2023-10-30 | End: 2023-11-07

## 2023-10-30 RX ORDER — POLYETHYLENE GLYCOL 3350 17 G/17G
17 POWDER, FOR SOLUTION ORAL DAILY
Qty: 510 G | Refills: 0 | Status: ON HOLD | OUTPATIENT
Start: 2023-10-30 | End: 2023-12-05

## 2023-10-30 RX ADMIN — SENNOSIDES AND DOCUSATE SODIUM 1 TABLET: 8.6; 5 TABLET ORAL at 20:40

## 2023-10-30 RX ADMIN — ACETAMINOPHEN 650 MG: 325 TABLET ORAL at 18:02

## 2023-10-30 RX ADMIN — BUPROPION HYDROCHLORIDE 300 MG: 300 TABLET, EXTENDED RELEASE ORAL at 06:43

## 2023-10-30 RX ADMIN — POLYETHYLENE GLYCOL 3350 17 G: 17 POWDER, FOR SOLUTION ORAL at 08:29

## 2023-10-30 RX ADMIN — ASPIRIN 81 MG: 81 TABLET, COATED ORAL at 08:29

## 2023-10-30 RX ADMIN — OXYCODONE HYDROCHLORIDE 10 MG: 5 TABLET ORAL at 18:02

## 2023-10-30 RX ADMIN — OXYCODONE HYDROCHLORIDE 10 MG: 5 TABLET ORAL at 12:38

## 2023-10-30 RX ADMIN — PANTOPRAZOLE SODIUM 40 MG: 40 TABLET, DELAYED RELEASE ORAL at 08:29

## 2023-10-30 RX ADMIN — ACETAMINOPHEN 650 MG: 325 TABLET ORAL at 12:38

## 2023-10-30 RX ADMIN — FERROUS SULFATE TAB 325 MG (65 MG ELEMENTAL FE) 325 MG: 325 (65 FE) TAB at 08:29

## 2023-10-30 RX ADMIN — HYDROXYZINE HYDROCHLORIDE 10 MG: 10 TABLET ORAL at 00:55

## 2023-10-30 RX ADMIN — MONTELUKAST SODIUM 10 MG: 10 TABLET, COATED ORAL at 20:40

## 2023-10-30 RX ADMIN — ASPIRIN 81 MG: 81 TABLET, COATED ORAL at 20:40

## 2023-10-30 RX ADMIN — CITALOPRAM 20 MG: 20 TABLET ORAL at 08:29

## 2023-10-30 RX ADMIN — OXYCODONE HYDROCHLORIDE 5 MG: 5 TABLET ORAL at 08:50

## 2023-10-30 RX ADMIN — METHOCARBAMOL 500 MG: 500 TABLET ORAL at 20:40

## 2023-10-30 RX ADMIN — ACETAMINOPHEN 975 MG: 325 TABLET ORAL at 06:44

## 2023-10-30 RX ADMIN — SENNOSIDES AND DOCUSATE SODIUM 1 TABLET: 8.6; 5 TABLET ORAL at 08:29

## 2023-10-30 RX ADMIN — ROSUVASTATIN CALCIUM 40 MG: 10 TABLET, FILM COATED ORAL at 20:40

## 2023-10-30 RX ADMIN — BUDESONIDE AND FORMOTEROL FUMARATE DIHYDRATE 2 PUFF: 160; 4.5 AEROSOL RESPIRATORY (INHALATION) at 08:30

## 2023-10-30 RX ADMIN — BUDESONIDE AND FORMOTEROL FUMARATE DIHYDRATE 2 PUFF: 160; 4.5 AEROSOL RESPIRATORY (INHALATION) at 20:41

## 2023-10-30 ASSESSMENT — ACTIVITIES OF DAILY LIVING (ADL)
ADLS_ACUITY_SCORE: 32
ADLS_ACUITY_SCORE: 30
ADLS_ACUITY_SCORE: 32
ADLS_ACUITY_SCORE: 32
ADLS_ACUITY_SCORE: 30
ADLS_ACUITY_SCORE: 34

## 2023-10-30 NOTE — PLAN OF CARE
Problem: Plan of Care - These are the overarching goals to be used throughout the patient stay.    Goal: Optimal Comfort and Wellbeing  Outcome: Progressing   Goal Outcome Evaluation:       Patient vital signs are at baseline: Yes  Patient able to ambulate as they were prior to admission or with assist devices provided by therapies during their stay:  No,  Reason:  Pt up with assist of 1  Patient MUST void prior to discharge:  Yes  Patient able to tolerate oral intake:  Yes  Pain has adequate pain control using Oral analgesics:  Yes  Does patient have an identified :  Yes  Has goal D/C date and time been discussed with patient:  Yes

## 2023-10-30 NOTE — PLAN OF CARE
Problem: Plan of Care - These are the overarching goals to be used throughout the patient stay.    Goal: Absence of Hospital-Acquired Illness or Injury  Intervention: Identify and Manage Fall Risk  Recent Flowsheet Documentation  Taken 10/30/2023 0100 by Jeanine Zamora RN  Safety Promotion/Fall Prevention:   safety round/check completed   room near nurse's station   nonskid shoes/slippers when out of bed   lighting adjusted   mobility aid in reach   assistive device/personal items within reach     Problem: Plan of Care - These are the overarching goals to be used throughout the patient stay.    Goal: Absence of Hospital-Acquired Illness or Injury  Intervention: Prevent Skin Injury  Recent Flowsheet Documentation  Taken 10/30/2023 0100 by Jeanine Zamora RN  Body Position:   position changed independently   supine, head elevated   supine, legs elevated     Problem: Plan of Care - These are the overarching goals to be used throughout the patient stay.    Goal: Optimal Comfort and Wellbeing  Intervention: Monitor Pain and Promote Comfort  Recent Flowsheet Documentation  Taken 10/29/2023 2328 by Jeanine Zamora RN  Pain Management Interventions:   medication (see MAR)   cold applied     Problem: Hip Arthroplasty  Goal: Effective Oxygenation and Ventilation  Intervention: Optimize Oxygenation and Ventilation  Recent Flowsheet Documentation  Taken 10/30/2023 0100 by Jeanine Zamora RN  Head of Bed (HOB) Positioning: HOB at 20-30 degrees   Goal Outcome Evaluation:    A&OX4 but intermittent confusion. VSS except for elevated bp due to anxiety/movement but bp went down later in shift. Pt pain moderate during movement treated w/ ice and PRN Atarax (see mar) Pt on 1 Liter O2 overnight and will try and wean off in the am. Assist of one w/ walker and GB. OT at 0730am and PT at 0845am. Discharge pending TCU placement, weaning off O2, and passing therapies/pain control.

## 2023-10-30 NOTE — PROGRESS NOTES
"Orthopedic Progress Note      Assessment: 3 Days Post-Op  S/P Procedure(s):  LEFT TOTAL HIP ARTHROPLASTY DIRECT ANTERIOR @    Plan:   - Continue PT/OT  - Weightbearing status: WBAT  - Anticoagulation: ASA in addition to SCDs, alba stockings and early ambulation.  - Discharge planning: TCU pending, unsafe to go home due to mobility      Subjective:  Pain: moderate  Chest pain, SOB: no  Nausea, Vomiting:  no  Lightheadedness, Dizziness:  no  Neuro:  Patient denies new onset numbness or paresthesias    Patient is doing well  this morning but is frustrated that his her pain is not well managed. She is still having trouble with mobility at this time    Objective:  /64 (BP Location: Right arm)   Pulse 85   Temp 98.3  F (36.8  C) (Oral)   Resp 16   Ht 1.6 m (5' 3\")   Wt 79.4 kg (175 lb)   SpO2 94%   BMI 31.00 kg/m    The patient is A&Ox3. Appears comfortable.   Sensation is intact.  Dorsiflexion and plantar flexion is intact.  Dorsalis pedis pulse intact.  Calves are soft and non-tender. Negative Kp's.  The incision is covered. Dressing C/D/I.    Pertinent Labs   Lab Results: personally reviewed.   Lab Results   Component Value Date    INR 0.90 10/27/2023    INR 0.98 01/07/2021     Lab Results   Component Value Date    WBC 15.2 (H) 10/28/2023    HGB 8.7 (L) 10/29/2023    HCT 29.8 (L) 10/28/2023    MCV 86 10/28/2023     10/28/2023     Lab Results   Component Value Date     (L) 10/28/2023    CO2 22 10/28/2023         Report completed by:  Dali Kidd PA-C/Dr. Cornejo  [facility]    Date: 10/30/2023  Time: 9:41 AM    "

## 2023-10-30 NOTE — PROGRESS NOTES
KS 3102- got her Protonix this morning but is reporting she is still having some reflux. Is there something she can get? Thank you in advance  #[phone]

## 2023-10-30 NOTE — PROGRESS NOTES
Care Management Follow Up    Length of Stay (days): 3    Expected Discharge Date: 10/31/2023     Concerns to be Addressed: discharge planning     Patient plan of care discussed at interdisciplinary rounds: Yes    Anticipated Discharge Disposition: Transitional Care     Anticipated Discharge Services: None  Anticipated Discharge DME: Oxygen    Patient/family educated on Medicare website which has current facility and service quality ratings: yes  Education Provided on the Discharge Plan: Yes  Patient/Family in Agreement with the Plan: yes    Referrals Placed by CM/SW: Post Acute Facilities  Private pay costs discussed: Not applicable    Additional Information:    MIKAYLA met with ortho PA, Pt will require TCU. MIKAYLA met with Pt.  Pt does not feel safe discharging home reports she needs TCU, however reports concern that she is unable to pay for TCU privately and inquires about coverage. SW submitting [employer] authorization request ID: [id number])    MIKAYLA called and spoke with Yulia at [facility]--referral is being reviewed.     10:59 AM  Pt accepted at [facility] with bed available on 10/31 (can admit between 3464-1228).  PAS completed and on chart. MIKAYLA updated Pt.  Pt is in agreement with plan, sister will transport at 1300.    11:44 AM  BCBS authorization approved ([id number]).  MIKAYLA updated Yulia in admissions at [facility].     LIZZY Daily

## 2023-10-31 VITALS
BODY MASS INDEX: 31.01 KG/M2 | TEMPERATURE: 97.9 F | RESPIRATION RATE: 16 BRPM | OXYGEN SATURATION: 95 % | WEIGHT: 175 LBS | HEART RATE: 95 BPM | SYSTOLIC BLOOD PRESSURE: 158 MMHG | HEIGHT: 63 IN | DIASTOLIC BLOOD PRESSURE: 70 MMHG

## 2023-10-31 PROCEDURE — 250N000013 HC RX MED GY IP 250 OP 250 PS 637

## 2023-10-31 PROCEDURE — 250N000013 HC RX MED GY IP 250 OP 250 PS 637: Performed by: CLINICAL NURSE SPECIALIST

## 2023-10-31 PROCEDURE — 250N000013 HC RX MED GY IP 250 OP 250 PS 637: Performed by: FAMILY MEDICINE

## 2023-10-31 RX ADMIN — OXYCODONE HYDROCHLORIDE 10 MG: 5 TABLET ORAL at 12:47

## 2023-10-31 RX ADMIN — PANTOPRAZOLE SODIUM 40 MG: 40 TABLET, DELAYED RELEASE ORAL at 09:17

## 2023-10-31 RX ADMIN — BUDESONIDE AND FORMOTEROL FUMARATE DIHYDRATE 2 PUFF: 160; 4.5 AEROSOL RESPIRATORY (INHALATION) at 09:18

## 2023-10-31 RX ADMIN — CITALOPRAM 20 MG: 20 TABLET ORAL at 09:18

## 2023-10-31 RX ADMIN — SENNOSIDES AND DOCUSATE SODIUM 1 TABLET: 8.6; 5 TABLET ORAL at 09:17

## 2023-10-31 RX ADMIN — ASPIRIN 81 MG: 81 TABLET, COATED ORAL at 09:17

## 2023-10-31 RX ADMIN — POLYETHYLENE GLYCOL 3350 17 G: 17 POWDER, FOR SOLUTION ORAL at 09:18

## 2023-10-31 RX ADMIN — OXYCODONE HYDROCHLORIDE 10 MG: 5 TABLET ORAL at 04:50

## 2023-10-31 RX ADMIN — FERROUS SULFATE TAB 325 MG (65 MG ELEMENTAL FE) 325 MG: 325 (65 FE) TAB at 09:17

## 2023-10-31 RX ADMIN — OXYCODONE HYDROCHLORIDE 10 MG: 5 TABLET ORAL at 09:18

## 2023-10-31 RX ADMIN — BUPROPION HYDROCHLORIDE 300 MG: 300 TABLET, EXTENDED RELEASE ORAL at 06:57

## 2023-10-31 ASSESSMENT — ACTIVITIES OF DAILY LIVING (ADL)
ADLS_ACUITY_SCORE: 30
ADLS_ACUITY_SCORE: 34
ADLS_ACUITY_SCORE: 30
ADLS_ACUITY_SCORE: 34
ADLS_ACUITY_SCORE: 30

## 2023-10-31 NOTE — PROGRESS NOTES
Care Management Discharge Note    Discharge Date: 10/31/2023       Discharge Disposition: Transitional Care    Discharge Services: skilled nursing, therapy     Discharge DME: per treatment team     Discharge Transportation: agency, family or friend will provide    Private pay costs discussed: Not applicable    PAS Confirmation Code: [id number]  Patient/family educated on Medicare website which has current facility and service quality ratings: yes    Education Provided on the Discharge Plan: Yes  Persons Notified of Discharge Plans: patient   Patient/Family in Agreement with the Plan: yes    Handoff Referral Completed: Yes    Additional Information:  Patient discharging to [facility] for TCU.  Pt's sister transporting at 1300.  PAS completed, [employer] auth approved--both documents on Pt's chart. Pt in agreement with discharge plan. Discharge orders placed, SW faxed order to TCU.     LIZZY Daily

## 2023-10-31 NOTE — PROGRESS NOTES
Discharge AVS reviewed with patient.  Questions answered and teachings acknowledged.  Belongings and paperwork + TCU packet sent with via sister transport. Orthopedic [employer] acknowledged (YES)

## 2023-10-31 NOTE — DISCHARGE SUMMARY
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 Cornejo on 10/27. There were no intraoperative complications and the patient was transferred to the recovery room and later the orthopedic unit in stable condition. Once the patient reached the orthopedic floor our orthopedic pain protocol was implemented along with the following:    Anticoagulation Medications: ASA  Therapy: PT and OT  Activity: WBAT  Bracing: None    Consultations during Admission: Hospitalist service for medical management     COMPLICATIONS/SIGNIFICANT FINDINGS    NONE    DISCHARGE INFORMATION   Condition at discharge: Good  Discharge destination: Skilled nursing facility  Patient was seen by myself on the date of discharge.    FOLLOW UP CARE   Follow up with orthopedics in 7-10 days or sooner should the need arise. Ortho will continue to manage pain control, post op anticoagulation and incision care.     Follow up with your PCP for management of chronic medical problems and to evaluate for post op medical complications including constipation, nausea/vomiting, DVT/PE, anemia, changes in blood pressure, fevers/chills, urinary retention and atelectasis/pneumonia.     Subjective   Patient is doing well on POD #1. Pain is well controlled with oral medications. Ambulating. Tolerating oral intake.     Physical Exam   BP (!) 158/70 (BP Location: Right arm)   Pulse 95   Temp 97.9  F (36.6  C) (Oral)   Resp 16   Ht 1.6 m (5' 3\")   Wt 79.4 kg (175 " lb)   SpO2 95%   BMI 31.00 kg/m    The patient is A&Ox3. Appears comfortable.   Sensation is intact.  Dorsiflexion and plantar flexion is intact.  Dorsalis pedis pulse intact.  Calves are soft and non-tender. Negative Kp's.  The incision is covered. Dressing C/D/I.    Pertinent Results at Discharge     Hemoglobin   Date/Time Value Ref Range Status   10/29/2023 07:13 AM 8.7 (L) 11.7 - 15.7 g/dL Final   10/28/2023 07:13 AM 9.4 (L) 11.7 - 15.7 g/dL Final   10/27/2023 08:23 AM 10.9 (L) 11.7 - 15.7 g/dL Final   01/21/2021 01:32 PM 10.4 (L) 11.7 - 15.7 g/dL Final   01/10/2021 09:57 AM 9.0 (L) 11.7 - 15.7 g/dL Final   01/10/2021 05:31 AM 8.6 (L) 11.7 - 15.7 g/dL Final     INR   Date/Time Value Ref Range Status   10/27/2023 08:23 AM 0.90 0.85 - 1.15 Final   01/07/2021 02:00 PM 0.98 0.86 - 1.14 Final     Platelet Count   Date/Time Value Ref Range Status   10/28/2023 07:13  150 - 450 10e3/uL Final   10/27/2023 08:23  150 - 450 10e3/uL Final   10/20/2023 02:45  150 - 450 10e3/uL Final   01/21/2021 01:32  (H) 150 - 450 10e9/L Final   01/10/2021 05:31  150 - 450 10e9/L Final   01/09/2021 02:14  150 - 450 10e9/L Final       Problem List   Principal Problem:    S/P total left hip arthroplasty      Dali Kidd PA-C/Dr. Cornejo  [facility]  [phone]  Date: 10/31/2023  Time: 10:16 AM

## 2023-10-31 NOTE — PROGRESS NOTES
"Orthopedic Progress Note      Assessment: 4 Days Post-Op  S/P Procedure(s):  LEFT TOTAL HIP ARTHROPLASTY DIRECT ANTERIOR @    Plan:   - Continue PT/OT  - Weightbearing status: WBAT  - Anticoagulation: ASA in addition to SCDs, alba stockings and early ambulation.  - Discharge planning: TCU today       Subjective:  Pain: moderate  Chest pain, SOB: no  Nausea, Vomiting:  no  Lightheadedness, Dizziness:  no  Neuro:  Patient denies new onset numbness or paresthesias    Patient is doing well  this morning but is frustrated that his her pain is not well managed. She is still having trouble with mobility at this time    Objective:  BP (!) 158/70 (BP Location: Right arm)   Pulse 95   Temp 97.9  F (36.6  C) (Oral)   Resp 16   Ht 1.6 m (5' 3\")   Wt 79.4 kg (175 lb)   SpO2 95%   BMI 31.00 kg/m    The patient is A&Ox3. Appears comfortable.   Sensation is intact.  Dorsiflexion and plantar flexion is intact.  Dorsalis pedis pulse intact.  Calves are soft and non-tender. Negative Kp's.  The incision is covered. Dressing C/D/I.    Pertinent Labs   Lab Results: personally reviewed.   Lab Results   Component Value Date    INR 0.90 10/27/2023    INR 0.98 01/07/2021     Lab Results   Component Value Date    WBC 15.2 (H) 10/28/2023    HGB 8.7 (L) 10/29/2023    HCT 29.8 (L) 10/28/2023    MCV 86 10/28/2023     10/28/2023     Lab Results   Component Value Date     (L) 10/28/2023    CO2 22 10/28/2023         Report completed by:  Dali Kidd PA-C/Dr. Cornejo  [facility]    10/31/23     "

## 2023-10-31 NOTE — PLAN OF CARE
Problem: Hip Arthroplasty  Goal: Optimal Coping  Outcome: Progressing  Intervention: Support Psychosocial Response to Surgery and Mobility Changes  Recent Flowsheet Documentation  Taken 10/31/2023 0924 by Jason Rodriguez RN  Supportive Measures:   active listening utilized   relaxation techniques promoted  Goal: Absence of Bleeding  Outcome: Progressing  Intervention: Promote Optimal Functional Status  Recent Flowsheet Documentation  Taken 10/31/2023 0924 by Jason Rodriguez RN  Assistive Device Utilized:   gait belt   walker  Activity Management:   activity adjusted per tolerance   ambulated to bathroom   back to bed  Goal: Absence of Infection Signs and Symptoms  Outcome: Progressing  Goal: Intact Neurovascular Status  Outcome: Progressing  Goal: Anesthesia/Sedation Recovery  Outcome: Progressing  Goal: Acceptable Pain Control  Outcome: Progressing  Intervention: Prevent or Manage Pain  Recent Flowsheet Documentation  Taken 10/31/2023 0918 by Jason Rodriguez RN  Pain Management Interventions:   medication (see MAR)   cold applied   care clustered   breathing exercises   emotional support   pain management plan reviewed with patient/caregiver   pillow support provided   repositioned   rest  Goal: Nausea and Vomiting Relief  Outcome: Progressing  Goal: Effective Urinary Elimination  Outcome: Progressing       Patient vital signs are at baseline: Yes.   Patient able to ambulate as they were prior to admission or with assist devices provided by therapies during their stay:  Yes  Patient MUST void prior to discharge:  Yes  Patient able to tolerate oral intake:  Yes  Pain has adequate pain control using Oral analgesics:  Yes. Patient rating pain 4-9/10  Does patient have an identified :  Yes  Has goal D/C date and time been discussed with patient:  Yes. Patient to discharge to TCU at 1330 today, family transport.

## 2023-10-31 NOTE — PROGRESS NOTES
Physical Therapy Discharge Summary    Reason for therapy discharge:    Discharged to transitional care facility.    Progress towards therapy goal(s). See goals on Care Plan in [facility] electronic health record for goal details.  Goals not met.  Barriers to achieving goals:   discharge from facility.    Therapy recommendation(s):    Continued therapy is recommended.  Rationale/Recommendations:  PT for increased strength, activity tolerance and functional mobility.

## 2023-10-31 NOTE — PLAN OF CARE
Problem: Plan of Care - These are the overarching goals to be used throughout the patient stay.    Goal: Optimal Comfort and Wellbeing  Outcome: Progressing  Intervention: Monitor Pain and Promote Comfort     Goal Outcome Evaluation:       Patient vital signs are at baseline: Yes, pt on 2L O2 via NC during sleep  Patient able to ambulate as they were prior to admission or with assist devices provided by therapies during their stay:  Yes, up with assist of 1 with gait belt and walker  Patient MUST void prior to discharge:  Yes  Patient able to tolerate oral intake:  Yes  Pain has adequate pain control using Oral analgesics:  Yes, utilizing PRN pain meds and ice application  Does patient have an identified :  Yes  Has goal D/C date and time been discussed with patient:  Yes, accepted to [facility] TCU, plan to discharge today

## 2023-10-31 NOTE — PLAN OF CARE
Occupational Therapy Discharge Summary    Reason for therapy discharge:    Discharged to transitional care facility.    Progress towards therapy goal(s). See goals on Care Plan in [facility] electronic health record for goal details.  Goals not met.  Barriers to achieving goals:   discharge from facility.    Therapy recommendation(s):    Continued therapy is recommended.  Rationale/Recommendations:  not at baseline for BADLs.

## 2023-11-01 ENCOUNTER — TELEPHONE (OUTPATIENT)
Dept: GERIATRICS | Facility: CLINIC | Age: 83
End: 2023-11-01
Payer: COMMERCIAL

## 2023-11-01 ENCOUNTER — PATIENT OUTREACH (OUTPATIENT)
Dept: CARE COORDINATION | Facility: CLINIC | Age: 83
End: 2023-11-01
Payer: COMMERCIAL

## 2023-11-01 NOTE — PROGRESS NOTES
Connected [facility]    Background: Transitional Care Management program identified per system criteria and reviewed by [facility] team for possible outreach.    Assessment: Upon chart review, [facility] Team member will not proceed with patient outreach related to this episode of Transitional Care Management program due to reason below:    Patient has discharged to a Memory Care, Long-term Care, Assisted Living or Group Home where patient is receiving on-site support with their daily cares, including support with hospital follow up plan.    Plan: Transitional Care Management episode addressed appropriately per reason noted above.      Nola Reyes MA  Connected Care Resource [place], [facility]    *Connected Care Resource Team does NOT follow patient ongoing. Referrals are identified based on internal discharge reports and the outreach is to ensure patient has an understanding of their discharge instructions.

## 2023-11-01 NOTE — TELEPHONE ENCOUNTER
[facility] Geriatrics Triage Nurse Telephone Encounter    Provider: Juan C Donaldson NP   Facility: [facility]  Facility Type:  TCU    Caller: Dena    Allergies:    Allergies   Allergen Reactions    Ticagrelor Other (See Comments)     Dyspnea    Penicillins      Amoxicillin doesn't work for her    Sulfa Antibiotics Rash        Reason for call: Patient is refusing to take Celexa while in the TCU. Requesting order to discontinue or hold medication during stay.    Verbal Order/Direction given by Provider: Hold Celexa per patient request    Provider giving Order:  Juan C Donaldson NP     Verbal Order given to: Dena Gallego RN

## 2023-11-02 ENCOUNTER — TRANSITIONAL CARE UNIT VISIT (OUTPATIENT)
Dept: GERIATRICS | Facility: CLINIC | Age: 83
End: 2023-11-02
Payer: COMMERCIAL

## 2023-11-02 VITALS
WEIGHT: 180.4 LBS | TEMPERATURE: 97.7 F | DIASTOLIC BLOOD PRESSURE: 65 MMHG | HEART RATE: 87 BPM | OXYGEN SATURATION: 90 % | RESPIRATION RATE: 18 BRPM | SYSTOLIC BLOOD PRESSURE: 121 MMHG | BODY MASS INDEX: 31.96 KG/M2 | HEIGHT: 63 IN

## 2023-11-02 DIAGNOSIS — D62 ABLA (ACUTE BLOOD LOSS ANEMIA): ICD-10-CM

## 2023-11-02 DIAGNOSIS — I21.21 ST ELEVATION MYOCARDIAL INFARCTION INVOLVING LEFT CIRCUMFLEX CORONARY ARTERY (H): ICD-10-CM

## 2023-11-02 DIAGNOSIS — I25.10 ATHEROSCLEROSIS OF NATIVE CORONARY ARTERY OF NATIVE HEART WITHOUT ANGINA PECTORIS: ICD-10-CM

## 2023-11-02 DIAGNOSIS — Z96.642 STATUS POST TOTAL REPLACEMENT OF LEFT HIP: ICD-10-CM

## 2023-11-02 DIAGNOSIS — C50.411 MALIGNANT NEOPLASM OF UPPER-OUTER QUADRANT OF RIGHT BREAST IN FEMALE, ESTROGEN RECEPTOR POSITIVE (H): ICD-10-CM

## 2023-11-02 DIAGNOSIS — F32.5 MAJOR DEPRESSIVE DISORDER, SINGLE EPISODE IN FULL REMISSION (H): ICD-10-CM

## 2023-11-02 DIAGNOSIS — J45.40 MODERATE PERSISTENT ASTHMA WITHOUT COMPLICATION: ICD-10-CM

## 2023-11-02 DIAGNOSIS — Z17.0 MALIGNANT NEOPLASM OF UPPER-OUTER QUADRANT OF RIGHT BREAST IN FEMALE, ESTROGEN RECEPTOR POSITIVE (H): ICD-10-CM

## 2023-11-02 DIAGNOSIS — M16.12 PRIMARY OSTEOARTHRITIS OF LEFT HIP: Primary | ICD-10-CM

## 2023-11-02 PROCEDURE — 99306 1ST NF CARE HIGH MDM 50: CPT | Performed by: FAMILY MEDICINE

## 2023-11-02 NOTE — LETTER
2023        RE: Krystal Virgen  [address]  [place] [address]        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 Hospital stay 10/27/23 through 10/31/23..     HPI:    Hospital course  Admitted for planned left total hip arthroplasty related to significant history of left hip arthritis and immobility.  Underwent surgery 10/27/2023 without complications.  Originally plan was to discharge home with family support, however given increased functional needs, discharged to TCU for ongoing therapies. Hospital course uncomplicated. During admission, she was followed by hospitalist team as well for management of chronic conditions.  No changes to her prior medications were made.She continues on aspirin twice a day for prophylaxis.  Continued on cefadroxil for 7 days postoperatively per surgery recommendations.    To review, she underwent preoperative evaluation with her primary physician 10/20/2023.  Had been seen by her cardiologist due to history of coronary disease with NSTEMI and underwent stress test . This did show evidence of prior infarction without evidence of inducible ischemia.  No further evaluation was required prior to surgery.    Today  Overall she is doing well.  Her pain is generally well controlled.  She she is trying to avoid oxycodone but does find it helpful particularly when she needs to sleep.  She is currently  using oxycodone approximately every 8 hours as needed.  She does continue to have moderate decreased mobility of her left leg due to pain but is working with therapies well.  Her appetite is doing well.  She has no fever or chills, no nausea or vomiting.  No chest pain or pressure, no shortness of breath.  She has been able to sleep well in general.    Noted on review that she had asked for citalopram to be held.  Her mood is doing well, not feeling down depressed or anxious.  She actually had stopped citalopram at home prior to surgery about 3 to 4 months ago.    In addition, reviewed some of her other medications.  She was discharged with her home dose of tizanidine, however on review of the chart, appears this was not prescribed since last March.  Additionally, she has not noticed she has not noticed significant improvement with her pain other than with oxycodone.  She has received limited Tylenol as Tylenol as well as she was unaware was on an as-needed basis.    Functional Review  She was living independently at the [facility] independent living apartments associated with [facility].  Following discharge from the hospital, had plan to stay with her sister and made still discharged to stay with her sister while she requires some increased assistance.  Otherwise living independently, not driving. Is incontinent of urine.    Often participating in writing classes at the [facility].  Also notes that there are many professors on her floor and they have very interesting discussions.  She is a retired  and works for [employer].  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  Notes that she has a family of social workers.    CODE STATUS/ADVANCE DIRECTIVES DISCUSSION:  DNR/Comfort focused     ALLERGIES:   Allergies   Allergen Reactions     Ticagrelor Other (See Comments)     Dyspnea     Penicillins      Amoxicillin doesn't work for her     Sulfa Antibiotics Rash      PAST MEDICAL HISTORY:    Past Medical History:   Diagnosis Date     Anemia      Arthritis      Asthma      Asthma      Asthma with acute exacerbation 08/23/2021    Formatting of this note might be different from the original. Created by Conversion  Replacement Utility updated for latest IMO load     Breast cancer (H) 2017     Chronic bronchitis (H)      Chronic sinusitis      COPD (chronic obstructive pulmonary disease) (H)      Depression      GERD (gastroesophageal reflux disease)      Hiatal hernia      Hypertension      Migraines      Osteopenia      Osteoporosis      Overactive bladder      Pneumococcal infection      Pneumonia      PONV (postoperative nausea and vomiting)      Renal disease      Sinusitis      Spinal headache      ST elevation MI (STEMI) (H) 01/07/2021     Stented coronary artery       PAST SURGICAL HISTORY:   has a past surgical history that includes ENT surgery; Cholecystectomy; GYN surgery; orthopedic surgery; Arthrodesis foot (11/11/2011); Coronary Angiogram (N/A, 1/7/2021); Percutaneous Coronary Intervention Stent (N/A, 1/7/2021); Hysterectomy (1984); Foot surgery; sinus surgery; Cholecystectomy; Lumpectomy breast (Right, 2017); Biopsy breast; and Arthroplasty Hip Anterior (Left, 10/27/2023).  FAMILY HISTORY: family history includes Breast Cancer (age of onset: 35.00) in her maternal aunt; Breast Cancer (age of onset: 48.00) in her paternal aunt and sister; Breast Cancer (age of onset: 78.00) in her mother; Heart Disease in her brother and father; Macular Degeneration in her father.  SOCIAL HISTORY:   reports that she has never smoked. She has never used smokeless tobacco. She reports that she does not drink alcohol and does not use drugs.  Patient's living condition: lives alone    Post Discharge Medication Reconciliation Status:   MED REC REQUIRED  Post Medication Reconciliation Status: discharge medications reconciled and changed, per note/orders       Current Outpatient Medications   Medication Sig      acetaminophen (TYLENOL) 325 MG tablet Take 2 tablets (650 mg) by mouth every 6 hours as needed for other (mild pain)     albuterol (PROAIR HFA/PROVENTIL HFA/VENTOLIN HFA) 108 (90 Base) MCG/ACT inhaler USE 2 INHALATIONS ORALLY   EVERY 6 HOURS AS NEEDED     aspirin 81 MG EC tablet Take 1 tablet (81 mg) by mouth 2 times daily     budesonide-formoterol (SYMBICORT) 160-4.5 MCG/ACT inhaler Inhale 2 puffs into the lungs 2 times daily.     buPROPion (WELLBUTRIN XL) 300 MG 24 hr tablet Take 1 tablet (300 mg) by mouth every morning     cefadroxil (DURICEF) 500 MG capsule Take 1 capsule (500 mg) by mouth 2 times daily     citalopram (CELEXA) 20 MG tablet Take 1 tablet (20 mg) by mouth daily     ferrous sulfate (FE TABS) 325 (65 Fe) MG EC tablet Take 1 tablet (325 mg) by mouth daily     fluticasone (FLONASE) 50 MCG/ACT nasal spray Spray 1-2 sprays into both nostrils daily as needed     hydrOXYzine (ATARAX) 10 MG tablet Take 1 tablet (10 mg) by mouth every 6 hours as needed for other (ASSISTS WITH NAUSEA, PAIN ADJUNCT FOR YOUR OTHER MEDICATIONS AND SLEEP)     ipratropium - albuterol 0.5 mg/2.5 mg/3 mL (DUONEB) 0.5-2.5 (3) MG/3ML neb solution Inhale 3 mLs into the lungs every 4 hours as needed     LANsoprazole (PREVACID) 30 MG DR capsule Take 1 capsule (30 mg) by mouth daily     montelukast (SINGULAIR) 10 MG tablet Take 1 tablet (10 mg) by mouth At Bedtime     nitroGLYcerin (NITROSTAT) 0.4 MG sublingual tablet For chest pain place 1 tablet under the tongue every 5 minutes for 3 doses. If symptoms persist 5 minutes after 1st dose call 911.     oxyCODONE (ROXICODONE) 5 MG tablet Take 1-2 tablets (5-10 mg) by mouth every 6 hours as needed for pain     polyethylene glycol (MIRALAX) 17 GM/Dose powder Take 17 g by mouth daily     rosuvastatin (CRESTOR) 40 MG tablet Take 1 tablet (40 mg) by mouth daily     senna-docusate (SENOKOT-S/PERICOLACE) 8.6-50 MG tablet Take 1-2 tablets by mouth 2 times daily Take while on oral narcotics to  "prevent or treat constipation.     SUMAtriptan (IMITREX) 100 MG tablet Take 1 tablet (100 mg) by mouth at onset of headache May repeat in 2 hours if needed: max 2/day;     tiZANidine (ZANAFLEX) 2 MG tablet Take 1 tablet (2 mg) by mouth 3 times daily     No current facility-administered medications for this visit.     Vitals:  /65   Pulse 87   Temp 97.7  F (36.5  C)   Resp 18   Ht 1.6 m (5' 3\")   Wt 81.8 kg (180 lb 6.4 oz)   SpO2 90%   BMI 31.96 kg/m      Exam:  GENERAL APPEARANCE: Laying in bed comfortably, NAD  HENT:  NCAT, moderately [place], good dentition  EYES:  Conjunctiva clear, anicteric, EOMI, PERRL  PULM  Normal WOB on RA, lungs CTAB, no wheezes or crackles, good air movement  CV:  RRR, S1/S2 normal, no murmurs; LLE edema  ABDOMEN: Abdomen soft, not tender, not distended, BS normal and active throughout   M/S:  Decreased left hip ROM as expected  SKIN: Surgical site c/d/I, healing appropriately   NEURO: Alert, answering questions appropriately, normal thought processes; CN II-XII grossly intact, 5/5 strength in distal/proximal extremities throughout except unable to raise left leg off the bed due to hip pain; mild tremor of bilateral UEs  PSYCH: Mood normal with congruent affect    Lab/Diagnostic data:  Recent labs in [facility] reviewed by me today.     ASSESSMENT/PLAN:    (M16.12) Primary osteoarthritis of left hip  (primary encounter diagnosis)  (Z96.642) Status post total replacement of left hip  Comment: Primary reason for admission to the hospital.  Uncomplicated postoperative course.  Discharged to TCU for ongoing therapies.  Plan:   -Continue aspirin 81 mg twice daily  -Continue cefadroxil per orthopedic surgery  -Discontinue hydroxyzine, tizanidine  -Change Tylenol to 1000 mg 3 times daily  -Continue oxycodone as needed  -PT/OT    (D62) ABLA (acute blood loss anemia)  Comment: Related to the above, in context of history of acute GI bleeds, suspected to be related to hiatal hernia.  Follows with " hematology/oncology related to this and history of breast cancer.  Hemoglobin stable postoperatively  Plan:   -Repeat hemoglobin next week  -Continue iron supplement    (I21.21) ST elevation myocardial infarction involving left circumflex coronary artery (H)  (I25.10) Atherosclerosis of native coronary artery of native heart without angina pectoris  Comment: Significant history of coronary artery disease with STEMI.  Seen by cardiology prior to her surgery in September, underwent stress testing with no inducible ischemia.  No acute coronary symptoms.  Plan:   -Aspirin twice a day as above, continue daily when prophylaxis complete  -Continue rosuvastatin    (J45.40) Moderate persistent asthma without complication  Comment: Follows with pulmonology.  Did undergo PFTs 9/13/2023.  No dyspnea though she likely has mild tracheal irritation from intubation.  Plan:   -Continue PTA inhalers    (F32.5) Major depressive disorder, single episode in full remission (H24)  Comment: No current mood concerns.  Will discontinue citalopram as she had discontinued this months ago.  Continues on bupropion without concern.    (C50.411,  Z17.0) Malignant neoplasm of upper-outer quadrant of right breast in female, estrogen receptor positive (H)  Comment: Follows with hematology/oncology Dr. Zavala.  Last evaluated 8/24/2023.  Had been on anastrozole, then tamoxifen due to intolerable VMS.  Completed 5 years of tamoxifen in 2022.  Continues to undergo annual evaluation.    Orders:  [x] Discontinue hydroxyzine, citalopram, tizandine.   [x] Schedule tylenol to 1000mg TID  [x] Draw Hgb next week Monday    Electronically signed by:    Benjamin Rosenstein, MD, MA  [facility]    This note was completed with the assistance of dictation software. Typos and word substitution-errors are expected and unintended.      52 MINUTES SPENT BY ME on the date of service doing chart review, history, exam, documentation & further  activities per the note.                  Sincerely,        Benjamin Rosenstein, MD

## 2023-11-02 NOTE — PROGRESS NOTES
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 Hospital stay 10/27/23 through 10/31/23..     HPI:    Hospital course  Admitted for planned left total hip arthroplasty related to significant history of left hip arthritis and immobility.  Underwent surgery 10/27/2023 without complications.  Originally plan was to discharge home with family support, however given increased functional needs, discharged to TCU for ongoing therapies. Hospital course uncomplicated. During admission, she was followed by hospitalist team as well for management of chronic conditions.  No changes to her prior medications were made.She continues on aspirin twice a day for prophylaxis.  Continued on cefadroxil for 7 days postoperatively per surgery recommendations.    To review, she underwent preoperative evaluation with her primary physician 10/20/2023.  Had been seen by her cardiologist due to history of coronary disease with NSTEMI and underwent stress test . This did show evidence of prior infarction without evidence of inducible ischemia.  No further evaluation was required prior to surgery.    Today  Overall she is doing well.  Her pain is generally well controlled.  She she is trying to avoid oxycodone but does find it helpful particularly when she needs to sleep.  She is currently using oxycodone approximately every 8 hours as needed.  She does continue to have moderate decreased  mobility of her left leg due to pain but is working with therapies well.  Her appetite is doing well.  She has no fever or chills, no nausea or vomiting.  No chest pain or pressure, no shortness of breath.  She has been able to sleep well in general.    Noted on review that she had asked for citalopram to be held.  Her mood is doing well, not feeling down depressed or anxious.  She actually had stopped citalopram at home prior to surgery about 3 to 4 months ago.    In addition, reviewed some of her other medications.  She was discharged with her home dose of tizanidine, however on review of the chart, appears this was not prescribed since last March.  Additionally, she has not noticed she has not noticed significant improvement with her pain other than with oxycodone.  She has received limited Tylenol as Tylenol as well as she was unaware was on an as-needed basis.    Functional Review  She was living independently at the [facility] independent living apartments associated with [facility].  Following discharge from the hospital, had plan to stay with her sister and made still discharged to stay with her sister while she requires some increased assistance.  Otherwise living independently, not driving. Is incontinent of urine.    Often participating in writing classes at the [facility].  Also notes that there are many professors on her floor and they have very interesting discussions.  She is a retired  and works for [employer].  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  Notes that she has a family of social workers.    CODE STATUS/ADVANCE DIRECTIVES DISCUSSION:  DNR/Comfort focused     ALLERGIES:   Allergies   Allergen Reactions    Ticagrelor Other (See Comments)     Dyspnea    Penicillins      Amoxicillin doesn't work for her    Sulfa Antibiotics Rash      PAST MEDICAL HISTORY:   Past Medical History:   Diagnosis Date    Anemia     Arthritis     Asthma     Asthma     Asthma with  acute exacerbation 08/23/2021    Formatting of this note might be different from the original. Created by Conversion  Replacement Utility updated for latest IMO load    Breast cancer (H) 2017    Chronic bronchitis (H)     Chronic sinusitis     COPD (chronic obstructive pulmonary disease) (H)     Depression     GERD (gastroesophageal reflux disease)     Hiatal hernia     Hypertension     Migraines     Osteopenia     Osteoporosis     Overactive bladder     Pneumococcal infection     Pneumonia     PONV (postoperative nausea and vomiting)     Renal disease     Sinusitis     Spinal headache     ST elevation MI (STEMI) (H) 01/07/2021    Stented coronary artery       PAST SURGICAL HISTORY:   has a past surgical history that includes ENT surgery; Cholecystectomy; GYN surgery; orthopedic surgery; Arthrodesis foot (11/11/2011); Coronary Angiogram (N/A, 1/7/2021); Percutaneous Coronary Intervention Stent (N/A, 1/7/2021); Hysterectomy (1984); Foot surgery; sinus surgery; Cholecystectomy; Lumpectomy breast (Right, 2017); Biopsy breast; and Arthroplasty Hip Anterior (Left, 10/27/2023).  FAMILY HISTORY: family history includes Breast Cancer (age of onset: 35.00) in her maternal aunt; Breast Cancer (age of onset: 48.00) in her paternal aunt and sister; Breast Cancer (age of onset: 78.00) in her mother; Heart Disease in her brother and father; Macular Degeneration in her father.  SOCIAL HISTORY:   reports that she has never smoked. She has never used smokeless tobacco. She reports that she does not drink alcohol and does not use drugs.  Patient's living condition: lives alone    Post Discharge Medication Reconciliation Status:   MED REC REQUIRED  Post Medication Reconciliation Status: discharge medications reconciled and changed, per note/orders       Current Outpatient Medications   Medication Sig    acetaminophen (TYLENOL) 325 MG tablet Take 2 tablets (650 mg) by mouth every 6 hours as needed for other (mild pain)    albuterol  (PROAIR HFA/PROVENTIL HFA/VENTOLIN HFA) 108 (90 Base) MCG/ACT inhaler USE 2 INHALATIONS ORALLY   EVERY 6 HOURS AS NEEDED    aspirin 81 MG EC tablet Take 1 tablet (81 mg) by mouth 2 times daily    budesonide-formoterol (SYMBICORT) 160-4.5 MCG/ACT inhaler Inhale 2 puffs into the lungs 2 times daily.    buPROPion (WELLBUTRIN XL) 300 MG 24 hr tablet Take 1 tablet (300 mg) by mouth every morning    cefadroxil (DURICEF) 500 MG capsule Take 1 capsule (500 mg) by mouth 2 times daily    citalopram (CELEXA) 20 MG tablet Take 1 tablet (20 mg) by mouth daily    ferrous sulfate (FE TABS) 325 (65 Fe) MG EC tablet Take 1 tablet (325 mg) by mouth daily    fluticasone (FLONASE) 50 MCG/ACT nasal spray Spray 1-2 sprays into both nostrils daily as needed    hydrOXYzine (ATARAX) 10 MG tablet Take 1 tablet (10 mg) by mouth every 6 hours as needed for other (ASSISTS WITH NAUSEA, PAIN ADJUNCT FOR YOUR OTHER MEDICATIONS AND SLEEP)    ipratropium - albuterol 0.5 mg/2.5 mg/3 mL (DUONEB) 0.5-2.5 (3) MG/3ML neb solution Inhale 3 mLs into the lungs every 4 hours as needed    LANsoprazole (PREVACID) 30 MG DR capsule Take 1 capsule (30 mg) by mouth daily    montelukast (SINGULAIR) 10 MG tablet Take 1 tablet (10 mg) by mouth At Bedtime    nitroGLYcerin (NITROSTAT) 0.4 MG sublingual tablet For chest pain place 1 tablet under the tongue every 5 minutes for 3 doses. If symptoms persist 5 minutes after 1st dose call 911.    oxyCODONE (ROXICODONE) 5 MG tablet Take 1-2 tablets (5-10 mg) by mouth every 6 hours as needed for pain    polyethylene glycol (MIRALAX) 17 GM/Dose powder Take 17 g by mouth daily    rosuvastatin (CRESTOR) 40 MG tablet Take 1 tablet (40 mg) by mouth daily    senna-docusate (SENOKOT-S/PERICOLACE) 8.6-50 MG tablet Take 1-2 tablets by mouth 2 times daily Take while on oral narcotics to prevent or treat constipation.    SUMAtriptan (IMITREX) 100 MG tablet Take 1 tablet (100 mg) by mouth at onset of headache May repeat in 2 hours if  "needed: max 2/day;    tiZANidine (ZANAFLEX) 2 MG tablet Take 1 tablet (2 mg) by mouth 3 times daily     No current facility-administered medications for this visit.     Vitals:  /65   Pulse 87   Temp 97.7  F (36.5  C)   Resp 18   Ht 1.6 m (5' 3\")   Wt 81.8 kg (180 lb 6.4 oz)   SpO2 90%   BMI 31.96 kg/m      Exam:  GENERAL APPEARANCE: Laying in bed comfortably, NAD  HENT:  NCAT, moderately [place], good dentition  EYES:  Conjunctiva clear, anicteric, EOMI, PERRL  PULM  Normal WOB on RA, lungs CTAB, no wheezes or crackles, good air movement  CV:  RRR, S1/S2 normal, no murmurs; LLE edema  ABDOMEN: Abdomen soft, not tender, not distended, BS normal and active throughout   M/S:  Decreased left hip ROM as expected  SKIN: Surgical site c/d/I, healing appropriately   NEURO: Alert, answering questions appropriately, normal thought processes; CN II-XII grossly intact, 5/5 strength in distal/proximal extremities throughout except unable to raise left leg off the bed due to hip pain; mild tremor of bilateral UEs  PSYCH: Mood normal with congruent affect    Lab/Diagnostic data:  Recent labs in [facility] reviewed by me today.     ASSESSMENT/PLAN:    (M16.12) Primary osteoarthritis of left hip  (primary encounter diagnosis)  (Z96.642) Status post total replacement of left hip  Comment: Primary reason for admission to the hospital.  Uncomplicated postoperative course.  Discharged to TCU for ongoing therapies.  Plan:   -Continue aspirin 81 mg twice daily  -Continue cefadroxil per orthopedic surgery  -Discontinue hydroxyzine, tizanidine  -Change Tylenol to 1000 mg 3 times daily  -Continue oxycodone as needed  -PT/OT    (D62) ABLA (acute blood loss anemia)  Comment: Related to the above, in context of history of acute GI bleeds, suspected to be related to hiatal hernia.  Follows with hematology/oncology related to this and history of breast cancer.  Hemoglobin stable postoperatively  Plan:   -Repeat hemoglobin next week  -Continue " iron supplement    (I21.21) ST elevation myocardial infarction involving left circumflex coronary artery (H)  (I25.10) Atherosclerosis of native coronary artery of native heart without angina pectoris  Comment: Significant history of coronary artery disease with STEMI.  Seen by cardiology prior to her surgery in September, underwent stress testing with no inducible ischemia.  No acute coronary symptoms.  Plan:   -Aspirin twice a day as above, continue daily when prophylaxis complete  -Continue rosuvastatin    (J45.40) Moderate persistent asthma without complication  Comment: Follows with pulmonology.  Did undergo PFTs 9/13/2023.  No dyspnea though she likely has mild tracheal irritation from intubation.  Plan:   -Continue PTA inhalers    (F32.5) Major depressive disorder, single episode in full remission (H24)  Comment: No current mood concerns.  Will discontinue citalopram as she had discontinued this months ago.  Continues on bupropion without concern.    (C50.411,  Z17.0) Malignant neoplasm of upper-outer quadrant of right breast in female, estrogen receptor positive (H)  Comment: Follows with hematology/oncology Dr. Zavala.  Last evaluated 8/24/2023.  Had been on anastrozole, then tamoxifen due to intolerable VMS.  Completed 5 years of tamoxifen in 2022.  Continues to undergo annual evaluation.    Orders:  [x] Discontinue hydroxyzine, citalopram, tizandine.   [x] Schedule tylenol to 1000mg TID  [x] Draw Hgb next week Monday    Electronically signed by:    Benjamin Rosenstein, MD, MA  [facility]    This note was completed with the assistance of dictation software. Typos and word substitution-errors are expected and unintended.      52 MINUTES SPENT BY ME on the date of service doing chart review, history, exam, documentation & further activities per the note.

## 2023-11-03 DIAGNOSIS — Z96.642 S/P TOTAL LEFT HIP ARTHROPLASTY: ICD-10-CM

## 2023-11-03 RX ORDER — OXYCODONE HYDROCHLORIDE 5 MG/1
5-10 TABLET ORAL EVERY 6 HOURS PRN
Qty: 30 TABLET | Refills: 0 | Status: SHIPPED | OUTPATIENT
Start: 2023-11-03 | End: 2023-11-10

## 2023-11-06 VITALS
SYSTOLIC BLOOD PRESSURE: 146 MMHG | HEART RATE: 91 BPM | TEMPERATURE: 97.8 F | DIASTOLIC BLOOD PRESSURE: 73 MMHG | RESPIRATION RATE: 18 BRPM | HEIGHT: 63 IN | BODY MASS INDEX: 31.96 KG/M2 | OXYGEN SATURATION: 96 % | WEIGHT: 180.4 LBS

## 2023-11-07 ENCOUNTER — LAB REQUISITION (OUTPATIENT)
Dept: LAB | Facility: CLINIC | Age: 83
End: 2023-11-07
Payer: COMMERCIAL

## 2023-11-07 ENCOUNTER — TELEPHONE (OUTPATIENT)
Dept: GERIATRICS | Facility: CLINIC | Age: 83
End: 2023-11-07

## 2023-11-07 ENCOUNTER — TRANSITIONAL CARE UNIT VISIT (OUTPATIENT)
Dept: GERIATRICS | Facility: CLINIC | Age: 83
End: 2023-11-07
Payer: COMMERCIAL

## 2023-11-07 DIAGNOSIS — Z79.01 ANTICOAGULATED: ICD-10-CM

## 2023-11-07 DIAGNOSIS — J45.40 MODERATE PERSISTENT ASTHMA WITHOUT COMPLICATION: Primary | ICD-10-CM

## 2023-11-07 DIAGNOSIS — R52 PAIN: ICD-10-CM

## 2023-11-07 DIAGNOSIS — E55.9 VITAMIN D DEFICIENCY, UNSPECIFIED: ICD-10-CM

## 2023-11-07 DIAGNOSIS — D62 ABLA (ACUTE BLOOD LOSS ANEMIA): ICD-10-CM

## 2023-11-07 DIAGNOSIS — F33.3 SEVERE RECURRENT MAJOR DEPRESSIVE DISORDER WITH PSYCHOTIC FEATURES (H): ICD-10-CM

## 2023-11-07 DIAGNOSIS — Z96.642 S/P TOTAL LEFT HIP ARTHROPLASTY: ICD-10-CM

## 2023-11-07 DIAGNOSIS — R78.79 FINDING OF ABNORMAL LEVEL OF HEAVY METALS IN BLOOD: ICD-10-CM

## 2023-11-07 DIAGNOSIS — K59.03 DRUG-INDUCED CONSTIPATION: ICD-10-CM

## 2023-11-07 DIAGNOSIS — I21.21 ST ELEVATION MYOCARDIAL INFARCTION INVOLVING LEFT CIRCUMFLEX CORONARY ARTERY (H): ICD-10-CM

## 2023-11-07 PROCEDURE — 99310 SBSQ NF CARE HIGH MDM 45: CPT | Performed by: NURSE PRACTITIONER

## 2023-11-07 RX ORDER — LIDOCAINE 4 G/G
1 PATCH TOPICAL EVERY 24 HOURS
Status: ON HOLD | COMMUNITY
End: 2023-12-05

## 2023-11-07 RX ORDER — ACETAMINOPHEN 500 MG
1000 TABLET ORAL EVERY 8 HOURS PRN
COMMUNITY

## 2023-11-07 NOTE — LETTER
"    11/7/2023        RE: Krystal Virgen  [address]  [place] [address]        [facility]    Chief Complaint   Patient presents with     RECHECK     HPI:  Krystal Virgen is a 83 year old  (1940), who is being seen today for an episodic care visit at: [facility] ([facility]) [[id number]].     This is a 84 yo female with PMHx for CAD status post STEMI, mild asthma/COPD, history of severe left-sided OA who presented for elective left-sided JORGE LUIS on 10/27/2023.  No issues postoperatively, given cefadroxil prophylactically, aspirin for DVT prophylaxis, Tylenol and oxycodone for pain control, weightbearing as tolerated and sustained ABLA with discharge hemoglobin of 8.7.  No other changes noted, discharged to [facility] for rehab.    MED REC REQUIRED  Post Medication Reconciliation Status:  Discharge medications reconciled and changed, see notes/orders    Allergies, and PMH/PSH reviewed in [facility] today.  REVIEW OF SYSTEMS:  10 point ROS of systems including Constitutional, Eyes, Respiratory, Cardiovascular, Gastroenterology, Genitourinary, Integumentary, Musculoskeletal, Psychiatric were all negative except for pertinent positives noted in my HPI.    Objective:   BP (!) 146/73   Pulse 91   Temp 97.8  F (36.6  C)   Resp 18   Ht 1.6 m (5' 3\")   Wt 81.8 kg (180 lb 6.4 oz)   SpO2 96%   BMI 31.96 kg/m    GENERAL APPEARANCE:  Alert, in no distress, oriented, morbidly obese, cooperative, L hip pain  ENT:  Mouth and posterior oropharynx normal, moist mucous membranes, normal hearing acuity  NECK:  No adenopathy,masses or thyromegaly  RESP:  lungs clear to auscultation , no respiratory distress  CV:  regular rate and rhythm, no murmur, rub, or gallop, no edema  ABDOMEN:  normal bowel sounds, soft, nontender, no hepatosplenomegaly or other masses  M/S:   WBAT, able to move all extremities  SKIN:  L hip drsg intact  NEURO:   No focal deficits  PSYCH:  oriented to 3    Most Recent 3 " CBC's:  Recent Labs   Lab Test 10/29/23  0713 10/28/23  0713 10/27/23  0823 10/20/23  1445   WBC  --  15.2* 9.6 9.1   HGB 8.7* 9.4* 10.9* 11.1*   MCV  --  86 85 86   PLT  --  240 299 325     Most Recent 3 BMP's:  Recent Labs   Lab Test 10/29/23  0626 10/28/23  0713 10/28/23  0550 10/27/23  0823 10/20/23  1445   NA  --  134*  --  140 139   POTASSIUM  --  4.4  --  3.8 5.0   CHLORIDE  --  104  --  108* 103   CO2  --  22  --  22 23   BUN  --  18.8  --  21.4 22.1   CR  --  1.21*  --  1.27* 1.31*   ANIONGAP  --  8  --  10 13   ALINE  --  8.7*  --  8.8 11.7*   GLC 96 131* 140* 112* 92       Assessment/Plan:    (Z96.642) S/P total left hip arthroplasty  Given WBAT, will recheck vit D, follow-up with ortho on 11/21    (R52) Pain  Change Tylenol to 1000 mg 3 times daily, start lidocaine patch, continue oxycodone 5-10 mg every 6 hours as needed and encourage icing    (Z79.01) Anticoagulated  Continue ASA 81 mg twice daily, duration per Ortho    (I21.21) ST elevation myocardial infarction involving left circumflex coronary artery (H)  Monitor blood pressure, continue statin, restart ASA 81 mg daily when prophylaxis discontinued    (J45.40) Moderate persistent asthma without complication   Continue Symbicort 160/4.5 mcg 2 puffs twice daily, montelukast 10 mg at bedtime and Christina/DuoNeb as needed.  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  Recheck CBC on 11/8    (K59.03) Drug-induced constipation  Continue MiraLAX daily and senna S 2 tabs twice daily as needed    GERD  Continue Prevacid 30 mg daily    Orders:  Increase Tylenol, start lidocaine patch, recheck CBC/vit D    Electronically signed by: Juan C Donaldson NP         Sincerely,        Juan C Donaldson NP

## 2023-11-07 NOTE — PROGRESS NOTES
"[facility] GERIATRICS    Chief Complaint   Patient presents with    RECHECK     HPI:  Krystal Virgen is a 83 year old  (1940), who is being seen today for an episodic care visit at: [facility] ([facility]) [[id number]].     This is a 84 yo female with PMHx for CAD status post STEMI, mild asthma/COPD, history of severe left-sided OA who presented for elective left-sided JORGE LUIS on 10/27/2023.  No issues postoperatively, given cefadroxil prophylactically, aspirin for DVT prophylaxis, Tylenol and oxycodone for pain control, weightbearing as tolerated and sustained ABLA with discharge hemoglobin of 8.7.  No other changes noted, discharged to [facility] for rehab.    MED REC REQUIRED  Post Medication Reconciliation Status:  Discharge medications reconciled and changed, see notes/orders    Allergies, and PMH/PSH reviewed in [facility] today.  REVIEW OF SYSTEMS:  10 point ROS of systems including Constitutional, Eyes, Respiratory, Cardiovascular, Gastroenterology, Genitourinary, Integumentary, Musculoskeletal, Psychiatric were all negative except for pertinent positives noted in my HPI.    Objective:   BP (!) 146/73   Pulse 91   Temp 97.8  F (36.6  C)   Resp 18   Ht 1.6 m (5' 3\")   Wt 81.8 kg (180 lb 6.4 oz)   SpO2 96%   BMI 31.96 kg/m    GENERAL APPEARANCE:  Alert, in no distress, oriented, morbidly obese, cooperative, L hip pain  ENT:  Mouth and posterior oropharynx normal, moist mucous membranes, normal hearing acuity  NECK:  No adenopathy,masses or thyromegaly  RESP:  lungs clear to auscultation , no respiratory distress  CV:  regular rate and rhythm, no murmur, rub, or gallop, no edema  ABDOMEN:  normal bowel sounds, soft, nontender, no hepatosplenomegaly or other masses  M/S:   WBAT, able to move all extremities  SKIN:  L hip drsg intact  NEURO:   No focal deficits  PSYCH:  oriented to 3    Most Recent 3 CBC's:  Recent Labs   Lab Test 10/29/23  0713 10/28/23  0713 10/27/23  0823 10/20/23  1445   WBC  -- "  15.2* 9.6 9.1   HGB 8.7* 9.4* 10.9* 11.1*   MCV  --  86 85 86   PLT  --  240 299 325     Most Recent 3 BMP's:  Recent Labs   Lab Test 10/29/23  0626 10/28/23  0713 10/28/23  0550 10/27/23  0823 10/20/23  1445   NA  --  134*  --  140 139   POTASSIUM  --  4.4  --  3.8 5.0   CHLORIDE  --  104  --  108* 103   CO2  --  22  --  22 23   BUN  --  18.8  --  21.4 22.1   CR  --  1.21*  --  1.27* 1.31*   ANIONGAP  --  8  --  10 13   ALINE  --  8.7*  --  8.8 11.7*   GLC 96 131* 140* 112* 92       Assessment/Plan:    (Z96.642) S/P total left hip arthroplasty  Given WBAT, will recheck vit D, follow-up with ortho on 11/21    (R52) Pain  Change Tylenol to 1000 mg 3 times daily, start lidocaine patch, continue oxycodone 5-10 mg every 6 hours as needed and encourage icing    (Z79.01) Anticoagulated  Continue ASA 81 mg twice daily, duration per Ortho    (I21.21) ST elevation myocardial infarction involving left circumflex coronary artery (H)  Monitor blood pressure, continue statin, restart ASA 81 mg daily when prophylaxis discontinued    (J45.40) Moderate persistent asthma without complication   Continue Symbicort 160/4.5 mcg 2 puffs twice daily, montelukast 10 mg at bedtime and Christina/DuoNeb as needed.  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  Recheck CBC on 11/8    (K59.03) Drug-induced constipation  Continue MiraLAX daily and senna S 2 tabs twice daily as needed    GERD  Continue Prevacid 30 mg daily    Orders:  Increase Tylenol, start lidocaine patch, recheck CBC/vit D    Electronically signed by: Juan C Donaldson NP

## 2023-11-07 NOTE — TELEPHONE ENCOUNTER
[facility]     Per Tammy Virgen at [facility], please check about CBC that was ordered yesterday, if none reorder for tomorrow per akil GARCIA! Add a Vitamin D level tomorrow 11/8/23    Verbal order/direction given to: Derrick  Provider Giving Order:  MARLO Arriola RN

## 2023-11-08 VITALS
TEMPERATURE: 98.3 F | WEIGHT: 183.4 LBS | BODY MASS INDEX: 32.5 KG/M2 | HEIGHT: 63 IN | DIASTOLIC BLOOD PRESSURE: 62 MMHG | RESPIRATION RATE: 18 BRPM | OXYGEN SATURATION: 95 % | HEART RATE: 79 BPM | SYSTOLIC BLOOD PRESSURE: 116 MMHG

## 2023-11-08 LAB
ERYTHROCYTE [DISTWIDTH] IN BLOOD BY AUTOMATED COUNT: 17.2 % (ref 10–15)
HCT VFR BLD AUTO: 30.4 % (ref 35–47)
HGB BLD-MCNC: 9.1 G/DL (ref 11.7–15.7)
MCH RBC QN AUTO: 27.4 PG (ref 26.5–33)
MCHC RBC AUTO-ENTMCNC: 29.9 G/DL (ref 31.5–36.5)
MCV RBC AUTO: 92 FL (ref 78–100)
PLATELET # BLD AUTO: 506 10E3/UL (ref 150–450)
RBC # BLD AUTO: 3.32 10E6/UL (ref 3.8–5.2)
VIT D+METAB SERPL-MCNC: 12 NG/ML (ref 20–50)
WBC # BLD AUTO: 13.7 10E3/UL (ref 4–11)

## 2023-11-08 PROCEDURE — 82306 VITAMIN D 25 HYDROXY: CPT | Mod: ORL | Performed by: FAMILY MEDICINE

## 2023-11-08 PROCEDURE — 85027 COMPLETE CBC AUTOMATED: CPT | Mod: ORL | Performed by: FAMILY MEDICINE

## 2023-11-08 PROCEDURE — P9604 ONE-WAY ALLOW PRORATED TRIP: HCPCS | Mod: ORL | Performed by: FAMILY MEDICINE

## 2023-11-08 PROCEDURE — 36415 COLL VENOUS BLD VENIPUNCTURE: CPT | Mod: ORL | Performed by: FAMILY MEDICINE

## 2023-11-08 NOTE — PROGRESS NOTES
"[facility] GERIATRICS    Chief Complaint   Patient presents with    RECHECK     HPI:  Krystal Virgen is a 83 year old  (1940), who is being seen today for an episodic care visit at: [facility] ([facility]) [[id number]].     This is a 82 yo female with PMHx for CAD status post STEMI, mild asthma/COPD, history of severe left-sided OA who presented for elective left-sided JORGE LUIS on 10/27/2023.  No issues postoperatively, given cefadroxil prophylactically, aspirin for DVT prophylaxis, Tylenol and oxycodone for pain control, weightbearing as tolerated and sustained ABLA with discharge hemoglobin of 8.7.  No other changes noted, discharged to [facility] for rehab.    Today's concern is:   Pain control, vit D deficiency    Allergies, and PMH/PSH reviewed in [facility] today.  REVIEW OF SYSTEMS:  4 point ROS including Respiratory, CV, GI and , other than that noted in the HPI,  is negative    Objective:   /62   Pulse 79   Temp 98.3  F (36.8  C)   Resp 18   Ht 1.6 m (5' 3\")   Wt 83.2 kg (183 lb 6.4 oz)   SpO2 95%   BMI 32.49 kg/m    GENERAL APPEARANCE:  Alert, in no distress, oriented, morbidly obese, cooperative, L hip pain controlled  RESP:  lungs clear to auscultation , no respiratory distress  CV:  regular rate and rhythm, no murmur, rub, or gallop, no edema  PSYCH:  oriented to 3    Most Recent 3 CBC's:  Recent Labs   Lab Test 11/08/23  1111 10/29/23  0713 10/28/23  0713 10/27/23  0823   WBC 13.7*  --  15.2* 9.6   HGB 9.1* 8.7* 9.4* 10.9*   MCV 92  --  86 85   *  --  240 299     Most Recent 3 BMP's:  Recent Labs   Lab Test 10/29/23  0626 10/28/23  0713 10/28/23  0550 10/27/23  0823 10/20/23  1445   NA  --  134*  --  140 139   POTASSIUM  --  4.4  --  3.8 5.0   CHLORIDE  --  104  --  108* 103   CO2  --  22  --  22 23   BUN  --  18.8  --  21.4 22.1   CR  --  1.21*  --  1.27* 1.31*   ANIONGAP  --  8  --  10 13   ALINE  --  8.7*  --  8.8 11.7*   GLC 96 131* 140* 112* 92 "       Assessment/Plan:    (Z96.642) S/P total left hip arthroplasty  Given WBAT, per vit D 12 will start D3 5000U daily, follow-up with ortho on 11/21. Recheck vit D with PCP     (R52) Pain  Using Tylenol 1000 mg 3 times daily, lidocaine patch, continue oxycodone 5-10 mg every 6 hours as needed (1-3x/day) and encourage icing. Controlled     (Z79.01) Anticoagulated  Continue ASA 81 mg twice daily, duration per Ortho     (I21.21) ST elevation myocardial infarction involving left circumflex coronary artery (H)  Monitor blood pressure, continue statin, restart ASA 81 mg daily when prophylaxis discontinued     (J45.40) Moderate persistent asthma without complication   Continue Symbicort 160/4.5 mcg 2 puffs twice daily, montelukast 10 mg at bedtime and Christina/DuoNeb as needed.  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 Stable     Renal function  Last CR 1.21 with GFR 58 on 10/28     (D62) ABLA (acute blood loss anemia)  Continue FeSO4 325 mg daily, last Hgb 9.1 on 11/8     (K59.03) Drug-induced constipation  Continue MiraLAX daily and senna S 2 tabs twice daily as needed     GERD  Continue Prevacid 30 mg daily     Orders:  D3 supplement    Electronically signed by: Juan C Donaldson NP

## 2023-11-09 ENCOUNTER — TRANSITIONAL CARE UNIT VISIT (OUTPATIENT)
Dept: GERIATRICS | Facility: CLINIC | Age: 83
End: 2023-11-09
Payer: COMMERCIAL

## 2023-11-09 DIAGNOSIS — D62 ABLA (ACUTE BLOOD LOSS ANEMIA): ICD-10-CM

## 2023-11-09 DIAGNOSIS — Z96.642 S/P TOTAL LEFT HIP ARTHROPLASTY: ICD-10-CM

## 2023-11-09 DIAGNOSIS — K59.03 DRUG-INDUCED CONSTIPATION: ICD-10-CM

## 2023-11-09 DIAGNOSIS — E55.9 VITAMIN D DEFICIENCY: ICD-10-CM

## 2023-11-09 DIAGNOSIS — R52 PAIN: Primary | ICD-10-CM

## 2023-11-09 PROCEDURE — 99309 SBSQ NF CARE MODERATE MDM 30: CPT | Performed by: NURSE PRACTITIONER

## 2023-11-09 NOTE — LETTER
"    11/9/2023        RE: Krystal Virgen  [address]  [place] [address]        [facility] GERIATRICS    Chief Complaint   Patient presents with     RECHECK     HPI:  Krystal Virgen is a 83 year old  (1940), who is being seen today for an episodic care visit at: [facility] ([facility]) [[id number]].     This is a 84 yo female with PMHx for CAD status post STEMI, mild asthma/COPD, history of severe left-sided OA who presented for elective left-sided JORGE LUIS on 10/27/2023.  No issues postoperatively, given cefadroxil prophylactically, aspirin for DVT prophylaxis, Tylenol and oxycodone for pain control, weightbearing as tolerated and sustained ABLA with discharge hemoglobin of 8.7.  No other changes noted, discharged to [facility] for rehab.    Today's concern is:   Pain control, vit D deficiency    Allergies, and PMH/PSH reviewed in [facility] today.  REVIEW OF SYSTEMS:  4 point ROS including Respiratory, CV, GI and , other than that noted in the HPI,  is negative    Objective:   /62   Pulse 79   Temp 98.3  F (36.8  C)   Resp 18   Ht 1.6 m (5' 3\")   Wt 83.2 kg (183 lb 6.4 oz)   SpO2 95%   BMI 32.49 kg/m    GENERAL APPEARANCE:  Alert, in no distress, oriented, morbidly obese, cooperative, L hip pain controlled  RESP:  lungs clear to auscultation , no respiratory distress  CV:  regular rate and rhythm, no murmur, rub, or gallop, no edema  PSYCH:  oriented to 3    Most Recent 3 CBC's:  Recent Labs   Lab Test 11/08/23  1111 10/29/23  0713 10/28/23  0713 10/27/23  0823   WBC 13.7*  --  15.2* 9.6   HGB 9.1* 8.7* 9.4* 10.9*   MCV 92  --  86 85   *  --  240 299     Most Recent 3 BMP's:  Recent Labs   Lab Test 10/29/23  0626 10/28/23  0713 10/28/23  0550 10/27/23  0823 10/20/23  1445   NA  --  134*  --  140 139   POTASSIUM  --  4.4  --  3.8 5.0   CHLORIDE  --  104  --  108* 103   CO2  --  22  --  22 23   BUN  --  18.8  --  21.4 22.1   CR  --  1.21*  --  1.27* 1.31*   ANIONGAP  -- "  8  --  10 13   ALINE  --  8.7*  --  8.8 11.7*   GLC 96 131* 140* 112* 92       Assessment/Plan:    (Z96.642) S/P total left hip arthroplasty  Given WBAT, per vit D 12 will start D3 5000U daily, follow-up with ortho on 11/21. Recheck vit D with PCP     (R52) Pain  Using Tylenol 1000 mg 3 times daily, lidocaine patch, continue oxycodone 5-10 mg every 6 hours as needed (1-3x/day) and encourage icing. Controlled     (Z79.01) Anticoagulated  Continue ASA 81 mg twice daily, duration per Ortho     (I21.21) ST elevation myocardial infarction involving left circumflex coronary artery (H)  Monitor blood pressure, continue statin, restart ASA 81 mg daily when prophylaxis discontinued     (J45.40) Moderate persistent asthma without complication   Continue Symbicort 160/4.5 mcg 2 puffs twice daily, montelukast 10 mg at bedtime and Christina/DuoNeb as needed.  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 Stable     Renal function  Last CR 1.21 with GFR 58 on 10/28     (D62) ABLA (acute blood loss anemia)  Continue FeSO4 325 mg daily, last Hgb 9.1 on 11/8     (K59.03) Drug-induced constipation  Continue MiraLAX daily and senna S 2 tabs twice daily as needed     GERD  Continue Prevacid 30 mg daily     Orders:  D3 supplement    Electronically signed by: Juan C Donaldson NP         Sincerely,        Juan C Donaldson NP

## 2023-11-10 DIAGNOSIS — E55.9 VITAMIN D DEFICIENCY: Primary | ICD-10-CM

## 2023-11-10 DIAGNOSIS — Z96.642 S/P TOTAL LEFT HIP ARTHROPLASTY: ICD-10-CM

## 2023-11-10 RX ORDER — OXYCODONE HYDROCHLORIDE 5 MG/1
5 TABLET ORAL EVERY 6 HOURS PRN
Qty: 56 TABLET | Refills: 0 | Status: SHIPPED | OUTPATIENT
Start: 2023-11-10 | End: 2023-11-15

## 2023-11-10 NOTE — TELEPHONE ENCOUNTER
Message to physician:     Date of last visit: Visit date not found    Date of next visit if scheduled:    Potassium   Date Value Ref Range Status   10/28/2023 4.4 3.4 - 5.3 mmol/L Final   08/24/2021 4.6 3.5 - 5.0 mmol/L Final   01/21/2021 4.3 3.4 - 5.3 mmol/L Final     Creatinine   Date Value Ref Range Status   10/28/2023 1.21 (H) 0.51 - 0.95 mg/dL Final   01/21/2021 1.29 (H) 0.52 - 1.04 mg/dL Final     GFR Estimate   Date Value Ref Range Status   10/28/2023 45 (L) >60 mL/min/1.73m2 Final   04/30/2021 37 (L) >60 mL/min/1.73m2 Final   01/21/2021 39 (L) >60 mL/min/[1.73_m2] Final     Comment:     Non  GFR Calc  Starting 12/18/2018, serum creatinine based estimated GFR (eGFR) will be   calculated using the Chronic Kidney Disease Epidemiology Collaboration   (CKD-EPI) equation.         BP Readings from Last 3 Encounters:   11/08/23 116/62   11/06/23 (!) 146/73   11/02/23 121/65       Hemoglobin A1C   Date Value Ref Range Status   01/08/2021 5.3 0 - 5.6 % Final     Comment:     Normal <5.7% Prediabetes 5.7-6.4%  Diabetes 6.5% or higher - adopted from ADA   consensus guidelines.         Please complete refill and CLOSE ENCOUNTER.  Closing the encounter signifies the refill is complete.

## 2023-11-15 DIAGNOSIS — Z96.642 S/P TOTAL LEFT HIP ARTHROPLASTY: ICD-10-CM

## 2023-11-15 RX ORDER — OXYCODONE HYDROCHLORIDE 5 MG/1
5 TABLET ORAL EVERY 6 HOURS PRN
Qty: 56 TABLET | Refills: 0 | Status: ON HOLD | OUTPATIENT
Start: 2023-11-15 | End: 2023-12-06

## 2023-11-15 NOTE — TELEPHONE ENCOUNTER
Reason for call:  Other     Patient called regarding (reason for call): Update    Additional comments: Pt is home from TCU, taking oxy QID, unable to decrease further at this time. Requesting a refill. Follow up appt scheduled with surgeon on 11/21/23.    Phone number to reach patient:  Cell number on file:    Telephone Information:   Mobile [phone]       Best Time:  Any    Can we leave a detailed message on this number?  YES    Requested Medication:   Pending Prescriptions:                       Disp   Refills    oxyCODONE (ROXICODONE) 5 MG tablet        56 tab*0            Sig: Take 1 tablet (5 mg) by mouth every 6 hours as           needed for severe pain       Last Refill:  11/10/2023    Last Office Visit:  10/20/2023     Next Appointment with Provider:  Visit date not found     Clinic visit frequency required: Q 3 months    Controlled substance agreement on file: No.    Urine Drug Screen on file:  No

## 2023-11-16 ENCOUNTER — MEDICAL CORRESPONDENCE (OUTPATIENT)
Dept: HEALTH INFORMATION MANAGEMENT | Facility: CLINIC | Age: 83
End: 2023-11-16
Payer: COMMERCIAL

## 2023-11-17 ENCOUNTER — TELEPHONE (OUTPATIENT)
Dept: NURSING | Facility: CLINIC | Age: 83
End: 2023-11-17
Payer: COMMERCIAL

## 2023-11-17 NOTE — TELEPHONE ENCOUNTER
Home Care is calling regarding an established patient with [facility].       Requesting orders from: Juan C Fairchild MD  Is this a 60-day recertification request?  No    Orders Requested    Physical Therapy  1x/week for 1 week (for eval that was completed yesterday)  2x/week for 2 weeks.  1x/week for 4 weeks.    Information was gathered and will be sent to provider for review.  RN will contact Home Care with information after provider review.  Confirmed ok to leave a detailed message with call back.  Contact information confirmed and updated as needed.    Geetha Brownlee, RN

## 2023-11-21 ENCOUNTER — TRANSFERRED RECORDS (OUTPATIENT)
Dept: HEALTH INFORMATION MANAGEMENT | Facility: CLINIC | Age: 83
End: 2023-11-21
Payer: COMMERCIAL

## 2023-11-22 NOTE — TELEPHONE ENCOUNTER
Per patient request she is wanting to discharge from PT early effective today.     Physical Therapist states that they think she is doing well and thinks she will be ok. Pt was very adamant about being done with PT.     Delilah EDGE @ [facility]   [phone] VM ok.

## 2023-11-27 ENCOUNTER — PATIENT OUTREACH (OUTPATIENT)
Dept: ONCOLOGY | Facility: CLINIC | Age: 83
End: 2023-11-27
Payer: COMMERCIAL

## 2023-11-27 NOTE — PROGRESS NOTES
[facility]: Cancer Care Follow-Up Note                                    Discussion with Patient:                                                      Received TEAMS message from Esme Flores, , that patient called last week asking to talk with Ramila KILLIAN.  CONST[facility] on PTO Wed-Fri. Called patient today. Patient said she has an appointment with her surgeon (Gallito Patrick) on 12/4/23 at 1430 and would like to get her iron labs checked before that.  Told her we could schedule that but Dr. Zavala is out of the office that week so would need to schedule follow-up with Dr. Zavala, possible iron the following week. Patient agreed.     Dates of Treatment:                                                      Infusion given in last 28 days       None            Assessment:                                                      Overdue for follow-up due to recent hip surgery.    Intervention/Education provided during outreach:                                                       Transferred call to Anjana Santana.    Patient to follow up as scheduled at next appt    Signature:  Ramila Aguiar RN

## 2023-11-28 DIAGNOSIS — D50.0 IRON DEFICIENCY ANEMIA DUE TO CHRONIC BLOOD LOSS: Primary | ICD-10-CM

## 2023-12-03 DIAGNOSIS — Z53.9 DIAGNOSIS NOT YET DEFINED: Primary | ICD-10-CM

## 2023-12-03 PROCEDURE — G0180 MD CERTIFICATION HHA PATIENT: HCPCS | Performed by: FAMILY MEDICINE

## 2023-12-04 ENCOUNTER — LAB (OUTPATIENT)
Dept: INFUSION THERAPY | Facility: CLINIC | Age: 83
End: 2023-12-04
Attending: INTERNAL MEDICINE
Payer: COMMERCIAL

## 2023-12-04 ENCOUNTER — TRANSFERRED RECORDS (OUTPATIENT)
Dept: HEALTH INFORMATION MANAGEMENT | Facility: CLINIC | Age: 83
End: 2023-12-04

## 2023-12-04 DIAGNOSIS — D50.0 IRON DEFICIENCY ANEMIA DUE TO CHRONIC BLOOD LOSS: ICD-10-CM

## 2023-12-04 LAB
ALBUMIN SERPL BCG-MCNC: 4 G/DL (ref 3.5–5.2)
ALP SERPL-CCNC: 114 U/L (ref 40–150)
ALT SERPL W P-5'-P-CCNC: 6 U/L (ref 0–50)
ANION GAP SERPL CALCULATED.3IONS-SCNC: 10 MMOL/L (ref 7–15)
AST SERPL W P-5'-P-CCNC: 17 U/L (ref 0–45)
BASOPHILS # BLD AUTO: 0.1 10E3/UL (ref 0–0.2)
BASOPHILS NFR BLD AUTO: 1 %
BILIRUB SERPL-MCNC: 0.4 MG/DL
BUN SERPL-MCNC: 18.7 MG/DL (ref 8–23)
CALCIUM SERPL-MCNC: 9.9 MG/DL (ref 8.8–10.2)
CHLORIDE SERPL-SCNC: 107 MMOL/L (ref 98–107)
CREAT SERPL-MCNC: 1.83 MG/DL (ref 0.51–0.95)
DEPRECATED HCO3 PLAS-SCNC: 23 MMOL/L (ref 22–29)
EGFRCR SERPLBLD CKD-EPI 2021: 27 ML/MIN/1.73M2
EOSINOPHIL # BLD AUTO: 0.4 10E3/UL (ref 0–0.7)
EOSINOPHIL NFR BLD AUTO: 4 %
ERYTHROCYTE [DISTWIDTH] IN BLOOD BY AUTOMATED COUNT: 15.6 % (ref 10–15)
FERRITIN SERPL-MCNC: 59 NG/ML (ref 11–328)
GLUCOSE SERPL-MCNC: 131 MG/DL (ref 70–99)
HCT VFR BLD AUTO: 36.1 % (ref 35–47)
HGB BLD-MCNC: 11.2 G/DL (ref 11.7–15.7)
IMM GRANULOCYTES # BLD: 0 10E3/UL
IMM GRANULOCYTES NFR BLD: 0 %
IRON BINDING CAPACITY (ROCHE): 402 UG/DL (ref 240–430)
IRON SATN MFR SERPL: 16 % (ref 15–46)
IRON SERPL-MCNC: 65 UG/DL (ref 37–145)
LYMPHOCYTES # BLD AUTO: 1.7 10E3/UL (ref 0.8–5.3)
LYMPHOCYTES NFR BLD AUTO: 16 %
MCH RBC QN AUTO: 27.3 PG (ref 26.5–33)
MCHC RBC AUTO-ENTMCNC: 31 G/DL (ref 31.5–36.5)
MCV RBC AUTO: 88 FL (ref 78–100)
MONOCYTES # BLD AUTO: 0.8 10E3/UL (ref 0–1.3)
MONOCYTES NFR BLD AUTO: 7 %
NEUTROPHILS # BLD AUTO: 7.8 10E3/UL (ref 1.6–8.3)
NEUTROPHILS NFR BLD AUTO: 72 %
NRBC # BLD AUTO: 0 10E3/UL
NRBC BLD AUTO-RTO: 0 /100
PLATELET # BLD AUTO: 450 10E3/UL (ref 150–450)
POTASSIUM SERPL-SCNC: 4.2 MMOL/L (ref 3.4–5.3)
PROT SERPL-MCNC: 6.6 G/DL (ref 6.4–8.3)
RBC # BLD AUTO: 4.11 10E6/UL (ref 3.8–5.2)
SODIUM SERPL-SCNC: 140 MMOL/L (ref 135–145)
WBC # BLD AUTO: 10.9 10E3/UL (ref 4–11)

## 2023-12-04 PROCEDURE — 36415 COLL VENOUS BLD VENIPUNCTURE: CPT

## 2023-12-04 PROCEDURE — 85004 AUTOMATED DIFF WBC COUNT: CPT

## 2023-12-04 PROCEDURE — 83550 IRON BINDING TEST: CPT

## 2023-12-04 PROCEDURE — 80053 COMPREHEN METABOLIC PANEL: CPT

## 2023-12-04 PROCEDURE — 82728 ASSAY OF FERRITIN: CPT

## 2023-12-04 NOTE — OR NURSING
RN received chart for 12/5 add on case at 1550  on 12/4 and inquired about when and where she went for her pre op. Yessica asked Emerita from scheduling if she was going to be admitted but she stated she was unsure just that she was a am admit. RN attempted to call pt x2 to inquire if she has been seen recently or if she had been told a plan by her surgeon. Writer placed a note on the chart and alerted the PACU Charge RN of this potential issue.Lupe MOONEY is also aware via Teams chat.

## 2023-12-05 ENCOUNTER — HOSPITAL ENCOUNTER (OUTPATIENT)
Facility: CLINIC | Age: 83
Discharge: HOME | End: 2023-12-06
Attending: ORTHOPAEDIC SURGERY | Admitting: ORTHOPAEDIC SURGERY
Payer: COMMERCIAL

## 2023-12-05 ENCOUNTER — ANESTHESIA (OUTPATIENT)
Dept: SURGERY | Facility: CLINIC | Age: 83
End: 2023-12-05
Payer: COMMERCIAL

## 2023-12-05 ENCOUNTER — ANESTHESIA EVENT (OUTPATIENT)
Dept: SURGERY | Facility: CLINIC | Age: 83
End: 2023-12-05
Payer: COMMERCIAL

## 2023-12-05 DIAGNOSIS — T81.31XS: Primary | ICD-10-CM

## 2023-12-05 LAB
CREAT SERPL-MCNC: 1.78 MG/DL (ref 0.51–0.95)
CRP SERPL-MCNC: <3 MG/L
EGFRCR SERPLBLD CKD-EPI 2021: 28 ML/MIN/1.73M2
ERYTHROCYTE [DISTWIDTH] IN BLOOD BY AUTOMATED COUNT: 15.5 % (ref 10–15)
ERYTHROCYTE [SEDIMENTATION RATE] IN BLOOD BY WESTERGREN METHOD: 28 MM/HR (ref 0–30)
HCT VFR BLD AUTO: 33.5 % (ref 35–47)
HGB BLD-MCNC: 10.6 G/DL (ref 11.7–15.7)
INR PPP: 1.08 (ref 0.85–1.15)
MCH RBC QN AUTO: 27.1 PG (ref 26.5–33)
MCHC RBC AUTO-ENTMCNC: 31.6 G/DL (ref 31.5–36.5)
MCV RBC AUTO: 86 FL (ref 78–100)
PLATELET # BLD AUTO: 403 10E3/UL (ref 150–450)
POTASSIUM SERPL-SCNC: 4.1 MMOL/L (ref 3.4–5.3)
RBC # BLD AUTO: 3.91 10E6/UL (ref 3.8–5.2)
WBC # BLD AUTO: 9.3 10E3/UL (ref 4–11)

## 2023-12-05 PROCEDURE — 250N000011 HC RX IP 250 OP 636: Performed by: NURSE ANESTHETIST, CERTIFIED REGISTERED

## 2023-12-05 PROCEDURE — 250N000013 HC RX MED GY IP 250 OP 250 PS 637

## 2023-12-05 PROCEDURE — 272N000001 HC OR GENERAL SUPPLY STERILE: Performed by: ORTHOPAEDIC SURGERY

## 2023-12-05 PROCEDURE — 710N000010 HC RECOVERY PHASE 1, LEVEL 2, PER MIN: Performed by: ORTHOPAEDIC SURGERY

## 2023-12-05 PROCEDURE — 250N000009 HC RX 250: Performed by: STUDENT IN AN ORGANIZED HEALTH CARE EDUCATION/TRAINING PROGRAM

## 2023-12-05 PROCEDURE — 85610 PROTHROMBIN TIME: CPT

## 2023-12-05 PROCEDURE — 999N000141 HC STATISTIC PRE-PROCEDURE NURSING ASSESSMENT: Performed by: ORTHOPAEDIC SURGERY

## 2023-12-05 PROCEDURE — 258N000003 HC RX IP 258 OP 636

## 2023-12-05 PROCEDURE — 360N000075 HC SURGERY LEVEL 2, PER MIN: Performed by: ORTHOPAEDIC SURGERY

## 2023-12-05 PROCEDURE — 250N000013 HC RX MED GY IP 250 OP 250 PS 637: Performed by: STUDENT IN AN ORGANIZED HEALTH CARE EDUCATION/TRAINING PROGRAM

## 2023-12-05 PROCEDURE — 86140 C-REACTIVE PROTEIN: CPT

## 2023-12-05 PROCEDURE — 258N000003 HC RX IP 258 OP 636: Performed by: STUDENT IN AN ORGANIZED HEALTH CARE EDUCATION/TRAINING PROGRAM

## 2023-12-05 PROCEDURE — 36415 COLL VENOUS BLD VENIPUNCTURE: CPT

## 2023-12-05 PROCEDURE — 258N000003 HC RX IP 258 OP 636: Performed by: NURSE ANESTHETIST, CERTIFIED REGISTERED

## 2023-12-05 PROCEDURE — 250N000011 HC RX IP 250 OP 636

## 2023-12-05 PROCEDURE — 84132 ASSAY OF SERUM POTASSIUM: CPT

## 2023-12-05 PROCEDURE — 85027 COMPLETE CBC AUTOMATED: CPT

## 2023-12-05 PROCEDURE — 250N000011 HC RX IP 250 OP 636: Performed by: STUDENT IN AN ORGANIZED HEALTH CARE EDUCATION/TRAINING PROGRAM

## 2023-12-05 PROCEDURE — 120N000001 HC R&B MED SURG/OB

## 2023-12-05 PROCEDURE — 82565 ASSAY OF CREATININE: CPT

## 2023-12-05 PROCEDURE — 370N000017 HC ANESTHESIA TECHNICAL FEE, PER MIN: Performed by: ORTHOPAEDIC SURGERY

## 2023-12-05 PROCEDURE — 85652 RBC SED RATE AUTOMATED: CPT

## 2023-12-05 PROCEDURE — 250N000025 HC SEVOFLURANE, PER MIN: Performed by: ORTHOPAEDIC SURGERY

## 2023-12-05 PROCEDURE — 99223 1ST HOSP IP/OBS HIGH 75: CPT | Performed by: STUDENT IN AN ORGANIZED HEALTH CARE EDUCATION/TRAINING PROGRAM

## 2023-12-05 RX ORDER — ONDANSETRON 2 MG/ML
4 INJECTION INTRAMUSCULAR; INTRAVENOUS EVERY 6 HOURS PRN
Status: DISCONTINUED | OUTPATIENT
Start: 2023-12-05 | End: 2023-12-06 | Stop reason: HOSPADM

## 2023-12-05 RX ORDER — MONTELUKAST SODIUM 10 MG/1
10 TABLET ORAL AT BEDTIME
Status: DISCONTINUED | OUTPATIENT
Start: 2023-12-05 | End: 2023-12-06 | Stop reason: HOSPADM

## 2023-12-05 RX ORDER — PANTOPRAZOLE SODIUM 40 MG/1
40 TABLET, DELAYED RELEASE ORAL DAILY
Status: DISCONTINUED | OUTPATIENT
Start: 2023-12-06 | End: 2023-12-06 | Stop reason: HOSPADM

## 2023-12-05 RX ORDER — HYDROMORPHONE HCL IN WATER/PF 6 MG/30 ML
0.2 PATIENT CONTROLLED ANALGESIA SYRINGE INTRAVENOUS EVERY 5 MIN PRN
Status: DISCONTINUED | OUTPATIENT
Start: 2023-12-05 | End: 2023-12-05 | Stop reason: HOSPADM

## 2023-12-05 RX ORDER — BUPROPION HYDROCHLORIDE 150 MG/1
300 TABLET ORAL EVERY MORNING
Status: DISCONTINUED | OUTPATIENT
Start: 2023-12-06 | End: 2023-12-06 | Stop reason: HOSPADM

## 2023-12-05 RX ORDER — OXYCODONE HYDROCHLORIDE 5 MG/1
5 TABLET ORAL
Status: DISCONTINUED | OUTPATIENT
Start: 2023-12-05 | End: 2023-12-05 | Stop reason: HOSPADM

## 2023-12-05 RX ORDER — ONDANSETRON 4 MG/1
4 TABLET, ORALLY DISINTEGRATING ORAL EVERY 30 MIN PRN
Status: DISCONTINUED | OUTPATIENT
Start: 2023-12-05 | End: 2023-12-05 | Stop reason: HOSPADM

## 2023-12-05 RX ORDER — ROSUVASTATIN CALCIUM 10 MG/1
40 TABLET, COATED ORAL DAILY
Status: DISCONTINUED | OUTPATIENT
Start: 2023-12-06 | End: 2023-12-06 | Stop reason: HOSPADM

## 2023-12-05 RX ORDER — CEFAZOLIN SODIUM 2 G/100ML
2 INJECTION, SOLUTION INTRAVENOUS EVERY 12 HOURS
Qty: 300 ML | Refills: 0 | Status: DISCONTINUED | OUTPATIENT
Start: 2023-12-06 | End: 2023-12-06 | Stop reason: HOSPADM

## 2023-12-05 RX ORDER — SODIUM CHLORIDE, SODIUM LACTATE, POTASSIUM CHLORIDE, CALCIUM CHLORIDE 600; 310; 30; 20 MG/100ML; MG/100ML; MG/100ML; MG/100ML
INJECTION, SOLUTION INTRAVENOUS CONTINUOUS
Status: DISCONTINUED | OUTPATIENT
Start: 2023-12-05 | End: 2023-12-05 | Stop reason: HOSPADM

## 2023-12-05 RX ORDER — AMOXICILLIN 250 MG
1 CAPSULE ORAL 2 TIMES DAILY
Status: DISCONTINUED | OUTPATIENT
Start: 2023-12-05 | End: 2023-12-06 | Stop reason: HOSPADM

## 2023-12-05 RX ORDER — POLYETHYLENE GLYCOL 3350 17 G/17G
17 POWDER, FOR SOLUTION ORAL DAILY
Status: DISCONTINUED | OUTPATIENT
Start: 2023-12-06 | End: 2023-12-06 | Stop reason: HOSPADM

## 2023-12-05 RX ORDER — PROPOFOL 10 MG/ML
INJECTION, EMULSION INTRAVENOUS PRN
Status: DISCONTINUED | OUTPATIENT
Start: 2023-12-05 | End: 2023-12-05

## 2023-12-05 RX ORDER — ASPIRIN 81 MG/1
81 TABLET ORAL 2 TIMES DAILY
Status: DISCONTINUED | OUTPATIENT
Start: 2023-12-05 | End: 2023-12-06 | Stop reason: HOSPADM

## 2023-12-05 RX ORDER — SODIUM CHLORIDE, SODIUM LACTATE, POTASSIUM CHLORIDE, CALCIUM CHLORIDE 600; 310; 30; 20 MG/100ML; MG/100ML; MG/100ML; MG/100ML
INJECTION, SOLUTION INTRAVENOUS CONTINUOUS
Status: DISCONTINUED | OUTPATIENT
Start: 2023-12-05 | End: 2023-12-06 | Stop reason: HOSPADM

## 2023-12-05 RX ORDER — FENTANYL CITRATE 50 UG/ML
25 INJECTION, SOLUTION INTRAMUSCULAR; INTRAVENOUS EVERY 5 MIN PRN
Status: DISCONTINUED | OUTPATIENT
Start: 2023-12-05 | End: 2023-12-05 | Stop reason: HOSPADM

## 2023-12-05 RX ORDER — VANCOMYCIN HYDROCHLORIDE 1 G/20ML
1 INJECTION, POWDER, LYOPHILIZED, FOR SOLUTION INTRAVENOUS ONCE
Status: DISCONTINUED | OUTPATIENT
Start: 2023-12-05 | End: 2023-12-05

## 2023-12-05 RX ORDER — HYDROMORPHONE HCL IN WATER/PF 6 MG/30 ML
0.1 PATIENT CONTROLLED ANALGESIA SYRINGE INTRAVENOUS
Status: DISCONTINUED | OUTPATIENT
Start: 2023-12-05 | End: 2023-12-06 | Stop reason: HOSPADM

## 2023-12-05 RX ORDER — NITROGLYCERIN 0.4 MG/1
0.4 TABLET SUBLINGUAL EVERY 5 MIN PRN
Status: DISCONTINUED | OUTPATIENT
Start: 2023-12-05 | End: 2023-12-06 | Stop reason: HOSPADM

## 2023-12-05 RX ORDER — NALOXONE HYDROCHLORIDE 0.4 MG/ML
0.2 INJECTION, SOLUTION INTRAMUSCULAR; INTRAVENOUS; SUBCUTANEOUS
Status: DISCONTINUED | OUTPATIENT
Start: 2023-12-05 | End: 2023-12-06 | Stop reason: HOSPADM

## 2023-12-05 RX ORDER — ONDANSETRON 2 MG/ML
INJECTION INTRAMUSCULAR; INTRAVENOUS PRN
Status: DISCONTINUED | OUTPATIENT
Start: 2023-12-05 | End: 2023-12-05

## 2023-12-05 RX ORDER — PROCHLORPERAZINE MALEATE 5 MG
5 TABLET ORAL EVERY 6 HOURS PRN
Status: DISCONTINUED | OUTPATIENT
Start: 2023-12-05 | End: 2023-12-06 | Stop reason: HOSPADM

## 2023-12-05 RX ORDER — LIDOCAINE 40 MG/G
CREAM TOPICAL
Status: DISCONTINUED | OUTPATIENT
Start: 2023-12-05 | End: 2023-12-06 | Stop reason: HOSPADM

## 2023-12-05 RX ORDER — ACETAMINOPHEN 325 MG/1
650 TABLET ORAL EVERY 4 HOURS PRN
Status: DISCONTINUED | OUTPATIENT
Start: 2023-12-08 | End: 2023-12-06 | Stop reason: HOSPADM

## 2023-12-05 RX ORDER — ONDANSETRON 2 MG/ML
4 INJECTION INTRAMUSCULAR; INTRAVENOUS EVERY 30 MIN PRN
Status: DISCONTINUED | OUTPATIENT
Start: 2023-12-05 | End: 2023-12-05 | Stop reason: HOSPADM

## 2023-12-05 RX ORDER — ACETAMINOPHEN 325 MG/1
975 TABLET ORAL ONCE
Status: COMPLETED | OUTPATIENT
Start: 2023-12-05 | End: 2023-12-05

## 2023-12-05 RX ORDER — BISACODYL 10 MG
10 SUPPOSITORY, RECTAL RECTAL DAILY PRN
Status: DISCONTINUED | OUTPATIENT
Start: 2023-12-05 | End: 2023-12-06 | Stop reason: HOSPADM

## 2023-12-05 RX ORDER — HYDROMORPHONE HCL IN WATER/PF 6 MG/30 ML
0.4 PATIENT CONTROLLED ANALGESIA SYRINGE INTRAVENOUS EVERY 5 MIN PRN
Status: DISCONTINUED | OUTPATIENT
Start: 2023-12-05 | End: 2023-12-05 | Stop reason: HOSPADM

## 2023-12-05 RX ORDER — OXYCODONE HYDROCHLORIDE 5 MG/1
10 TABLET ORAL
Status: DISCONTINUED | OUTPATIENT
Start: 2023-12-05 | End: 2023-12-05 | Stop reason: HOSPADM

## 2023-12-05 RX ORDER — NALOXONE HYDROCHLORIDE 0.4 MG/ML
0.4 INJECTION, SOLUTION INTRAMUSCULAR; INTRAVENOUS; SUBCUTANEOUS
Status: DISCONTINUED | OUTPATIENT
Start: 2023-12-05 | End: 2023-12-06 | Stop reason: HOSPADM

## 2023-12-05 RX ORDER — ONDANSETRON 4 MG/1
4 TABLET, ORALLY DISINTEGRATING ORAL EVERY 6 HOURS PRN
Status: DISCONTINUED | OUTPATIENT
Start: 2023-12-05 | End: 2023-12-06 | Stop reason: HOSPADM

## 2023-12-05 RX ORDER — FLUTICASONE FUROATE AND VILANTEROL 200; 25 UG/1; UG/1
1 POWDER RESPIRATORY (INHALATION) DAILY
Status: DISCONTINUED | OUTPATIENT
Start: 2023-12-05 | End: 2023-12-06 | Stop reason: HOSPADM

## 2023-12-05 RX ORDER — TRANEXAMIC ACID 650 MG/1
1950 TABLET ORAL ONCE
Status: COMPLETED | OUTPATIENT
Start: 2023-12-05 | End: 2023-12-05

## 2023-12-05 RX ORDER — HYDROMORPHONE HCL IN WATER/PF 6 MG/30 ML
0.2 PATIENT CONTROLLED ANALGESIA SYRINGE INTRAVENOUS
Status: DISCONTINUED | OUTPATIENT
Start: 2023-12-05 | End: 2023-12-06 | Stop reason: HOSPADM

## 2023-12-05 RX ORDER — ALBUTEROL SULFATE 90 UG/1
1 AEROSOL, METERED RESPIRATORY (INHALATION) EVERY 6 HOURS PRN
Status: DISCONTINUED | OUTPATIENT
Start: 2023-12-05 | End: 2023-12-06 | Stop reason: HOSPADM

## 2023-12-05 RX ORDER — FLUTICASONE PROPIONATE 50 MCG
1-2 SPRAY, SUSPENSION (ML) NASAL DAILY PRN
Status: DISCONTINUED | OUTPATIENT
Start: 2023-12-05 | End: 2023-12-06 | Stop reason: HOSPADM

## 2023-12-05 RX ORDER — OXYCODONE HYDROCHLORIDE 5 MG/1
5 TABLET ORAL EVERY 4 HOURS PRN
Status: DISCONTINUED | OUTPATIENT
Start: 2023-12-05 | End: 2023-12-06 | Stop reason: HOSPADM

## 2023-12-05 RX ORDER — ACETAMINOPHEN 325 MG/1
975 TABLET ORAL EVERY 8 HOURS
Qty: 27 TABLET | Refills: 0 | Status: DISCONTINUED | OUTPATIENT
Start: 2023-12-05 | End: 2023-12-06 | Stop reason: HOSPADM

## 2023-12-05 RX ORDER — LIDOCAINE 40 MG/G
CREAM TOPICAL
Status: DISCONTINUED | OUTPATIENT
Start: 2023-12-05 | End: 2023-12-05 | Stop reason: HOSPADM

## 2023-12-05 RX ORDER — FENTANYL CITRATE 50 UG/ML
INJECTION, SOLUTION INTRAMUSCULAR; INTRAVENOUS PRN
Status: DISCONTINUED | OUTPATIENT
Start: 2023-12-05 | End: 2023-12-05

## 2023-12-05 RX ORDER — MAGNESIUM HYDROXIDE/ALUMINUM HYDROXICE/SIMETHICONE 120; 1200; 1200 MG/30ML; MG/30ML; MG/30ML
30 SUSPENSION ORAL EVERY 4 HOURS PRN
Status: DISCONTINUED | OUTPATIENT
Start: 2023-12-05 | End: 2023-12-05 | Stop reason: DRUGHIGH

## 2023-12-05 RX ORDER — CEFAZOLIN SODIUM/WATER 2 G/20 ML
2 SYRINGE (ML) INTRAVENOUS
Status: COMPLETED | OUTPATIENT
Start: 2023-12-05 | End: 2023-12-05

## 2023-12-05 RX ORDER — CEFAZOLIN SODIUM/WATER 2 G/20 ML
2 SYRINGE (ML) INTRAVENOUS SEE ADMIN INSTRUCTIONS
Status: DISCONTINUED | OUTPATIENT
Start: 2023-12-05 | End: 2023-12-05 | Stop reason: HOSPADM

## 2023-12-05 RX ORDER — FENTANYL CITRATE 50 UG/ML
50 INJECTION, SOLUTION INTRAMUSCULAR; INTRAVENOUS EVERY 5 MIN PRN
Status: DISCONTINUED | OUTPATIENT
Start: 2023-12-05 | End: 2023-12-05 | Stop reason: HOSPADM

## 2023-12-05 RX ORDER — DEXAMETHASONE SODIUM PHOSPHATE 10 MG/ML
INJECTION, SOLUTION INTRAMUSCULAR; INTRAVENOUS PRN
Status: DISCONTINUED | OUTPATIENT
Start: 2023-12-05 | End: 2023-12-05

## 2023-12-05 RX ADMIN — PHENYLEPHRINE HYDROCHLORIDE 200 MCG: 10 INJECTION INTRAVENOUS at 17:19

## 2023-12-05 RX ADMIN — OXYCODONE HYDROCHLORIDE 5 MG: 5 TABLET ORAL at 20:58

## 2023-12-05 RX ADMIN — FENTANYL CITRATE 50 MCG: 50 INJECTION INTRAMUSCULAR; INTRAVENOUS at 17:13

## 2023-12-05 RX ADMIN — ASPIRIN 81 MG: 81 TABLET, COATED ORAL at 20:58

## 2023-12-05 RX ADMIN — SENNOSIDES AND DOCUSATE SODIUM 1 TABLET: 8.6; 5 TABLET ORAL at 20:58

## 2023-12-05 RX ADMIN — PROPOFOL 50 MG: 10 INJECTION, EMULSION INTRAVENOUS at 17:13

## 2023-12-05 RX ADMIN — FENTANYL CITRATE 50 MCG: 50 INJECTION, SOLUTION INTRAMUSCULAR; INTRAVENOUS at 18:22

## 2023-12-05 RX ADMIN — SODIUM CHLORIDE, POTASSIUM CHLORIDE, SODIUM LACTATE AND CALCIUM CHLORIDE 1000 ML: 600; 310; 30; 20 INJECTION, SOLUTION INTRAVENOUS at 14:49

## 2023-12-05 RX ADMIN — DEXAMETHASONE SODIUM PHOSPHATE 10 MG: 10 INJECTION, SOLUTION INTRAMUSCULAR; INTRAVENOUS at 16:57

## 2023-12-05 RX ADMIN — ACETAMINOPHEN 975 MG: 325 TABLET ORAL at 22:52

## 2023-12-05 RX ADMIN — SODIUM CHLORIDE, POTASSIUM CHLORIDE, SODIUM LACTATE AND CALCIUM CHLORIDE: 600; 310; 30; 20 INJECTION, SOLUTION INTRAVENOUS at 17:45

## 2023-12-05 RX ADMIN — FENTANYL CITRATE 50 MCG: 50 INJECTION, SOLUTION INTRAMUSCULAR; INTRAVENOUS at 18:14

## 2023-12-05 RX ADMIN — PHENYLEPHRINE HYDROCHLORIDE 200 MCG: 10 INJECTION INTRAVENOUS at 17:29

## 2023-12-05 RX ADMIN — MONTELUKAST SODIUM 10 MG: 10 TABLET, COATED ORAL at 20:58

## 2023-12-05 RX ADMIN — SODIUM CHLORIDE, POTASSIUM CHLORIDE, SODIUM LACTATE AND CALCIUM CHLORIDE: 600; 310; 30; 20 INJECTION, SOLUTION INTRAVENOUS at 19:38

## 2023-12-05 RX ADMIN — SODIUM CHLORIDE, POTASSIUM CHLORIDE, SODIUM LACTATE AND CALCIUM CHLORIDE: 600; 310; 30; 20 INJECTION, SOLUTION INTRAVENOUS at 22:21

## 2023-12-05 RX ADMIN — FENTANYL CITRATE 50 MCG: 50 INJECTION INTRAMUSCULAR; INTRAVENOUS at 16:57

## 2023-12-05 RX ADMIN — LIDOCAINE HYDROCHLORIDE 40 MG: 10 INJECTION, SOLUTION EPIDURAL; INFILTRATION; INTRACAUDAL; PERINEURAL at 16:57

## 2023-12-05 RX ADMIN — TRANEXAMIC ACID 1950 MG: 650 TABLET ORAL at 14:42

## 2023-12-05 RX ADMIN — FENTANYL CITRATE 50 MCG: 50 INJECTION INTRAMUSCULAR; INTRAVENOUS at 17:43

## 2023-12-05 RX ADMIN — Medication 2 G: at 16:52

## 2023-12-05 RX ADMIN — FENTANYL CITRATE 50 MCG: 50 INJECTION INTRAMUSCULAR; INTRAVENOUS at 17:57

## 2023-12-05 RX ADMIN — PROPOFOL 150 MG: 10 INJECTION, EMULSION INTRAVENOUS at 16:57

## 2023-12-05 RX ADMIN — ONDANSETRON 4 MG: 2 INJECTION INTRAMUSCULAR; INTRAVENOUS at 16:57

## 2023-12-05 RX ADMIN — PROCHLORPERAZINE EDISYLATE 5 MG: 5 INJECTION INTRAMUSCULAR; INTRAVENOUS at 19:43

## 2023-12-05 RX ADMIN — ONDANSETRON 4 MG: 4 TABLET, ORALLY DISINTEGRATING ORAL at 18:27

## 2023-12-05 RX ADMIN — ACETAMINOPHEN 975 MG: 325 TABLET ORAL at 14:42

## 2023-12-05 RX ADMIN — Medication 2 G: at 16:45

## 2023-12-05 ASSESSMENT — ACTIVITIES OF DAILY LIVING (ADL)
ADLS_ACUITY_SCORE: 36
ADLS_ACUITY_SCORE: 32
ADLS_ACUITY_SCORE: 35
ADLS_ACUITY_SCORE: 36
ADLS_ACUITY_SCORE: 25

## 2023-12-05 ASSESSMENT — COPD QUESTIONNAIRES: COPD: 1

## 2023-12-05 NOTE — OP NOTE
Operative Note    Name:  Krystal Virgen  PCP:  Juan C Fairchild  Procedure Date:  12/5/2023      Procedure(s):  Irrigate debride and close left hip anterior incision    Pre-Procedure Diagnosis:  Superficial wound dehiscence left anterior hip/JORGE LUIS incision    Post-Procedure Diagnosis:    Same    Surgeon(s):  Jairo Cornejo MD    Assist:  Ramón Mac PA-C PA-C assist  was required for this operation due to the complexity of the procedure, for proper positioning of the limb during the operation, and for patient safety.    Anesthesia Type:  General    Antibiotics:  Ancef 2 g IVPB on induction  Ancef 3 g and 3 L for irrigation    Condition on discharge from OR:  stable    Indications:  83-year-old female 6 weeks post left JORGE LUIS DA with standard oblique anterior incision distal to the inguinal crease seen postoperatively.  2 weeks ago her wound was satisfactory but upon review yesterday found to have superficial dehiscence of the skin closure to the subcutaneous fat layer without dehiscence to the fascial layer beneath.  Operative indications include wound dehiscence and need of irrigation debridement and wound closure.  Thorough review of the anatomy, pathology, options available and recommendation with patient with an informed collaborative decision made to proceed.  Risks reviewed.    Findings:  Superficial skin dehiscence to subcutaneous fat with no dissection to the fascial layer.    Operative Report:     Upon informed consent having reviewed the procedure along with attendant risk of complication, an informed collaborative decision was made to proceed.  All protocols and regimens were followed.  Leg was marked.  Patient attended to by [facility].  Consent obtained.  Labs were checked.  Inflammatory markers were normal.  Patient was premedicated, brought to the operating room undergoing anesthetic induction.  The entire inguinal area and left hemipelvic region was provisionally draped, scrubbed alcohol washed and  ChloraPrep in its entirety.  The area was then formally draped.    We paused for patient identification, limb and procedure confirmation.    Carefully evaluated the wound and found that the dehiscence went down to the very superficial part of the subcutaneous fat layer.  I outlined an elliptical excision around the wound margins and sharply elliptically excised the skin edges down to subcutaneous tissue.  All devitalized suspect tissue was removed sharply.  We were satisfied that there was no extension of the dehiscence beyond the superficial subcutaneous fat layer and there was no evidence for exposure of the underlying fascial layer.    The wounds were then copiously thoroughly irrigated with multiple liters of antibiotic laden irrigant.  Upon completion, I reinspected the wound obtained hemostasis.  Then, skin edges were brought together with #1 monofilament suture in a vertical mattress style stitch bringing the skin edges together.  Satisfactory closure was obtained in Staples were used between adjacent sutures.  Careful attention was directed towards proper suture placement to cornell the skin edges and to properly cornell the skin edges while stapling.    The wounds were then copiously thoroughly irrigated cleaned and dried and a Prevena wound VAC applied in usual fashion.    Counts were correct.  Bleeding was largely from subcutaneous tissue and hemostasis was achieved.  There were no complications.  Patient tolerated the procedure well was brought to the PAR in stable condition.    Estimated Blood Loss:   Less than 50 cc    Specimens:    Superficial wound dehiscence/cicatrix.       Drains: Prevena wound VAC dressing  Negative Pressure Wound Therapy Thigh Anterior;Left;Proximal (Active)       Complications:    None    Jairo Cornejo MD     Date: 12/5/2023  Time: 5:47 PM      * No implants in log *

## 2023-12-05 NOTE — H&P
Orthopedic preop note    83-year-old female status post left JORGE LUIS DA now admitted for superficial wound dehiscence requiring incision and debridement with wound VAC application.  Past medical history per previous Ortho note.  Medications Per preop note  Allergies per preop note    Vital stable.  Afebrile.  HEENT normal with supple neck  Chest clear to A%P.  No rales.  Cor normal S1-S2 without S3-S4 or murmur  Abdomen soft and nontender  CMS intact.    Assessment:  1.superficial wound dehiscence.  2.multiple comorbidities  3.cleared for surgical treatment.  Reviewed with anesthesia.    Plan:  1.we will proceed with surgery as consented.  2.patient aware of plan and is in agreement.  All questions addressed to her satisfaction.

## 2023-12-05 NOTE — ANESTHESIA PREPROCEDURE EVALUATION
Anesthesia Pre-Procedure Evaluation    Patient: Krystal Virgen   MRN: [id number] : 1940        Procedure : Procedure(s):  LEFT HIP IRRIGATION AND DEBRIDEMENT          Past Medical History:   Diagnosis Date     Anemia      Arthritis      Asthma      Asthma      Asthma with acute exacerbation 2021    Formatting of this note might be different from the original. Created by Conversion  Replacement Utility updated for latest IMO load     Breast cancer (H) 2017     Chronic bronchitis (H)      Chronic sinusitis      COPD (chronic obstructive pulmonary disease) (H)      Depression      GERD (gastroesophageal reflux disease)      Hiatal hernia      Hypertension      Migraines      Osteopenia      Osteoporosis      Overactive bladder      Pneumococcal infection      Pneumonia      PONV (postoperative nausea and vomiting)      Renal disease      Sinusitis      Spinal headache      ST elevation MI (STEMI) (H) 2021     Stented coronary artery       Past Surgical History:   Procedure Laterality Date     ARTHRODESIS FOOT  2011    Procedure:ARTHRODESIS FOOT; Right First Metatarsophalangeal Joint Fusion with Second and Third Clawtoe Repairs; Surgeon:SARAH CASTRO; Location:[facility] OR     ARTHROPLASTY HIP ANTERIOR Left 10/27/2023    Procedure: LEFT TOTAL HIP ARTHROPLASTY DIRECT ANTERIOR;  Surgeon: Jairo Cornejo MD;  Location: [facility] Main OR     BIOPSY BREAST       CHOLECYSTECTOMY       CHOLECYSTECTOMY       CV CORONARY ANGIOGRAM N/A 2021    Procedure: Coronary Angiogram;  Surgeon: Slade Yu MD;  Location:  [facility]     CV PCI STENT DRUG ELUTING N/A 2021    Procedure: Percutaneous Coronary Intervention Stent Drug Eluting;  Surgeon: Slade Yu MD;  Location:  [facility]     ENT SURGERY      sinus surgery x1     FOOT SURGERY       GYN SURGERY      benign hysterectomy age 43     HYSTERECTOMY  1984     LUMPECTOMY BREAST Right 2017     ORTHOPEDIC  SURGERY      fusion of grest toe right     SINUS SURGERY        Allergies   Allergen Reactions     Ticagrelor Other (See Comments)     Dyspnea     Penicillins      Amoxicillin doesn't work for her     Sulfa Antibiotics Rash      Social History     Tobacco Use     Smoking status: Never     Smokeless tobacco: Never   Substance Use Topics     Alcohol use: No      Wt Readings from Last 1 Encounters:   11/08/23 83.2 kg (183 lb 6.4 oz)        Anesthesia Evaluation        History of anesthetic complications       ROS/MED HX  ENT/Pulmonary:     (+)                     asthma    COPD,              Neurologic:  - neg neurologic ROS     Cardiovascular: Comment: 2023        Lexiscan stress ECG negative for ischemia.     The nuclear stress test is abnormal.  There is a large area of transmural infarction involving the lateral and inferolateral wall extending from base to apex.  No ischemia identified.     The left ventricular ejection fraction at stress is 55% with inferolateral and lateral wall hypokinesis.     There is no prior study for comparison.      (+)  hypertension- -  CAD - past MI (negative stress test 9/5/23, EF 55%) - -                                   (-) angina and angina   METS/Exercise Tolerance:     Hematologic:     (+)      anemia,          Musculoskeletal:   (+)  arthritis,             GI/Hepatic:     (+) GERD, Asymptomatic on medication,    hiatal hernia,              Renal/Genitourinary:     (+) renal disease, type: CRI,            Endo:  - neg endo ROS     Psychiatric/Substance Use:     (+) psychiatric history depression  H/O chronic opiod use .     Infectious Disease:  - neg infectious disease ROS     Malignancy:   (+) Malignancy, History of Breast.Breast CA Remission status post.      Other:  - neg other ROS    (+)  , H/O Chronic Pain (current VAS 5/10),         Physical Exam    Airway        Mallampati: II   TM distance: > 3 FB   Neck ROM: full     Respiratory Devices and Support         Dental            Cardiovascular   cardiovascular exam normal          Pulmonary   pulmonary exam normal            OUTSIDE LABS:  CBC:   Lab Results   Component Value Date    WBC 10.9 12/04/2023    WBC 13.7 (H) 11/08/2023    HGB 11.2 (L) 12/04/2023    HGB 9.1 (L) 11/08/2023    HCT 36.1 12/04/2023    HCT 30.4 (L) 11/08/2023     12/04/2023     (H) 11/08/2023     BMP:   Lab Results   Component Value Date     12/04/2023     (L) 10/28/2023    POTASSIUM 4.2 12/04/2023    POTASSIUM 4.4 10/28/2023    CHLORIDE 107 12/04/2023    CHLORIDE 104 10/28/2023    CO2 23 12/04/2023    CO2 22 10/28/2023    BUN 18.7 12/04/2023    BUN 18.8 10/28/2023    CR 1.83 (H) 12/04/2023    CR 1.21 (H) 10/28/2023     (H) 12/04/2023    GLC 96 10/29/2023     COAGS:   Lab Results   Component Value Date    PTT 53 (H) 01/07/2021    INR 0.90 10/27/2023     POC:   Lab Results   Component Value Date    BGM 90 01/07/2021     HEPATIC:   Lab Results   Component Value Date    ALBUMIN 4.0 12/04/2023    PROTTOTAL 6.6 12/04/2023    ALT 6 12/04/2023    AST 17 12/04/2023    ALKPHOS 114 12/04/2023    BILITOTAL 0.4 12/04/2023     OTHER:   Lab Results   Component Value Date    LACT 1.3 10/27/2023    A1C 5.3 01/08/2021    ALINE 9.9 12/04/2023    PHOS 3.8 01/10/2021    MAG 2.2 01/10/2021    TSH 3.38 08/24/2021       Anesthesia Plan    ASA Status:  3       Anesthesia Type: General.     - Airway: ETT              Consents          - Extended Intubation/Ventilatory Support Discussed: No.      - Patient is DNR/DNI Status: No     Use of blood products discussed: No .     Postoperative Care    Pain management: IV analgesics, Oral pain medications.   PONV prophylaxis: Dexamethasone or Solumedrol, Ondansetron (or other 5HT-3)     Comments:             Chito Bell, DO    I have reviewed the pertinent notes and labs in the chart from the past 30 days and (re)examined the patient.  Any updates or changes from those notes are reflected in this note.    "          # Drug Induced Platelet Defect: home medication list includes an antiplatelet medication  # Obesity: Estimated body mass index is 32.49 kg/m  as calculated from the following:    Height as of 11/8/23: 1.6 m (5' 3\").    Weight as of 11/8/23: 83.2 kg (183 lb 6.4 oz).      "

## 2023-12-05 NOTE — ANESTHESIA PROCEDURE NOTES
Airway       Patient location during procedure: OR  Staff -        Anesthesiologist:  Paulo Asher MD       CRNA: Krystal White APRN CRNA       Performed By: CRNA  Consent for Airway        Urgency: elective  Indications and Patient Condition       Indications for airway management: yamile-procedural       Induction type:intravenous       Mask difficulty assessment: 0 - not attempted    Final Airway Details       Final airway type: supraglottic airway    Supraglottic Airway Details        Type: LMA       Brand: Ambu AuraGain       LMA size: 3    Post intubation assessment        Placement verified by: capnometry, equal breath sounds and chest rise        Number of attempts at approach: 1       Number of other approaches attempted: 0       Secured with: commercial tube gasca and tape       Ease of procedure: easy       Dentition: Intact and Unchanged

## 2023-12-05 NOTE — PHARMACY-ADMISSION MEDICATION HISTORY
Pharmacist Admission Medication History    Admission medication history is complete. The information provided in this note is only as accurate as the sources available at the time of the update.    Information Source(s): Patient and CareEverywhere/SureScripts via in-person    Pertinent Information:      Changes made to PTA medication list:  Added: None  Deleted: None  Changed: None    Medication Affordability:  Not including over the counter (OTC) medications, was there a time in the past 3 months when you did not take your medications as prescribed because of cost?: No    Allergies reviewed with patient and updates made in EHR: yes    Medication History Completed By: Arnulfo Lopez [facility] 12/5/2023 2:18 PM    PTA Med List   Medication Sig Last Dose    acetaminophen (TYLENOL) 500 MG tablet Take 1,000 mg by mouth every 8 hours as needed Past Week at prn    albuterol (PROAIR HFA/PROVENTIL HFA/VENTOLIN HFA) 108 (90 Base) MCG/ACT inhaler USE 2 INHALATIONS ORALLY   EVERY 6 HOURS AS NEEDED Past Month at not with    aspirin 81 MG EC tablet Take 1 tablet (81 mg) by mouth 2 times daily 12/4/2023 at am    budesonide-formoterol (SYMBICORT) 160-4.5 MCG/ACT inhaler Inhale 2 puffs into the lungs 2 times daily as needed Past Month at not with    buPROPion (WELLBUTRIN XL) 300 MG 24 hr tablet Take 1 tablet (300 mg) by mouth every morning 12/5/2023 at am    ferrous sulfate (FE TABS) 325 (65 Fe) MG EC tablet Take 1 tablet (325 mg) by mouth daily Past Month    fluticasone (FLONASE) 50 MCG/ACT nasal spray Spray 1-2 sprays into both nostrils daily as needed Past Month at not with    LANsoprazole (PREVACID) 30 MG DR capsule Take 1 capsule (30 mg) by mouth daily 12/5/2023 at am    montelukast (SINGULAIR) 10 MG tablet Take 1 tablet (10 mg) by mouth At Bedtime 12/4/2023 at pm    nitroGLYcerin (NITROSTAT) 0.4 MG sublingual tablet For chest pain place 1 tablet under the tongue every 5 minutes for 3 doses. If symptoms persist 5 minutes after 1st  dose call 911. prn at prn    oxyCODONE (ROXICODONE) 5 MG tablet Take 1 tablet (5 mg) by mouth every 6 hours as needed for severe pain prn at prn    rosuvastatin (CRESTOR) 40 MG tablet Take 1 tablet (40 mg) by mouth daily 12/5/2023 at am    senna-docusate (SENOKOT-S/PERICOLACE) 8.6-50 MG tablet Take 1-2 tablets by mouth 2 times daily Take while on oral narcotics to prevent or treat constipation. (Patient taking differently: Take 1-2 tablets by mouth 2 times daily as needed Take while on oral narcotics to prevent or treat constipation.) prn at prn    Vitamin D3 (CHOLECALCIFEROL) 125 MCG (5000 UT) tablet Take 1 tablet (125 mcg) by mouth daily 12/4/2023 at am

## 2023-12-05 NOTE — H&P
ORTHOPEDIC H&P    Consultation  Krystal Virgen,  1940, MRN [id number]    [place]  Left hip pain [M25.552]  Infection [B99.9]    PCP: Juan C Fairchild, [phone]   Code status:  Prior       Extended Emergency Contact Information  Primary Emergency Contact: DIANELYS HONG  Mobile Phone: [phone]  Relation: Daughter  Secondary Emergency Contact: ACOSTA COLLAZO  Mobile Phone: [phone]  Relation: Sister         IMPRESSION:  Left hip JORGE LUIS 6 weeks ago now with wound dehiscence, initial encounter     PLAN:  This patient was discussed with Dr. Cornejo, on-call surgeon for [facility] and they are in agreement with the following plan.     - plan for I&D today at 4pm    Thank you for including [facility] in the care of Krystal Virgen. It has been a pleasure participating in Jody's care.        CHIEF COMPLAINT: <principal problem not specified>    HISTORY OF PRESENT ILLNESS:  Patient under went left hip JORGE LUIS 6 weeks ago was seen in clinic yesterday by Dr. Cornejo and noted some wound dehiscence and drainage.  She was then scheduled for surgical add-on today for I&D of the hip.  She has been most recently seen by her primary care provider on 10/20/2023 prior to her initial JORGE LUIS on 10/27/2023.  No new interim medications or medical issues to note.    PAST MEDICAL HISTORY:  Past Medical History:   Diagnosis Date    Anemia     Arthritis     Asthma     Asthma     Asthma with acute exacerbation 2021    Formatting of this note might be different from the original. Created by Conversion  Replacement Utility updated for latest IMO load    Breast cancer (H) 2017    Chronic bronchitis (H)     Chronic sinusitis     COPD (chronic obstructive pulmonary disease) (H)     Depression     GERD (gastroesophageal reflux disease)     Hiatal hernia     Hypertension     Migraines     Osteopenia     Osteoporosis     Overactive bladder     Pneumococcal infection     Pneumonia     PONV (postoperative nausea and  vomiting)     Renal disease     Sinusitis     Spinal headache     ST elevation MI (STEMI) (H) 01/07/2021    Stented coronary artery           ALLERGIES:   Review of patient's allergies indicates   Allergies   Allergen Reactions    Ticagrelor Other (See Comments)     Dyspnea    Penicillins      Amoxicillin doesn't work for her    Sulfa Antibiotics Rash         MEDICATIONS UPON ADMISSION:  Medications were reviewed.  They include:   Medications Prior to Admission   Medication Sig Dispense Refill Last Dose    acetaminophen (TYLENOL) 500 MG tablet Take 1,000 mg by mouth 3 times daily       oxyCODONE (ROXICODONE) 5 MG tablet Take 1 tablet (5 mg) by mouth every 6 hours as needed for severe pain 56 tablet 0     albuterol (PROAIR HFA/PROVENTIL HFA/VENTOLIN HFA) 108 (90 Base) MCG/ACT inhaler USE 2 INHALATIONS ORALLY   EVERY 6 HOURS AS NEEDED 54 g 1     aspirin 81 MG EC tablet Take 1 tablet (81 mg) by mouth 2 times daily 60 tablet 0     budesonide-formoterol (SYMBICORT) 160-4.5 MCG/ACT inhaler Inhale 2 puffs into the lungs 2 times daily.       buPROPion (WELLBUTRIN XL) 300 MG 24 hr tablet Take 1 tablet (300 mg) by mouth every morning 90 tablet 2     ferrous sulfate (FE TABS) 325 (65 Fe) MG EC tablet Take 1 tablet (325 mg) by mouth daily 30 tablet 3     fluticasone (FLONASE) 50 MCG/ACT nasal spray Spray 1-2 sprays into both nostrils daily as needed       ipratropium - albuterol 0.5 mg/2.5 mg/3 mL (DUONEB) 0.5-2.5 (3) MG/3ML neb solution Inhale 3 mLs into the lungs every 4 hours as needed       LANsoprazole (PREVACID) 30 MG DR capsule Take 1 capsule (30 mg) by mouth daily 90 capsule 2     Lidocaine (LIDOCARE) 4 % Patch Place 1 patch onto the skin every 24 hours To prevent lidocaine toxicity, patient should be patch free for 12 hrs daily. Apply to L hip       montelukast (SINGULAIR) 10 MG tablet Take 1 tablet (10 mg) by mouth At Bedtime 90 tablet 3     nitroGLYcerin (NITROSTAT) 0.4 MG sublingual tablet For chest pain place 1  tablet under the tongue every 5 minutes for 3 doses. If symptoms persist 5 minutes after 1st dose call 911. 25 tablet 0     polyethylene glycol (MIRALAX) 17 GM/Dose powder Take 17 g by mouth daily 510 g 0     rosuvastatin (CRESTOR) 40 MG tablet Take 1 tablet (40 mg) by mouth daily 90 tablet 3     senna-docusate (SENOKOT-S/PERICOLACE) 8.6-50 MG tablet Take 1-2 tablets by mouth 2 times daily Take while on oral narcotics to prevent or treat constipation. (Patient taking differently: Take 1-2 tablets by mouth 2 times daily as needed Take while on oral narcotics to prevent or treat constipation.) 15 tablet 0     SUMAtriptan (IMITREX) 100 MG tablet Take 1 tablet (100 mg) by mouth at onset of headache May repeat in 2 hours if needed: max 2/day; 30 tablet 3     Vitamin D3 (CHOLECALCIFEROL) 125 MCG (5000 UT) tablet Take 1 tablet (125 mcg) by mouth daily 30 tablet 1          SOCIAL HISTORY:   she  reports that she has never smoked. She has never used smokeless tobacco. She reports that she does not drink alcohol and does not use drugs.    FAMILY HISTORY:  family history includes Breast Cancer (age of onset: 35.00) in her maternal aunt; Breast Cancer (age of onset: 48.00) in her paternal aunt and sister; Breast Cancer (age of onset: 78.00) in her mother; Heart Disease in her brother and father; Macular Degeneration in her father.      REVIEW OF SYSTEMS:   Reviewed with patient. See HPI, otherwise negative       PHYSICAL EXAMINATION:  Vitals:    General: On examination, the patient is resting comfortably, NAD, awake, and alert and oriented to person, place, time, and, and general circumstances   SKIN: The left hip wound is covered with a bandage at this time  Pulses:  dorsalis pedis and posterior tibial pulse is intact and equal bilaterally  Sensation: intact and equal bilaterally to the distal lower extremities.  Contralateral side= Full range of motion, Negative joint instability findings, 5/5 motor groups about the joint,  Non-tender.       RADIOGRAPHIC EVALUATION:  Personally reviewed      PERTINENT LABS:  Lab Results: personally reviewed.   @BRIEFLAB[NA,K,CL,CO2,BUN,CREATININE,GLUCOSE,GLUCOSE @  Lab Results   Component Value Date    WBC 10.9 12/04/2023    WBC 10.3 01/21/2021    HGB 11.2 12/04/2023    HGB 10.4 01/21/2021    HCT 36.1 12/04/2023    HCT 33.1 01/21/2021    MCV 88 12/04/2023    MCV 94 01/21/2021     12/04/2023     01/21/2021         Dali Kidd PA-C, MARIA INES  Date: 12/5/2023  Time: 1:59 PM    CC1:   Jairo Cornejo MD    CC2:   Juan C Fairchild

## 2023-12-05 NOTE — INTERVAL H&P NOTE
"I have reviewed the surgical (or preoperative) H&P that is linked to this encounter, and examined the patient. There are no significant changes    Clinical Conditions Present on Arrival:  Clinically Significant Risk Factors Present on Admission                 # Drug Induced Platelet Defect: home medication list includes an antiplatelet medication  # Obesity: Estimated body mass index is 32.52 kg/m  as calculated from the following:    Height as of this encounter: 1.6 m (5' 3\").    Weight as of this encounter: 83.3 kg (183 lb 9.6 oz).       "

## 2023-12-06 ENCOUNTER — APPOINTMENT (OUTPATIENT)
Dept: PHYSICAL THERAPY | Facility: CLINIC | Age: 83
End: 2023-12-06
Attending: ORTHOPAEDIC SURGERY
Payer: COMMERCIAL

## 2023-12-06 VITALS
WEIGHT: 183.6 LBS | SYSTOLIC BLOOD PRESSURE: 136 MMHG | TEMPERATURE: 98.2 F | OXYGEN SATURATION: 96 % | DIASTOLIC BLOOD PRESSURE: 65 MMHG | BODY MASS INDEX: 32.53 KG/M2 | RESPIRATION RATE: 18 BRPM | HEART RATE: 74 BPM | HEIGHT: 63 IN

## 2023-12-06 LAB
ANION GAP SERPL CALCULATED.3IONS-SCNC: 9 MMOL/L (ref 7–15)
BUN SERPL-MCNC: 20.5 MG/DL (ref 8–23)
CALCIUM SERPL-MCNC: 8.6 MG/DL (ref 8.8–10.2)
CHLORIDE SERPL-SCNC: 108 MMOL/L (ref 98–107)
CREAT SERPL-MCNC: 1.52 MG/DL (ref 0.51–0.95)
DEPRECATED HCO3 PLAS-SCNC: 20 MMOL/L (ref 22–29)
EGFRCR SERPLBLD CKD-EPI 2021: 34 ML/MIN/1.73M2
GLUCOSE SERPL-MCNC: 192 MG/DL (ref 70–99)
HGB BLD-MCNC: 9.8 G/DL (ref 11.7–15.7)
LACTATE SERPL-SCNC: 1.3 MMOL/L (ref 0.7–2)
POTASSIUM SERPL-SCNC: 4.3 MMOL/L (ref 3.4–5.3)
SODIUM SERPL-SCNC: 137 MMOL/L (ref 135–145)

## 2023-12-06 PROCEDURE — 36415 COLL VENOUS BLD VENIPUNCTURE: CPT | Performed by: ORTHOPAEDIC SURGERY

## 2023-12-06 PROCEDURE — 36415 COLL VENOUS BLD VENIPUNCTURE: CPT | Performed by: STUDENT IN AN ORGANIZED HEALTH CARE EDUCATION/TRAINING PROGRAM

## 2023-12-06 PROCEDURE — 999N000111 HC STATISTIC OT IP EVAL DEFER

## 2023-12-06 PROCEDURE — 250N000013 HC RX MED GY IP 250 OP 250 PS 637

## 2023-12-06 PROCEDURE — 97161 PT EVAL LOW COMPLEX 20 MIN: CPT | Mod: GP

## 2023-12-06 PROCEDURE — 85018 HEMOGLOBIN: CPT

## 2023-12-06 PROCEDURE — 250N000011 HC RX IP 250 OP 636: Mod: JZ

## 2023-12-06 PROCEDURE — 999N000197 HC STATISTIC WOC PT EDUCATION, 0-15 MIN

## 2023-12-06 PROCEDURE — 99232 SBSQ HOSP IP/OBS MODERATE 35: CPT | Performed by: STUDENT IN AN ORGANIZED HEALTH CARE EDUCATION/TRAINING PROGRAM

## 2023-12-06 PROCEDURE — 80048 BASIC METABOLIC PNL TOTAL CA: CPT | Performed by: STUDENT IN AN ORGANIZED HEALTH CARE EDUCATION/TRAINING PROGRAM

## 2023-12-06 PROCEDURE — 250N000013 HC RX MED GY IP 250 OP 250 PS 637: Performed by: STUDENT IN AN ORGANIZED HEALTH CARE EDUCATION/TRAINING PROGRAM

## 2023-12-06 PROCEDURE — 97116 GAIT TRAINING THERAPY: CPT | Mod: GP

## 2023-12-06 PROCEDURE — 83605 ASSAY OF LACTIC ACID: CPT | Performed by: ORTHOPAEDIC SURGERY

## 2023-12-06 RX ORDER — OXYCODONE HYDROCHLORIDE 5 MG/1
5 TABLET ORAL EVERY 4 HOURS PRN
Qty: 15 TABLET | Refills: 0 | Status: SHIPPED | OUTPATIENT
Start: 2023-12-06 | End: 2024-02-16

## 2023-12-06 RX ORDER — CEFADROXIL 500 MG/1
500 CAPSULE ORAL 2 TIMES DAILY
Qty: 20 CAPSULE | Refills: 0 | Status: SHIPPED | OUTPATIENT
Start: 2023-12-06 | End: 2023-12-16

## 2023-12-06 RX ADMIN — ASPIRIN 81 MG: 81 TABLET, COATED ORAL at 09:56

## 2023-12-06 RX ADMIN — OXYCODONE HYDROCHLORIDE 5 MG: 5 TABLET ORAL at 13:49

## 2023-12-06 RX ADMIN — OXYCODONE HYDROCHLORIDE 5 MG: 5 TABLET ORAL at 06:04

## 2023-12-06 RX ADMIN — ONDANSETRON 4 MG: 4 TABLET, ORALLY DISINTEGRATING ORAL at 09:55

## 2023-12-06 RX ADMIN — SENNOSIDES AND DOCUSATE SODIUM 1 TABLET: 8.6; 5 TABLET ORAL at 09:56

## 2023-12-06 RX ADMIN — PANTOPRAZOLE SODIUM 40 MG: 40 TABLET, DELAYED RELEASE ORAL at 06:01

## 2023-12-06 RX ADMIN — ROSUVASTATIN CALCIUM 40 MG: 10 TABLET, FILM COATED ORAL at 09:56

## 2023-12-06 RX ADMIN — BUPROPION HYDROCHLORIDE 300 MG: 150 TABLET, EXTENDED RELEASE ORAL at 06:00

## 2023-12-06 RX ADMIN — CEFAZOLIN SODIUM 2 G: 2 INJECTION, SOLUTION INTRAVENOUS at 05:58

## 2023-12-06 ASSESSMENT — ACTIVITIES OF DAILY LIVING (ADL)
ADLS_ACUITY_SCORE: 30
ADLS_ACUITY_SCORE: 28
ADLS_ACUITY_SCORE: 28
ADLS_ACUITY_SCORE: 29
ADLS_ACUITY_SCORE: 30
ADLS_ACUITY_SCORE: 28
ADLS_ACUITY_SCORE: 29

## 2023-12-06 NOTE — PROGRESS NOTES
Patient vital signs are at baseline: Yes  Patient able to ambulate as they were prior to admission or with assist devices provided by therapies during their stay:  Yes  Patient MUST void prior to discharge:  Yes  Patient able to tolerate oral intake:  Yes  Pain has adequate pain control using Oral analgesics:  Yes  Does patient have an identified :  Yes  Has goal D/C date and time been discussed with patient:  Yes - CMS intact. Prevana wound vac functioning and dressing intact. Orders put in for pt to discharge today. Pt arranged a ride with family.     Jason Jones RN, ONC

## 2023-12-06 NOTE — PROGRESS NOTES
12/06/23 0832   Appointment Info   Signing Clinician's Name / Credentials (PT) Augustus Sevilla, KELY   Quick Adds   Quick Adds Certification   Living Environment   People in Home alone   Current Living Arrangements apartment   Home Accessibility no concerns   Transportation Anticipated family or friend will provide   Self-Care   Usual Activity Tolerance moderate   Current Activity Tolerance moderate   Equipment Currently Used at Home walker, rolling;cane, straight   Fall history within last six months no   Activity/Exercise/Self-Care Comment Indep with all I ADL's and ADL's, not currently driving and limited in distance ambulating due to COPD   General Information   Onset of Illness/Injury or Date of Surgery 12/05/23   Pertinent History of Current Problem (include personal factors and/or comorbidities that impact the POC) Superficial disruption or dehiscence of operation wound, sequela, post op L JORGE LUIS 6 wks prior   Existing Precautions/Restrictions no known precautions/restrictions   Weight-Bearing Status - LLE weight-bearing as tolerated   Cognition   Affect/Mental Status (Cognition) WFL   Range of Motion (ROM)   ROM Comment decreased ROM s/p L JORGE LUIS   Strength (Manual Muscle Testing)   Strength (Manual Muscle Testing) No deficits observed during functional mobility   Transfers   Transfers sit-stand transfer   Sit-Stand Transfer   Sit-Stand [place] (Transfers) contact guard;verbal cues   Assistive Device (Sit-Stand Transfers) walker, front-wheeled   Gait/Stairs (Locomotion)   [place] Level (Gait) verbal cues;contact guard   Assistive Device (Gait) walker, front-wheeled   Distance in Feet (Gait) 20   Pattern (Gait) swing-through   Balance   Balance no deficits were identified   Clinical Impression   Criteria for Skilled Therapeutic Intervention Yes, treatment indicated   PT Diagnosis (PT) impaired functional mobility   Influenced by the following impairments pain, decreased ROM, impaired balance, decreased  strength   Functional limitations due to impairments gait,  transfers   Clinical Presentation (PT Evaluation Complexity) stable   Clinical Presentation Rationale pt presents as medically diagnosed   Clinical Decision Making (Complexity) low complexity   Planned Therapy Interventions (PT) balance training;bed mobility training;gait training;home exercise program;patient/family education;transfer training   Risk & Benefits of therapy have been explained care plan/treatment goals reviewed;patient   PT Total Evaluation Time   PT Eval, Low Complexity Minutes (35057) 10   Therapy Certification   Start of care date 12/06/23   Certification date from 12/06/23   Certification date to 01/05/24   Physical Therapy Goals   PT Frequency One time eval and treatment only   PT Predicted Duration/Target Date for Goal Attainment 12/06/23   PT Goals PT Goal 1;Transfers;Gait   PT: Transfers Modified independent;Sit to/from stand;Assistive device;Goal Met   PT: Gait Modified independent;Rolling walker;Within precautions;50 feet;Goal Met   PT: Goal 1 Independent with written HEP for LE strengthening and ROM, goal met   Interventions   Interventions Quick Adds Therapeutic Procedure;Therapeutic Activity;Gait Training   Therapeutic Procedure/Exercise   Treatment Detail/Skilled Intervention Reviewed HEP verbally and gave paitent handout.  Patient declined to work through exercise as she felt comfortable doing them   Therapeutic Activity   Treatment Detail/Skilled Intervention sit to/from stand, cueing for safe hand placement and LE positioning, Mod I following education   Gait Training   Gait Training Minutes (30140) 8   Symptoms Noted During/After Treatment (Gait Training) other (see comments)  (SOB)   Treatment Detail/Skilled Intervention Slow stable gait, patient declining to use walker, but then wanting to push IV pole.  Paitent encouraged to use walker instead of hanging onto to IV pole. Patient encouraged to frequently ambulate with  nursing while staying in hospital.  Patient states copd limits her ability to walk further distances. Extra time needed to work with wound vac, iv lines   Distance in Feet 60, 10   [place] Level (Gait Training) independent   Physical Assistance Level (Gait Training) verbal cues   Weight Bearing (Gait Training) weight-bearing as tolerated   Assistive Device (Gait Training) other (see comments)  (iv pole)   Pattern Analysis (Gait Training) swing-through gait   Gait Analysis Deviations decreased ben;decreased step length;decreased stride length   Impairments (Gait Analysis/Training) pain   PT Discharge Planning   PT Plan (S)  d/c PT   PT Discharge Recommendation (DC Rec) home with assist   PT Rationale for DC Rec Patient mobilizing well overall, is alert, orientated , has good home setup and support   PT Brief overview of current status Patient indep with gait/transfers   PT Equipment Needed at Discharge cane, straight;walker, rolling   Total Session Time   Timed Code Treatment Minutes 8   Total Session Time (sum of timed and untimed services) 18

## 2023-12-06 NOTE — ANESTHESIA CARE TRANSFER NOTE
Patient: Krystal Berryully    Procedure: Procedure(s):  LEFT HIP IRRIGATION AND DEBRIDEMENT       Diagnosis: Left hip pain [M25.552]  Infection [B99.9]  Diagnosis Additional Information: No value filed.    Anesthesia Type:   General     Note:    Oropharynx: oropharynx clear of all foreign objects  Level of Consciousness: awake  Oxygen Supplementation: room air    Independent Airway: airway patency satisfactory and stable  Dentition: dentition unchanged  Vital Signs Stable: post-procedure vital signs reviewed and stable  Report to RN Given: handoff report given  Patient transferred to: PACU    Handoff Report: Identifed the Patient, Identified the Reponsible Provider, Reviewed the pertinent medical history, Discussed the surgical course, Reviewed Intra-OP anesthesia mangement and issues during anesthesia, Set expectations for post-procedure period and Allowed opportunity for questions and acknowledgement of understanding      Vitals:  Vitals Value Taken Time   /67 12/05/23 1801   Temp 38.1  C (100.6  F) 12/05/23 1801   Pulse 106 12/05/23 1802   Resp 24 12/05/23 1802   SpO2 95 % 12/05/23 1802   Vitals shown include unfiled device data.    Electronically Signed By: CESARIO FAJARDO CRNA  December 5, 2023  6:03 PM

## 2023-12-06 NOTE — ANESTHESIA POSTPROCEDURE EVALUATION
Patient: Krystal Virgen    Procedure: Procedure(s):  LEFT HIP IRRIGATION AND DEBRIDEMENT       Anesthesia Type:  General    Note:  Disposition: Inpatient   Postop Pain Control: Uneventful            Sign Out: Well controlled pain   PONV: No   Neuro/Psych: Uneventful            Sign Out: Acceptable/Baseline neuro status   Airway/Respiratory: Uneventful            Sign Out: Acceptable/Baseline resp. status   CV/Hemodynamics: Uneventful            Sign Out: Acceptable CV status; No obvious hypovolemia; No obvious fluid overload   Other NRE: NONE   DID A NON-ROUTINE EVENT OCCUR?            Last vitals:  Vitals Value Taken Time   /78 12/05/23 1848   Temp 36.9  C (98.4  F) 12/05/23 1830   Pulse 78 12/05/23 1848   Resp 18 12/05/23 1848   SpO2 96 % 12/05/23 1848       Electronically Signed By: Paulo Asher MD  December 5, 2023  8:23 PM

## 2023-12-06 NOTE — PROGRESS NOTES
[facility]    Medicine Progress Note - Hospitalist Service    Date of Admission:  12/5/2023    Assessment & Plan   Krystal Virgen is a 83 year old female admitted on 12/5/2023. She has a past medical history of moderate persistent asthma, coronary artery disease, CKD 3, IgG subclass deficiency, dyslipidemia, GERD, anxiety, depression,  and of pertinence, recently had left hip total hip arthroplasty 6 weeks prior to arrival with wound dehiscence, now here for I&D with [facility].  Brought in on 12/5/2023. S/p Irrigate debride and close left hip anterior incision by Dr. Cornejo on 12/5/2023 w/ EBL of < 50ccs.       Internal Medicine is consulted postoperatively for medication comanagement.  Patient is noted to be hypertensive at 154/77 otherwise stable vitals.  On room air.  Notable labs include 12/4/2023 creatinine 1.83, on day of procedure is slightly downtrending at 1.78, hemoglobin is relatively stable from 11.2 to now 10.6, no leukocytosis WBC is 9.3, platelets of 403, normal sodium and potassium. The home medications are reconciled from our standpoint.      On 12/6/2023, hgb downward trend at 9.8, bmp notably with downtrending creatine two days consecutively at 1.52; recommend rechecking at follow up and proper hydration. Otherwise, cleared from our standpoint and as usual per ortho/therapies for final disposition.     ------------------  S/p Irrigate debride and close left hip anterior incision 12/5/2023   A- as above, stable.  P:   - abx per primary; given cefazolin   - -DVT prophylaxis and pain management per primary service  -We will monitor for complications including respiratory concerns, urinary retention, ileus, skin reactions, infections, medication side effects, etc.  -Incentive spirometry   -discharge disposition per primary service    Coronary artery disease  History of MI with left circumflex artery intervention  Stable and remains asymptomatic  -Hold  "aspirin  -cont statin  -Not on beta-blocker at present  -Nuclear stress test 9/5/2023--negative for ischemia, EF 55%, large area of transmural infarction  -monitor for chest pain and if develops, stat EKG and troponin      History of ductal carcinoma of right breast  Treated with right breast lumpectomy on 10/27  Treated with anastrozole then switched to tamoxifen, completed 5 years  - noted, monitor clinically      Chronic iron deficiency anemia  Hemoglobin stable on consult  Follow in AM  Transfuse if < 7     Dyslipidemia  -Continue Crestor 40 mg daily     GERD  -continue PPI     Moderate persistent asthma  On Room air, remains Stable and asymptomatic, cont home meds  -Albuterol inhaler/nebulizer every 6 hours as needed  -Symbicort 2 puff twice a day, auto-sub for breo ellipta based on facility availability   -Singulair 10 mg daily at night     Anxiety and depression  Noted hx of stable mood and affect  - stable on consult   -Continue Wellbutrin  mg daily     CKD 3  Creatinine was elevated -  12/4/2023 creatinine 1.83, on day of procedure is slightly downtrending at 1.78  - avoid nephrotoxic agents  - MIVFs as per primary until po intake improes  - consider further renal us or urine studies if not improving on 12/6          Diet: Advance Diet as Tolerated: Regular Diet Adult  Discharge Instruction - Regular Diet Adult    DVT Prophylaxis: Defer to primary service  Loo Catheter: Not present  Lines: None     Cardiac Monitoring: None  Code Status: Full Code      Clinically Significant Risk Factors Present on Admission                 # Drug Induced Platelet Defect: home medication list includes an antiplatelet medication        # Obesity: Estimated body mass index is 32.52 kg/m  as calculated from the following:    Height as of this encounter: 1.6 m (5' 3\").    Weight as of this encounter: 83.3 kg (183 lb 9.6 oz).       # Financial/Environmental Concerns:    # Asthma: noted on problem list        Disposition " Plan      Expected Discharge Date: 12/07/2023                    Brett Hudson MD  Hospitalist Service  [facility]  Securely message with [employer] (more info)  Text page via [employer] Paging/Directory   ______________________________________________________________________    Interval History   -no acute interval events  -seen in early morning, no new symptoms or shortness of breath.  -We discussed disposition per primary team  -passing gas   -All questions answered      Physical Exam   Vital Signs: Temp: 98.2  F (36.8  C) Temp src: Oral BP: 121/56 Pulse: 91   Resp: 17 SpO2: 93 % O2 Device: None (Room air) Oxygen Delivery: 6 LPM  Weight: 183 lbs 9.6 oz    General: alert, oriented, and in no acute distress  Pulmonary: clear to auscultation bilaterally, normal respiratory effort, on room air, no rales or wheezes or evidence of accessory muscle use  CVS: regular rate and rhythm, no murmurs, rubs, or gallops; no blatant jugular venous distention; no extremity edema and extremities are warm to the touch  GI: soft, nontender, BS+, no rebound or guarding, no conspicuous organomegaly   Neuro: nonfocal, moves all extremities of own volition  Psych: appropriate  Msk- stable, worked w/ therapies without issues     Medical Decision Making       45 MINUTES SPENT BY ME on the date of service doing chart review, history, exam, documentation & further activities per the note.      Data   ------------------------- PAST 24 HR DATA REVIEWED -----------------------------------------------    I have personally reviewed the following data over the past 24 hrs:    9.3  \   9.8 (L)   / 403     137 108 (H) 20.5 /  192 (H)   4.3 20 (L) 1.52 (H) \     Procal: N/A CRP: <3.00 Lactic Acid: 1.3       INR:  1.08 PTT:  N/A   D-dimer:  N/A Fibrinogen:  N/A       Imaging results reviewed over the past 24 hrs:   No results found for this or any previous visit (from the past 24 hour(s)).

## 2023-12-06 NOTE — CONSULTS
Consult was received for Left hip. After reviewing notes it appeared that a prevena was placed over a surgical incision. Confirmed with LUZ Kidd that no WOC consult was actually needed. WOC team will sign off but will gladly reassess patient if a new consult is placed.     JAZMÍN MendezN RN CWOCN  Pager no longer in use, please contact through [employer] group: [facility]

## 2023-12-06 NOTE — UTILIZATION REVIEW
Admission Status; Secondary Review Determination       Under the authority of the Utilization Management Committee, the utilization review process indicated a secondary review on the above patient. The review outcome is based on review of the medical records, discussions with staff, and applying clinical experience noted on the date of the review.     (x) Outpatient Status with extended recovery is appropriate - This patient does not meet hospital inpatient criteria. If this patient's primary payer is Medicare and was admitted as an inpatient, Condition Code 44 should be used and patient status changed to outpatient recovery.    RATIONALE FOR DETERMINATION     Ms. Virgen is a 83 years old woman who underwent a left . Patient was admitted to the hospital after the procedure. Patient has Medicare and the procedure is not on the CMS inpatient list. No documented complications or unexpected recovery. Patient can be safely  monitored for bleeding and recover in outpatient/extended recovery setting.   The severity of illness, intensity of service provided, expected LOS and risk for adverse outcome doesn't meet inpatient hospital admission.     The information on this document is developed by the utilization review team in order for the business office to ensure compliance. This only denotes the appropriateness of proper admission status and does not reflect the quality of care rendered.   The definitions of Inpatient Status and Observation Status used in making the determination above are those provided in the CMS Coverage Manual, Chapter 1 and Chapter 6, section 70.4.       Sincerely,       Lavern August, DO  Utilization Review  Physician Advisor  [facility].

## 2023-12-06 NOTE — DISCHARGE SUMMARY
"ORTHOPEDIC DISCHARGE SUMMARY       Krystal Virgen,  1940, MRN [id number]    Admission Date: 2023      Admission Diagnoses: Left hip pain [M25.552]  Infection [B99.9]  Superficial disruption or dehiscence of operation wound, sequela [T81.31XS]     Discharge Date:  23     Post-operative Day:  1 Day Post-Op    Reason for Admission: The patient was admitted for the following: Procedure(s):  LEFT HIP IRRIGATION AND DEBRIDEMENT    BRIEF HOSPITAL COURSE   Krystal Virgen is a pleasant 83 year old female who underwent the aforementioned procedure with Dr. Cornejo on . There were no intraoperative complications and the patient was transferred to the recovery room and later the orthopedic unit in stable condition. Once the patient reached the orthopedic floor our orthopedic pain protocol was implemented along with the following:    Anticoagulation Medications: ASA  Therapy: PT and OT  Activity: WBAT  Bracing: None    Consultations during Admission: Hospitalist service for medical management     COMPLICATIONS/SIGNIFICANT FINDINGS    MNONE    DISCHARGE INFORMATION   Condition at discharge: Good  Discharge destination: Home  Patient was seen by myself on the date of discharge.    FOLLOW UP CARE   Follow up with orthopedics in 7-10 days or sooner should the need arise. Ortho will continue to manage pain control, post op anticoagulation and incision care.     Follow up with your PCP for management of chronic medical problems and to evaluate for post op medical complications including constipation, nausea/vomiting, DVT/PE, anemia, changes in blood pressure, fevers/chills, urinary retention and atelectasis/pneumonia.     Subjective   Patient is doing well on POD #1. Pain is well controlled with oral medications. Ambulating. Tolerating oral intake.     Physical Exam   /59 (BP Location: Right arm)   Pulse 66   Temp 98.1  F (36.7  C) (Oral)   Resp 18   Ht 1.6 m (5' 3\")   Wt 83.3 kg (183 lb 9.6 oz)  "  SpO2 96%   BMI 32.52 kg/m    The patient is A&Ox3. Appears comfortable.   Sensation is intact.  Dorsiflexion and plantar flexion is intact.  Dorsalis pedis pulse intact.  Calves are soft and non-tender. Negative Kp's.  The incision is covered. Dressing C/D/I.    Pertinent Results at Discharge     Hemoglobin   Date/Time Value Ref Range Status   12/06/2023 05:56 AM 9.8 (L) 11.7 - 15.7 g/dL Final   12/05/2023 02:55 PM 10.6 (L) 11.7 - 15.7 g/dL Final   12/04/2023 01:31 PM 11.2 (L) 11.7 - 15.7 g/dL Final   01/21/2021 01:32 PM 10.4 (L) 11.7 - 15.7 g/dL Final   01/10/2021 09:57 AM 9.0 (L) 11.7 - 15.7 g/dL Final   01/10/2021 05:31 AM 8.6 (L) 11.7 - 15.7 g/dL Final     INR   Date/Time Value Ref Range Status   12/05/2023 02:55 PM 1.08 0.85 - 1.15 Final   10/27/2023 08:23 AM 0.90 0.85 - 1.15 Final   01/07/2021 02:00 PM 0.98 0.86 - 1.14 Final     Platelet Count   Date/Time Value Ref Range Status   12/05/2023 02:55  150 - 450 10e3/uL Final   12/04/2023 01:31  150 - 450 10e3/uL Final   11/08/2023 11:11  (H) 150 - 450 10e3/uL Final   01/21/2021 01:32  (H) 150 - 450 10e9/L Final   01/10/2021 05:31  150 - 450 10e9/L Final   01/09/2021 02:14  150 - 450 10e9/L Final       Problem List   Principal Problem:    Superficial disruption or dehiscence of operation wound, sequela      Dali Kidd PA-C/Dr. Cornejo  [facility]  [phone]  Date: 12/6/2023  Time: 1:04 PM

## 2023-12-06 NOTE — CONSULTS
[facility]  Consult Note - Hospitalist Service  Date of Admission:  12/5/2023  Consult Requested by: Dr. Cornejo  Reason for Consult: med co-management     Assessment & Plan   Krystal Virgen is a 83 year old female admitted on 12/5/2023. She has a past medical history of moderate persistent asthma, coronary artery disease, CKD 3, IgG subclass deficiency, dyslipidemia, GERD, anxiety, depression,  and of pertinence, recently had left hip total hip arthroplasty 6 weeks prior to arrival with wound dehiscence, now here for I&D with [facility].  Brought in on 12/5/2023. S/p Irrigate debride and close left hip anterior incision by Dr. Cornejo on 12/5/2023 w/ EBL of < 50ccs.       Internal Medicine is consulted postoperatively for medication comanagement.  Patient is noted to be hypertensive at 154/77 otherwise stable vitals.  On room air.  Notable labs include 12/4/2023 creatinine 1.83, on day of procedure is slightly downtrending at 1.78, hemoglobin is relatively stable from 11.2 to now 10.6, no leukocytosis WBC is 9.3, platelets of 403, normal sodium and potassium.    The home medications are reconciled from our standpoint.  Will follow along, thank you for the consult.  ------------------  S/p Irrigate debride and close left hip anterior incision 12/5/2023   A- as above, stable.  P:   - abx per primary; given cefazolin   - -DVT prophylaxis and pain management per primary service  -We will monitor for complications including respiratory concerns, urinary retention, ileus, skin reactions, infections, medication side effects, etc.  -Incentive spirometry   -discharge disposition per primary service    Coronary artery disease  History of MI with left circumflex artery intervention  Stable and remains asymptomatic  -Hold aspirin  -cont statin  -Not on beta-blocker at present  -Nuclear stress test 9/5/2023--negative for ischemia, EF 55%, large area of transmural infarction  -monitor for chest  "pain and if develops, stat EKG and troponin      History of ductal carcinoma of right breast  Treated with right breast lumpectomy on 10/27  Treated with anastrozole then switched to tamoxifen, completed 5 years  - noted, monitor clinically      Chronic iron deficiency anemia  Hemoglobin stable on consult  Follow in AM  Transfuse if < 7     Dyslipidemia  -Continue Crestor 40 mg daily     GERD  -continue PPI     Moderate persistent asthma  On Room air, remains Stable and asymptomatic, cont home meds  -Albuterol inhaler/nebulizer every 6 hours as needed  -Symbicort 2 puff twice a day, auto-sub for breo ellipta based on facility availability   -Singulair 10 mg daily at night     Anxiety and depression  Noted hx of stable mood and affect  - stable on consult   -Continue Wellbutrin  mg daily     CKD 3  Creatinine was elevated -  12/4/2023 creatinine 1.83, on day of procedure is slightly downtrending at 1.78  - avoid nephrotoxic agents  - MIVFs as per primary until po intake improes  - consider further renal us or urine studies if not improving on 12/6       Clinically Significant Risk Factors Present on Admission                 # Drug Induced Platelet Defect: home medication list includes an antiplatelet medication        # Obesity: Estimated body mass index is 32.52 kg/m  as calculated from the following:    Height as of this encounter: 1.6 m (5' 3\").    Weight as of this encounter: 83.3 kg (183 lb 9.6 oz).         # Financial/Environmental Concerns:    # Asthma: noted on problem list        Brett Hudson MD  Hospitalist Service  Securely message with [employer] (more info)  Text page via [facility] Paging/Directory   ______________________________________________________________________    Chief Complaint   Hip pain      History is obtained from the patient    History of Present Illness   Krystal Virgen is a 83 year old female who has a past medical history of moderate persistent asthma, coronary artery disease, CKD " 3, IgG subclass deficiency, dyslipidemia, GERD, anxiety, depression,  and of pertinence, recently had left hip total hip arthroplasty 6 weeks prior to arrival with wound dehiscence, now here for I&D with [facility].  Brought in on 12/5/2023. S/p Irrigate debride and close left hip anterior incision by Dr. Cornejo on 12/5/2023 w/ EBL of < 50ccs.     Internal Medicine is consulted postoperatively for medication comanagement.  Patient is noted to be hypertensive at 154/77 otherwise stable vitals.  On room air.  Notable labs include 12/4/2023 creatinine 1.83, on day of procedure is slightly downtrending at 1.78, hemoglobin is relatively stable from 11.2 to now 10.6, no leukocytosis WBC is 9.3, platelets of 403, normal sodium and potassium.    On interview, the patient confirms the aforementioned and also reports no other symptoms noted including no headaches, fevers, chills, chest pain or shortness of breath including tachypnea dyspnea or coughs, no abdominal pain, no diarrhea or constipation, no dysuria, no neurologic changes or sensory changes.       Past Medical History    Past Medical History:   Diagnosis Date    Anemia     Arthritis     Asthma     Asthma     Asthma with acute exacerbation 08/23/2021    Formatting of this note might be different from the original. Created by Conversion  Replacement Utility updated for latest IMO load    Breast cancer (H) 2017    Chronic bronchitis (H)     Chronic sinusitis     COPD (chronic obstructive pulmonary disease) (H)     Depression     GERD (gastroesophageal reflux disease)     Hiatal hernia     Hypertension     Migraines     Osteopenia     Osteoporosis     Overactive bladder     Pneumococcal infection     Pneumonia     PONV (postoperative nausea and vomiting)     Renal disease     Sinusitis     Spinal headache     ST elevation MI (STEMI) (H) 01/07/2021    Stented coronary artery        Past Surgical History   Past Surgical History:   Procedure Laterality Date     ARTHRODESIS FOOT  11/11/2011    Procedure:ARTHRODESIS FOOT; Right First Metatarsophalangeal Joint Fusion with Second and Third Clawtoe Repairs; Surgeon:SARAH CASTRO; Location:[facility] OR    ARTHROPLASTY HIP ANTERIOR Left 10/27/2023    Procedure: LEFT TOTAL HIP ARTHROPLASTY DIRECT ANTERIOR;  Surgeon: Jairo Cornejo MD;  Location: [facility] Main OR    BIOPSY BREAST      CHOLECYSTECTOMY      CHOLECYSTECTOMY      CV CORONARY ANGIOGRAM N/A 1/7/2021    Procedure: Coronary Angiogram;  Surgeon: Slade Yu MD;  Location: [facility] CARDIAC CATH LAB    CV PCI STENT DRUG ELUTING N/A 1/7/2021    Procedure: Percutaneous Coronary Intervention Stent Drug Eluting;  Surgeon: Slade Yu MD;  Location: [facility] CARDIAC CATH LAB    ENT SURGERY      sinus surgery x1    FOOT SURGERY      GYN SURGERY      benign hysterectomy age 43    HYSTERECTOMY  1984    LUMPECTOMY BREAST Right 2017    ORTHOPEDIC SURGERY      fusion of grest toe right    SINUS SURGERY         Medications   I have reviewed this patient's current medications       Review of Systems    The 10 point Review of Systems is negative other than noted in the HPI or here.      Social History   I have reviewed this patient's social history and updated it with pertinent information if needed.  Social History     Tobacco Use    Smoking status: Never    Smokeless tobacco: Never   Vaping Use    Vaping Use: Never used   Substance Use Topics    Alcohol use: No    Drug use: No         Allergies   Allergies   Allergen Reactions    Ticagrelor Other (See Comments)     Dyspnea    Penicillins      Amoxicillin doesn't work for her    Sulfa Antibiotics Rash        Physical Exam   Vital Signs: Temp: 97.3  F (36.3  C) Temp src: Oral BP: (!) 158/73 Pulse: 76   Resp: 18 SpO2: 92 % O2 Device: None (Room air) Oxygen Delivery: 6 LPM  Weight: 183 lbs 9.6 oz    GENERAL:  Alert, appears comfortable, in no acute distress, appears stated age   HEAD:  Normocephalic, without obvious  abnormality, atraumatic   EYES:  PERRL, conjunctiva/corneas clear, no scleral icterus, EOM's intact   NOSE: Nares normal, septum midline, mucosa normal, no drainage   THROAT: Lips, mucosa, and tongue normal; teeth and gums normal, mouth moist   NECK: Supple, symmetrical, trachea midline   BACK:   Symmetric, no curvature   LUNGS:   Clear to auscultation bilaterally, no rales, rhonchi, or wheezing, symmetric chest rise on inhalation, respirations unlabored, on room air    CHEST WALL:  No tenderness or conspicuous deformity   HEART:  Regular rate and rhythm, S1 and S2 normal, no murmur, rub, or gallop, no conspicuous jugular venous distention noted, peripheral pulses intact    ABDOMEN:   Soft, non-tender, bowel sounds auscultated in all four quadrants, no masses, no organomegaly, no rebound or guarding   EXTREMITIES: Hip dressing and vacuum is stable, c/d/I; other limbs Extremities normal, atraumatic, no cyanosis or edema    SKIN: Dry to touch, no exanthems in the visualized areas or erythema   NEURO: Alert, oriented x3, moves all four extremities of their own volition, strength is 5/5 in 4 limbs    PSYCH: Cooperative and behavior is appropriate       Medical Decision Making       81 MINUTES SPENT BY ME on the date of service doing chart review, history, exam, documentation & further activities per the note.      Data   ------------------------- PAST 24 HR DATA REVIEWED -----------------------------------------------    I have personally reviewed the following data over the past 24 hrs:    9.3  \   10.6 (L)   / 403     N/A N/A N/A /  N/A   4.1 N/A 1.78 (H) \     Procal: N/A CRP: <3.00 Lactic Acid: N/A       INR:  1.08 PTT:  N/A   D-dimer:  N/A Fibrinogen:  N/A       Imaging results reviewed over the past 24 hrs:   No results found for this or any previous visit (from the past 24 hour(s)).

## 2023-12-06 NOTE — PLAN OF CARE
Patient cleared for discharge. Removed PIV, assisted in gathering patient's belongings ad reviewed AVS with patient. Patient verbalized understanding and satisfaction. Hospital staff escorted patient to main entrance. Patient discharged to home and left accompanied by family.

## 2023-12-06 NOTE — PLAN OF CARE
Problem: Risk for Delirium  Goal: Improved Sleep  12/6/2023 0327 by Cat Castillo RN  Outcome: Progressing     Problem: Infection  Goal: Absence of Infection Signs and Symptoms  Outcome: Progressing   Goal Outcome Evaluation:    Patient vital signs are at baseline: Yes  Patient able to ambulate as they were prior to admission or with assist devices provided by therapies during their stay:  Yes, pt refused to ambulate in hallway, but ambulated AX1 in room  Patient MUST void prior to discharge:  Yes  Patient able to tolerate oral intake:  Yes  Pain has adequate pain control using Oral analgesics:  Yes  Does patient have an identified :  Yes  Has goal D/C date and time been discussed with patient:  Yes    VSS. PRN oxy effective in managing R hip pain. Prevena wound vac in place, no output noted at this time. Ambulating Ax1 w/ gb and walker. IVF and abx infusing. Voiding adequately, incontinent at times.     Patient flagged sepsis protocol. Paged provider. VSS on room air, afebrile, WBC normal, getting IV fluids and post op abx. Per MD okay not to order protocol.

## 2023-12-06 NOTE — PROGRESS NOTES
Care Management Discharge Note    Discharge Date: 12/06/2023       Discharge Disposition: Home    Discharge Services: None    Discharge DME: None    Discharge Transportation: family or friend will provide    Education Provided on the Discharge Plan: Yes (AVS per bedside RN)    Persons Notified of Discharge Plans: patient    Patient/Family in Agreement with the Plan: yes        Additional Information:  CM reviewed chart. No CM needs identified or requested. Family to provide transportation home at discharge.     CM discussed MOON with patient and change to outpatient status.       [facility] referral sent per protocol.      Angie Murray RN

## 2023-12-06 NOTE — PLAN OF CARE
Occupational Therapy: Orders received. Chart reviewed and discussed with care team.? Occupational Therapy not indicated due to near ADL and functional mobility baseline.? No OT needs at this time. PT deferred OT. Defer discharge recommendations to care team.? Will complete orders.

## 2023-12-06 NOTE — PROGRESS NOTES
"Orthopedic Progress Note      Assessment: 1 Day Post-Op  S/P Procedure(s):  LEFT HIP IRRIGATION AND DEBRIDEMENT @    Plan:   - Continue PT/OT  - Weightbearing status: WBAT   - Anticoagulation: ASA  in addition to SCDs, alba stockings and early ambulation.  - Prevena in place  - Wound consult  - Discharge planning: pending therapy      Subjective:  Pain: mild  Chest pain, SOB: no  Nausea, Vomiting:  no  Lightheadedness, Dizziness:  no  Neuro:  Patient denies new onset numbness or paresthesias    Patient is doing well notes that her incision is slightly more painful today but overall stable with oral medications.  Antonia is in place with 0 out in the canister.    Objective:  /59 (BP Location: Right arm)   Pulse 66   Temp 98.1  F (36.7  C) (Oral)   Resp 18   Ht 1.6 m (5' 3\")   Wt 83.3 kg (183 lb 9.6 oz)   SpO2 96%   BMI 32.52 kg/m    The patient is A&Ox3. Appears comfortable.   Sensation is intact.  Dorsiflexion and plantar flexion is intact.  Dorsalis pedis pulse intact.  Calves are soft and non-tender. Negative Kp's.  The incision is covered. Dressing C/D/I.        Pertinent Labs   Lab Results: personally reviewed.   Lab Results   Component Value Date    INR 1.08 12/05/2023    INR 0.90 10/27/2023    INR 0.98 01/07/2021     Lab Results   Component Value Date    WBC 9.3 12/05/2023    HGB 9.8 (L) 12/06/2023    HCT 33.5 (L) 12/05/2023    MCV 86 12/05/2023     12/05/2023     Lab Results   Component Value Date     12/06/2023    CO2 20 (L) 12/06/2023         Report completed by:  Dali Kidd PA-C/Dr. [facility]    Date: 12/6/2023  Time: 9:43 AM    "

## 2023-12-07 ENCOUNTER — PATIENT OUTREACH (OUTPATIENT)
Dept: CARE COORDINATION | Facility: CLINIC | Age: 83
End: 2023-12-07
Payer: COMMERCIAL

## 2023-12-07 ENCOUNTER — TELEPHONE (OUTPATIENT)
Dept: ONCOLOGY | Facility: CLINIC | Age: 83
End: 2023-12-07
Payer: COMMERCIAL

## 2023-12-07 DIAGNOSIS — D50.0 IRON DEFICIENCY ANEMIA DUE TO CHRONIC BLOOD LOSS: Primary | ICD-10-CM

## 2023-12-07 NOTE — TELEPHONE ENCOUNTER
Patient calls leaving message on triage voicemail saying that she has questions regarding upcoming appointment next week.    Return call placed to patient. There was no answer. Message left for her to return call to triage nurse.    Rae Thakur RN

## 2023-12-07 NOTE — PROGRESS NOTES
Clinic Care Coordination Contact  [facility]: Post-Discharge Note  SITUATION                                                      Admission:    Admission Date: 12/05/23   Reason for Admission: hip irrigation and debridement  Discharge:   Discharge Date: 12/06/23  Discharge Diagnosis: hip irrigation and debridement.    BACKGROUND                                                      Per hospital discharge summary and inpatient provider notes:  Admission Date: 12/5/2023       Admission Diagnoses: Left hip pain [M25.552]  Infection [B99.9]  Superficial disruption or dehiscence of operation wound, sequela [T81.31XS]      Discharge Date:  12/06/23      Post-operative Day:  1 Day Post-Op     Reason for Admission: The patient was admitted for the following: Procedure(s):  LEFT HIP IRRIGATION AND DEBRIDEMENT     BRIEF HOSPITAL COURSE   Krystal Virgen is a pleasant 83 year old female who underwent the aforementioned procedure with Dr. Cornejo on 12/5. There were no intraoperative complications and the patient was transferred to the recovery room and later the orthopedic unit in stable condition. Once the patient reached the orthopedic floor our orthopedic pain protocol was implemented along with the following:     Anticoagulation Medications: ASA  Therapy: PT and OT  Activity: WBAT  Bracing: None     Consultations during Admission: Hospitalist service for medical management     ASSESSMENT           Discharge Assessment  How are you doing now that you are home?: at my wit's end.  No nurse has been out to help with the wound vac. I only got this piece of paper with instructions.  There was supposed to be a nurse.  How are your symptoms? (Red Flag symptoms escalate to triage hotline per guidelines): Unchanged  Do you feel your condition is stable enough to be safe at home until your provider visit?: Yes  Does the patient have their discharge instructions? : Yes  Does the patient have questions regarding their discharge  instructions? : No  Were you started on any new medications or were there changes to any of your previous medications? : No  Discharge follow-up appointment scheduled within 14 calendar days? : Yes  Discharge Follow Up Appointment Scheduled with?: Specialty Care Provider         Post-op (Clinicians Only)  Did the patient have surgery or a procedure: Yes  Incision: healing    VM from patient stating she was at her wit's end since no nurse has come to see her since getting out of the hospital.  Before calling her back, received call from Mike RN who is working with patient as well, as Mike did the procedure.  Don't see an order for home care was placed in Epic.  RN will work with Dr. Cornejo to get order for home care placed and accepted.      Called patient and provided support.  She will work with Mike to get the in home care she needs.  She lives in independent apartment at [employer].     PLAN                                                      Outpatient Plan:  Patient to call if needs arise.  Sending letter.     Future Appointments   Date Time Provider Department Center   12/15/2023 11:00 AM Herbert Zavala MD [facility]   12/15/2023 11:30 AM [facility] INFUSION CHAIR [facility]         For any urgent concerns, please contact our 24 hour nurse triage line: [phone] ([phone])         LIZZY Yates    [facility]/Voicemail    Clinical Data: Care Coordinator Outreach    Outreach Documentation Number of Outreach Attempt   12/7/2023   2:33 PM 1       Left message on patient's voicemail with call back information and requested return call.    Plan: Care Coordinator will try to reach patient again in 1-2 business days.    Yulia Victoria,   [facility]  [phone]

## 2023-12-07 NOTE — PROGRESS NOTES
12/06/23 0832   Appointment Info   Signing Clinician's Name / Credentials (PT) Augustus Sevilla, KELY   Quick Adds   Quick Adds Certification   Living Environment   People in Home alone   Current Living Arrangements apartment   Home Accessibility no concerns   Transportation Anticipated family or friend will provide   Self-Care   Usual Activity Tolerance moderate   Current Activity Tolerance moderate   Equipment Currently Used at Home walker, rolling;cane, straight   Fall history within last six months no   Activity/Exercise/Self-Care Comment Indep with all I ADL's and ADL's, not currently driving and limited in distance ambulating due to COPD   General Information   Onset of Illness/Injury or Date of Surgery 12/05/23   Pertinent History of Current Problem (include personal factors and/or comorbidities that impact the POC) Superficial disruption or dehiscence of operation wound, sequela, post op L JORGE LUIS 6 wks prior   Existing Precautions/Restrictions no known precautions/restrictions   Weight-Bearing Status - LLE weight-bearing as tolerated   Cognition   Affect/Mental Status (Cognition) WFL   Range of Motion (ROM)   ROM Comment decreased ROM s/p L JORGE LUIS   Strength (Manual Muscle Testing)   Strength (Manual Muscle Testing) No deficits observed during functional mobility   Transfers   Transfers sit-stand transfer   Sit-Stand Transfer   Sit-Stand Cotton (Transfers) contact guard;verbal cues   Assistive Device (Sit-Stand Transfers) walker, front-wheeled   Gait/Stairs (Locomotion)   Cotton Level (Gait) verbal cues;contact guard   Assistive Device (Gait) walker, front-wheeled   Distance in Feet (Gait) 20   Pattern (Gait) swing-through   Balance   Balance no deficits were identified   Clinical Impression   Criteria for Skilled Therapeutic Intervention Yes, treatment indicated   PT Diagnosis (PT) impaired functional mobility   Influenced by the following impairments pain, decreased ROM, impaired balance, decreased  strength   Functional limitations due to impairments gait,  transfers   Clinical Presentation (PT Evaluation Complexity) stable   Clinical Presentation Rationale pt presents as medically diagnosed   Clinical Decision Making (Complexity) low complexity   Planned Therapy Interventions (PT) balance training;bed mobility training;gait training;home exercise program;patient/family education;transfer training   Risk & Benefits of therapy have been explained care plan/treatment goals reviewed;patient   PT Total Evaluation Time   PT Eval, Low Complexity Minutes (26623) 10   Therapy Certification   Start of care date 12/06/23   Certification date from 12/06/23   Certification date to 01/05/24   Physical Therapy Goals   PT Frequency One time eval and treatment only   PT Predicted Duration/Target Date for Goal Attainment 12/06/23   PT Goals PT Goal 1;Transfers;Gait   PT: Transfers Modified independent;Sit to/from stand;Assistive device;Goal Met   PT: Gait Modified independent;Rolling walker;Within precautions;50 feet;Goal Met   PT: Goal 1 Independent with written HEP for LE strengthening and ROM, goal met   Interventions   Interventions Quick Adds Therapeutic Procedure;Therapeutic Activity;Gait Training   Therapeutic Procedure/Exercise   Treatment Detail/Skilled Intervention Reviewed HEP verbally and gave paitent handout.  Patient declined to work through exercise as she felt comfortable doing them   Therapeutic Activity   Treatment Detail/Skilled Intervention sit to/from stand, cueing for safe hand placement and LE positioning, Mod I following education   Gait Training   Gait Training Minutes (42462) 8   Symptoms Noted During/After Treatment (Gait Training) other (see comments)  (SOB)   Treatment Detail/Skilled Intervention Slow stable gait, patient declining to use walker, but then wanting to push IV pole.  Paitent encouraged to use walker instead of hanging onto to IV pole. Patient encouraged to frequently ambulate with  nursing while staying in hospital.  Patient states copd limits her ability to walk further distances. Extra time needed to work with wound vac, iv lines   Distance in Feet 60, 10   Brooke Level (Gait Training) independent   Physical Assistance Level (Gait Training) verbal cues   Weight Bearing (Gait Training) weight-bearing as tolerated   Assistive Device (Gait Training) other (see comments)  (iv pole)   Pattern Analysis (Gait Training) swing-through gait   Gait Analysis Deviations decreased ben;decreased step length;decreased stride length   Impairments (Gait Analysis/Training) pain   PT Discharge Planning   PT Plan (S)  d/c PT   PT Discharge Recommendation (DC Rec) home with assist   PT Rationale for DC Rec Patient mobilizing well overall, is alert, orientated , has good home setup and support   PT Brief overview of current status Patient indep with gait/transfers   PT Equipment Needed at Discharge cane, straight;walker, rolling   Total Session Time   Timed Code Treatment Minutes 8   Total Session Time (sum of timed and untimed services) 18   [facility]  OUTPATIENT PHYSICAL THERAPY EVALUATION  PLAN OF TREATMENT FOR OUTPATIENT REHABILITATION  (COMPLETE FOR INITIAL CLAIMS ONLY)  Patient's Last Name, First Name, M.I.  YOB: 1940  ParamKrystal  DANIEL                        Provider's Name  [facility] Medical Record No.  [id number]                             Onset Date:  12/05/23   Start of Care Date:  12/06/23   Type:     _X_PT   ___OT   ___SLP Medical Diagnosis:                 PT Diagnosis:  impaired functional mobility Visits from SOC:  1     See note for plan of treatment, functional goals and certification details    I CERTIFY THE NEED FOR THESE SERVICES FURNISHED UNDER        THIS PLAN OF TREATMENT AND WHILE UNDER MY CARE     (Physician co-signature of this document indicates review and certification of the therapy plan).

## 2023-12-07 NOTE — LETTER
[facility] CARE COORDINATION  [facility]  December 8, 2023    Krystal Virgen  [address]   [place] [address]      Dear Krystal,    I am a clinic care coordinator who works with Juan C Fairchild MD, MD with the [facility]. I wanted to introduce myself and provide you with my contact information for you to be able to call me with any questions or concerns. Below is a description of clinic care coordination and how I can further assist you.       The clinic care coordination team is made up of a registered nurse, , financial resource worker and community health worker who understand the health care system. The goal of clinic care coordination is to help you manage your health and improve access to the health care system. Our team works alongside your provider to assist you in determining your health and social needs. We can help you obtain health care and community resources, providing you with necessary information and education. We can work with you through any barriers and develop a care plan that helps coordinate and strengthen the communication between you and your care team.  Our services are voluntary and are offered without charge to you personally.    Please feel free to contact me with any questions or concerns regarding care coordination and what we can offer.      We are focused on providing you with the highest-quality healthcare experience possible.    Sincerely,     Yulia Victoria,   [facility]  [phone]

## 2023-12-08 NOTE — TELEPHONE ENCOUNTER
[facility]: Cancer Care                                                                                          Left message returning patient's call from earlier today.    12/11/23:  Left message return patient's call from Fri.    12/12/23:  Discussed lab results from 12/4/23 with Dr. Zavala. Labs look good. Follow-up with lab recheck and provider visit in 3 months.  Called patient and left message to call back to review recent lab results and follow-up plan.    Called patient's emergency contact, daughter, Jennifer, and told her unable to get a hold of patient the last couple of days and if had any different number to get a hold of her at? Jennifer said she's notorious for not returning calls. She said she will send patient a text to have her call me.  Told her to call Ramila from Dr. Zavala's office at [phone] to discuss an appointment later this week.    Patient returned my call.  She reports her phone has been giving her trouble and she missed my calls.  Lab results reviewed with patient and told her Dr. Zavala would like labs rechecked and follow-up in 3 months. Patient agreed to this plan. She said she would like to be added to recall list and called when got closer to appointment time.    Message sent to Scheduling team to add patient to recall list.    Signature:  Ramila Aguiar RN

## 2023-12-08 NOTE — TELEPHONE ENCOUNTER
Patient returns call. She reports that she was recently in the hospital and is post operative so will not be able to come to appointment next week with Dr. Zavala. Patient would like Ramila Aguiar RN to call her to figure out plan for follow up.    Rae Thakur RN

## 2023-12-11 ENCOUNTER — TRANSFERRED RECORDS (OUTPATIENT)
Dept: HEALTH INFORMATION MANAGEMENT | Facility: CLINIC | Age: 83
End: 2023-12-11
Payer: COMMERCIAL

## 2023-12-18 ENCOUNTER — TRANSFERRED RECORDS (OUTPATIENT)
Dept: HEALTH INFORMATION MANAGEMENT | Facility: CLINIC | Age: 83
End: 2023-12-18
Payer: COMMERCIAL

## 2023-12-20 DIAGNOSIS — Z53.9 DIAGNOSIS NOT YET DEFINED: Primary | ICD-10-CM

## 2023-12-20 DIAGNOSIS — E55.9 VITAMIN D DEFICIENCY: ICD-10-CM

## 2023-12-20 PROCEDURE — G0180 MD CERTIFICATION HHA PATIENT: HCPCS | Performed by: FAMILY MEDICINE

## 2023-12-20 PROCEDURE — 99207 PR MD CERTIFICATION HHA PATIENT: CPT | Performed by: FAMILY MEDICINE

## 2023-12-22 DIAGNOSIS — J45.40 MODERATE PERSISTENT ASTHMA WITHOUT COMPLICATION: ICD-10-CM

## 2023-12-22 DIAGNOSIS — F32.5 MAJOR DEPRESSIVE DISORDER, SINGLE EPISODE, IN REMISSION (H): ICD-10-CM

## 2023-12-22 RX ORDER — MONTELUKAST SODIUM 10 MG/1
TABLET ORAL
Qty: 14 TABLET | Refills: 11 | OUTPATIENT
Start: 2023-12-22

## 2023-12-22 RX ORDER — MONTELUKAST SODIUM 10 MG/1
10 TABLET ORAL AT BEDTIME
Qty: 90 TABLET | Refills: 2 | Status: SHIPPED | OUTPATIENT
Start: 2023-12-22

## 2023-12-22 RX ORDER — BUPROPION HYDROCHLORIDE 300 MG/1
300 TABLET ORAL EVERY MORNING
Qty: 90 TABLET | Refills: 2 | Status: SHIPPED | OUTPATIENT
Start: 2023-12-22

## 2023-12-22 NOTE — TELEPHONE ENCOUNTER
Pending Prescriptions:                       Disp   Refills    montelukast (SINGULAIR) 10 MG tablet      90 tab*3            Sig: Take 1 tablet (10 mg) by mouth at bedtime    buPROPion (WELLBUTRIN XL) 300 MG 24 hr ta*90 tab*2            Sig: Take 1 tablet (300 mg) by mouth every morning

## 2024-01-03 ENCOUNTER — TRANSFERRED RECORDS (OUTPATIENT)
Dept: HEALTH INFORMATION MANAGEMENT | Facility: CLINIC | Age: 84
End: 2024-01-03
Payer: COMMERCIAL

## 2024-01-15 DIAGNOSIS — K21.9 GASTROESOPHAGEAL REFLUX DISEASE WITHOUT ESOPHAGITIS: ICD-10-CM

## 2024-01-15 RX ORDER — LANSOPRAZOLE 30 MG/1
30 CAPSULE, DELAYED RELEASE ORAL DAILY
Qty: 90 CAPSULE | Refills: 2 | Status: SHIPPED | OUTPATIENT
Start: 2024-01-15 | End: 2024-02-16

## 2024-01-22 ENCOUNTER — TRANSFERRED RECORDS (OUTPATIENT)
Dept: HEALTH INFORMATION MANAGEMENT | Facility: CLINIC | Age: 84
End: 2024-01-22
Payer: COMMERCIAL

## 2024-02-09 PROBLEM — I49.9 CARDIAC ARRHYTHMIA, UNSPECIFIED: Status: ACTIVE | Noted: 2023-10-31

## 2024-02-09 PROBLEM — R26.2 DIFFICULTY IN WALKING, NOT ELSEWHERE CLASSIFIED: Status: ACTIVE | Noted: 2023-10-31

## 2024-02-09 PROBLEM — R26.9 UNSPECIFIED ABNORMALITIES OF GAIT AND MOBILITY: Status: ACTIVE | Noted: 2023-10-31

## 2024-02-09 PROBLEM — R53.1 WEAKNESS: Status: ACTIVE | Noted: 2023-10-31

## 2024-02-09 PROBLEM — K21.9 GASTRO-ESOPHAGEAL REFLUX DISEASE WITHOUT ESOPHAGITIS: Status: ACTIVE | Noted: 2023-10-31

## 2024-02-09 PROBLEM — Z96.642 PRESENCE OF LEFT ARTIFICIAL HIP JOINT: Status: ACTIVE | Noted: 2023-10-31

## 2024-02-09 PROBLEM — N18.30 CHRONIC KIDNEY DISEASE, STAGE 3 UNSPECIFIED (H): Status: ACTIVE | Noted: 2023-10-31

## 2024-02-09 PROBLEM — D64.9 ANEMIA, UNSPECIFIED: Status: ACTIVE | Noted: 2023-10-31

## 2024-02-09 PROBLEM — F41.9 ANXIETY DISORDER, UNSPECIFIED: Status: ACTIVE | Noted: 2023-10-31

## 2024-02-09 PROBLEM — R26.81 UNSTEADINESS ON FEET: Status: ACTIVE | Noted: 2023-10-31

## 2024-02-12 ENCOUNTER — TELEPHONE (OUTPATIENT)
Dept: FAMILY MEDICINE | Facility: CLINIC | Age: 84
End: 2024-02-12
Payer: COMMERCIAL

## 2024-02-12 NOTE — TELEPHONE ENCOUNTER
Called and asked to come a little earlier for her appt on Friday with dr khalil. 1255pm arrival would be perfect

## 2024-02-16 ENCOUNTER — OFFICE VISIT (OUTPATIENT)
Dept: FAMILY MEDICINE | Facility: CLINIC | Age: 84
End: 2024-02-16
Payer: COMMERCIAL

## 2024-02-16 VITALS
BODY MASS INDEX: 27.46 KG/M2 | SYSTOLIC BLOOD PRESSURE: 128 MMHG | WEIGHT: 155 LBS | OXYGEN SATURATION: 97 % | TEMPERATURE: 98 F | RESPIRATION RATE: 20 BRPM | DIASTOLIC BLOOD PRESSURE: 62 MMHG | HEART RATE: 105 BPM

## 2024-02-16 DIAGNOSIS — D80.3: ICD-10-CM

## 2024-02-16 DIAGNOSIS — D64.9 ANEMIA, UNSPECIFIED TYPE: ICD-10-CM

## 2024-02-16 DIAGNOSIS — K21.9 GASTROESOPHAGEAL REFLUX DISEASE WITHOUT ESOPHAGITIS: ICD-10-CM

## 2024-02-16 DIAGNOSIS — Z96.642 S/P TOTAL LEFT HIP ARTHROPLASTY: ICD-10-CM

## 2024-02-16 DIAGNOSIS — H69.93 DYSFUNCTION OF BOTH EUSTACHIAN TUBES: ICD-10-CM

## 2024-02-16 DIAGNOSIS — N18.31 STAGE 3A CHRONIC KIDNEY DISEASE (H): Primary | ICD-10-CM

## 2024-02-16 DIAGNOSIS — I25.10 ATHEROSCLEROSIS OF CORONARY ARTERY OF NATIVE HEART WITHOUT ANGINA PECTORIS, UNSPECIFIED VESSEL OR LESION TYPE: ICD-10-CM

## 2024-02-16 DIAGNOSIS — J45.40 MODERATE PERSISTENT ASTHMA WITHOUT COMPLICATION: ICD-10-CM

## 2024-02-16 DIAGNOSIS — E55.9 VITAMIN D DEFICIENCY: ICD-10-CM

## 2024-02-16 LAB
BASOPHILS # BLD AUTO: 0.1 10E3/UL (ref 0–0.2)
BASOPHILS NFR BLD AUTO: 1 %
EOSINOPHIL # BLD AUTO: 0.5 10E3/UL (ref 0–0.7)
EOSINOPHIL NFR BLD AUTO: 5 %
ERYTHROCYTE [DISTWIDTH] IN BLOOD BY AUTOMATED COUNT: 14.4 % (ref 10–15)
HCT VFR BLD AUTO: 35 % (ref 35–47)
HGB BLD-MCNC: 11.1 G/DL (ref 11.7–15.7)
IMM GRANULOCYTES # BLD: 0 10E3/UL
IMM GRANULOCYTES NFR BLD: 0 %
LYMPHOCYTES # BLD AUTO: 1 10E3/UL (ref 0.8–5.3)
LYMPHOCYTES NFR BLD AUTO: 11 %
MCH RBC QN AUTO: 26.5 PG (ref 26.5–33)
MCHC RBC AUTO-ENTMCNC: 31.7 G/DL (ref 31.5–36.5)
MCV RBC AUTO: 84 FL (ref 78–100)
MONOCYTES # BLD AUTO: 0.8 10E3/UL (ref 0–1.3)
MONOCYTES NFR BLD AUTO: 8 %
NEUTROPHILS # BLD AUTO: 7.2 10E3/UL (ref 1.6–8.3)
NEUTROPHILS NFR BLD AUTO: 75 %
PLATELET # BLD AUTO: 468 10E3/UL (ref 150–450)
RBC # BLD AUTO: 4.19 10E6/UL (ref 3.8–5.2)
WBC # BLD AUTO: 9.6 10E3/UL (ref 4–11)

## 2024-02-16 PROCEDURE — 99214 OFFICE O/P EST MOD 30 MIN: CPT | Performed by: FAMILY MEDICINE

## 2024-02-16 PROCEDURE — 91320 SARSCV2 VAC 30MCG TRS-SUC IM: CPT | Performed by: FAMILY MEDICINE

## 2024-02-16 PROCEDURE — 85025 COMPLETE CBC W/AUTO DIFF WBC: CPT | Performed by: FAMILY MEDICINE

## 2024-02-16 PROCEDURE — 80048 BASIC METABOLIC PNL TOTAL CA: CPT | Performed by: FAMILY MEDICINE

## 2024-02-16 PROCEDURE — 36415 COLL VENOUS BLD VENIPUNCTURE: CPT | Performed by: FAMILY MEDICINE

## 2024-02-16 PROCEDURE — 82306 VITAMIN D 25 HYDROXY: CPT | Performed by: FAMILY MEDICINE

## 2024-02-16 PROCEDURE — 90480 ADMN SARSCOV2 VAC 1/ONLY CMP: CPT | Performed by: FAMILY MEDICINE

## 2024-02-16 RX ORDER — LANSOPRAZOLE 30 MG/1
30 CAPSULE, DELAYED RELEASE ORAL DAILY
Qty: 90 CAPSULE | Refills: 3 | Status: SHIPPED | OUTPATIENT
Start: 2024-02-16

## 2024-02-16 ASSESSMENT — PAIN SCALES - GENERAL: PAINLEVEL: MODERATE PAIN (4)

## 2024-02-16 NOTE — PATIENT INSTRUCTIONS
Try to obtain the following vaccines at your pharmacy: RSV, shingles (Shingrix), tetanus vaccine (Td)      Obtain fluticasone (Flonase) nasal spray over-the-counter at your drugstore and use according to the package instructions to help with the throat drainage and hopefully improve the symptoms with your ears which I believe are caused by plugging of the eustachian tubes.    Enclosed within this documentation is in number for occupational therapy.  If they do not contact you within the next few days call the number to schedule the appointment to begin the process to obtain the wheelchair.    I will inform you of the lab test results when they return.    Request any medication refills that you need from your pharmacy as usual and I will make sure they get sent when needed.

## 2024-02-16 NOTE — LETTER
February 19, 2024      Jody Virgen  [address]   [place] [address]        Dear Ms.Param,    We are writing to inform you of your test results.    The vitamin D level is well within the normal range.  No need to change vitamin D supplement at this time.     Kidney function remains reduced from normal but is stable.  We will continue to monitor as prior.   '   The red blood cell count called hemoglobin has increased.  A measurement of a blood type called platelets is slightly elevated but relatively stable in comparison to previous.  This will be something to continue to monitor in the future but no action needs to be taken at this time.     Overall no significant concerns identified.     Resulted Orders   Vitamin D Deficiency   Result Value Ref Range    Vitamin D, Total (25-Hydroxy) 55 (H) 20 - 50 ng/mL      Comment:      indicates supplementation, with increased risk of hypercalciuria    Narrative    Season, race, dietary intake, and treatment affect the concentration of 25-hydroxy-Vitamin D. Values may decrease during winter months and increase during summer months.    Vitamin D determination is routinely performed by an immunoassay specific for 25 hydroxyvitamin D3.  If an individual is on vitamin D2(ergocalciferol) supplementation, please specify 25 OH vitamin D2 and D3 level determination by LCMSMS test VITD23.     Basic metabolic panel   Result Value Ref Range    Sodium 138 135 - 145 mmol/L      Comment:      Reference intervals for this test were updated on 09/26/2023 to more accurately reflect our healthy population. There may be differences in the flagging of prior results with similar values performed with this method. Interpretation of those prior results can be made in the context of the updated reference intervals.     Potassium 5.1 3.4 - 5.3 mmol/L    Chloride 103 98 - 107 mmol/L    Carbon Dioxide (CO2) 21 (L) 22 - 29 mmol/L    Anion Gap 14 7 - 15 mmol/L    Urea Nitrogen 29.2 (H) 8.0  - 23.0 mg/dL    Creatinine 1.56 (H) 0.51 - 0.95 mg/dL    GFR Estimate 33 (L) >60 mL/min/1.73m2    Calcium 10.0 8.8 - 10.2 mg/dL    Glucose 95 70 - 99 mg/dL   CBC with platelets and differential   Result Value Ref Range    WBC Count 9.6 4.0 - 11.0 10e3/uL    RBC Count 4.19 3.80 - 5.20 10e6/uL    Hemoglobin 11.1 (L) 11.7 - 15.7 g/dL    Hematocrit 35.0 35.0 - 47.0 %    MCV 84 78 - 100 fL    MCH 26.5 26.5 - 33.0 pg    MCHC 31.7 31.5 - 36.5 g/dL    RDW 14.4 10.0 - 15.0 %    Platelet Count 468 (H) 150 - 450 10e3/uL    % Neutrophils 75 %    % Lymphocytes 11 %    % Monocytes 8 %    % Eosinophils 5 %    % Basophils 1 %    % Immature Granulocytes 0 %    Absolute Neutrophils 7.2 1.6 - 8.3 10e3/uL    Absolute Lymphocytes 1.0 0.8 - 5.3 10e3/uL    Absolute Monocytes 0.8 0.0 - 1.3 10e3/uL    Absolute Eosinophils 0.5 0.0 - 0.7 10e3/uL    Absolute Basophils 0.1 0.0 - 0.2 10e3/uL    Absolute Immature Granulocytes 0.0 <=0.4 10e3/uL       If you have any questions or concerns, please call the clinic at the number listed above.       Sincerely,      Juan C Fairchild MD

## 2024-02-16 NOTE — PROGRESS NOTES
Krystal Virgen  /62   Pulse 105   Temp 98  F (36.7  C)   Resp 20   Wt 70.3 kg (155 lb)   SpO2 97%   BMI 27.46 kg/m       Assessment/Plan:                Jody was seen today for uri and recheck medication.    Diagnoses and all orders for this visit:    Stage 3a chronic kidney disease (H)  -     Basic metabolic panel; Future  -     CBC with Platelets & Differential; Future    Atherosclerosis of coronary artery of native heart without angina pectoris, unspecified vessel or lesion type    Gastroesophageal reflux disease without esophagitis  -     LANsoprazole (PREVACID) 30 MG DR capsule; Take 1 capsule (30 mg) by mouth daily    Vitamin D deficiency  -     Vitamin D Deficiency; Future    Anemia, unspecified type  -     CBC with Platelets & Differential; Future    Moderate persistent asthma without complication  -     Occupational Therapy Referral; Future    IgG1 subclass deficiency (H)  -     Occupational Therapy Referral; Future    S/P total left hip arthroplasty  -     Occupational Therapy Referral; Future    Other orders  -     COVID-19 12+ (2023-24) (PFIZER)         DISCUSSION  Recommend obtaining fluticasone nasal spray for eustachian tube dysfunction management.    Referral to begin evaluation process for power wheelchair, see discussion below.    Continue medications for vascular disease risk reduction and monitor symptom concerns.    Continue to follow with pulmonology and utilize her current asthma treatment regiment.  Update immunizations she is provided with information regarding obtaining immunizations from her pharmacy including RSV, tetanus and shingles.      Subjective:     HPI:    Krystal Virgen is a 83 year old female has a complicated medical history.  She has cardiovascular disease with a history of non-ST elevation myocardial infarction.  She had an evaluation by her cardiologist in September 2023 prior to having hip replacement.  There is an area of transmural infarction involving  the lateral inferior wall without ischemia with an ejection fraction of 55% on echocardiogram.  She does have moderate to severe persistent asthma with a history of IgG subclass deficiency leading to frequent respiratory infections.  She has chronic dyspnea that is limiting.  She does not have measured hypoxia and has not been recommended to start oxygen therapy.  Her mobility is limited because of her breathing difficulty and she would like to pursue obtaining a power wheelchair which would help her be more mobile within the community but also allow her to complete activities of daily living within the home.  We discussed referral to occupational therapy for this purpose.    She has a history of chronic anemia that decreased slightly following her recent hip replacement surgery.  We discussed recheck today.  She has been on iron therapy.  She has chronic kidney disease stage IIIb.  We discussed reassessment of her kidney function at this time.  Recent kidney function measurements were noted in her chart.    She does at this time have ear plugging with crackly sounds in the ears.  She does report a lot of postnasal drip and drainage.    ROS:  Complete review of systems is obtained.  Other than the specific considerations noted above complete review of systems is negative.          Objective:   Medications:  Current Outpatient Medications   Medication    acetaminophen (TYLENOL) 500 MG tablet    albuterol (PROAIR HFA/PROVENTIL HFA/VENTOLIN HFA) 108 (90 Base) MCG/ACT inhaler    aspirin 81 MG EC tablet    budesonide-formoterol (SYMBICORT) 160-4.5 MCG/ACT inhaler    buPROPion (WELLBUTRIN XL) 300 MG 24 hr tablet    ferrous sulfate (FE TABS) 325 (65 Fe) MG EC tablet    fluticasone (FLONASE) 50 MCG/ACT nasal spray    LANsoprazole (PREVACID) 30 MG DR capsule    montelukast (SINGULAIR) 10 MG tablet    nitroGLYcerin (NITROSTAT) 0.4 MG sublingual tablet    rosuvastatin (CRESTOR) 40 MG tablet    senna-docusate  (SENOKOT-S/PERICOLACE) 8.6-50 MG tablet    Vitamin D3 (CHOLECALCIFEROL) 125 MCG (5000 UT) tablet     No current facility-administered medications for this visit.        Allergies:     Allergies   Allergen Reactions    Ticagrelor Other (See Comments)     Dyspnea    Penicillins      Amoxicillin doesn't work for her    Sulfa Antibiotics Rash        Social History     Socioeconomic History    Marital status:      Spouse name: Not on file    Number of children: Not on file    Years of education: Not on file    Highest education level: Not on file   Occupational History    Not on file   Tobacco Use    Smoking status: Never    Smokeless tobacco: Never   Vaping Use    Vaping Use: Never used   Substance and Sexual Activity    Alcohol use: No    Drug use: No    Sexual activity: Not on file   Other Topics Concern    Parent/sibling w/ CABG, MI or angioplasty before 65F 55M? Not Asked   Social History Narrative    Not on file     Social Determinants of Health     Financial Resource Strain: Low Risk  (10/20/2023)    Financial Resource Strain     Within the past 12 months, have you or your family members you live with been unable to get utilities (heat, electricity) when it was really needed?: No   Food Insecurity: Low Risk  (10/20/2023)    Food Insecurity     Within the past 12 months, did you worry that your food would run out before you got money to buy more?: No     Within the past 12 months, did the food you bought just not last and you didn t have money to get more?: No   Transportation Needs: Low Risk  (10/20/2023)    Transportation Needs     Within the past 12 months, has lack of transportation kept you from medical appointments, getting your medicines, non-medical meetings or appointments, work, or from getting things that you need?: No   Physical Activity: Not on file   Stress: Not on file   Social Connections: Not on file   Interpersonal Safety: Not on file   Housing Stability: Low Risk  (10/20/2023)    Housing  Stability     Do you have housing? : Yes     Are you worried about losing your housing?: No       Family History   Problem Relation Age of Onset    Breast Cancer Paternal Aunt 48.00    Breast Cancer Mother 78.00    Breast Cancer Sister 48.00    Breast Cancer Maternal Aunt 35.00    Heart Disease Father     Macular Degeneration Father     Heart Disease Brother         Most Recent Immunizations   Administered Date(s) Administered    COVID-19 Bivalent 18+ (Moderna) 10/25/2022    COVID-19 Monovalent 18+ (Moderna) 06/01/2022    Influenza (H1N1) 01/05/2010    Influenza (High Dose) 3 valent vaccine 10/01/2020    Influenza (IIV3) PF 10/17/2013    Influenza Vaccine 65+ (FLUAD) 11/01/2023    Influenza Vaccine 65+ (Fluzone HD) 11/01/2023    Influenza Vaccine, 6+MO IM (QUADRIVALENT W/PRESERVATIVES) 10/17/2013    Influenza, seasonal, injectable, PF 10/01/2010    Mantoux Tuberculin Skin Test 04/13/2006    Pneumo Conj 13-V (2010&after) 09/11/2018    Pneumococcal 23 valent 10/01/2019    TDAP (Adacel,Boostrix) 04/28/2014    Zoster vaccine, live 10/06/2009   Pended Date(s) Pended    COVID-19 12+ (2023-24) (Pfizer) 02/16/2024        Wt Readings from Last 3 Encounters:   02/16/24 70.3 kg (155 lb)   12/05/23 83.3 kg (183 lb 9.6 oz)   11/08/23 83.2 kg (183 lb 6.4 oz)        BP Readings from Last 6 Encounters:   02/16/24 128/62   12/06/23 136/65   11/08/23 116/62   11/06/23 (!) 146/73   11/02/23 121/65   11/01/23 121/65        Hemoglobin A1C   Date Value Ref Range Status   01/08/2021 5.3 0 - 5.6 % Final     Comment:     Normal <5.7% Prediabetes 5.7-6.4%  Diabetes 6.5% or higher - adopted from ADA   consensus guidelines.     02/01/2013 5.8 4.2 - 6.1 % Final        PHYSICAL EXAM:    /62   Pulse 105   Temp 98  F (36.7  C)   Resp 20   Wt 70.3 kg (155 lb)   SpO2 97%   BMI 27.46 kg/m           General Appearance:    Alert, cooperative, no distress   Eyes:   No scleral icterus or conjunctival irritation       Ears:  Limited view of TM  secondary to narrow ear canals and some cerumen.  No sign of infection.   Throat:   Lips, mucosa, and tongue normal; teeth and gums normal   Neck:   Supple, symmetrical, trachea midline, no adenopathy;        thyroid:  No enlargement/tenderness/nodules   Lungs:     Clear to auscultation bilaterally, respirations unlabored, no wheezes or crackles   Heart:    Regular rate and rhythm,  No murmur   Extremities:  No edema, no joint swelling or redness, no evidence of any injuries   Skin:  No concerning skin findings, no suspicious moles, no rashes   Neurologic:  On gross examination there is no motor or sensory deficit.  Patient walks with a normal gait

## 2024-02-16 NOTE — PROGRESS NOTES
Prior to immunization administration, verified patients identity using patient s name and date of birth. Please see Immunization Activity for additional information.     Screening Questionnaire for Adult Immunization    Are you sick today?   No   Do you have allergies to medications, food, a vaccine component or latex?   No   Have you ever had a serious reaction after receiving a vaccination?   No   Do you have a long-term health problem with heart, lung, kidney, or metabolic disease (e.g., diabetes), asthma, a blood disorder, no spleen, complement component deficiency, a cochlear implant, or a spinal fluid leak?  Are you on long-term aspirin therapy?   No   Do you have cancer, leukemia, HIV/AIDS, or any other immune system problem?   No   Do you have a parent, brother, or sister with an immune system problem?   No   In the past 3 months, have you taken medications that affect  your immune system, such as prednisone, other steroids, or anticancer drugs; drugs for the treatment of rheumatoid arthritis, Crohn s disease, or psoriasis; or have you had radiation treatments?   No   Have you had a seizure, or a brain or other nervous system problem?   No   During the past year, have you received a transfusion of blood or blood    products, or been given immune (gamma) globulin or antiviral drug?   No   For women: Are you pregnant or is there a chance you could become       pregnant during the next month?   No   Have you received any vaccinations in the past 4 weeks?   No     Immunization questionnaire answers were all negative.      Patient instructed to remain in clinic for 15 minutes afterwards, and to report any adverse reactions.     Screening performed by Kirt Cabrera MA on 2/16/2024 at 1:49 PM.

## 2024-02-17 LAB
ANION GAP SERPL CALCULATED.3IONS-SCNC: 14 MMOL/L (ref 7–15)
BUN SERPL-MCNC: 29.2 MG/DL (ref 8–23)
CALCIUM SERPL-MCNC: 10 MG/DL (ref 8.8–10.2)
CHLORIDE SERPL-SCNC: 103 MMOL/L (ref 98–107)
CREAT SERPL-MCNC: 1.56 MG/DL (ref 0.51–0.95)
DEPRECATED HCO3 PLAS-SCNC: 21 MMOL/L (ref 22–29)
EGFRCR SERPLBLD CKD-EPI 2021: 33 ML/MIN/1.73M2
GLUCOSE SERPL-MCNC: 95 MG/DL (ref 70–99)
POTASSIUM SERPL-SCNC: 5.1 MMOL/L (ref 3.4–5.3)
SODIUM SERPL-SCNC: 138 MMOL/L (ref 135–145)
VIT D+METAB SERPL-MCNC: 55 NG/ML (ref 20–50)

## 2024-03-01 ENCOUNTER — TELEPHONE (OUTPATIENT)
Dept: FAMILY MEDICINE | Facility: CLINIC | Age: 84
End: 2024-03-01
Payer: COMMERCIAL

## 2024-03-01 NOTE — TELEPHONE ENCOUNTER
Patient calling about a Prior Authorization for her Symbicort.      budesonide-formoterol (SYMBICORT) 160-4.5 MCG/ACT inhaler. Inhale 2 puffs into the lungs 2 times daily as needed.    This is prescribed by her Pulmonologist, Dr. Alise Thakkar.  Here is her contact info.     [facility]   [address] [place] [address] [phone]  Alise Thakkar MD   [address] [place] [address] [phone] (Work)   [phone] (Fax)       Can we try to start PA for this patient's inhaler please.  Inquiries or further information can be directed to her pulmonologist Dr Thakkar.    Thank you

## 2024-03-02 NOTE — TELEPHONE ENCOUNTER
Note: Due to record-high volumes, our turn-around time is taking longer than usual . We are currently 10 business days behind in the pools.   We are working diligently to submit all requests in a timely manner and in the order they are received. Please only flag TRUE URGENT requests as high priority to the pool at this time.   If you have questions - please send a note/message in the active PA encounter and send back to the RPPA (Retail Pharmacy Prior Authorization) team [[id number]].    If you have more specific questions about our process please reach out to our supervisor Fiordaliza Bermudez.   Thank you!     We are currently submitting requests we received on 02/19/2024    PA Initiation    Medication: SYMBICORT 80-4.5 MCG/ACT IN AERO  Insurance Company:    Pharmacy Filling the Rx:    Filling Pharmacy Phone:    Filling Pharmacy Fax:    Start Date: 3/2/2024    The Retail PA Team is unable to complete a PA for this medication as it is not prescribed by a [facility] provider.  The patient will need to reach out the Dr. Thakkar's office to have a PA completed.

## 2024-03-04 NOTE — TELEPHONE ENCOUNTER
Will try to reach [facility] Alise Thakkar's office when I have time.    Called waited on hold 8 minutes will try again later

## 2024-04-02 ENCOUNTER — VIRTUAL VISIT (OUTPATIENT)
Dept: OCCUPATIONAL THERAPY | Facility: CLINIC | Age: 84
End: 2024-04-02
Attending: FAMILY MEDICINE
Payer: COMMERCIAL

## 2024-04-02 DIAGNOSIS — D80.3: ICD-10-CM

## 2024-04-02 DIAGNOSIS — Z78.9 IMPAIRED MOBILITY AND ADLS: Primary | ICD-10-CM

## 2024-04-02 DIAGNOSIS — Z74.09 IMPAIRED MOBILITY AND ADLS: Primary | ICD-10-CM

## 2024-04-02 DIAGNOSIS — Z96.642 S/P TOTAL LEFT HIP ARTHROPLASTY: ICD-10-CM

## 2024-04-02 DIAGNOSIS — J45.40 MODERATE PERSISTENT ASTHMA WITHOUT COMPLICATION: ICD-10-CM

## 2024-04-02 PROCEDURE — 97542 WHEELCHAIR MNGMENT TRAINING: CPT | Mod: GO,GT,95 | Performed by: OCCUPATIONAL THERAPIST

## 2024-04-02 NOTE — PROGRESS NOTES
"                                                                             SEATING AND WHEELED MOBILITY ASSESSMENT    [facility]  Occupational Therapy   Date of service: April 2, 2024    Referring provider: Juan C Fairchild  Order Date 2/16/24  Onset Date: 2/61/24    Order Details: w/c ashleyal    Funding:[facility] medicare    Vendor: [employer]    Height/Weight: 5'3\" / 155    Medical diagnosis:   Nervous and Auditory  Migraine headache  Pain     Respiratory  Asthma  Cough  Diaphragmatic hernia  Lung nodule  Moderate persistent asthma without complication  Pulmonary infiltrate on chest x-ray  Chronic rhinitis  SOB (shortness of breath)     Digestive  John lesion, chronic  Colon, diverticulosis  Gastric polyps  Polyp of duodenum  Vitamin D deficiency  Rectal bleeding  Drug-induced constipation  Gastro-esophageal reflux disease without esophagitis     Endocrine  Hyperlipidemia with target LDL less than 70     Circulatory  ST elevation myocardial infarction involving left circumflex coronary artery (H)  Coronary atherosclerosis  Irregular heart rate  Cardiac arrhythmia, unspecified     Musculoskeletal and Integumentary  Disorder of bone and cartilage  Superficial disruption or dehiscence of operation wound, sequela  Presence of left artificial hip joint     Immune  IgG1 subclass deficiency (H)     Urinary  Hypertonicity of bladder  RAMON (acute kidney injury) (H24)  Chronic kidney disease, stage 3 unspecified (H)     Hematologic  Iron deficiency anemia  Thrombocytosis  ABLA (acute blood loss anemia)  Anemia, unspecified     Behavioral  Major depressive disorder, single episode in full remission (H24)  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)  Anxiety disorder, unspecified     Other  Adverse effect of other drugs, medicaments and biological substances, initial encounter  Hypogammaglobulinemia (H24)  Insomnia  Other malaise and fatigue  Need for " prophylactic hormone replacement therapy (postmenopausal)  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)  Advanced directives, counseling/discussion  S/P total left hip arthroplasty  Anticoagulated  Weakness  Unsteadiness on feet  Unspecified abnormalities of gait and mobility  Difficulty in walking, not elsewhere classified    Cardio-respiratory status:  Neb and inhaler     Patient concerns/goals: power mobility    Living environment:  Apartment    Living environment barriers:  None      Current assistance/living environment:  Lives alone    Community Mobility:  Transportation: Car  Community Mobility Requirements: Medical Appointments, Shopping, [facility], apts  Accessibility Requirements for Community Settings: Cannot get from car to apts or safely out of the building due to limited breath support.       Current mobility equipment:  4 wheeled walker with seat  Walking stick    Patient Measurements- per atp notes    Fall Risk Screen:   Has the patient fallen 2 or more times in the last year? No      Has the patient fallen and had an injury in the past year? No       Timed Up and Go Score: Not able to perform due to video and limited breath support    Is the patient a fall risk? Yes, department fall risk interventions implemented    ADL:   Feeding self:  ind  Grooming: ind  UE Dressing:  ind  LE Dressing:  ind  Showering/bathing: shower chair, grab bars- rests after  Toileting/transfer:  grab bars  Functional Mobility: walking sticks or walker    IADL:    Medications:ind  Meals: daughter brings food, dining room and bistro  Home management:  once a week help  Driving:  ind when not in pain    Evaluation     Pain assessment:  L hip 2/10 when walking 5/10 to elevator 5+ with greater than 50    Cognition:  wnl    Vision: wnl    Hearing: wnl    Fatigue:  Reported Problems: very limited due to poor breath support    Balance:  Unsupported Sitting Balance: Uses UE for Balance  Sitting Balance in  Chair: Uses UE for Balance  Standing Balance: Uses UE for Balance    Ambulation:  Level of [place]: WNL  Assistive Device(s): Walker (front wheeled), Walking poles    Transfers:   ind    Sensation:  Sensory Deficits Reported: wnl  Sensation on Seating     Head and Neck:  Head and Neck Position: WFL  Head Control: Adequate      Upper Extremities  UE ROM: WFL  UE Strength: UE Strength WFL  Dominance: Right    Pelvis  Anterior/Posterior Pelvis Position: Partially Flexible, Posterior Tilt    Trunk:  Anterior/Posterior Trunk Position: Increased Thoracic Kyphosis    Lower Extremities:  LE ROM: WFL  LE Strength: LE Strength WFL     Impairments:  Fatigue  Pain  Breath support     Treatment diagnosis:  Impaired mobility  Impaired activities of daily living     Recommendations/Plan of care:  Patient would benefit from interventions to enhance safety and independence.  Occupational therapy intervention for  wheelchair management.    Goals:   Target date:   Patient, family and/or caregiver will verbalize/demonstrate understanding of compensatory methods /equipment to enhance functional independence and safety.    Educational assessment/barriers to learning:  Breath support    Treatment provided this date:   Wheelchair management, 40 minutes   Determine need for light weight take apart power wheelchair in order to aid in safe and independent participation in MRADLS with in her apartment building.  Patient currently is extremely limited due to limited breath support.  Patient only able to ambulate short distances at a time with out needing sitting rest break to catch breath due to COPD and asthma.  She requires a powered device in order to allow her to participate in all things within her building including getting from bedroom to bathroom as well as getting to male and safely out the front door.  Home trials of pride mobility go chair med in her apartment.    Response to treatment/recommendations: Receptive and  thankful    Goal attainment:  All goals met    Risks and benefits of evaluation/treatment have been explained.  Patient, family and/or caregiver are in agreement with Plan of Care.        Krystal Virgen is a 83 year old female who is being seen via a billable video visit.      Patient has given verbal consent for Video visit? Yes    Video Start Time: 155    Telehealth Visit Details    Type of Service:  Telehealth    Video End Time (time video stopped): 235    Originating Location (pt. location): Home    Additional Participants in Telehealth Visit: linda costa [facility]    Distant Location (provider location):   [facility]    Mode of Communication (Audio Visual or Audio Only): Audiovisual    Linda Bhardwaj OT  April 2, 2024        Timed Code Treatment Minutes: 40  Total Treatment Time (sum of timed and untimed services): 40    Electronically signed by:  Linda Bhardwaj OTR/L, ATP      Occupational Therapist, Assistive   [phone]      fax: [phone]      [email or web address]  [address]  [place]   [address]  April 2, 2024

## 2024-04-09 DIAGNOSIS — I21.21 ST ELEVATION MYOCARDIAL INFARCTION INVOLVING LEFT CIRCUMFLEX CORONARY ARTERY (H): ICD-10-CM

## 2024-04-10 RX ORDER — ROSUVASTATIN CALCIUM 40 MG/1
TABLET, COATED ORAL
Qty: 90 TABLET | Refills: 3 | Status: SHIPPED | OUTPATIENT
Start: 2024-04-10

## 2024-04-13 NOTE — PLAN OF CARE
Major shift events: A/OX4, SBA. PRN percocet x1 for right groin pain. Afebrile. Room air. Cardiac diet with good appetite. Voiding. Loose BM x1. Transfer orders placed. Started on prilosec. Requested trazadone. R groin site ecchymotic, tender, soft.     Plan: transfer when bed is available. Continue POC, notify MD for any changes or concern.    Vital Signs: I have reviewed the initial vital signs.  Constitutional: chronically ill appearing, no acute distress  HEENT: Airway patent, moist MM, EOMI,   CV: regular rate, regular rhythm, well-perfused extremities,   Lungs: Clear to ascultation bilaterally,   ABD: Non-tender, Non-distended,   MSK: Neck supple, nontender, normal range of motion,   INTEG: Skin warm, dry, no rash.  NEURO: A&Ox3, moving all extremities, normal speech

## 2024-04-20 ENCOUNTER — VIRTUAL VISIT (OUTPATIENT)
Dept: URGENT CARE | Facility: CLINIC | Age: 84
End: 2024-04-20
Payer: COMMERCIAL

## 2024-04-20 ENCOUNTER — NURSE TRIAGE (OUTPATIENT)
Dept: NURSING | Facility: CLINIC | Age: 84
End: 2024-04-20

## 2024-04-20 DIAGNOSIS — N39.0 URINARY TRACT INFECTION WITHOUT HEMATURIA, SITE UNSPECIFIED: Primary | ICD-10-CM

## 2024-04-20 PROCEDURE — 99441 PR PHYSICIAN TELEPHONE EVALUATION 5-10 MIN: CPT | Mod: 93

## 2024-04-20 RX ORDER — CIPROFLOXACIN 250 MG/1
250 TABLET, FILM COATED ORAL DAILY
Qty: 5 TABLET | Refills: 0 | Status: SHIPPED | OUTPATIENT
Start: 2024-04-20 | End: 2024-04-24

## 2024-04-20 NOTE — TELEPHONE ENCOUNTER
"\"Fierce UTI.\"  She stated she needs an antibiotic.  Painful pressure.  Incontinence..  Patient said she can't go anywhere.   Said she can't get out of the bathroom.    I told Jody that it's likely a provider will want a urine specimen.  She is adamant that she cannot go out.  She was demanding about needing an antibiotic.  She stated she has had UTI's before and knows that's what's going on.  She said her pcp wouldn't insist on getting a specimen.    I suggested a virtual visit but also told her the provider may want a specimen.  I told her I can't promise a virtual provider will order an antibiotic.  Caller stated understanding and agreement.    Tammy MOORE RN [facility]                           "
Patient calling stating no virtual visits available until Monday and patient is stating there is no way that she can get to an .   Connected with Bushra in scheduling who was able to find a telephone appointment for today.   Margaux Escobedo RN   04/20/24 2:36 PM  [facility]  
Cooperative/Alert/Awake

## 2024-04-20 NOTE — PROGRESS NOTES
CC: Bladder symptoms    Hx of simple UTIs per patient= last infection 7/2023= treated with Macrobid.    Hx of CKD.    No fever or flank pain.    1. Urinary tract infection without hematuria, site unspecified    - ciprofloxacin (CIPRO) 250 MG tablet; Take 1 tablet (250 mg) by mouth daily for 5 days  Dispense: 5 tablet; Refill: 0     As Macrobid is not typically indicated for patients over age 65- recommend ciprofloxacin instead renal-adjusted/  Continue with increased fluids.  Close Follow-up if no change or new or worsening sx prn.    Esme Garcia MD  [place]    Phone call duration # 10 minutes

## 2024-04-24 ENCOUNTER — TELEPHONE (OUTPATIENT)
Dept: FAMILY MEDICINE | Facility: CLINIC | Age: 84
End: 2024-04-24
Payer: COMMERCIAL

## 2024-04-24 DIAGNOSIS — N39.0 URINARY TRACT INFECTION WITHOUT HEMATURIA, SITE UNSPECIFIED: ICD-10-CM

## 2024-04-24 RX ORDER — CIPROFLOXACIN 250 MG/1
250 TABLET, FILM COATED ORAL DAILY
Qty: 5 TABLET | Refills: 0 | Status: SHIPPED | OUTPATIENT
Start: 2024-04-24 | End: 2024-04-30

## 2024-04-24 NOTE — TELEPHONE ENCOUNTER
Medication Question or Refill    Contacts         Type Contact Phone/Fax    04/24/2024 01:37 PM CDT Phone (Incoming) Jody Virgen (Self) [phone] (M)            What medication are you calling about (include dose and sig)?: UTI Medication     Preferred Pharmacy:   [facility] - [place], [place] - [address]  [address]  [place] [address]  Phone: [phone] Fax: [phone]    [facility] [place] - [address] [facility] AT Portal to Registered [facility]  [facility]  [place] [address]  Phone: [phone] Fax: [phone]    [facility] [place], [place] - [address] [facility]  [address] [facility] [address]  Phone: [phone] Fax: [phone]      Controlled Substance Agreement on file:   CSA -- Patient Level:    CSA: None found at the patient level.       Who prescribed the medication?: Gurinder    Do you need a refill? Yes - Cipro    When did you use the medication last? Today     Patient offered an appointment? No    Do you have any questions or concerns?  Yes: Pt has questions regarding her UTI medications, she is requesting a call back from Gurinder or someone on his support staff regarding this medication as well as refills if she needs them. Please reach out to her ASAP. Declined Nurse triage - wants to speak to Gurinder or someone on their team. (Peterson specifically requested)       Okay to leave a detailed message?: Yes at Home number on file [phone] (home) or Cell number on file:    Telephone Information:   Mobile [phone]

## 2024-04-24 NOTE — TELEPHONE ENCOUNTER
Spoke with patient and she stated that she is still having symptoms of her UTI. She states that they have gotten better but the symptoms are still lingering. Pt states that she has only one more dose left for tomorrow 4/25/24 and wants to make sure that a refill is sent in to her pharmacy before she is out and before the weekend so she is not stuck without for an entire weekend.     Rx pended for provider review.     Brett Ovalles RN  [facility]

## 2024-04-25 NOTE — TELEPHONE ENCOUNTER
"Per PCP,  \"Please notify patient that refill sent to pharmacy.    Kirt Gallego MD\"    Writer called patient and relayed message per provider. Patient verbalized understanding and denies any questions at this time.    CONSTANTINE Jimenez, RN  [facility]    "

## 2024-04-30 RX ORDER — CIPROFLOXACIN 250 MG/1
250 TABLET, FILM COATED ORAL DAILY
Qty: 5 TABLET | Refills: 0 | Status: SHIPPED | OUTPATIENT
Start: 2024-04-30

## 2024-04-30 NOTE — TELEPHONE ENCOUNTER
Pt calling back, was sent to wrong pharmacy-need sent to [facility].    Please call when sent so she can arrange delivery

## 2024-05-17 ENCOUNTER — NURSE TRIAGE (OUTPATIENT)
Dept: FAMILY MEDICINE | Facility: CLINIC | Age: 84
End: 2024-05-17
Payer: COMMERCIAL

## 2024-05-17 DIAGNOSIS — J20.9 ACUTE BRONCHITIS, UNSPECIFIED ORGANISM: ICD-10-CM

## 2024-05-17 DIAGNOSIS — J45.40 MODERATE PERSISTENT ASTHMA WITHOUT COMPLICATION: Primary | ICD-10-CM

## 2024-05-17 NOTE — TELEPHONE ENCOUNTER
Nurse Triage SBAR    Is this a 2nd Level Triage? YES, LICENSED PRACTITIONER REVIEW IS REQUIRED    Situation: Patient called saying she believes she has bronchitis again.     Background: Asthma, cough, hld, headaches, chronic rhinitis, irregular heart rate.     Assessment: Pt stated that she started having a cough a couple days ago and today started having SOB, can't take more then a couple steps without feeling out of breath. Checking her oxygen levels she is at 97%. Pt denies any fever, runny nose, chest pain.    Protocol Recommended Disposition:   Go To Office Now    Recommendation: Pt was offered an appointment or walk in care but states that this is a chronic issue for her and she does not want to go in, she is usually prescribed doxycyline for this and wants something sent for her today to the pharmacy. Pt was upset that a nurse called her after specifically requesting to only speak with Dr. Fairchild or Peterson.  Explained to patient that they are out for the week and she allowed triage.     Routed to provider    Does the patient meet one of the following criteria for ADS visit consideration? No    Brett Ovalles RN  [facility]       Reason for Disposition   MILD difficulty breathing (e.g., minimal/no SOB at rest, SOB with walking, pulse <100) and still present when not coughing    Additional Information   Negative: Bluish (or gray) lips or face   Negative: SEVERE difficulty breathing (e.g., struggling for each breath, speaks in single words)   Negative: Rapid onset of cough and has hives   Negative: Coughing started suddenly after medicine, an allergic food or bee sting   Negative: Difficulty breathing after exposure to flames, smoke, or fumes   Negative: Sounds like a life-threatening emergency to the triager   Negative: Previous asthma attacks and this feels like asthma attack   Negative: Dry cough (non-productive; no sputum or minimal clear sputum) and within 14 days of COVID-19 Exposure    "Negative: MODERATE difficulty breathing (e.g., speaks in phrases, SOB even at rest, pulse 100-120) and still present when not coughing   Negative: Chest pain present when not coughing   Negative: Passed out (i.e., fainted, collapsed and was not responding)   Negative: Patient sounds very sick or weak to the triager    Answer Assessment - Initial Assessment Questions  1. ONSET: \"When did the cough begin?\"       Couple days  2. SEVERITY: \"How bad is the cough today?\"       Coughing is causing shortness of breath  3. SPUTUM: \"Describe the color of your sputum\" (none, dry cough; clear, white, yellow, green)      green  4. HEMOPTYSIS: \"Are you coughing up any blood?\" If so ask: \"How much?\" (flecks, streaks, tablespoons, etc.)      none  5. DIFFICULTY BREATHING: \"Are you having difficulty breathing?\" If Yes, ask: \"How bad is it?\" (e.g., mild, moderate, severe)     - MILD: No SOB at rest, mild SOB with walking, speaks normally in sentences, can lie down, no retractions, pulse < 100.     - MODERATE: SOB at rest, SOB with minimal exertion and prefers to sit, cannot lie down flat, speaks in phrases, mild retractions, audible wheezing, pulse 100-120.     - SEVERE: Very SOB at rest, speaks in single words, struggling to breathe, sitting hunched forward, retractions, pulse > 120     mild  6. FEVER: \"Do you have a fever?\" If Yes, ask: \"What is your temperature, how was it measured, and when did it start?\"      none  7. CARDIAC HISTORY: \"Do you have any history of heart disease?\" (e.g., heart attack, congestive heart failure)       Arrhythmia   8. LUNG HISTORY: \"Do you have any history of lung disease?\"  (e.g., pulmonary embolus, asthma, emphysema)      Chronic bronchitis   9. PE RISK FACTORS: \"Do you have a history of blood clots?\" (or: recent major surgery, recent prolonged travel, bedridden)      none  10. OTHER SYMPTOMS: \"Do you have any other symptoms?\" (e.g., runny nose, wheezing, chest pain)        SOB  11. PREGNANCY: \"Is " "there any chance you are pregnant?\" \"When was your last menstrual period?\"        N/a  12. TRAVEL: \"Have you traveled out of the country in the last month?\" (e.g., travel history, exposures)        none    Protocols used: Cough-A-[place]    "

## 2024-05-17 NOTE — TELEPHONE ENCOUNTER
"Provider Recommendation Follow Up:   Reached patient/caregiver. Informed of provider's recommendations. Patient verbalized understanding and does not agree with the plan.     Pt stated that she does not want to or feel like going to walk in care. Writer offered to call someone for her to get her to walk in care. Patient stated \"I am just going to put myself on prednisone since they are too afraid to prescribe me anything.\" Pt was again advised to go to walk in care.    Brett Ovalles RN  [facility]     "

## 2024-05-17 NOTE — TELEPHONE ENCOUNTER
Reason for Call:  Other call back    Detailed comments: Patient believes she may have bronchitis again, wondering if she may need antibiotics. Severe coughing is causing her to have trouble breathing. Refused to speak to nurse, requesting a call back from pcp or an assistant of Fairchild. Please reach out to further assist.     Phone Number Patient can be reached at: Home number on file [phone] (home)    Best Time: anytime    Can we leave a detailed message on this number? YES    Call taken on 5/17/2024 at 1:36 PM by Velvet Fields

## 2024-05-20 DIAGNOSIS — J45.41 MODERATE PERSISTENT ASTHMA WITH (ACUTE) EXACERBATION: ICD-10-CM

## 2024-05-20 RX ORDER — CODEINE PHOSPHATE AND GUAIFENESIN 10; 100 MG/5ML; MG/5ML
SOLUTION ORAL
Qty: 473 ML | Refills: 0 | Status: SHIPPED | OUTPATIENT
Start: 2024-05-20

## 2024-05-20 RX ORDER — DOXYCYCLINE 100 MG/1
100 CAPSULE ORAL 2 TIMES DAILY
Qty: 20 CAPSULE | Refills: 0 | Status: SHIPPED | OUTPATIENT
Start: 2024-05-20 | End: 2024-05-24

## 2024-05-20 RX ORDER — PREDNISONE 10 MG/1
TABLET ORAL
Qty: 30 TABLET | Refills: 0 | Status: SHIPPED | OUTPATIENT
Start: 2024-05-20 | End: 2024-05-24

## 2024-05-20 NOTE — TELEPHONE ENCOUNTER
Called and spoke with patient.  I am well aware of her history of asthma and frequency of infections.  I think the most appropriate course of action is to start her on treatment with prednisone and doxycycline.  She agrees should she have worsening she will seek emergent care.  She will give me an update in a couple of days and we will decide if any further action is necessary.  Will give strong consideration to having an action plan

## 2024-05-20 NOTE — TELEPHONE ENCOUNTER
FYI - Status Update    Who is Calling: patient    Update: patient calling back, would like to speak to Dr. Fairchild regarding bronchitis, and starting antibiotics. States not feeling well enough to go into office, and would like a call back before the pharmacy closes today, specifically from Dr. Fairchild. 5/20/24    Does caller want a call/response back: Yes     Okay to leave a detailed message?: Yes at Cell number on file:    Telephone Information:   Mobile [phone]

## 2024-05-23 NOTE — TELEPHONE ENCOUNTER
S-(situation): Patient called back into the clinic regarding her symptoms that were previously triaged.     B-(background): chronic bronchitis    A-(assessment): Patient called back in and stated that she has been taking the prednisone over the weekend and then spoke to Dr. Fairchild on Monday and started the doxycycline and prednisone he ordered and she is not feeling much better yet. Still having a little SOB, still having a productive cough with globs of phlegm that is clear, no fever. Patient is anxious about going into the weekend with this still going on and not being able to get advice then.     R-(recommendations): Patient would like to know if Dr. Fairchild has any other recommendations for her.       Brett Ovalles RN  [facility]

## 2024-05-24 ENCOUNTER — VIRTUAL VISIT (OUTPATIENT)
Dept: FAMILY MEDICINE | Facility: CLINIC | Age: 84
End: 2024-05-24
Payer: COMMERCIAL

## 2024-05-24 DIAGNOSIS — J45.40 MODERATE PERSISTENT ASTHMA WITHOUT COMPLICATION: ICD-10-CM

## 2024-05-24 DIAGNOSIS — J20.9 ACUTE BRONCHITIS, UNSPECIFIED ORGANISM: ICD-10-CM

## 2024-05-24 PROCEDURE — 99207 PR NO BILLABLE SERVICE THIS VISIT: CPT | Mod: 93 | Performed by: FAMILY MEDICINE

## 2024-05-24 RX ORDER — PREDNISONE 10 MG/1
TABLET ORAL
Qty: 30 TABLET | Refills: 0 | Status: SHIPPED | OUTPATIENT
Start: 2024-05-24 | End: 2024-06-04

## 2024-05-24 RX ORDER — DOXYCYCLINE 100 MG/1
100 CAPSULE ORAL 2 TIMES DAILY
Qty: 20 CAPSULE | Refills: 0 | Status: SHIPPED | OUTPATIENT
Start: 2024-05-24

## 2024-05-24 NOTE — TELEPHONE ENCOUNTER
Spoke with patient. Feeling much better then from yesterday. Still having some coughing.  Did not want to come in. Would like a phone call from dr khalil. Phone visit set up today for 220 pm estimated

## 2024-05-24 NOTE — PROGRESS NOTES
Jody is a 83 year old who is being evaluated via a billable telephone visit.    What phone number would you like to be contacted at?   How would you like to obtain your AVS? MyChart  Originating Location (pt. Location): Home    Distant Location (provider location):  On-site    Jody was seen today for cough.    Diagnoses and all orders for this visit:    Moderate persistent asthma without complication  -     doxycycline monohydrate (MONODOX) 100 MG capsule; Take 1 capsule (100 mg) by mouth 2 times daily  -     predniSONE (DELTASONE) 10 MG tablet; Take 4 tablets (40 mg) by mouth daily for 3 days, THEN 3 tablets (30 mg) daily for 3 days, THEN 2 tablets (20 mg) daily for 3 days, THEN 1 tablet (10 mg) daily for 3 days.    Acute bronchitis, unspecified organism  -     doxycycline monohydrate (MONODOX) 100 MG capsule; Take 1 capsule (100 mg) by mouth 2 times daily  -     predniSONE (DELTASONE) 10 MG tablet; Take 4 tablets (40 mg) by mouth daily for 3 days, THEN 3 tablets (30 mg) daily for 3 days, THEN 2 tablets (20 mg) daily for 3 days, THEN 1 tablet (10 mg) daily for 3 days.           Subjective   Jody is a 83 year old, presenting for the following health issues:  Cough    HPI     She has a significant history of asthma with history of recurrent pneumonia and immunodeficiency.  She follows with pulmonology.  She recently contracted with seems to be most consistent with viral respiratory infection.  I spoke with her earlier this week and we elected to start prednisone and doxycycline.  I asked her to call for an update which she did late yesterday.  Patient was reporting no real significant improvement in symptoms at that time but on our conversation today she reports that since her last message she really feels there has been some good movement toward improvement.  She still has a lot of clear mucus production and occasional cough but she feels overall her breathing is much better.  Denies any fevers denies any other  signs of distress.  Today we had conversation about enacting an action plan of starting prednisone and doxycycline for onset of symptoms along with contacting me if this occurs again in the future.      Complete review of systems is obtained.  Other than the specific considerations noted above complete review of systems is negative.        Objective           Vitals:  No vitals were obtained today due to virtual visit.    Physical Exam   General: Alert and no distress //Respiratory: No audible wheeze, cough, or shortness of breath // Psychiatric:  Appropriate affect, tone, and pace of words            Phone call duration: 3 min  Signed Electronically by: Juan C Fairchild MD, MD

## 2024-07-09 DIAGNOSIS — F32.5 MAJOR DEPRESSIVE DISORDER, SINGLE EPISODE, IN REMISSION (H): Primary | ICD-10-CM

## 2024-07-10 RX ORDER — CITALOPRAM HYDROBROMIDE 20 MG/1
20 TABLET ORAL DAILY
Qty: 90 TABLET | Refills: 2 | Status: SHIPPED | OUTPATIENT
Start: 2024-07-10 | End: 2024-09-20

## 2024-09-03 ENCOUNTER — VIRTUAL VISIT (OUTPATIENT)
Dept: FAMILY MEDICINE | Facility: CLINIC | Age: 84
End: 2024-09-03
Payer: COMMERCIAL

## 2024-09-03 DIAGNOSIS — F41.9 ANXIETY: ICD-10-CM

## 2024-09-03 DIAGNOSIS — F33.2 SEVERE EPISODE OF RECURRENT MAJOR DEPRESSIVE DISORDER, WITHOUT PSYCHOTIC FEATURES (H): Primary | ICD-10-CM

## 2024-09-03 PROCEDURE — 99442 PR PHYSICIAN TELEPHONE EVALUATION 11-20 MIN: CPT | Mod: 93 | Performed by: FAMILY MEDICINE

## 2024-09-03 RX ORDER — ESCITALOPRAM OXALATE 5 MG/1
TABLET ORAL
Qty: 60 TABLET | Refills: 0 | Status: SHIPPED | OUTPATIENT
Start: 2024-09-03 | End: 2024-09-20

## 2024-09-03 NOTE — PROGRESS NOTES
Jody is a 84 year old who is being evaluated via a billable telephone visit.      Originating Location (pt. Location): Home    Distant Location (provider location):  On-site    Diagnoses and all orders for this visit:    Severe episode of recurrent major depressive disorder, without psychotic features (H)  -     escitalopram (LEXAPRO) 5 MG tablet; Take 1 tablet (5 mg) by mouth daily for 6 days, THEN 2 tablets (10 mg) daily for 6 days, THEN 3 tablets (15 mg) daily for 6 days, THEN 4 tablets (20 mg) daily for 6 days.    Anxiety  -     escitalopram (LEXAPRO) 5 MG tablet; Take 1 tablet (5 mg) by mouth daily for 6 days, THEN 2 tablets (10 mg) daily for 6 days, THEN 3 tablets (15 mg) daily for 6 days, THEN 4 tablets (20 mg) daily for 6 days.       Taper citalopram by alternating 1/2 tablet daily with 1 whole tablet daily for 4 days then 1/2 tablet daily for 4 days then we will change over to the new medication escitalopram and begin gradually working up starting with 5 mg as listed above.  Patient has a follow-up scheduled with me for 20 September at which time we will reevaluate.  If she has problems or concerns prior to that she will let me know    Subjective   Jody is a 84 year old, presenting for the following health issues:  No chief complaint on file.    HPI     Has a history of major depression.  She has been on citalopram and bupropion for a number of years.  Patient felt the medications had worked well for her up until more recently.  Patient states she is stressed out by a lot of world events and events with her own health.  She would like to consider a change in medication to help overcome worsened depression and anxiety symptoms.  Today we discussed options for medication alteration.      Complete review of systems is obtained.  Other than the specific considerations noted above complete review of systems is negative.        Objective           Vitals:  No vitals were obtained today due to virtual  visit.    Physical Exam   General: Alert and no distress //Respiratory: No audible wheeze, cough, or shortness of breath // Psychiatric:  Appropriate affect, tone, and pace of words            Phone call duration: 12 minutes  Signed Electronically by: Juan C Fairchild MD, MD

## 2024-09-04 ENCOUNTER — ANCILLARY PROCEDURE (OUTPATIENT)
Dept: MAMMOGRAPHY | Facility: CLINIC | Age: 84
End: 2024-09-04
Attending: FAMILY MEDICINE
Payer: COMMERCIAL

## 2024-09-04 DIAGNOSIS — Z12.31 VISIT FOR SCREENING MAMMOGRAM: ICD-10-CM

## 2024-09-04 PROCEDURE — 77063 BREAST TOMOSYNTHESIS BI: CPT | Mod: TC | Performed by: RADIOLOGY

## 2024-09-04 PROCEDURE — 77067 SCR MAMMO BI INCL CAD: CPT | Mod: TC | Performed by: RADIOLOGY

## 2024-09-20 ENCOUNTER — OFFICE VISIT (OUTPATIENT)
Dept: FAMILY MEDICINE | Facility: CLINIC | Age: 84
End: 2024-09-20
Payer: COMMERCIAL

## 2024-09-20 VITALS
SYSTOLIC BLOOD PRESSURE: 128 MMHG | BODY MASS INDEX: 30.67 KG/M2 | HEIGHT: 63 IN | RESPIRATION RATE: 22 BRPM | DIASTOLIC BLOOD PRESSURE: 70 MMHG | WEIGHT: 173.1 LBS | OXYGEN SATURATION: 96 % | HEART RATE: 72 BPM | TEMPERATURE: 98.6 F

## 2024-09-20 DIAGNOSIS — F41.9 ANXIETY: ICD-10-CM

## 2024-09-20 DIAGNOSIS — N18.32 STAGE 3B CHRONIC KIDNEY DISEASE (H): Primary | ICD-10-CM

## 2024-09-20 DIAGNOSIS — I25.10 ATHEROSCLEROSIS OF NATIVE CORONARY ARTERY OF NATIVE HEART WITHOUT ANGINA PECTORIS: ICD-10-CM

## 2024-09-20 DIAGNOSIS — J45.40 MODERATE PERSISTENT ASTHMA WITHOUT COMPLICATION: ICD-10-CM

## 2024-09-20 DIAGNOSIS — F33.2 SEVERE EPISODE OF RECURRENT MAJOR DEPRESSIVE DISORDER, WITHOUT PSYCHOTIC FEATURES (H): ICD-10-CM

## 2024-09-20 LAB
ANION GAP SERPL CALCULATED.3IONS-SCNC: 10 MMOL/L (ref 7–15)
BASOPHILS # BLD AUTO: 0.1 10E3/UL (ref 0–0.2)
BASOPHILS NFR BLD AUTO: 1 %
BUN SERPL-MCNC: 21.9 MG/DL (ref 8–23)
CALCIUM SERPL-MCNC: 8.8 MG/DL (ref 8.8–10.4)
CHLORIDE SERPL-SCNC: 107 MMOL/L (ref 98–107)
CREAT SERPL-MCNC: 1.18 MG/DL (ref 0.51–0.95)
EGFRCR SERPLBLD CKD-EPI 2021: 46 ML/MIN/1.73M2
EOSINOPHIL # BLD AUTO: 0.5 10E3/UL (ref 0–0.7)
EOSINOPHIL NFR BLD AUTO: 6 %
ERYTHROCYTE [DISTWIDTH] IN BLOOD BY AUTOMATED COUNT: 14.6 % (ref 10–15)
GLUCOSE SERPL-MCNC: 90 MG/DL (ref 70–99)
HCO3 SERPL-SCNC: 25 MMOL/L (ref 22–29)
HCT VFR BLD AUTO: 31.2 % (ref 35–47)
HGB BLD-MCNC: 9.6 G/DL (ref 11.7–15.7)
IMM GRANULOCYTES # BLD: 0 10E3/UL
IMM GRANULOCYTES NFR BLD: 0 %
LYMPHOCYTES # BLD AUTO: 1.4 10E3/UL (ref 0.8–5.3)
LYMPHOCYTES NFR BLD AUTO: 18 %
MCH RBC QN AUTO: 24.6 PG (ref 26.5–33)
MCHC RBC AUTO-ENTMCNC: 30.8 G/DL (ref 31.5–36.5)
MCV RBC AUTO: 80 FL (ref 78–100)
MONOCYTES # BLD AUTO: 0.7 10E3/UL (ref 0–1.3)
MONOCYTES NFR BLD AUTO: 9 %
NEUTROPHILS # BLD AUTO: 5.3 10E3/UL (ref 1.6–8.3)
NEUTROPHILS NFR BLD AUTO: 67 %
PLATELET # BLD AUTO: 363 10E3/UL (ref 150–450)
POTASSIUM SERPL-SCNC: 5 MMOL/L (ref 3.4–5.3)
RBC # BLD AUTO: 3.9 10E6/UL (ref 3.8–5.2)
SODIUM SERPL-SCNC: 142 MMOL/L (ref 135–145)
WBC # BLD AUTO: 7.9 10E3/UL (ref 4–11)

## 2024-09-20 PROCEDURE — G0008 ADMIN INFLUENZA VIRUS VAC: HCPCS | Performed by: FAMILY MEDICINE

## 2024-09-20 PROCEDURE — 91320 SARSCV2 VAC 30MCG TRS-SUC IM: CPT | Performed by: FAMILY MEDICINE

## 2024-09-20 PROCEDURE — 90662 IIV NO PRSV INCREASED AG IM: CPT | Performed by: FAMILY MEDICINE

## 2024-09-20 PROCEDURE — 99214 OFFICE O/P EST MOD 30 MIN: CPT | Mod: 25 | Performed by: FAMILY MEDICINE

## 2024-09-20 PROCEDURE — 85025 COMPLETE CBC W/AUTO DIFF WBC: CPT | Performed by: FAMILY MEDICINE

## 2024-09-20 PROCEDURE — 90480 ADMN SARSCOV2 VAC 1/ONLY CMP: CPT | Performed by: FAMILY MEDICINE

## 2024-09-20 PROCEDURE — 36415 COLL VENOUS BLD VENIPUNCTURE: CPT | Performed by: FAMILY MEDICINE

## 2024-09-20 PROCEDURE — 80048 BASIC METABOLIC PNL TOTAL CA: CPT | Performed by: FAMILY MEDICINE

## 2024-09-20 RX ORDER — ESCITALOPRAM OXALATE 20 MG/1
20 TABLET ORAL DAILY
Qty: 90 TABLET | Refills: 3 | Status: SHIPPED | OUTPATIENT
Start: 2024-09-20

## 2024-09-20 ASSESSMENT — PAIN SCALES - GENERAL: PAINLEVEL: NO PAIN (0)

## 2024-09-20 NOTE — PROGRESS NOTES
Prior to immunization administration, verified patients identity using patient s name and date of birth. Please see Immunization Activity for additional information.     Screening Questionnaire for Adult Immunization    Are you sick today?   No   Do you have allergies to medications, food, a vaccine component or latex?   No   Have you ever had a serious reaction after receiving a vaccination?   No   Do you have a long-term health problem with heart, lung, kidney, or metabolic disease (e.g., diabetes), asthma, a blood disorder, no spleen, complement component deficiency, a cochlear implant, or a spinal fluid leak?  Are you on long-term aspirin therapy?   No   Do you have cancer, leukemia, HIV/AIDS, or any other immune system problem?   No   Do you have a parent, brother, or sister with an immune system problem?   No   In the past 3 months, have you taken medications that affect  your immune system, such as prednisone, other steroids, or anticancer drugs; drugs for the treatment of rheumatoid arthritis, Crohn s disease, or psoriasis; or have you had radiation treatments?   No   Have you had a seizure, or a brain or other nervous system problem?   No   During the past year, have you received a transfusion of blood or blood    products, or been given immune (gamma) globulin or antiviral drug?   No   For women: Are you pregnant or is there a chance you could become       pregnant during the next month?   No   Have you received any vaccinations in the past 4 weeks?   No     Immunization questionnaire answers were all negative.      Patient instructed to remain in clinic for 15 minutes afterwards, and to report any adverse reactions.     Screening performed by Kirt Cabrera MA on 9/20/2024 at 12:59 PM.

## 2024-09-20 NOTE — LETTER
September 23, 2024      Jody M Param  [address]   [place] [address]        Dear ,    We are writing to inform you of your test results.    The kidney function has improved which is good to see.  Creatinine previously was 1.56 it is currently 1.18.  The electrolytes are normal.     She does have a worsened anemia.  The red blood cell count is 9.6.  This past winter and it was 11.1.  His blood count has fluctuated from a low of 8.7 to a high of 11.2 over the past year.     This could be a contributing factor to being more tired.  It is also not completely clear why the blood count is lower than it was.     I recommend returning for a repeat check of the blood count in about 1 month.  Depending on the trajectory of the number we will decide if any additional action is necessary.     Resulted Orders   Basic metabolic panel   Result Value Ref Range    Sodium 142 135 - 145 mmol/L    Potassium 5.0 3.4 - 5.3 mmol/L    Chloride 107 98 - 107 mmol/L    Carbon Dioxide (CO2) 25 22 - 29 mmol/L    Anion Gap 10 7 - 15 mmol/L    Urea Nitrogen 21.9 8.0 - 23.0 mg/dL    Creatinine 1.18 (H) 0.51 - 0.95 mg/dL    GFR Estimate 46 (L) >60 mL/min/1.73m2      Comment:      eGFR calculated using 2021 CKD-EPI equation.    Calcium 8.8 8.8 - 10.4 mg/dL      Comment:      Reference intervals for this test were updated on 7/16/2024 to reflect our healthy population more accurately. There may be differences in the flagging of prior results with similar values performed with this method. Those prior results can be interpreted in the context of the updated reference intervals.    Glucose 90 70 - 99 mg/dL   CBC with platelets and differential   Result Value Ref Range    WBC Count 7.9 4.0 - 11.0 10e3/uL    RBC Count 3.90 3.80 - 5.20 10e6/uL    Hemoglobin 9.6 (L) 11.7 - 15.7 g/dL    Hematocrit 31.2 (L) 35.0 - 47.0 %    MCV 80 78 - 100 fL    MCH 24.6 (L) 26.5 - 33.0 pg    MCHC 30.8 (L) 31.5 - 36.5 g/dL    RDW 14.6 10.0 - 15.0 %     Platelet Count 363 150 - 450 10e3/uL    % Neutrophils 67 %    % Lymphocytes 18 %    % Monocytes 9 %    % Eosinophils 6 %    % Basophils 1 %    % Immature Granulocytes 0 %    Absolute Neutrophils 5.3 1.6 - 8.3 10e3/uL    Absolute Lymphocytes 1.4 0.8 - 5.3 10e3/uL    Absolute Monocytes 0.7 0.0 - 1.3 10e3/uL    Absolute Eosinophils 0.5 0.0 - 0.7 10e3/uL    Absolute Basophils 0.1 0.0 - 0.2 10e3/uL    Absolute Immature Granulocytes 0.0 <=0.4 10e3/uL       If you have any questions or concerns, please call the clinic at the number listed above.       Sincerely,      Juan C Fairchild MD

## 2024-09-20 NOTE — PROGRESS NOTES
"Krystal Virgen  /70   Pulse 72   Temp 98.6  F (37  C)   Resp 22   Ht 1.6 m (5' 3\")   Wt 78.5 kg (173 lb 1.6 oz)   SpO2 96%   BMI 30.66 kg/m       Assessment/Plan:                Jody was seen today for recheck medication and referral.    Diagnoses and all orders for this visit:    Stage 3b chronic kidney disease (H)  -     CBC with Platelets & Differential; Future  -     Basic metabolic panel; Future  -     CBC with Platelets & Differential  -     Basic metabolic panel    Atherosclerosis of native coronary artery of native heart without angina pectoris  -     Adult Cardiology Eval  Referral; Future    Severe episode of recurrent major depressive disorder, without psychotic features (H)  -     Adult Mental Health  Referral; Future  -     escitalopram (LEXAPRO) 20 MG tablet; Take 1 tablet (20 mg) by mouth daily.    Anxiety  -     Adult Mental Health  Referral; Future  -     escitalopram (LEXAPRO) 20 MG tablet; Take 1 tablet (20 mg) by mouth daily.    Moderate persistent asthma without complication    Other orders  -     INFLUENZA HIGH DOSE, TRIVALENT, PF (FLUZONE)  -     COVID-19 12+ (PFIZER)         DISCUSSION  See discussion below.  Check labs as noted above continue escitalopram, referral to cognitive behavioral therapy.  Follow-up with me about 3 months to recheck sooner if needed.  Subjective:     HPI:    Krystal Virgen is a 84 year old female with a medical history that includes moderate to severe persistent asthma, acid reflux, hiatal hernia, breast cancer treated, IgG1 subclass deficiency, coronary artery disease with history of STEMI and stent placement, major depression, anxiety, multisite osteoarthritis is here today to follow-up on depression.    Recently was changed from citalopram to escitalopram.  Currently on 15 mg.  Reports no side effect concerns.  Reports slight improvement in sleep.  Reports no worsening mood symptoms overall.  Is optimistic that the " medication seems to be helpful even after just a relatively short amount of time.  Today we discussed continuation of the escitalopram.  PHQ-9 score reviewed noting slight improvement.  Discussed referral to see behavioral therapist.    Reviewed other chronic medical problems briefly.  Patient continues to have shortness of breath.  Likely it is multifactorial.  She is overdue for follow-up with her cardiologist.  She does report some progression of her shortness of breath but denies chest pain.  She is seeing her pulmonologist as of July 2024.  We discussed following up with cardiology to see if they recommend any additional testing.  Discussed continued use of medications to control asthma/lung symptoms.  Discussed updating immunizations today.    She is a history of chronic kidney disease as well as anemia discussed recheck of these lab tests.    ROS:  Complete review of systems is obtained.  Other than the specific considerations noted above complete review of systems is negative.          Objective:   Medications:  Current Outpatient Medications   Medication Sig Dispense Refill    budeson-glycopyrrol-formoterol (BREZTRI AEROSPHERE) 160-9-4.8 MCG/ACT AERO inhaler Inhale 2 puffs into the lungs 2 times daily.      escitalopram (LEXAPRO) 20 MG tablet Take 1 tablet (20 mg) by mouth daily. 90 tablet 3    acetaminophen (TYLENOL) 500 MG tablet Take 1,000 mg by mouth every 8 hours as needed      albuterol (PROAIR HFA/PROVENTIL HFA/VENTOLIN HFA) 108 (90 Base) MCG/ACT inhaler USE 2 INHALATIONS ORALLY   EVERY 6 HOURS AS NEEDED 54 g 1    aspirin 81 MG EC tablet Take 1 tablet (81 mg) by mouth 2 times daily 60 tablet 0    budesonide-formoterol (SYMBICORT) 160-4.5 MCG/ACT inhaler Inhale 2 puffs into the lungs 2 times daily as needed      buPROPion (WELLBUTRIN XL) 300 MG 24 hr tablet Take 1 tablet (300 mg) by mouth every morning 90 tablet 2    ciprofloxacin (CIPRO) 250 MG tablet Take 1 tablet (250 mg) by mouth daily 5 tablet 0     doxycycline monohydrate (MONODOX) 100 MG capsule Take 1 capsule (100 mg) by mouth 2 times daily 20 capsule 0    ferrous sulfate (FE TABS) 325 (65 Fe) MG EC tablet Take 1 tablet (325 mg) by mouth daily 30 tablet 3    fluticasone (FLONASE) 50 MCG/ACT nasal spray Spray 1-2 sprays into both nostrils daily as needed      guaiFENesin-codeine (ROBITUSSIN AC) 100-10 MG/5ML solution TAKE 10 ML BY MOUTH EVERY 4 HOURS AS NEEDED (COUGH). 473 mL 0    LANsoprazole (PREVACID) 30 MG DR capsule Take 1 capsule (30 mg) by mouth daily 90 capsule 3    montelukast (SINGULAIR) 10 MG tablet Take 1 tablet (10 mg) by mouth at bedtime 90 tablet 2    nitroGLYcerin (NITROSTAT) 0.4 MG sublingual tablet For chest pain place 1 tablet under the tongue every 5 minutes for 3 doses. If symptoms persist 5 minutes after 1st dose call 911. 25 tablet 0    rosuvastatin (CRESTOR) 40 MG tablet give 1 Tablet BY MOUTH ONCE daily 90 tablet 3    senna-docusate (SENOKOT-S/PERICOLACE) 8.6-50 MG tablet Take 1-2 tablets by mouth 2 times daily Take while on oral narcotics to prevent or treat constipation. 15 tablet 0    Vitamin D3 (CHOLECALCIFEROL) 125 MCG (5000 UT) tablet Take 1 tablet (125 mcg) by MOUTH daily 30 tablet 11     No current facility-administered medications for this visit.        Allergies:     Allergies   Allergen Reactions    Ticagrelor Other (See Comments)     Dyspnea    Penicillins      Amoxicillin doesn't work for her    Sulfa Antibiotics Rash        Social History     Socioeconomic History    Marital status:      Spouse name: Not on file    Number of children: Not on file    Years of education: Not on file    Highest education level: Not on file   Occupational History    Not on file   Tobacco Use    Smoking status: Never    Smokeless tobacco: Never   Vaping Use    Vaping status: Never Used   Substance and Sexual Activity    Alcohol use: No    Drug use: No    Sexual activity: Not on file   Other Topics Concern    Parent/sibling w/ CABG,  MI or angioplasty before 65F 55M? Not Asked   Social History Narrative    Not on file     Social Determinants of Health     Financial Resource Strain: Low Risk  (10/20/2023)    Financial Resource Strain     Within the past 12 months, have you or your family members you live with been unable to get utilities (heat, electricity) when it was really needed?: No   Food Insecurity: Low Risk  (10/20/2023)    Food Insecurity     Within the past 12 months, did you worry that your food would run out before you got money to buy more?: No     Within the past 12 months, did the food you bought just not last and you didn t have money to get more?: No   Transportation Needs: Low Risk  (10/20/2023)    Transportation Needs     Within the past 12 months, has lack of transportation kept you from medical appointments, getting your medicines, non-medical meetings or appointments, work, or from getting things that you need?: No   Physical Activity: Not on file   Stress: Not on file   Social Connections: Not on file   Interpersonal Safety: Not on file   Housing Stability: Low Risk  (10/20/2023)    Housing Stability     Do you have housing? : Yes     Are you worried about losing your housing?: No       Family History   Problem Relation Age of Onset    Breast Cancer Mother 78    Heart Disease Father     Macular Degeneration Father     Breast Cancer Sister 48    Heart Disease Brother     Breast Cancer Maternal Aunt 35        passed at 84y not of breast    Breast Cancer Paternal Aunt 48        passed away from breast        Most Recent Immunizations   Administered Date(s) Administered    COVID-19 12+ (Pfizer) 09/20/2024    COVID-19 Bivalent 18+ (Moderna) 10/25/2022    COVID-19 Monovalent 18+ (Moderna) 06/01/2022    Influenza (H1N1) 01/05/2010    Influenza (High Dose) Trivalent,PF (Fluzone) 09/20/2024    Influenza (IIV3) PF 10/17/2013    Influenza Vaccine 65+ (FLUAD) 11/01/2023    Influenza Vaccine 65+ (Fluzone HD) 11/01/2023    Influenza  "Vaccine, 6+MO IM (QUADRIVALENT W/PRESERVATIVES) 10/17/2013    Influenza, Split Virus, Trivalent, Pf (Fluzone\Fluarix) 10/01/2010    Influenza, seasonal, injectable, PF 10/01/2010    Mantoux Tuberculin Skin Test 04/13/2006    Pneumo Conj 13-V (2010&after) 09/11/2018    Pneumococcal 23 valent 10/01/2019    TDAP (Adacel,Boostrix) 04/28/2014    Zoster vaccine, live 10/06/2009        Wt Readings from Last 3 Encounters:   09/20/24 78.5 kg (173 lb 1.6 oz)   02/16/24 70.3 kg (155 lb)   12/05/23 83.3 kg (183 lb 9.6 oz)        BP Readings from Last 6 Encounters:   09/20/24 128/70   02/16/24 128/62   12/06/23 136/65   11/08/23 116/62   11/06/23 (!) 146/73   11/02/23 121/65        Hemoglobin A1C   Date Value Ref Range Status   01/08/2021 5.3 0 - 5.6 % Final     Comment:     Normal <5.7% Prediabetes 5.7-6.4%  Diabetes 6.5% or higher - adopted from ADA   consensus guidelines.     02/01/2013 5.8 4.2 - 6.1 % Final              PHYSICAL EXAM:    /70   Pulse 72   Temp 98.6  F (37  C)   Resp 22   Ht 1.6 m (5' 3\")   Wt 78.5 kg (173 lb 1.6 oz)   SpO2 96%   BMI 30.66 kg/m           General Appearance:    Alert, cooperative, no distress   Eyes:   No scleral icterus or conjunctival irritation       Lungs:     Clear to auscultation bilaterally, respirations unlabored, no wheezes or crackles   Heart:    Regular rate and rhythm,  No murmur   Extremities:  No edema, no joint swelling or redness, no evidence of any injuries   Skin:  No concerning skin findings, no suspicious moles, no rashes   Neurologic:  On gross examination there is no motor or sensory deficit.  Patient walks with a normal gait                                     "

## 2024-09-22 DIAGNOSIS — D64.9 ANEMIA, UNSPECIFIED TYPE: Primary | ICD-10-CM

## 2024-10-07 ENCOUNTER — TRANSFERRED RECORDS (OUTPATIENT)
Dept: HEALTH INFORMATION MANAGEMENT | Facility: CLINIC | Age: 84
End: 2024-10-07
Payer: COMMERCIAL

## 2024-10-09 ENCOUNTER — NURSE TRIAGE (OUTPATIENT)
Dept: FAMILY MEDICINE | Facility: CLINIC | Age: 84
End: 2024-10-09
Payer: COMMERCIAL

## 2024-10-09 DIAGNOSIS — G47.00 INSOMNIA, UNSPECIFIED TYPE: Primary | ICD-10-CM

## 2024-10-09 NOTE — TELEPHONE ENCOUNTER
S-(situation): Patient calling to request a medication for sleep.     B-(background): Patient reports she took trazodone in the past for sleep. Reports she recently started Lexapro - this has helped with her mood and anxiety, but not sleep.   Patient reports she discussed sleep concerns with PCP at last appointment.     A-(assessment): Patient reports she is having trouble falling asleep. Sometime she will fall asleep at 2 am and sometimes she wont fall asleep at all. Reports that once she does fall asleep, she can sleep for 10 hours.   This has been a concern for about 6 months.     R-(recommendations): Virtual appointment offered. Patient states she would first like PCP to review and provide recommendation.     During call patient asked for referral for counselor - advised a referral was already place and provided phone number.    Patient also wants PCP to know that she plans to follow-up with cardiology but they are booked out into February and not scheduling out that far yet.     Irma Riggins RN  [facility]

## 2024-10-10 RX ORDER — TRAZODONE HYDROCHLORIDE 50 MG/1
50 TABLET, FILM COATED ORAL AT BEDTIME
Qty: 30 TABLET | Refills: 3 | Status: SHIPPED | OUTPATIENT
Start: 2024-10-10

## 2024-10-10 NOTE — TELEPHONE ENCOUNTER
Please inform patient I have sent a prescription for trazodone to her pharmacy.  If she continues to have difficulty she should let me know and we will arrange for a time to talk in more detail about what we can do.

## 2024-10-30 DIAGNOSIS — J45.40 MODERATE PERSISTENT ASTHMA WITHOUT COMPLICATION: ICD-10-CM

## 2024-10-30 RX ORDER — MONTELUKAST SODIUM 10 MG/1
10 TABLET ORAL AT BEDTIME
Qty: 90 TABLET | Refills: 2 | Status: SHIPPED | OUTPATIENT
Start: 2024-10-30

## 2025-02-12 DIAGNOSIS — K21.9 GASTROESOPHAGEAL REFLUX DISEASE WITHOUT ESOPHAGITIS: ICD-10-CM

## 2025-02-12 RX ORDER — LANSOPRAZOLE 30 MG/1
30 CAPSULE, DELAYED RELEASE ORAL DAILY
Qty: 90 CAPSULE | Refills: 3 | Status: SHIPPED | OUTPATIENT
Start: 2025-02-12

## 2025-02-12 NOTE — TELEPHONE ENCOUNTER
02/12/2025    Pt states sometimes she takes 2 capsules a day. Pt is wondering if the new Rx can be for 180 capsules to show her taking 2 capsules a day. Pt states she is almost out of medication and would like this to be expedited if possible.        LANsoprazole (PREVACID) 30 MG DR capsule  30 mg, DAILY 3 ordered           Summary: Take 1 capsule (30 mg) by mouth daily, Disp-90 capsule, R-3, E-Prescribe        Kassandra Kelly

## 2025-02-17 ENCOUNTER — TELEPHONE (OUTPATIENT)
Dept: FAMILY MEDICINE | Facility: CLINIC | Age: 85
End: 2025-02-17
Payer: COMMERCIAL

## 2025-02-17 DIAGNOSIS — K21.9 GASTROESOPHAGEAL REFLUX DISEASE WITHOUT ESOPHAGITIS: ICD-10-CM

## 2025-02-17 DIAGNOSIS — F33.2 SEVERE EPISODE OF RECURRENT MAJOR DEPRESSIVE DISORDER, WITHOUT PSYCHOTIC FEATURES (H): ICD-10-CM

## 2025-02-17 DIAGNOSIS — F41.9 ANXIETY: ICD-10-CM

## 2025-02-17 NOTE — TELEPHONE ENCOUNTER
"  Drug Change Request      What is the current medication?:  Generic for Prilosec     What alternative is being requested?: pt would like a change in dosage     Why the request to change?:  pt ran out last week and insurance company wants pt to wait two weeks for a refill pt states that \"this is intolerable\", pt states that she would also like to speak to Peterson    [facility]:   [facility] - [place], [place] [facility] [address] [facility]  [address] [place] [address]  Phone: [phone] Fax: [phone]      Okay to leave a detailed message?: NO at Cell number on file:    Telephone Information:   Mobile [phone]     "

## 2025-02-18 RX ORDER — LANSOPRAZOLE 30 MG/1
30 CAPSULE, DELAYED RELEASE ORAL 2 TIMES DAILY
Qty: 180 CAPSULE | Refills: 1 | Status: SHIPPED | OUTPATIENT
Start: 2025-02-18

## 2025-02-18 RX ORDER — ESCITALOPRAM OXALATE 20 MG/1
20 TABLET ORAL DAILY
Qty: 90 TABLET | Refills: 3 | Status: SHIPPED | OUTPATIENT
Start: 2025-02-18

## 2025-02-18 NOTE — TELEPHONE ENCOUNTER
I called and left a message.    I will try to call her back later.    If she calls back please let me know

## 2025-02-18 NOTE — TELEPHONE ENCOUNTER
Spoke with Jody.    We will try to send in a new rx with updated sig for her pantoprazole.    She was almost out of her lexapro also.    I recommended a 6 month recheck (last visit 9/20/24).    She will schedule an appt.    Meds set up for review

## 2025-03-03 DIAGNOSIS — I21.21 ST ELEVATION MYOCARDIAL INFARCTION INVOLVING LEFT CIRCUMFLEX CORONARY ARTERY (H): ICD-10-CM

## 2025-03-03 RX ORDER — ROSUVASTATIN CALCIUM 40 MG/1
TABLET, COATED ORAL
Qty: 90 TABLET | Refills: 3 | Status: SHIPPED | OUTPATIENT
Start: 2025-03-03

## 2025-03-04 ENCOUNTER — TELEPHONE (OUTPATIENT)
Dept: FAMILY MEDICINE | Facility: CLINIC | Age: 85
End: 2025-03-04
Payer: COMMERCIAL

## 2025-03-04 NOTE — TELEPHONE ENCOUNTER
General Call    Contacts       Contact Date/Time Type Contact Phone/Fax    03/04/2025 01:49 PM CST Phone (Incoming) Jody Virgen (Self) [phone] (M)          Reason for Call:  medication question    What are your questions or concerns:  Pt called and wants to discuss changing her medication dosage on escitalopram (LEXAPRO) 20 MG tablet     Date of last appointment with provider: 09/20/2024    Okay to leave a detailed message?: Yes at Cell number on file:    Telephone Information:   Mobile [phone]     Kassandra Kelly

## 2025-03-04 NOTE — TELEPHONE ENCOUNTER
"S-(situation): Called patient to further discuss request to change Lexapro dose    B-(background):   Patient recently started escitalopram (prescribed at office visit with PCP on 9/3/24). Was previously on citalopram prior to changing medications    Hx: major depressive disorder, anxiety disorder    A-(assessment):   states \"this stuff is so subtle\"  thought prior medication \"wasn't working so well\", so changed to this medication  feels that the medication \"is not working\"  endorses irritability and feeling \"icky\"  not even dressed yet today  states \"clearly the antidepressant is not working\"    states she is a \"wreck\", waited too long to call  currently lacking motivation, states she feels like she \"has glue on her ass\"    declines nurse triage  does have upcoming appointment with Dr. Fairchild on 3/24, rescheduled for sooner appointment on 3/18 with PCP    Denies current suicidal or homicidal ideation    R-(recommendations): Patient states she either wants to increase her Lexapro dosage, or change to a different medication, as she feels this medication does not work.    Assisted with scheduling sooner appointment with PCP on 3/18. Advised that the above information will be sent to the provider to review and advise if any medication changes are appropriate prior to appointment. Advised to call back to clinic with any new or worsening symptoms.    Ok to leave a detailed message at mobile number on file.    Alanna Gill RN  [facility]   "

## 2025-03-04 NOTE — TELEPHONE ENCOUNTER
See response from the PCP, to the patient, on 3/4/25, regarding escitalopram dose.    Writer called the patient and relayed the above message to the patient, who verbalized understanding.    Patient stated that she is taking the bupropion 300 mg daily and that she is comfortable waiting until her appointment, with the PCP, on 3/18/25, to discuss any medication changes.    Denies other questions or concerns at this time.    Jennifer Zamora RN, BSN  [facility]

## 2025-03-04 NOTE — TELEPHONE ENCOUNTER
She is on the maximum dose of the escitalopram so we cannot increase it.  Please confirm she is taking the bupropion.  Please see if she is comfortable waiting until her scheduled appointment on the 18th to decide on any changes

## 2025-03-18 ENCOUNTER — OFFICE VISIT (OUTPATIENT)
Dept: FAMILY MEDICINE | Facility: CLINIC | Age: 85
End: 2025-03-18
Payer: COMMERCIAL

## 2025-03-18 VITALS
HEIGHT: 63 IN | TEMPERATURE: 98 F | WEIGHT: 181.8 LBS | SYSTOLIC BLOOD PRESSURE: 132 MMHG | RESPIRATION RATE: 24 BRPM | DIASTOLIC BLOOD PRESSURE: 76 MMHG | BODY MASS INDEX: 32.21 KG/M2 | HEART RATE: 76 BPM | OXYGEN SATURATION: 97 %

## 2025-03-18 DIAGNOSIS — F41.9 ANXIETY: ICD-10-CM

## 2025-03-18 DIAGNOSIS — J45.40 MODERATE PERSISTENT ASTHMA WITHOUT COMPLICATION: ICD-10-CM

## 2025-03-18 DIAGNOSIS — F33.2 SEVERE EPISODE OF RECURRENT MAJOR DEPRESSIVE DISORDER, WITHOUT PSYCHOTIC FEATURES (H): ICD-10-CM

## 2025-03-18 DIAGNOSIS — I25.10 ATHEROSCLEROSIS OF NATIVE CORONARY ARTERY OF NATIVE HEART WITHOUT ANGINA PECTORIS: ICD-10-CM

## 2025-03-18 DIAGNOSIS — N18.31 STAGE 3A CHRONIC KIDNEY DISEASE (H): ICD-10-CM

## 2025-03-18 DIAGNOSIS — I21.21 ST ELEVATION MYOCARDIAL INFARCTION INVOLVING LEFT CIRCUMFLEX CORONARY ARTERY (H): ICD-10-CM

## 2025-03-18 DIAGNOSIS — N18.32 STAGE 3B CHRONIC KIDNEY DISEASE (H): ICD-10-CM

## 2025-03-18 DIAGNOSIS — F32.5 MAJOR DEPRESSIVE DISORDER, SINGLE EPISODE, IN REMISSION: ICD-10-CM

## 2025-03-18 DIAGNOSIS — R06.09 CHRONIC DYSPNEA: ICD-10-CM

## 2025-03-18 DIAGNOSIS — Z96.642 S/P TOTAL LEFT HIP ARTHROPLASTY: ICD-10-CM

## 2025-03-18 DIAGNOSIS — D64.9 ANEMIA, UNSPECIFIED TYPE: ICD-10-CM

## 2025-03-18 DIAGNOSIS — G47.00 INSOMNIA, UNSPECIFIED TYPE: Primary | ICD-10-CM

## 2025-03-18 LAB
BASOPHILS # BLD AUTO: 0.1 10E3/UL (ref 0–0.2)
BASOPHILS NFR BLD AUTO: 1 %
EOSINOPHIL # BLD AUTO: 0.3 10E3/UL (ref 0–0.7)
EOSINOPHIL NFR BLD AUTO: 3 %
ERYTHROCYTE [DISTWIDTH] IN BLOOD BY AUTOMATED COUNT: 16.8 % (ref 10–15)
HCT VFR BLD AUTO: 29.3 % (ref 35–47)
HGB BLD-MCNC: 9 G/DL (ref 11.7–15.7)
IMM GRANULOCYTES # BLD: 0 10E3/UL
IMM GRANULOCYTES NFR BLD: 0 %
LYMPHOCYTES # BLD AUTO: 1.4 10E3/UL (ref 0.8–5.3)
LYMPHOCYTES NFR BLD AUTO: 15 %
MCH RBC QN AUTO: 23.4 PG (ref 26.5–33)
MCHC RBC AUTO-ENTMCNC: 30.7 G/DL (ref 31.5–36.5)
MCV RBC AUTO: 76 FL (ref 78–100)
MONOCYTES # BLD AUTO: 0.8 10E3/UL (ref 0–1.3)
MONOCYTES NFR BLD AUTO: 8 %
NEUTROPHILS # BLD AUTO: 6.6 10E3/UL (ref 1.6–8.3)
NEUTROPHILS NFR BLD AUTO: 72 %
PLATELET # BLD AUTO: 364 10E3/UL (ref 150–450)
RBC # BLD AUTO: 3.85 10E6/UL (ref 3.8–5.2)
WBC # BLD AUTO: 9.2 10E3/UL (ref 4–11)

## 2025-03-18 PROCEDURE — 36415 COLL VENOUS BLD VENIPUNCTURE: CPT | Performed by: FAMILY MEDICINE

## 2025-03-18 PROCEDURE — 85025 COMPLETE CBC W/AUTO DIFF WBC: CPT | Performed by: FAMILY MEDICINE

## 2025-03-18 RX ORDER — SERTRALINE HYDROCHLORIDE 25 MG/1
TABLET, FILM COATED ORAL
Qty: 49 TABLET | Refills: 0 | Status: SHIPPED | OUTPATIENT
Start: 2025-03-18 | End: 2025-04-15

## 2025-03-18 RX ORDER — ROSUVASTATIN CALCIUM 40 MG/1
40 TABLET, COATED ORAL DAILY
Status: SHIPPED
Start: 2025-03-18

## 2025-03-18 RX ORDER — MIRTAZAPINE 7.5 MG/1
7.5 TABLET, FILM COATED ORAL AT BEDTIME
Qty: 30 TABLET | Refills: 2 | Status: SHIPPED | OUTPATIENT
Start: 2025-03-18

## 2025-03-18 ASSESSMENT — ANXIETY QUESTIONNAIRES
7. FEELING AFRAID AS IF SOMETHING AWFUL MIGHT HAPPEN: SEVERAL DAYS
1. FEELING NERVOUS, ANXIOUS, OR ON EDGE: MORE THAN HALF THE DAYS
6. BECOMING EASILY ANNOYED OR IRRITABLE: NEARLY EVERY DAY
5. BEING SO RESTLESS THAT IT IS HARD TO SIT STILL: SEVERAL DAYS
GAD7 TOTAL SCORE: 13
GAD7 TOTAL SCORE: 13
7. FEELING AFRAID AS IF SOMETHING AWFUL MIGHT HAPPEN: SEVERAL DAYS
GAD7 TOTAL SCORE: 13
2. NOT BEING ABLE TO STOP OR CONTROL WORRYING: MORE THAN HALF THE DAYS
3. WORRYING TOO MUCH ABOUT DIFFERENT THINGS: MORE THAN HALF THE DAYS
8. IF YOU CHECKED OFF ANY PROBLEMS, HOW DIFFICULT HAVE THESE MADE IT FOR YOU TO DO YOUR WORK, TAKE CARE OF THINGS AT HOME, OR GET ALONG WITH OTHER PEOPLE?: VERY DIFFICULT
4. TROUBLE RELAXING: MORE THAN HALF THE DAYS
IF YOU CHECKED OFF ANY PROBLEMS ON THIS QUESTIONNAIRE, HOW DIFFICULT HAVE THESE PROBLEMS MADE IT FOR YOU TO DO YOUR WORK, TAKE CARE OF THINGS AT HOME, OR GET ALONG WITH OTHER PEOPLE: VERY DIFFICULT

## 2025-03-18 ASSESSMENT — ASTHMA QUESTIONNAIRES
QUESTION_1 LAST FOUR WEEKS HOW MUCH OF THE TIME DID YOUR ASTHMA KEEP YOU FROM GETTING AS MUCH DONE AT WORK, SCHOOL OR AT HOME: ALL OF THE TIME
QUESTION_5 LAST FOUR WEEKS HOW WOULD YOU RATE YOUR ASTHMA CONTROL: WELL CONTROLLED
QUESTION_2 LAST FOUR WEEKS HOW OFTEN HAVE YOU HAD SHORTNESS OF BREATH: MORE THAN ONCE A DAY
ACT_TOTALSCORE: 13
QUESTION_3 LAST FOUR WEEKS HOW OFTEN DID YOUR ASTHMA SYMPTOMS (WHEEZING, COUGHING, SHORTNESS OF BREATH, CHEST TIGHTNESS OR PAIN) WAKE YOU UP AT NIGHT OR EARLIER THAN USUAL IN THE MORNING: ONCE OR TWICE
ACT_TOTALSCORE: 13
QUESTION_4 LAST FOUR WEEKS HOW OFTEN HAVE YOU USED YOUR RESCUE INHALER OR NEBULIZER MEDICATION (SUCH AS ALBUTEROL): TWO OR THREE TIMES PER WEEK

## 2025-03-18 ASSESSMENT — PAIN SCALES - GENERAL: PAINLEVEL_OUTOF10: NO PAIN (0)

## 2025-03-18 ASSESSMENT — PATIENT HEALTH QUESTIONNAIRE - PHQ9
10. IF YOU CHECKED OFF ANY PROBLEMS, HOW DIFFICULT HAVE THESE PROBLEMS MADE IT FOR YOU TO DO YOUR WORK, TAKE CARE OF THINGS AT HOME, OR GET ALONG WITH OTHER PEOPLE: SOMEWHAT DIFFICULT
SUM OF ALL RESPONSES TO PHQ QUESTIONS 1-9: 15
SUM OF ALL RESPONSES TO PHQ QUESTIONS 1-9: 15

## 2025-03-18 NOTE — PROGRESS NOTES
"Krystal Virgen  /76   Pulse 76   Temp 98  F (36.7  C)   Resp 24   Ht 1.6 m (5' 3\")   Wt 82.5 kg (181 lb 12.8 oz)   SpO2 97%   BMI 32.20 kg/m       Assessment/Plan:                Jody was seen today for depression and shortness of breath.    Diagnoses and all orders for this visit:    Insomnia, unspecified type  -     mirtazapine (REMERON) 7.5 MG tablet; Take 1 tablet (7.5 mg) by mouth at bedtime.    Severe episode of recurrent major depressive disorder, without psychotic features (H)    Anxiety  -     mirtazapine (REMERON) 7.5 MG tablet; Take 1 tablet (7.5 mg) by mouth at bedtime.  -     sertraline (ZOLOFT) 25 MG tablet; Take 1 tablet (25 mg) by mouth daily for 7 days, THEN 2 tablets (50 mg) daily for 21 days.    ST elevation myocardial infarction involving left circumflex coronary artery (H)  -     rosuvastatin (CRESTOR) 40 MG tablet; Take 1 tablet (40 mg) by mouth daily.    Stage 3b chronic kidney disease (H)    Atherosclerosis of native coronary artery of native heart without angina pectoris    Stage 3a chronic kidney disease (H)  -     Basic metabolic panel; Future  -     CBC with Platelets & Differential; Future  -     Basic metabolic panel  -     CBC with Platelets & Differential    Anemia, unspecified type  -     CBC with Platelets & Differential; Future  -     CBC with Platelets & Differential    Moderate persistent asthma without complication  -     Occupational Therapy  Referral; Future    Major depressive disorder, single episode, in remission  -     sertraline (ZOLOFT) 25 MG tablet; Take 1 tablet (25 mg) by mouth daily for 7 days, THEN 2 tablets (50 mg) daily for 21 days.    Chronic dyspnea  -     Occupational Therapy  Referral; Future    S/P total left hip arthroplasty  -     Occupational Therapy  Referral; Future         DISCUSSION  See discussion below.  Follow-up with me in 1 month.  Subjective:     HPI:    Krystal Virgen is a 84 year old female she has " a history of coronary artery disease and severe asthma.  She is chronically short of breath.  She gets winded easily.  Does not necessarily get hypoxic based on oximetry monitoring but is limited in her activity because of dyspnea.  Patient reports a little change in her overall symptom picture.  We discussed the importance of follow-up with her pulmonologist as well as cardiology team to ensure that there are not any other means of evaluation and/or treatment that we should be considering based on her symptom picture.  Patient also would benefit from increased mobility for help with activities of daily living as well as just general transportation by utilizing an electric wheelchair because of her chronic dyspnea.  We discussed referral to occupational therapy for this purpose.    She has a significant history of major depression, anxiety and insomnia.  She is currently on bupropion 300 mg daily, escitalopram 20 mg daily.  She is not currently taking trazodone.    She reports depression symptoms.  PHQ-9 score 15.  She reports significant anxiety concurrently.  She reports a lot of irritability.  Current medications have not generally been helpful.  Previous medications tried have included duloxetine citalopram.  Today we discussed options for optimizing medical treatment.  We have elected to pursue the following.  Taper off of escitalopram.  Continue bupropion.  Start sertraline in place of escitalopram.  May also try low-dose mirtazapine at night to help with sleep and anxiety at night.  Patient is provided with written instructions.    She has chronic kidney disease and anemia these have not been assessed for several months we discussed obtaining lab testing today.    ROS:  Complete review of systems is obtained.  Other than the specific considerations noted above complete review of systems is negative.          Objective:   Medications:  Current Outpatient Medications   Medication Sig Dispense Refill     acetaminophen (TYLENOL) 500 MG tablet Take 1,000 mg by mouth every 8 hours as needed      albuterol (PROAIR HFA/PROVENTIL HFA/VENTOLIN HFA) 108 (90 Base) MCG/ACT inhaler USE 2 INHALATIONS ORALLY   EVERY 6 HOURS AS NEEDED 54 g 1    aspirin 81 MG EC tablet Take 1 tablet (81 mg) by mouth 2 times daily 60 tablet 0    budeson-glycopyrrol-formoterol (BREZTRI AEROSPHERE) 160-9-4.8 MCG/ACT AERO inhaler Inhale 2 puffs into the lungs 2 times daily.      budesonide-formoterol (SYMBICORT) 160-4.5 MCG/ACT inhaler Inhale 2 puffs into the lungs 2 times daily as needed      buPROPion (WELLBUTRIN XL) 300 MG 24 hr tablet Take 1 tablet (300 mg) by mouth every morning 90 tablet 2    escitalopram (LEXAPRO) 20 MG tablet Take 1 tablet (20 mg) by mouth daily. 90 tablet 3    ferrous sulfate (FE TABS) 325 (65 Fe) MG EC tablet Take 1 tablet (325 mg) by mouth daily 30 tablet 3    fluticasone (FLONASE) 50 MCG/ACT nasal spray Spray 1-2 sprays into both nostrils daily as needed      LANsoprazole (PREVACID) 30 MG DR capsule Take 1 capsule (30 mg) by mouth 2 times daily. 180 capsule 1    mirtazapine (REMERON) 7.5 MG tablet Take 1 tablet (7.5 mg) by mouth at bedtime. 30 tablet 2    montelukast (SINGULAIR) 10 MG tablet Take 1 tablet (10 mg) by mouth at bedtime 90 tablet 2    nitroGLYcerin (NITROSTAT) 0.4 MG sublingual tablet For chest pain place 1 tablet under the tongue every 5 minutes for 3 doses. If symptoms persist 5 minutes after 1st dose call 911. 25 tablet 0    rosuvastatin (CRESTOR) 40 MG tablet Take 1 tablet (40 mg) by mouth daily.      senna-docusate (SENOKOT-S/PERICOLACE) 8.6-50 MG tablet Take 1-2 tablets by mouth 2 times daily Take while on oral narcotics to prevent or treat constipation. 15 tablet 0    sertraline (ZOLOFT) 25 MG tablet Take 1 tablet (25 mg) by mouth daily for 7 days, THEN 2 tablets (50 mg) daily for 21 days. 49 tablet 0    Vitamin D3 (CHOLECALCIFEROL) 125 MCG (5000 UT) tablet Take 1 tablet (125 mcg) by MOUTH daily 30  tablet 11    guaiFENesin-codeine (ROBITUSSIN AC) 100-10 MG/5ML solution TAKE 10 ML BY MOUTH EVERY 4 HOURS AS NEEDED (COUGH). 473 mL 0     No current facility-administered medications for this visit.        Allergies:     Allergies   Allergen Reactions    Ticagrelor Other (See Comments)     Dyspnea    Penicillins      Amoxicillin doesn't work for her    Sulfa Antibiotics Rash        Social History     Socioeconomic History    Marital status:      Spouse name: Not on file    Number of children: Not on file    Years of education: Not on file    Highest education level: Not on file   Occupational History    Not on file   Tobacco Use    Smoking status: Never    Smokeless tobacco: Never   Vaping Use    Vaping status: Never Used   Substance and Sexual Activity    Alcohol use: No    Drug use: No    Sexual activity: Not on file   Other Topics Concern    Parent/sibling w/ CABG, MI or angioplasty before 65F 55M? Not Asked   Social History Narrative    Not on file     Social Drivers of Health     Financial Resource Strain: Low Risk  (10/20/2023)    Financial Resource Strain     Within the past 12 months, have you or your family members you live with been unable to get utilities (heat, electricity) when it was really needed?: No   Food Insecurity: Low Risk  (10/20/2023)    Food Insecurity     Within the past 12 months, did you worry that your food would run out before you got money to buy more?: No     Within the past 12 months, did the food you bought just not last and you didn t have money to get more?: No   Transportation Needs: Low Risk  (10/20/2023)    Transportation Needs     Within the past 12 months, has lack of transportation kept you from medical appointments, getting your medicines, non-medical meetings or appointments, work, or from getting things that you need?: No   Physical Activity: Not on file   Stress: Not on file   Social Connections: Not on file   Interpersonal Safety: Not on file   Housing Stability:  "Low Risk  (10/20/2023)    Housing Stability     Do you have housing? : Yes     Are you worried about losing your housing?: No       Family History   Problem Relation Age of Onset    Breast Cancer Mother 78    Heart Disease Father     Macular Degeneration Father     Breast Cancer Sister 48    Heart Disease Brother     Breast Cancer Maternal Aunt 35        passed at 84y not of breast    Breast Cancer Paternal Aunt 48        passed away from breast        Most Recent Immunizations   Administered Date(s) Administered    COVID-19 12+ (Pfizer) 09/20/2024    COVID-19 Bivalent 18+ (Moderna) 10/25/2022    COVID-19 Monovalent 18+ (Moderna) 06/01/2022    Influenza (H1N1) 01/05/2010    Influenza (High Dose) Trivalent,PF (Fluzone) 09/20/2024    Influenza (IIV3) PF 10/17/2013    Influenza (prior to 2024) 10/01/2010    Influenza Vaccine 65+ (FLUAD) 11/01/2023    Influenza Vaccine 65+ (Fluzone HD) 11/01/2023    Influenza Vaccine, 6+MO IM (QUADRIVALENT W/PRESERVATIVES) 10/17/2013    Influenza, Split Virus, Trivalent, Pf (Fluzone\Fluarix) 10/01/2010    Mantoux Tuberculin Skin Test 04/13/2006    Pneumo Conj 13-V (2010&after) 09/11/2018    Pneumococcal 20 valent Conjugate (Prevnar 20) 11/13/2024    Pneumococcal 23 valent 10/01/2019    TDAP (Adacel,Boostrix) 04/28/2014    Zoster vaccine, live 10/06/2009        Wt Readings from Last 3 Encounters:   03/18/25 82.5 kg (181 lb 12.8 oz)   09/20/24 78.5 kg (173 lb 1.6 oz)   02/16/24 70.3 kg (155 lb)        BP Readings from Last 6 Encounters:   03/18/25 132/76   09/20/24 128/70   02/16/24 128/62   12/06/23 136/65   11/08/23 116/62   11/06/23 (!) 146/73        Hemoglobin A1C   Date Value Ref Range Status   01/08/2021 5.3 0 - 5.6 % Final     Comment:     Normal <5.7% Prediabetes 5.7-6.4%  Diabetes 6.5% or higher - adopted from ADA   consensus guidelines.     02/01/2013 5.8 4.2 - 6.1 % Final              PHYSICAL EXAM:    /76   Pulse 76   Temp 98  F (36.7  C)   Resp 24   Ht 1.6 m (5' 3\") "   Wt 82.5 kg (181 lb 12.8 oz)   SpO2 97%   BMI 32.20 kg/m       General: Patient noted no signs of distress.    Heart: Regular rate and rhythm no murmur    Lungs: Good air movement throughout no wheeze or crackle                              Answers submitted by the patient for this visit:  Patient Health Questionnaire (Submitted on 3/18/2025)  If you checked off any problems, how difficult have these problems made it for you to do your work, take care of things at home, or get along with other people?: Somewhat difficult  PHQ9 TOTAL SCORE: 15  Patient Health Questionnaire (G7) (Submitted on 3/18/2025)  LUIS MANUEL 7 TOTAL SCORE: 13  Depression / Anxiety Questionnaire (Submitted on 3/18/2025)  Chief Complaint: Chronic problems general questions HPI Form  Depression/Anxiety: Depression & Anxiety  Depression & Anxiety (Submitted on 3/18/2025)  Chief Complaint: Chronic problems general questions HPI Form  Status since last visit:: bad  Anxiety since last: : bad  Other associated symptoms of depression:: Yes  Other associated symotome: : Yes  Significant life event: : relationship concerns, financial concerns, grief or loss  Anxious:: Yes  Current substance use:: No  General Questionnaire (Submitted on 3/18/2025)  Chief Complaint: Chronic problems general questions HPI Form  How many servings of fruits and vegetables do you eat daily?: 2-3  On average, how many sweetened beverages do you drink each day (Examples: soda, juice, sweet tea, etc.  Do NOT count diet or artificially sweetened beverages)?: 0  How many minutes a day do you exercise enough to make your heart beat faster?: 9 or less  How many days a week do you exercise enough to make your heart beat faster?: 3 or less  How many days per week do you miss taking your medication?: 0  Questionnaire about: Chronic problems general questions HPI Form (Submitted on 3/18/2025)  Chief Complaint: Chronic problems general questions HPI Form

## 2025-03-18 NOTE — PATIENT INSTRUCTIONS
Taper escitalopram as follows:    Take a 20 mg tablet alternating with 10 mg (1/2 tablet of 20 mg) for 4 days.    Then,    Take 10 mg (1/2 tablet of 20 mg) daily for 4 days.    Then,    Take a 10 mg alternating with a 5 mg tablet for 4 days.     Then,    Take 5 mg daily for 4 days    Then,    Stop      Upon stopping escitalopram start sertraline 25 mg once daily.  Take this for 1 week then increase to 50 mg once daily until her follow-up visit.    Do not take trazodone.    Start new medication mirtazapine 7.5 mg once daily at bedtime to help with sleep and other mental health symptoms.

## 2025-03-19 LAB
ANION GAP SERPL CALCULATED.3IONS-SCNC: 12 MMOL/L (ref 7–15)
BUN SERPL-MCNC: 20.4 MG/DL (ref 8–23)
CALCIUM SERPL-MCNC: 9.5 MG/DL (ref 8.8–10.4)
CHLORIDE SERPL-SCNC: 105 MMOL/L (ref 98–107)
CREAT SERPL-MCNC: 1.36 MG/DL (ref 0.51–0.95)
EGFRCR SERPLBLD CKD-EPI 2021: 38 ML/MIN/1.73M2
GLUCOSE SERPL-MCNC: 119 MG/DL (ref 70–99)
HCO3 SERPL-SCNC: 23 MMOL/L (ref 22–29)
POTASSIUM SERPL-SCNC: 4.7 MMOL/L (ref 3.4–5.3)
SODIUM SERPL-SCNC: 140 MMOL/L (ref 135–145)

## 2025-03-20 ENCOUNTER — NURSE TRIAGE (OUTPATIENT)
Dept: FAMILY MEDICINE | Facility: CLINIC | Age: 85
End: 2025-03-20
Payer: COMMERCIAL

## 2025-03-20 DIAGNOSIS — J20.9 ACUTE BRONCHITIS, UNSPECIFIED ORGANISM: ICD-10-CM

## 2025-03-20 DIAGNOSIS — J45.40 MODERATE PERSISTENT ASTHMA WITHOUT COMPLICATION: ICD-10-CM

## 2025-03-20 RX ORDER — PREDNISONE 10 MG/1
TABLET ORAL
Qty: 30 TABLET | Refills: 0 | Status: SHIPPED | OUTPATIENT
Start: 2025-03-20 | End: 2025-04-01

## 2025-03-20 RX ORDER — DOXYCYCLINE 100 MG/1
100 CAPSULE ORAL 2 TIMES DAILY
Qty: 20 CAPSULE | Refills: 0 | Status: SHIPPED | OUTPATIENT
Start: 2025-03-20 | End: 2025-03-30

## 2025-03-20 NOTE — TELEPHONE ENCOUNTER
Secure Chat sent to Dr. Gallego he is aware and will address as soon as possible.     Brett Ovalles RN  [facility]

## 2025-03-20 NOTE — TELEPHONE ENCOUNTER
Nurse Triage SBAR    Is this a 2nd Level Triage? YES, LICENSED PRACTITIONER REVIEW IS REQUIRED    Situation: Patient is having a cough and bringing up greenish tinged phlegm.She was just in to see PCP on Tuesday and at that time had just a tickle in her throat.  Yesterday started coughing.    Background:   Medical history includes chronic rhinitis, SOB, asthma, CKD.  She reports that she gets this frequently.  Last time she had bronchitis was in May and was given doxycycline and prednisone and it was helpful.   She states that Dr. Fairchild knows her very well.    Assessment: Patient is not having a fever, no runny nose, denies any chest pain.  Cough does sound tight and is productive with greenish phlegm.  Denies hemoptysis.       Protocol Recommended Disposition:   Home Care advice given.  Patient does not want to come in to be seen and does not have an active MyChart.  She states that she knows if it is bronchitis or pneumonia and if she suspects pneumonia she will go in.    Recommendation: Advised appointment but refuses. This happens frequently, last time was 5/20/24 and had a phone call with PCP who prescribed doxycycline and prednisone.    Routed to provider  CONSTANTINE Larose RN       Does the patient meet one of the following criteria for ADS visit consideration? 16+ years old, with an [facility] PCP     TIP  Providers, please consider if this condition is appropriate for management at one of our Acute and Diagnostic Services sites.     If patient is a good candidate, please use dotphrase <dot>triageresponse and select Refer to ADS to document.    Reason for Disposition   Cough    Additional Information   Negative: SEVERE difficulty breathing (e.g., struggling for each breath, speaks in single words)   Negative: Bluish (or gray) lips or face now   Negative: [1] Difficulty breathing AND [2] exposure to flames, smoke, or fumes   Negative: [1] Stridor AND [2] difficulty breathing   Negative: Sounds like a  life-threatening emergency to the triager   Negative: [1] Previous asthma attacks AND [2] this feels like asthma attack   Negative: Dry cough (non-productive;  no sputum or minimal clear sputum)   Negative: [1] MODERATE difficulty breathing (e.g., speaks in phrases, SOB even at rest, pulse 100-120) AND [2] still present when not coughing   Negative: Chest pain  (Exception: MILD central chest pain, present only when coughing.)   Negative: Patient sounds very sick or weak to the triager   Negative: [1] MILD difficulty breathing (e.g., minimal/no SOB at rest, SOB with walking, pulse <100) AND [2] still present when not coughing   Negative: [1] Coughed up blood AND [2] > 1 tablespoon (15 ml)   (Exception: Blood-tinged sputum.)   Negative: Fever > 103 F (39.4 C)   Negative: [1] Fever > 101 F (38.3 C) AND [2] age > 60 years   Negative: [1] Fever > 100.0 F (37.8 C) AND [2] bedridden (e.g., CVA, chronic illness, recovering from surgery)   Negative: [1] Fever > 100.0 F (37.8 C) AND [2] diabetes mellitus or weak immune system (e.g., HIV positive, cancer chemo, splenectomy, organ transplant, chronic steroids)   Negative: Wheezing is present   Negative: SEVERE coughing spells (e.g., whooping sound after coughing, vomiting after coughing)   Negative: [1] Continuous (nonstop) coughing interferes with work or school AND [2] no improvement using cough treatment per Care Advice   Negative: Coughing up ulises-colored (reddish-brown) sputum   Negative: Fever present > 3 days (72 hours)   Negative: [1] Fever returns after gone for over 24 hours AND [2] symptoms worse or not improved   Negative: [1] Using nasal washes and pain medicine > 24 hours AND [2] sinus pain (around cheekbone or eye) persists   Negative: Earache   Negative: [1] Known COPD or other severe lung disease (i.e., bronchiectasis, cystic fibrosis, lung surgery) AND [2] worsening symptoms (i.e., increased sputum purulence or amount, increased breathing difficulty    "Negative: Cough has been present for > 3 weeks   Negative: [1] Nasal discharge AND [2] present > 10 days   Negative: [1] Coughed up blood-tinged sputum AND [2] more than once   Negative: Exposure to TB (Tuberculosis)    Answer Assessment - Initial Assessment Questions  1. ONSET: \"When did the cough begin?\"       Cough started on Tuesday - just a tickle then and then yesterday worsened a lot  2. SEVERITY: \"How bad is the cough today?\"       Pretty bad  3. SPUTUM: \"Describe the color of your sputum\" (none, dry cough; clear, white, yellow, green)      green  4. HEMOPTYSIS: \"Are you coughing up any blood?\" If so ask: \"How much?\" (flecks, streaks, tablespoons, etc.)      no  5. DIFFICULTY BREATHING: \"Are you having difficulty breathing?\" If Yes, ask: \"How bad is it?\" (e.g., mild, moderate, severe)     - MILD: No SOB at rest, mild SOB with walking, speaks normally in sentences, can lie down, no retractions, pulse < 100.     - MODERATE: SOB at rest, SOB with minimal exertion and prefers to sit, cannot lie down flat, speaks in phrases, mild retractions, audible wheezing, pulse 100-120.     - SEVERE: Very SOB at rest, speaks in single words, struggling to breathe, sitting hunched forward, retractions, pulse > 120       Yes, but not worsening.  Her typical is shortness of breath due to asthma  6. FEVER: \"Do you have a fever?\" If Yes, ask: \"What is your temperature, how was it measured, and when did it start?\"      no  7. CARDIAC HISTORY: \"Do you have any history of heart disease?\" (e.g., heart attack, congestive heart failure)       Cardiac issues  8. LUNG HISTORY: \"Do you have any history of lung disease?\"  (e.g., pulmonary embolus, asthma, emphysema)      Yes, asthma and shortness of breath  9. PE RISK FACTORS: \"Do you have a history of blood clots?\" (or: recent major surgery, recent prolonged travel, bedridden)      no  10. OTHER SYMPTOMS: \"Do you have any other symptoms?\" (e.g., runny nose, wheezing, chest pain)        No " "- just coughing and feeling poorly  11. PREGNANCY: \"Is there any chance you are pregnant?\" \"When was your last menstrual period?\"        no  12. TRAVEL: \"Have you traveled out of the country in the last month?\" (e.g., travel history, exposures)        no    Protocols used: Cough - Acute Gixpjeduto-N-JE    "

## 2025-03-20 NOTE — TELEPHONE ENCOUNTER
Reviewed triage note.  Agree to doxycycline 100 mg twice daily x 10 days plus prednisone taper as previously utilized.  Prescriptions sent to local pharmacy.  Please ensure follow-up in office if persistent or worsening symptoms.

## 2025-03-20 NOTE — TELEPHONE ENCOUNTER
TJ West called Jody on 3/20 and left a message to return call to clinic.      TC if patient returns call please relay message from Dr. Gallego below.     Brett Ovalles RN  [facility]

## 2025-03-20 NOTE — TELEPHONE ENCOUNTER
03/20/2025    Pt returned call. TC relayed message. Pt understood message.     Nothing else needed at this time.    Kassandra Kelly

## 2025-03-26 ENCOUNTER — TELEPHONE (OUTPATIENT)
Dept: SCHEDULING | Facility: CLINIC | Age: 85
End: 2025-03-26
Payer: COMMERCIAL

## 2025-03-26 DIAGNOSIS — J45.51 SEVERE PERSISTENT ASTHMA WITH (ACUTE) EXACERBATION (H): Primary | ICD-10-CM

## 2025-03-26 RX ORDER — AZITHROMYCIN 250 MG/1
TABLET, FILM COATED ORAL
Qty: 6 TABLET | Refills: 0 | Status: SHIPPED | OUTPATIENT
Start: 2025-03-26 | End: 2025-03-31

## 2025-03-26 NOTE — TELEPHONE ENCOUNTER
Contacted pt.  - she really only wants to speak to Dr Fairchild or Peterson.  Did try to have her speak to a triage nurse.  Pt did decline again.

## 2025-03-26 NOTE — TELEPHONE ENCOUNTER
Patient Returning Call    Reason for call:  Pt would like or Dr Fairchild or Dr. Fairchild assistant Peterson to call her back. She is following up on the meds prescribed. Still is short on breath. Only wants to speak with Dr. Fairchild or Peterson. Denied speak to triage.     Information relayed to patient:  NA     Patient has additional questions:  No      Okay to leave a detailed message?: N/A at Cell number on file:    Telephone Information:   Mobile [phone]

## 2025-03-27 ENCOUNTER — TELEPHONE (OUTPATIENT)
Dept: FAMILY MEDICINE | Facility: CLINIC | Age: 85
End: 2025-03-27
Payer: COMMERCIAL

## 2025-03-27 DIAGNOSIS — J45.51 SEVERE PERSISTENT ASTHMA WITH (ACUTE) EXACERBATION (H): ICD-10-CM

## 2025-03-27 DIAGNOSIS — J45.40 MODERATE PERSISTENT ASTHMA WITHOUT COMPLICATION: Primary | ICD-10-CM

## 2025-03-27 RX ORDER — AMOXICILLIN 500 MG/1
500 CAPSULE ORAL 2 TIMES DAILY
Qty: 20 CAPSULE | Refills: 0 | Status: SHIPPED | OUTPATIENT
Start: 2025-03-27

## 2025-03-27 NOTE — TELEPHONE ENCOUNTER
Called and let patient know that Dr. Fairchild had ordered Amoxicillin Capsules and she is going to call the pharmacy and have them delivered.     Brett Ovalles RN  [facility]

## 2025-03-27 NOTE — TELEPHONE ENCOUNTER
Refills sent. -ejb    ----- Message from MARCUS Guzmán Junior sent at 5/7/2021  3:04 PM CDT -----  Regarding: Medication Refill  Patient has already contacted their pharmacy. The medication or refill issue is below:    Primary Cardiologist: Tristen  Medication: Plavix, Crestor & Metoprolol  Issue / Concern: Patient would like refill sent to mail order service.   Preferred Pharmacy/City: [facility] - [place], [place] - [facility] [address] AT [address]AL TO [address] [facility]  Best Phone Number for Patient: [phone]    Additional Info:          [Leg Weakness] : leg weakness [Numbness] : numbness [Tingling] : tingling [As Noted in HPI] : as noted in HPI [Limb Pain] : limb pain [Negative] : Endocrine [Abdominal Pain] : no abdominal pain [Vomiting] : no vomiting [Diarrhea] : no diarrhea [Incontinence] : no incontinence [Easy Bleeding] : no tendency for easy bleeding [Easy Bruising] : no tendency for easy bruising

## 2025-03-27 NOTE — TELEPHONE ENCOUNTER
Patient calling to follow up as she wants to start treatment. Patient states she has a pill cutter but she does not have the hand strength to do it and even tried crushing it.

## 2025-03-27 NOTE — TELEPHONE ENCOUNTER
Medication Question or Refill    Contacts       Contact Date/Time Type Contact Phone/Fax    03/27/2025 02:01 PM CDT Phone (Incoming) Jody Virgen (Self) [phone] (M)            What medication are you calling about (include dose and sig)?: Amoxicillin-Clavulanate 875-125 Tablet    Preferred Pharmacy:   [facility] - [place], [place] - [address] [facility]  [address]  [place] [address]  Phone: [phone] Fax: [phone]      Controlled Substance Agreement on file:   CSA -- Patient Level:    CSA: None found at the patient level.       Who prescribed the medication?: Dr. Fairchild    Do you need a refill? No    When did you use the medication last?     Patient offered an appointment? No    Do you have any questions or concerns?  Yes: Patient stated that the pill is to big for her to swallow, crush,smash, or cut and is wanting a different Rx.that she can handle.      Okay to leave a detailed message?: Yes at Home number on file [phone] (home)

## 2025-03-29 ENCOUNTER — TELEPHONE (OUTPATIENT)
Dept: NURSING | Facility: CLINIC | Age: 85
End: 2025-03-29
Payer: COMMERCIAL

## 2025-03-29 NOTE — TELEPHONE ENCOUNTER
FYI    Pt is calling and would like to have a message sent to provider:     Pt wants to let you know that she thinks you saved her life again and 10,000 thank you's for not making me go to the hospital!    I look forward to talking with you soon!    Pt states that she feels medications are working.     No triage     Haleigh Ambrosio RN  [place] Nurse Advisor  2:13 PM 3/29/2025

## 2025-04-17 ENCOUNTER — TELEPHONE (OUTPATIENT)
Dept: FAMILY MEDICINE | Facility: CLINIC | Age: 85
End: 2025-04-17

## 2025-04-17 NOTE — TELEPHONE ENCOUNTER
Medication Question or Refill      What medication are you calling about (include dose and sig)?: prednisone    Preferred Pharmacy:    [facility] [place] [facility] - [place], [place] - [address]  [address]  [place] [address]  Phone: [phone] Fax: [phone]      Who prescribed the medication?: Dr Fairchild    Do you need a refill? No    When did you use the medication last? today    Patient offered an appointment? No    Do you have any questions or concerns?  Yes: Pt states she's reporting to Dr Fairchild that she is back on prednisone. She tapered off x 2 days but her breathing got worse so she started back on it Tuesday 4/15/25 and will continue this- 30mg daily. She declined triage as she states that this happens a lot and she just wanted PCP to be aware.       Okay to leave a detailed message?: N/A

## 2025-04-18 ENCOUNTER — NURSE TRIAGE (OUTPATIENT)
Dept: FAMILY MEDICINE | Facility: CLINIC | Age: 85
End: 2025-04-18
Payer: COMMERCIAL

## 2025-04-18 DIAGNOSIS — J45.51 SEVERE PERSISTENT ASTHMA WITH (ACUTE) EXACERBATION (H): ICD-10-CM

## 2025-04-18 RX ORDER — PREDNISONE 10 MG/1
TABLET ORAL
Qty: 34 TABLET | Refills: 0 | Status: SHIPPED | OUTPATIENT
Start: 2025-04-18 | End: 2025-05-04

## 2025-04-18 NOTE — TELEPHONE ENCOUNTER
Nurse Triage SBAR    Is this a 2nd Level Triage? YES, LICENSED PRACTITIONER REVIEW IS REQUIRED    Situation: Patient was on antibiotic and prednisone for pneumonia. Did the taper so was done with prednisone on Tuesday along with the antibiotic.  She messaged yesterday reporting that she started back on prednisone, but reports today is not any better and is still short of breath.    Background: History of asthma, SOB, chronic rhinitis, coronary atherosclerosis.    Assessment:  O2 sats 97% at rest, pulse 85.  She is using her inhalers per ordered and has used her prn albuterol twice yesterday.  Denies any fever or increased productive cough, states is fatigued. She states she feels like she did previously before being treated, not any worse, but not getting any better.        Protocol Recommended Disposition:   See in Office Today - Patient declined coming in, stating that she too tired to do this and would rather have Dr. Fairchild address it.      Recommendation: Please call patient and advise on treatment per Dr. Fairchild.      CONSTANTINE Larose RN     Routed to provider    Does the patient meet one of the following criteria for ADS visit consideration? 16+ years old, with an [facility] PCP     TIP  Providers, please consider if this condition is appropriate for management at one of our Acute and Diagnostic Services sites.     If patient is a good candidate, please use dotphrase <dot>triageresponse and select Refer to ADS to document.    Reason for Disposition   Taking antibiotic > 48 hours (2 days) for pneumonia and breathing not improved    Additional Information   Negative: SEVERE difficulty breathing (e.g., struggling for each breath, speaks in single words)   Negative: Bluish (or gray) lips or face now   Negative: Difficult to awaken or acting confused (e.g., disoriented, slurred speech)   Negative: Stridor (harsh sound while breathing in)   Negative: Slow, shallow and weak breathing   Negative: Sounds like a  "life-threatening emergency to the triager   Negative: Main symptom is coughing up blood (Exception: Blood-tinged sputum.)   Negative: New-onset or worsening chest pain   Negative: Oxygen level (e.g., pulse oximetry) 90% or lower   Negative: MODERATE difficulty breathing (e.g., speaks in phrases, SOB even at rest, pulse 100-120)   Negative: Fever > 104 F (40 C)   Negative: Taking antibiotic > 24 hours for pneumonia and fever > 103 F (39.4 C)   Negative: Coughed up blood and > 1 tablespoon (15 ml)   (Exception: Blood-tinged sputum.)   Negative: Patient sounds very sick or weak to the triager   Negative: Diabetes mellitus or weak immune system (e.g., HIV positive, chemotherapy, splenectomy, organ transplant, chronic steroids) and new-onset of fever > 100 F (37.8 C)   Negative: Taking antibiotic > 24 hours for pneumonia and breathing is worse   Negative: Recent medical visit within 24 hours and symptoms worse  (Exception: Fever is worse.)   Negative: Oxygen level (e.g., pulse oximetry) 91 to 94%   Negative: Taking antibiotic > 24 hours for pneumonia and new-onset of fever   Negative: Taking antibiotic > 48 hours (2 days) for pneumonia and fever persists or recurs    Answer Assessment - Initial Assessment Questions  1. SYMPTOM: \"What's the main symptom you're concerned about?\" (e.g., breathing difficulty, fever, weakness)      Breathing difficulty  2. ONSET: \"When did the  shortness of breath  start?\"      When prednisone was only 1 tab left - last Friday  3. BETTER-SAME-WORSE: \"Are you getting better, staying the same, or getting worse compared to the day you were discharged?\"      About the same  4. BREATHING DIFFICULTY: \"Are you having any difficulty breathing?\" If Yes, ask: \"How bad is it?\"  (e.g., none, mild, moderate, severe)       At rest oxygenation is 97%  5. FEVER: \"Do you have a fever?\" If Yes, ask: \"What is your temperature, how was it measured, and when did it start?\"      no  6. SPUTUM: \"Describe the color " "of your sputum\" (clear, white, yellow, green, blood-tinged)      no  7. DIAGNOSIS CONFIRMATION: \"When was the pneumonia diagnosed?\" \"By whom?\"         8. ANTIBIOTIC: \"Are you taking an antibiotic?\"  If Yes, ask: \"Which one?\" \"When was it started?\"      Done and so is prednisone  9. OTHER TREATMENT: \"Are you receiving any other treatment for the pneumonia?\" (e.g., albuterol nebulizer, oxygen) If Yes, ask: \"How often?\" and \"Does it help?\"      Yes and using it - symbicort BID and used albuterol inhaler twice daily  10. HOSPITAL ADMISSION: \"Were you hospitalized for this pneumonia?\" If Yes, ask: \"When were you discharged home from the hospital?\"           11. O2 SATURATION MONITOR:  \"Do you use an oxygen saturation monitor (pulse oximeter) at home?\" If Yes, \"What is your reading (oxygen level) today?\" \"What is your usual oxygen saturation reading?\" (e.g., 95%)        97    Protocols used: Pneumonia Follow-up Call-A-OH    "

## 2025-04-18 NOTE — TELEPHONE ENCOUNTER
Called and spoke with patient.  Likely having exacerbation of underlying asthma.  No signs of any infectious etiology.  Elected to ramp up her current steroid dose to 40 mg and then taper down over the next 16 days.  Patient cautioned to seek emergent care if symptoms are not controlled with this plan.  She will continue to use her inhaler medications.  She will inform her pulmonologist of her current symptom concerns.  She has a follow-up appointment scheduled with me for next week.

## 2025-04-22 NOTE — TELEPHONE ENCOUNTER
Incoming call from patient. She wants Dr. Fairchild to know that she is supposed to start tapering down on her prednisone but she is going to stay taking 40 mg until her appointment on Friday. Reports she has not noticed an improvement in her symptoms yet so she wants to stay on the higher dose.     Routing to provider as FYI.

## 2025-04-22 NOTE — TELEPHONE ENCOUNTER
Writer huddled with the PCP, who stated that he will be seeing the patient on Friday, 4/25/25, and he is aware of the information from 4/22/25.    Jennifer Zamora RN, BSN  [facility]

## 2025-04-25 ENCOUNTER — ANCILLARY PROCEDURE (OUTPATIENT)
Dept: GENERAL RADIOLOGY | Facility: CLINIC | Age: 85
End: 2025-04-25
Attending: FAMILY MEDICINE
Payer: COMMERCIAL

## 2025-04-25 PROCEDURE — 71046 X-RAY EXAM CHEST 2 VIEWS: CPT | Mod: TC | Performed by: RADIOLOGY

## 2025-04-29 ENCOUNTER — TELEPHONE (OUTPATIENT)
Dept: ONCOLOGY | Facility: HOSPITAL | Age: 85
End: 2025-04-29
Payer: COMMERCIAL

## 2025-04-29 NOTE — TELEPHONE ENCOUNTER
Patient calls looking to schedule a follow up appointment with Dr. Zavala regarding her iron deficiency anemia. She was seen by her primary care provider on 4/25/25 and had lab work done. Last office visit with Dr. Zavala was 8/24/23. Patient is not taking an oral iron supplement.    Patient will be set up with an appointment in clinic. No lab work is needed prior to visit at this time. Patient is warm transferred to scheduling staff.    Rae Thakur RN

## 2025-04-30 ENCOUNTER — ONCOLOGY VISIT (OUTPATIENT)
Dept: ONCOLOGY | Facility: HOSPITAL | Age: 85
End: 2025-04-30
Attending: INTERNAL MEDICINE
Payer: COMMERCIAL

## 2025-04-30 VITALS
BODY MASS INDEX: 32.07 KG/M2 | HEART RATE: 99 BPM | DIASTOLIC BLOOD PRESSURE: 70 MMHG | OXYGEN SATURATION: 96 % | TEMPERATURE: 97.3 F | RESPIRATION RATE: 20 BRPM | WEIGHT: 181 LBS | HEIGHT: 63 IN | SYSTOLIC BLOOD PRESSURE: 120 MMHG

## 2025-04-30 DIAGNOSIS — K25.7: ICD-10-CM

## 2025-04-30 DIAGNOSIS — D50.0 IRON DEFICIENCY ANEMIA DUE TO CHRONIC BLOOD LOSS: Primary | ICD-10-CM

## 2025-04-30 PROCEDURE — G0463 HOSPITAL OUTPT CLINIC VISIT: HCPCS | Performed by: INTERNAL MEDICINE

## 2025-04-30 RX ORDER — ALBUTEROL SULFATE 0.83 MG/ML
2.5 SOLUTION RESPIRATORY (INHALATION)
OUTPATIENT
Start: 2025-05-07

## 2025-04-30 RX ORDER — EPINEPHRINE 1 MG/ML
0.3 INJECTION, SOLUTION INTRAMUSCULAR; SUBCUTANEOUS EVERY 5 MIN PRN
OUTPATIENT
Start: 2025-05-07

## 2025-04-30 RX ORDER — DIPHENHYDRAMINE HYDROCHLORIDE 50 MG/ML
50 INJECTION, SOLUTION INTRAMUSCULAR; INTRAVENOUS
Start: 2025-05-07

## 2025-04-30 RX ORDER — MEPERIDINE HYDROCHLORIDE 25 MG/ML
25 INJECTION INTRAMUSCULAR; INTRAVENOUS; SUBCUTANEOUS
OUTPATIENT
Start: 2025-05-07

## 2025-04-30 RX ORDER — HEPARIN SODIUM (PORCINE) LOCK FLUSH IV SOLN 100 UNIT/ML 100 UNIT/ML
5 SOLUTION INTRAVENOUS
OUTPATIENT
Start: 2025-05-07

## 2025-04-30 RX ORDER — HEPARIN SODIUM,PORCINE 10 UNIT/ML
5-20 VIAL (ML) INTRAVENOUS DAILY PRN
Status: CANCELLED | OUTPATIENT
Start: 2025-05-07

## 2025-04-30 RX ORDER — METHYLPREDNISOLONE SODIUM SUCCINATE 40 MG/ML
40 INJECTION INTRAMUSCULAR; INTRAVENOUS
Status: CANCELLED
Start: 2025-05-07

## 2025-04-30 RX ORDER — HEPARIN SODIUM,PORCINE 10 UNIT/ML
5-20 VIAL (ML) INTRAVENOUS DAILY PRN
OUTPATIENT
Start: 2025-05-07

## 2025-04-30 RX ORDER — DIPHENHYDRAMINE HYDROCHLORIDE 50 MG/ML
50 INJECTION, SOLUTION INTRAMUSCULAR; INTRAVENOUS
Status: CANCELLED
Start: 2025-05-07

## 2025-04-30 RX ORDER — EPINEPHRINE 1 MG/ML
0.3 INJECTION, SOLUTION INTRAMUSCULAR; SUBCUTANEOUS EVERY 5 MIN PRN
Status: CANCELLED | OUTPATIENT
Start: 2025-05-07

## 2025-04-30 RX ORDER — ALBUTEROL SULFATE 90 UG/1
1-2 INHALANT RESPIRATORY (INHALATION)
Status: CANCELLED
Start: 2025-05-07

## 2025-04-30 RX ORDER — ALBUTEROL SULFATE 90 UG/1
1-2 INHALANT RESPIRATORY (INHALATION)
Start: 2025-05-07

## 2025-04-30 RX ORDER — DIPHENHYDRAMINE HYDROCHLORIDE 50 MG/ML
25 INJECTION, SOLUTION INTRAMUSCULAR; INTRAVENOUS
Status: CANCELLED
Start: 2025-05-07

## 2025-04-30 RX ORDER — HEPARIN SODIUM (PORCINE) LOCK FLUSH IV SOLN 100 UNIT/ML 100 UNIT/ML
5 SOLUTION INTRAVENOUS
Status: CANCELLED | OUTPATIENT
Start: 2025-05-07

## 2025-04-30 RX ORDER — DIPHENHYDRAMINE HYDROCHLORIDE 50 MG/ML
25 INJECTION, SOLUTION INTRAMUSCULAR; INTRAVENOUS
Start: 2025-05-07

## 2025-04-30 RX ORDER — ALBUTEROL SULFATE 0.83 MG/ML
2.5 SOLUTION RESPIRATORY (INHALATION)
Status: CANCELLED | OUTPATIENT
Start: 2025-05-07

## 2025-04-30 RX ORDER — METHYLPREDNISOLONE SODIUM SUCCINATE 40 MG/ML
40 INJECTION INTRAMUSCULAR; INTRAVENOUS
Start: 2025-05-07

## 2025-04-30 RX ORDER — MEPERIDINE HYDROCHLORIDE 25 MG/ML
25 INJECTION INTRAMUSCULAR; INTRAVENOUS; SUBCUTANEOUS
Status: CANCELLED | OUTPATIENT
Start: 2025-05-07

## 2025-04-30 ASSESSMENT — PAIN SCALES - GENERAL: PAINLEVEL_OUTOF10: NO PAIN (0)

## 2025-04-30 NOTE — LETTER
"4/30/2025      Krystal Virgen  [address]  [place] [address]      Dear Colleague,    Thank you for referring your patient, Krystal Virgen, to the [facility]. Please see a copy of my visit note below.    Oncology Rooming Note    April 30, 2025 3:16 PM   Krystal Virgen is a 84 year old female who presents for:    Chief Complaint   Patient presents with     Oncology Clinic Visit     Malignant neoplasm of upper-outer quadrant of right female breast      Initial Vitals: Resp 20   Ht 1.6 m (5' 2.99\")   Wt 82.1 kg (181 lb)   BMI 32.07 kg/m   Estimated body mass index is 32.07 kg/m  as calculated from the following:    Height as of this encounter: 1.6 m (5' 2.99\").    Weight as of this encounter: 82.1 kg (181 lb). Body surface area is 1.91 meters squared.  Data Unavailable Comment: Data Unavailable   No LMP recorded. Patient is postmenopausal.  Allergies reviewed: Yes  Medications reviewed: Yes    Medications: Medication refills not needed today.  Pharmacy name entered into [employer]: [facility] - [place], [place] - [address]    Frailty Screening:   Is the patient here for a new oncology consult visit in cancer care? 2. No    PHQ9:  Did this patient require a PHQ9?: No      Clinical concerns: Patient claims she needs iron       Kizzy Botello MA              [facility] Hematology and Oncology Progress Note    Patient: Krystal Virgen  MRN: [id number]  Date of Service: Apr 30, 2025         Reason for Visit    Chief Complaint   Patient presents with     Oncology Clinic Visit     Malignant neoplasm of upper-outer quadrant of right female breast        Assessment and Plan     Cancer Staging   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)  Staging form: Breast, AJCC 7th Edition  - Pathologic stage from 9/8/2017: Stage IA (T1c, N0, cM0) - Signed by Herbert Zavala MD on 5/13/2022  ER Status: Positive  MD Status: " Positive  HER2 Status: Negative      ECOG Performance    1 - Can't do physically strenuous work, but fully ambyulatory and can do light sedentary work     Pain  Pain Score: No Pain (0)    #.  History of microcytic anemia with iron deficiency.   She was getting IV iron periodically.  #.  Large hiatal hernia with John's erosion  #.  CKD-3     Colonoscopy on 11/8/17 showed diverticulosis without evidence of source of bleeding. EGD from 12/14/2017 showed a large hiatal hernia with John's erosion.      I reviewed her hospitalization in [facility] with anemia and GI bleeding and anemia with hemoglobin drop to 7.7 g/dL.  I reviewed the EGD (6/28/2023) and it showed findings of large hiatal hernia but no John lesions.  There was no evidence of blood or current active bleeding.  Colonoscopy (6/29/2023) showed old maroon clotted blood found in the rectal, in the sigmoid colon, in the descending colon and in the transverse colon.  There are multiple diverticular without evidence of diverticulosis.  She was found 2 sessile polyps in the mid ascending colon and ileocecal valve.  She received 2 units of packed RBC on 6/29/2023 and 7/1/2023.     Today, I reviewed her labs which is c/w iron deficiency anemia. John Lesions are ikely contributing to the patient's iron deficiency, which may be exacerbating her breathing difficulties.    Plan:  - Arrange iron infusion appointments at [facility].  - Schedule follow-up labs in about two to three months to check iron levels.  - Monitor hemoglobin periodically to ensure it does not drop.  - Utilize virtual visits as needed for convenience. Follow up with me annual basis or as needed.       #.  History of invasive ductal carcinoma of the right breast.  Stage IA (pT1c, pN0, cM0), grade 1, ER/NY strongly positive, HER2/Uriel negative, with positive inferior margin. Associated DCIS. S/p right breast lumpectomy and right sentinel lymph node biopsy on 8/10/2017.  She declined reexcision or  adjuvant radiation treatment.  She started anastrozole in October 2017, then switched to tamoxifen in December 2018 due to intolerable vasomotor symptoms and not to compromise her bone density.  Completed 5 years of adjuvant tamoxifen in October 2022.   I reviewed the annual screening mammogram from 9/2024 and it was benign.       Encounter Diagnoses:    Problem List Items Addressed This Visit       John lesion, chronic    Iron deficiency anemia due to chronic blood loss - Primary    Relevant Orders    CBC with platelets    Ferritin    Iron & Iron Binding Capacity          CC: Juan C Fairchild MD, MD   ______________________________________________________________________________    History of Present Illness    History of Present Illness-  Jody Virgen, an 84-year-old female, reports significant difficulty breathing, which she attributes to a history of pneumonia that she has been unable to fully recover from. She has been on prednisone since March 17, 2025, and has been tapering down to 10 mg as of today. Despite this, she continues to experience breathing difficulties.    She mentions discovering that she has no iron in her blood, which she believes might be contributing to her current health issues. She has a history of John lesions, which have previously caused bleeding. A few years ago, she underwent a comprehensive workup that revealed significant blood loss at the time. She has not noticed any black-colored stools recently.    Her appetite has increased significantly, which she attributes to the prednisone, and she finds herself constantly thinking about food. She has previously received IV iron treatments and did not experience any adverse reactions. She expresses concern about a potential leak that might require further testing.    Jody feels extremely fatigued and weak after being sick for two months, to the point where she can hardly hold her head up. Her hemoglobin level was noted to be 10, which  she feels is contributing to her breathing difficulties. She is eager for relief and is concerned about the burden of frequent visits on her friends who assist her.    She resides on [address] and mentions that the [facility] is closer to her home.     She primarily follows up with Dr. Juan C Fairchild for her checkups, as her pulmonologist is difficult to reach.     Review of systems  Apart from describing in HPI, the remainder of comprehensive ROS was negative.    Past History    Past Medical History:   Diagnosis Date     Anemia      Arthritis      Asthma      Asthma      Asthma with acute exacerbation 08/23/2021    Formatting of this note might be different from the original. Created by Conversion  [employer] updated for latest IMO load     Breast cancer (H) 2017     Cardiac arrhythmia, unspecified 10/31/2023     Chronic bronchitis (H)      Chronic sinusitis      COPD (chronic obstructive pulmonary disease) (H)      Depression      Difficulty in walking, not elsewhere classified 10/31/2023     GERD (gastroesophageal reflux disease)      Hiatal hernia      Hypertension      Migraines      Osteopenia      Osteoporosis      Overactive bladder      Pneumococcal infection      Pneumonia      PONV (postoperative nausea and vomiting)      Renal disease      Sinusitis      Spinal headache      ST elevation MI (STEMI) (H) 01/07/2021     Stented coronary artery        Past Surgical History:   Procedure Laterality Date     ARTHRODESIS FOOT  11/11/2011    Procedure:ARTHRODESIS FOOT; Right First Metatarsophalangeal Joint Fusion with Second and Third Clawtoe Repairs; Surgeon:SARAH CASTRO; Location:[facility] OR     ARTHROPLASTY HIP ANTERIOR Left 10/27/2023    Procedure: LEFT TOTAL HIP ARTHROPLASTY DIRECT ANTERIOR;  Surgeon: Jairo Cornejo MD;  Location: [facility] Main OR     BIOPSY BREAST       CHOLECYSTECTOMY       CHOLECYSTECTOMY       CV CORONARY ANGIOGRAM N/A 1/7/2021    Procedure: Coronary Angiogram;  Surgeon:  "Slade Yu MD;  Location:  [facility] CARD[facility] CATH LAB     CV PCI STENT DRUG ELUTING N/A 1/7/2021    Procedure: Percutaneous Coronary Intervention Stent Drug Eluting;  Surgeon: Slade Yu MD;  Location:  HEART CARDIAC CATH LAB     ENT SURGERY      sinus surgery x1     FOOT SURGERY       GYN SURGERY      benign hysterectomy age 43     HYSTERECTOMY  1984     IRRIGATION AND DEBRIDEMENT HIP, COMBINED Left 12/5/2023    Procedure: LEFT HIP IRRIGATION AND DEBRIDEMENT;  Surgeon: Jairo Cornejo MD;  Location: [facility] Main OR     LUMPECTOMY BREAST Right 2017     ORTHOPEDIC SURGERY      fusion of grest toe right     SINUS SURGERY         Physical Exam    /70 (BP Location: Left arm, Patient Position: Sitting, Cuff Size: Adult Regular)   Pulse 99   Temp 97.3  F (36.3  C) (Tympanic)   Resp 20   Ht 1.6 m (5' 2.99\")   Wt 82.1 kg (181 lb)   SpO2 96%   BMI 32.07 kg/m      General: alert, awake, not in acute distress  HEENT: Head: Normal, normocephalic, atraumatic.  Eye: Normal external eye, conjunctiva, lids cornea, RINKU.  Pharynx: Normal buccal mucosa. Normal pharynx.  Neck / Thyroid: Supple, no masses, nodes, nodules or enlargement.  Abdomen: abdomen is soft without significant tenderness, masses, organomegaly or guarding  Extremities: normal strength, tone, and muscle mass  Skin: normal. no rash or abnormalities  CNS: non focal.    Lab Results    Recent Results (from the past week)   EKG 12-lead, tracing only   Result Value Ref Range    Systolic Blood Pressure  mmHg    Diastolic Blood Pressure  mmHg    Ventricular Rate 81 BPM    Atrial Rate 81 BPM    OH Interval 144 ms    QRS Duration 86 ms     ms    QTc 390 ms    P Axis 21 degrees    R AXIS -7 degrees    T Axis 110 degrees    Interpretation ECG       Sinus rhythm  ST & T wave abnormality, consider lateral ischemia  Abnormal ECG  When compared with ECG of 20-Oct-2023 14:42,  No significant change was found  Confirmed by NIKOLAS FLORES MD " LOC:[facility]GIL ([id number]) on 4/27/2025 6:32:09 AM     Iron & Iron Binding Capacity   Result Value Ref Range    Iron 32 (L) 37 - 145 ug/dL    Iron Binding Capacity 367 240 - 430 ug/dL    Iron Sat Index 9 (L) 15 - 46 %   Ferritin   Result Value Ref Range    Ferritin 18 11 - 328 ng/mL   Vitamin B12   Result Value Ref Range    Vitamin B12 324 232 - 1,245 pg/mL   TSH with free T4 reflex   Result Value Ref Range    TSH 3.12 0.30 - 4.20 uIU/mL   Comprehensive metabolic panel   Result Value Ref Range    Sodium 140 135 - 145 mmol/L    Potassium 4.9 3.4 - 5.3 mmol/L    Carbon Dioxide (CO2) 24 22 - 29 mmol/L    Anion Gap 13 7 - 15 mmol/L    Urea Nitrogen 30.1 (H) 8.0 - 23.0 mg/dL    Creatinine 1.60 (H) 0.51 - 0.95 mg/dL    GFR Estimate 32 (L) >60 mL/min/1.73m2    Calcium 9.6 8.8 - 10.4 mg/dL    Chloride 103 98 - 107 mmol/L    Glucose 91 70 - 99 mg/dL    Alkaline Phosphatase 66 40 - 150 U/L    AST 31 0 - 45 U/L    ALT 33 0 - 50 U/L    Protein Total 6.3 (L) 6.4 - 8.3 g/dL    Albumin 4.0 3.5 - 5.2 g/dL    Bilirubin Total 0.4 <=1.2 mg/dL   BNP-N terminal pro   Result Value Ref Range    N Terminal Pro BNP Outpatient 2,745 (H) 0 - 1,800 pg/mL   CBC with platelets and differential   Result Value Ref Range    WBC Count 19.6 (H) 4.0 - 11.0 10e3/uL    RBC Count 4.38 3.80 - 5.20 10e6/uL    Hemoglobin 10.0 (L) 11.7 - 15.7 g/dL    Hematocrit 33.1 (L) 35.0 - 47.0 %    MCV 76 (L) 78 - 100 fL    MCH 22.8 (L) 26.5 - 33.0 pg    MCHC 30.2 (L) 31.5 - 36.5 g/dL    RDW 18.2 (H) 10.0 - 15.0 %    Platelet Count 417 150 - 450 10e3/uL    % Neutrophils 87 %    % Lymphocytes 6 %    % Monocytes 5 %    % Eosinophils 0 %    % Basophils 0 %    % Immature Granulocytes 2 %    Absolute Neutrophils 17.1 (H) 1.6 - 8.3 10e3/uL    Absolute Lymphocytes 1.2 0.8 - 5.3 10e3/uL    Absolute Monocytes 0.9 0.0 - 1.3 10e3/uL    Absolute Eosinophils 0.0 0.0 - 0.7 10e3/uL    Absolute Basophils 0.0 0.0 - 0.2 10e3/uL    Absolute Immature Granulocytes 0.3 <=0.4 10e3/uL       Imaging    XR  Chest 2 Views    Result Date: 4/25/2025  EXAM: XR CHEST 2 VIEWS LOCATION: [facility] DATE: 4/25/2025 INDICATION:  Dyspnea COMPARISON: 1/7/2021     IMPRESSION: Interstitial airspace opacity right middle lobe, likely reflective of atelectasis and volume loss versus pneumonitis. No definite effusion. Chronic coarse interstitial airspace opacities in the upper lobes, consistent with underlying chronic scarring. Large esophageal hiatal hernia. Cardiac size and pulmonary vascularity upper limits normal. No pneumothorax.     The longitudinal plan of care for the diagnosis(es)/condition(s) as documented were addressed during this visit. Due to the added complexity in care, I will continue to support Jody in the subsequent management and with ongoing continuity of care.     30 minutes spent by me on the date of the encounter doing chart review, history and exam, documentation and further activities as noted above.    Consent was obtained from the patient to use an AI documentation tool in the creation of this note.    Signed by: Herbert Zavala MD      Again, thank you for allowing me to participate in the care of your patient.        Sincerely,        Herbert Zavala MD    Electronically signed

## 2025-04-30 NOTE — PROGRESS NOTES
"Oncology Rooming Note    April 30, 2025 3:16 PM   Krystal Virgen is a 84 year old female who presents for:    Chief Complaint   Patient presents with    Oncology Clinic Visit     Malignant neoplasm of upper-outer quadrant of right female breast      Initial Vitals: Resp 20   Ht 1.6 m (5' 2.99\")   Wt 82.1 kg (181 lb)   BMI 32.07 kg/m   Estimated body mass index is 32.07 kg/m  as calculated from the following:    Height as of this encounter: 1.6 m (5' 2.99\").    Weight as of this encounter: 82.1 kg (181 lb). Body surface area is 1.91 meters squared.  Data Unavailable Comment: Data Unavailable   No LMP recorded. Patient is postmenopausal.  Allergies reviewed: Yes  Medications reviewed: Yes    Medications: Medication refills not needed today.  Pharmacy name entered into [facility]: [facility] - [place], [place] - [facility] [address]    Frailty Screening:   Is the patient here for a new oncology consult visit in cancer care? 2. No    PHQ9:  Did this patient require a PHQ9?: No      Clinical concerns: Patient claims she needs amarilys Botello MA            "

## 2025-04-30 NOTE — PROGRESS NOTES
[facility]ology [facility] Oncology Progress Note    Patient: Krystal Virgen  MRN: [id number]  Date of Service: Apr 30, 2025         Reason for Visit    Chief Complaint   Patient presents with    Oncology Clinic Visit     Malignant neoplasm of upper-outer quadrant of right female breast        Assessment and Plan     Cancer Staging   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)  Staging form: Breast, AJCC 7th Edition  - Pathologic stage from 9/8/2017: Stage IA (T1c, N0, cM0) - Signed by Herbert Zavala MD on 5/13/2022  ER Status: Positive  CO Status: Positive  HER2 Status: Negative      ECOG Performance    1 - Can't do physically strenuous work, but fully ambyulatory and can do light sedentary work     Pain  Pain Score: No Pain (0)    #.  History of microcytic anemia with iron deficiency.   She was getting IV iron periodically.  #.  Large hiatal hernia with John's erosion  #.  CKD-3     Colonoscopy on 11/8/17 showed diverticulosis without evidence of source of bleeding. EGD from 12/14/2017 showed a large hiatal hernia with John's erosion.      I reviewed her hospitalization in [facility] with anemia and GI bleeding and anemia with hemoglobin drop to 7.7 g/dL.  I reviewed the EGD (6/28/2023) and it showed findings of large hiatal hernia but no John lesions.  There was no evidence of blood or current active bleeding.  Colonoscopy (6/29/2023) showed old maroon clotted blood found in the rectal, in the sigmoid colon, in the descending colon and in the transverse colon.  There are multiple diverticular without evidence of diverticulosis.  She was found 2 sessile polyps in the mid ascending colon and ileocecal valve.  She received 2 units of packed RBC on 6/29/2023 and 7/1/2023.     Today, I reviewed her labs which is c/w iron deficiency anemia. John Lesions are ikely contributing to the patient's iron deficiency, which may be exacerbating her breathing difficulties.    Plan:  -  Arrange iron infusion appointments at [facility].  - Schedule follow-up labs in about two to three months to check iron levels.  - Monitor hemoglobin periodically to ensure it does not drop.  - Utilize virtual visits as needed for convenience. Follow up with me annual basis or as needed.       #.  History of invasive ductal carcinoma of the right breast.  Stage IA (pT1c, pN0, cM0), grade 1, ER/NM strongly positive, HER2/Uriel negative, with positive inferior margin. Associated DCIS. S/p right breast lumpectomy and right sentinel lymph node biopsy on 8/10/2017.  She declined reexcision or adjuvant radiation treatment.  She started anastrozole in October 2017, then switched to tamoxifen in December 2018 due to intolerable vasomotor symptoms and not to compromise her bone density.  Completed 5 years of adjuvant tamoxifen in October 2022.   I reviewed the annual screening mammogram from 9/2024 and it was benign.       Encounter Diagnoses:    Problem List Items Addressed This Visit       John lesion, chronic    Iron deficiency anemia due to chronic blood loss - Primary    Relevant Orders    CBC with platelets    Ferritin    Iron & Iron Binding Capacity          CC: Juan C Fairchild MD, MD   ______________________________________________________________________________    History of Present Illness    History of Present Illness-  Jody Virgen, an 84-year-old female, reports significant difficulty breathing, which she attributes to a history of pneumonia that she has been unable to fully recover from. She has been on prednisone since March 17, 2025, and has been tapering down to 10 mg as of today. Despite this, she continues to experience breathing difficulties.    She mentions discovering that she has no iron in her blood, which she believes might be contributing to her current health issues. She has a history of John lesions, which have previously caused bleeding. A few years ago, she underwent a comprehensive workup  that revealed significant blood loss at the time. She has not noticed any black-colored stools recently.    Her appetite has increased significantly, which she attributes to the prednisone, and she finds herself constantly thinking about food. She has previously received IV iron treatments and did not experience any adverse reactions. She expresses concern about a potential leak that might require further testing.    Jody feels extremely fatigued and weak after being sick for two months, to the point where she can hardly hold her head up. Her hemoglobin level was noted to be 10, which she feels is contributing to her breathing difficulties. She is eager for relief and is concerned about the burden of frequent visits on her friends who assist her.    She resides on [facility] [place] and mentions that the [facility] is closer to her home.     She primarily follows up with Dr. Juan C Fairchild for her checkups, as her pulmonologist is difficult to reach.     Review of systems  Apart from describing in HPI, the remainder of comprehensive ROS was negative.    Past History    Past Medical History:   Diagnosis Date    Anemia     Arthritis     Asthma     Asthma     Asthma with acute exacerbation 08/23/2021    Formatting of this note might be different from the original. Created by Conversion  Replacement Utility updated for latest IMO load    Breast cancer (H) 2017    Cardiac arrhythmia, unspecified 10/31/2023    Chronic bronchitis (H)     Chronic sinusitis     COPD (chronic obstructive pulmonary disease) (H)     Depression     Difficulty in walking, not elsewhere classified 10/31/2023    GERD (gastroesophageal reflux disease)     Hiatal hernia     Hypertension     Migraines     Osteopenia     Osteoporosis     Overactive bladder     Pneumococcal infection     Pneumonia     PONV (postoperative nausea and vomiting)     Renal disease     Sinusitis     Spinal headache     ST elevation MI (STEMI) (H) 01/07/2021    Stented  "coronary artery        Past Surgical History:   Procedure Laterality Date    ARTHRODESIS FOOT  11/11/2011    Procedure:ARTHRODESIS FOOT; Right First Metatarsophalangeal Joint Fusion with Second and Third Clawtoe Repairs; Surgeon:SARAH CASTRO; Location:[facility] OR    ARTHROPLASTY HIP ANTERIOR Left 10/27/2023    Procedure: LEFT TOTAL HIP ARTHROPLASTY DIRECT ANTERIOR;  Surgeon: Jairo Cornejo MD;  Location: [facility] Main OR    BIOPSY BREAST      CHOLECYSTECTOMY      CHOLECYSTECTOMY      CV CORONARY ANGIOGRAM N/A 1/7/2021    Procedure: Coronary Angiogram;  Surgeon: Slade Yu MD;  Location:  [facility] CARDIAC CATH LAB    CV PCI STENT DRUG ELUTING N/A 1/7/2021    Procedure: Percutaneous Coronary Intervention Stent Drug Eluting;  Surgeon: Slade uY MD;  Location: [facility] CARDIAC CATH LAB    ENT SURGERY      sinus surgery x1    FOOT SURGERY      GYN SURGERY      benign hysterectomy age 43    HYSTERECTOMY  1984    IRRIGATION AND DEBRIDEMENT HIP, COMBINED Left 12/5/2023    Procedure: LEFT HIP IRRIGATION AND DEBRIDEMENT;  Surgeon: Jairo Cornejo MD;  Location: [facility] Main OR    LUMPECTOMY BREAST Right 2017    ORTHOPEDIC SURGERY      fusion of grest toe right    SINUS SURGERY         Physical Exam    /70 (BP Location: Left arm, Patient Position: Sitting, Cuff Size: Adult Regular)   Pulse 99   Temp 97.3  F (36.3  C) (Tympanic)   Resp 20   Ht 1.6 m (5' 2.99\")   Wt 82.1 kg (181 lb)   SpO2 96%   BMI 32.07 kg/m      General: alert, awake, not in acute distress  HEENT: Head: Normal, normocephalic, atraumatic.  Eye: Normal external eye, conjunctiva, lids cornea, RINKU.  Pharynx: Normal buccal mucosa. Normal pharynx.  Neck / Thyroid: Supple, no masses, nodes, nodules or enlargement.  Abdomen: abdomen is soft without significant tenderness, masses, organomegaly or guarding  Extremities: normal strength, tone, and muscle mass  Skin: normal. no rash or abnormalities  CNS: non focal.    Lab " Results    Recent Results (from the past week)   EKG 12-lead, tracing only   Result Value Ref Range    Systolic Blood Pressure  mmHg    Diastolic Blood Pressure  mmHg    Ventricular Rate 81 BPM    Atrial Rate 81 BPM    ND Interval 144 ms    QRS Duration 86 ms     ms    QTc 390 ms    P Axis 21 degrees    R AXIS -7 degrees    T Axis 110 degrees    Interpretation ECG       Sinus rhythm  ST & T wave abnormality, consider lateral ischemia  Abnormal ECG  When compared with ECG of 20-Oct-2023 14:42,  No significant change was found  Confirmed by NIKOLAS FLORES MD LOC:JN ([id number]) on 4/27/2025 6:32:09 AM     Iron & Iron Binding Capacity   Result Value Ref Range    Iron 32 (L) 37 - 145 ug/dL    Iron Binding Capacity 367 240 - 430 ug/dL    Iron Sat Index 9 (L) 15 - 46 %   Ferritin   Result Value Ref Range    Ferritin 18 11 - 328 ng/mL   Vitamin B12   Result Value Ref Range    Vitamin B12 324 232 - 1,245 pg/mL   TSH with free T4 reflex   Result Value Ref Range    TSH 3.12 0.30 - 4.20 uIU/mL   Comprehensive metabolic panel   Result Value Ref Range    Sodium 140 135 - 145 mmol/L    Potassium 4.9 3.4 - 5.3 mmol/L    Carbon Dioxide (CO2) 24 22 - 29 mmol/L    Anion Gap 13 7 - 15 mmol/L    Urea Nitrogen 30.1 (H) 8.0 - 23.0 mg/dL    Creatinine 1.60 (H) 0.51 - 0.95 mg/dL    GFR Estimate 32 (L) >60 mL/min/1.73m2    Calcium 9.6 8.8 - 10.4 mg/dL    Chloride 103 98 - 107 mmol/L    Glucose 91 70 - 99 mg/dL    Alkaline Phosphatase 66 40 - 150 U/L    AST 31 0 - 45 U/L    ALT 33 0 - 50 U/L    Protein Total 6.3 (L) 6.4 - 8.3 g/dL    Albumin 4.0 3.5 - 5.2 g/dL    Bilirubin Total 0.4 <=1.2 mg/dL   BNP-N terminal pro   Result Value Ref Range    N Terminal Pro BNP Outpatient 2,745 (H) 0 - 1,800 pg/mL   CBC with platelets and differential   Result Value Ref Range    WBC Count 19.6 (H) 4.0 - 11.0 10e3/uL    RBC Count 4.38 3.80 - 5.20 10e6/uL    Hemoglobin 10.0 (L) 11.7 - 15.7 g/dL    Hematocrit 33.1 (L) 35.0 - 47.0 %    MCV 76 (L) 78 - 100 fL     MCH 22.8 (L) 26.5 - 33.0 pg    MCHC 30.2 (L) 31.5 - 36.5 g/dL    RDW 18.2 (H) 10.0 - 15.0 %    Platelet Count 417 150 - 450 10e3/uL    % Neutrophils 87 %    % Lymphocytes 6 %    % Monocytes 5 %    % Eosinophils 0 %    % Basophils 0 %    % Immature Granulocytes 2 %    Absolute Neutrophils 17.1 (H) 1.6 - 8.3 10e3/uL    Absolute Lymphocytes 1.2 0.8 - 5.3 10e3/uL    Absolute Monocytes 0.9 0.0 - 1.3 10e3/uL    Absolute Eosinophils 0.0 0.0 - 0.7 10e3/uL    Absolute Basophils 0.0 0.0 - 0.2 10e3/uL    Absolute Immature Granulocytes 0.3 <=0.4 10e3/uL       Imaging    XR Chest 2 Views    Result Date: 4/25/2025  EXAM: XR CHEST 2 VIEWS LOCATION: [facility] DATE: 4/25/2025 INDICATION:  Dyspnea COMPARISON: 1/7/2021     IMPRESSION: Interstitial airspace opacity right middle lobe, likely reflective of atelectasis and volume loss versus pneumonitis. No definite effusion. Chronic coarse interstitial airspace opacities in the upper lobes, consistent with underlying chronic scarring. Large esophageal hiatal hernia. Cardiac size and pulmonary vascularity upper limits normal. No pneumothorax.     The longitudinal plan of care for the diagnosis(es)/condition(s) as documented were addressed during this visit. Due to the added complexity in care, I will continue to support Jody in the subsequent management and with ongoing continuity of care.     30 minutes spent by me on the date of the encounter doing chart review, history and exam, documentation and further activities as noted above.    Consent was obtained from the patient to use an AI documentation tool in the creation of this note.    Signed by: Herbert Zavala MD

## 2025-05-02 ENCOUNTER — TELEPHONE (OUTPATIENT)
Dept: FAMILY MEDICINE | Facility: CLINIC | Age: 85
End: 2025-05-02
Payer: COMMERCIAL

## 2025-05-02 DIAGNOSIS — R06.02 SOB (SHORTNESS OF BREATH): Primary | ICD-10-CM

## 2025-05-02 NOTE — TELEPHONE ENCOUNTER
Dr. Thakkar called from [facility] with pulmonology wanting to give an update about patient condition and treatment plan.     Patient met with Dr. Thakkar today via telephone call. Pt is struggling even with current treatment including prednisone. She is suspected possible fluids overload.     Dr. Thakkar started pt on Lasix 40 mg today.   Pt have a scheduled IV iron on 5/6/25 at [facility].     Dr. Thakkar is requesting a [facility] order for patient to check on her creatinine and potassium level since she is starting a new medication today.     She is unable to place order as she is outside of the system.     Routing to PCP for review and recommendation regarding request.     Clinic call back # [phone].     Eddie ENGEL RN

## 2025-05-02 NOTE — TELEPHONE ENCOUNTER
Please see if we can arrange for her to have a visit with me next week where we can check the BMP and reassess how she is doing to decide on further course of action with the diuretic.    Please send a message back to Dr. Thakkar who had called that we will follow-up with her in person visit to recheck and for lab check of basic metabolic profile.

## 2025-05-02 NOTE — TELEPHONE ENCOUNTER
I spoke with Jody.    I tried to schedule her an appointment with you next week but declines for now.    She is having an infusion on Tuesday 5/6/25 at [facility]. She is wondering if Dr Fairchild can place lab orders for her and she can get them done there.    I said I would check with you.    She felt over-whelmed by the 5 infusion appointments coming up. I told if she changed her mind to call back and ask Peterson and I would try to help get her an appointment with Dr Fairchild.

## 2025-05-06 ENCOUNTER — INFUSION THERAPY VISIT (OUTPATIENT)
Dept: INFUSION THERAPY | Facility: HOSPITAL | Age: 85
End: 2025-05-06
Payer: COMMERCIAL

## 2025-05-06 VITALS
DIASTOLIC BLOOD PRESSURE: 82 MMHG | HEART RATE: 87 BPM | TEMPERATURE: 97.7 F | OXYGEN SATURATION: 97 % | SYSTOLIC BLOOD PRESSURE: 167 MMHG | RESPIRATION RATE: 20 BRPM

## 2025-05-06 DIAGNOSIS — D50.0 IRON DEFICIENCY ANEMIA DUE TO CHRONIC BLOOD LOSS: ICD-10-CM

## 2025-05-06 DIAGNOSIS — N18.30 CHRONIC KIDNEY DISEASE, STAGE 3 UNSPECIFIED (H): Primary | ICD-10-CM

## 2025-05-06 PROCEDURE — 258N000003 HC RX IP 258 OP 636: Performed by: INTERNAL MEDICINE

## 2025-05-06 PROCEDURE — 250N000011 HC RX IP 250 OP 636: Performed by: INTERNAL MEDICINE

## 2025-05-06 PROCEDURE — 96374 THER/PROPH/DIAG INJ IV PUSH: CPT

## 2025-05-06 RX ORDER — ALBUTEROL SULFATE 0.83 MG/ML
2.5 SOLUTION RESPIRATORY (INHALATION)
Status: CANCELLED | OUTPATIENT
Start: 2025-05-08

## 2025-05-06 RX ORDER — METHYLPREDNISOLONE SODIUM SUCCINATE 40 MG/ML
40 INJECTION INTRAMUSCULAR; INTRAVENOUS
Status: CANCELLED
Start: 2025-05-08

## 2025-05-06 RX ORDER — HEPARIN SODIUM (PORCINE) LOCK FLUSH IV SOLN 100 UNIT/ML 100 UNIT/ML
5 SOLUTION INTRAVENOUS
Status: CANCELLED | OUTPATIENT
Start: 2025-05-08

## 2025-05-06 RX ORDER — MEPERIDINE HYDROCHLORIDE 25 MG/ML
25 INJECTION INTRAMUSCULAR; INTRAVENOUS; SUBCUTANEOUS
Status: CANCELLED | OUTPATIENT
Start: 2025-05-08

## 2025-05-06 RX ORDER — ALBUTEROL SULFATE 90 UG/1
1-2 INHALANT RESPIRATORY (INHALATION)
Status: CANCELLED
Start: 2025-05-08

## 2025-05-06 RX ORDER — DIPHENHYDRAMINE HYDROCHLORIDE 50 MG/ML
25 INJECTION, SOLUTION INTRAMUSCULAR; INTRAVENOUS
Status: CANCELLED
Start: 2025-05-08

## 2025-05-06 RX ORDER — DIPHENHYDRAMINE HYDROCHLORIDE 50 MG/ML
50 INJECTION, SOLUTION INTRAMUSCULAR; INTRAVENOUS
Status: CANCELLED
Start: 2025-05-08

## 2025-05-06 RX ORDER — EPINEPHRINE 1 MG/ML
0.3 INJECTION, SOLUTION INTRAMUSCULAR; SUBCUTANEOUS EVERY 5 MIN PRN
Status: CANCELLED | OUTPATIENT
Start: 2025-05-08

## 2025-05-06 RX ORDER — HEPARIN SODIUM,PORCINE 10 UNIT/ML
5-20 VIAL (ML) INTRAVENOUS DAILY PRN
Status: CANCELLED | OUTPATIENT
Start: 2025-05-08

## 2025-05-06 RX ADMIN — IRON SUCROSE 200 MG: 20 INJECTION, SOLUTION INTRAVENOUS at 12:53

## 2025-05-06 RX ADMIN — SODIUM CHLORIDE 250 ML: 0.9 INJECTION, SOLUTION INTRAVENOUS at 12:53

## 2025-05-06 NOTE — PROGRESS NOTES
Infusion Nursing Note:  Krystal Virgen presents today for venofer infusion.    Patient seen by provider today: No   present during visit today: Not Applicable.    Note: N/A.      Intravenous Access:  Peripheral IV placed.    Treatment Conditions:  Not Applicable.      Post Infusion Assessment:  Patient tolerated infusion without incident.  Site patent and intact, free from redness, edema or discomfort.  No evidence of extravasations.  Access discontinued per protocol.       Discharge Plan:   Discharge instructions reviewed with: Patient and Family.  Patient and/or family verbalized understanding of discharge instructions and all questions answered.  Patient discharged in stable condition accompanied by: son.  Departure Mode: Ambulatory.      Tonya Turner RN

## 2025-05-08 ENCOUNTER — INFUSION THERAPY VISIT (OUTPATIENT)
Dept: INFUSION THERAPY | Facility: HOSPITAL | Age: 85
End: 2025-05-08
Attending: INTERNAL MEDICINE
Payer: COMMERCIAL

## 2025-05-08 VITALS
TEMPERATURE: 98.4 F | DIASTOLIC BLOOD PRESSURE: 67 MMHG | HEART RATE: 84 BPM | OXYGEN SATURATION: 93 % | SYSTOLIC BLOOD PRESSURE: 133 MMHG | RESPIRATION RATE: 16 BRPM

## 2025-05-08 DIAGNOSIS — D50.0 IRON DEFICIENCY ANEMIA DUE TO CHRONIC BLOOD LOSS: Primary | ICD-10-CM

## 2025-05-08 DIAGNOSIS — N18.30 CHRONIC KIDNEY DISEASE, STAGE 3 UNSPECIFIED (H): ICD-10-CM

## 2025-05-08 PROCEDURE — 258N000003 HC RX IP 258 OP 636: Performed by: INTERNAL MEDICINE

## 2025-05-08 PROCEDURE — 250N000011 HC RX IP 250 OP 636: Performed by: INTERNAL MEDICINE

## 2025-05-08 RX ORDER — ALBUTEROL SULFATE 0.83 MG/ML
2.5 SOLUTION RESPIRATORY (INHALATION)
Status: DISCONTINUED | OUTPATIENT
Start: 2025-05-08 | End: 2025-05-08 | Stop reason: HOSPADM

## 2025-05-08 RX ORDER — HEPARIN SODIUM,PORCINE 10 UNIT/ML
5-20 VIAL (ML) INTRAVENOUS DAILY PRN
OUTPATIENT
Start: 2025-05-10

## 2025-05-08 RX ORDER — DIPHENHYDRAMINE HYDROCHLORIDE 50 MG/ML
50 INJECTION, SOLUTION INTRAMUSCULAR; INTRAVENOUS
Start: 2025-05-10

## 2025-05-08 RX ORDER — METHYLPREDNISOLONE SODIUM SUCCINATE 40 MG/ML
40 INJECTION INTRAMUSCULAR; INTRAVENOUS
Status: DISCONTINUED | OUTPATIENT
Start: 2025-05-08 | End: 2025-05-08 | Stop reason: HOSPADM

## 2025-05-08 RX ORDER — DIPHENHYDRAMINE HYDROCHLORIDE 50 MG/ML
25 INJECTION, SOLUTION INTRAMUSCULAR; INTRAVENOUS
Start: 2025-05-10

## 2025-05-08 RX ORDER — ALBUTEROL SULFATE 0.83 MG/ML
2.5 SOLUTION RESPIRATORY (INHALATION)
OUTPATIENT
Start: 2025-05-10

## 2025-05-08 RX ORDER — HEPARIN SODIUM (PORCINE) LOCK FLUSH IV SOLN 100 UNIT/ML 100 UNIT/ML
5 SOLUTION INTRAVENOUS
OUTPATIENT
Start: 2025-05-10

## 2025-05-08 RX ORDER — EPINEPHRINE 1 MG/ML
0.3 INJECTION, SOLUTION INTRAMUSCULAR; SUBCUTANEOUS EVERY 5 MIN PRN
Status: DISCONTINUED | OUTPATIENT
Start: 2025-05-08 | End: 2025-05-08 | Stop reason: HOSPADM

## 2025-05-08 RX ORDER — ALBUTEROL SULFATE 90 UG/1
1-2 INHALANT RESPIRATORY (INHALATION)
Start: 2025-05-10

## 2025-05-08 RX ORDER — MEPERIDINE HYDROCHLORIDE 25 MG/ML
25 INJECTION INTRAMUSCULAR; INTRAVENOUS; SUBCUTANEOUS
Status: DISCONTINUED | OUTPATIENT
Start: 2025-05-08 | End: 2025-05-08 | Stop reason: HOSPADM

## 2025-05-08 RX ORDER — EPINEPHRINE 1 MG/ML
0.3 INJECTION, SOLUTION INTRAMUSCULAR; SUBCUTANEOUS EVERY 5 MIN PRN
OUTPATIENT
Start: 2025-05-10

## 2025-05-08 RX ORDER — DIPHENHYDRAMINE HYDROCHLORIDE 50 MG/ML
25 INJECTION, SOLUTION INTRAMUSCULAR; INTRAVENOUS
Status: DISCONTINUED | OUTPATIENT
Start: 2025-05-08 | End: 2025-05-08 | Stop reason: HOSPADM

## 2025-05-08 RX ORDER — DIPHENHYDRAMINE HYDROCHLORIDE 50 MG/ML
50 INJECTION, SOLUTION INTRAMUSCULAR; INTRAVENOUS
Status: DISCONTINUED | OUTPATIENT
Start: 2025-05-08 | End: 2025-05-08 | Stop reason: HOSPADM

## 2025-05-08 RX ORDER — ALBUTEROL SULFATE 90 UG/1
1-2 INHALANT RESPIRATORY (INHALATION)
Status: DISCONTINUED | OUTPATIENT
Start: 2025-05-08 | End: 2025-05-08 | Stop reason: HOSPADM

## 2025-05-08 RX ORDER — MEPERIDINE HYDROCHLORIDE 25 MG/ML
25 INJECTION INTRAMUSCULAR; INTRAVENOUS; SUBCUTANEOUS
OUTPATIENT
Start: 2025-05-10

## 2025-05-08 RX ORDER — METHYLPREDNISOLONE SODIUM SUCCINATE 40 MG/ML
40 INJECTION INTRAMUSCULAR; INTRAVENOUS
Start: 2025-05-10

## 2025-05-08 RX ADMIN — IRON SUCROSE 200 MG: 20 INJECTION, SOLUTION INTRAVENOUS at 13:55

## 2025-05-08 RX ADMIN — SODIUM CHLORIDE 250 ML: 0.9 INJECTION, SOLUTION INTRAVENOUS at 13:55

## 2025-05-08 NOTE — PROGRESS NOTES
Infusion Nursing Note:  Krystal Virgen presents today for 2nd of 5  dose of 200mg of Venofer .    Patient seen by provider today: No   present during visit today: Not Applicable.    Note: Jody comes in today for her 2nd dose of 200 mg of Venofer. Jody stated that she had no issues with her 1st dose of Venofer. I went over the plan of care with Jody and she verbalized understanding. PIV place with great blood return throughout. Venofer given over 7 minutes and then Jody was monitored for 15 minutes. Jody tolerated her infusion with no sign or symptoms of a reaction. PIV removed and covered with gauze and coban. Jody left via  to the [facility] in stable condition and plans on returning on 05/13/2025      Intravenous Access:  Peripheral IV placed.    Treatment Conditions:  Not Applicable.      Post Infusion Assessment:  Patient tolerated infusion without incident.  Patient observed for 15 minutes post  per protocol.  Blood return noted pre and post infusion.  Site patent and intact, free from redness, edema or discomfort.  No evidence of extravasations.  Access discontinued per protocol.       Discharge Plan:   [facility]  Patient will return 05/13/2025 for next appointment.   Patient discharged in stable condition accompanied by: sister.  Departure Mode: Wheelchair.      NEGAR PINEDO RN

## 2025-05-13 ENCOUNTER — INFUSION THERAPY VISIT (OUTPATIENT)
Dept: INFUSION THERAPY | Facility: HOSPITAL | Age: 85
End: 2025-05-13
Attending: INTERNAL MEDICINE
Payer: COMMERCIAL

## 2025-05-13 VITALS
TEMPERATURE: 98.1 F | SYSTOLIC BLOOD PRESSURE: 132 MMHG | RESPIRATION RATE: 18 BRPM | DIASTOLIC BLOOD PRESSURE: 70 MMHG | HEART RATE: 91 BPM | OXYGEN SATURATION: 94 %

## 2025-05-13 DIAGNOSIS — D50.0 IRON DEFICIENCY ANEMIA DUE TO CHRONIC BLOOD LOSS: Primary | ICD-10-CM

## 2025-05-13 DIAGNOSIS — N18.30 CHRONIC KIDNEY DISEASE, STAGE 3 UNSPECIFIED (H): ICD-10-CM

## 2025-05-13 PROCEDURE — 250N000011 HC RX IP 250 OP 636: Performed by: INTERNAL MEDICINE

## 2025-05-13 PROCEDURE — 258N000003 HC RX IP 258 OP 636: Performed by: INTERNAL MEDICINE

## 2025-05-13 PROCEDURE — 96374 THER/PROPH/DIAG INJ IV PUSH: CPT

## 2025-05-13 RX ORDER — DIPHENHYDRAMINE HYDROCHLORIDE 50 MG/ML
50 INJECTION, SOLUTION INTRAMUSCULAR; INTRAVENOUS
Start: 2025-05-14

## 2025-05-13 RX ORDER — HEPARIN SODIUM,PORCINE 10 UNIT/ML
5-20 VIAL (ML) INTRAVENOUS DAILY PRN
OUTPATIENT
Start: 2025-05-14

## 2025-05-13 RX ORDER — METHYLPREDNISOLONE SODIUM SUCCINATE 40 MG/ML
40 INJECTION INTRAMUSCULAR; INTRAVENOUS
Start: 2025-05-14

## 2025-05-13 RX ORDER — ALBUTEROL SULFATE 0.83 MG/ML
2.5 SOLUTION RESPIRATORY (INHALATION)
OUTPATIENT
Start: 2025-05-14

## 2025-05-13 RX ORDER — HEPARIN SODIUM (PORCINE) LOCK FLUSH IV SOLN 100 UNIT/ML 100 UNIT/ML
5 SOLUTION INTRAVENOUS
OUTPATIENT
Start: 2025-05-14

## 2025-05-13 RX ORDER — ALBUTEROL SULFATE 90 UG/1
1-2 INHALANT RESPIRATORY (INHALATION)
Start: 2025-05-14

## 2025-05-13 RX ORDER — MEPERIDINE HYDROCHLORIDE 25 MG/ML
25 INJECTION INTRAMUSCULAR; INTRAVENOUS; SUBCUTANEOUS
OUTPATIENT
Start: 2025-05-14

## 2025-05-13 RX ORDER — DIPHENHYDRAMINE HYDROCHLORIDE 50 MG/ML
25 INJECTION, SOLUTION INTRAMUSCULAR; INTRAVENOUS
Start: 2025-05-14

## 2025-05-13 RX ORDER — EPINEPHRINE 1 MG/ML
0.3 INJECTION, SOLUTION INTRAMUSCULAR; SUBCUTANEOUS EVERY 5 MIN PRN
OUTPATIENT
Start: 2025-05-14

## 2025-05-13 RX ADMIN — IRON SUCROSE 200 MG: 20 INJECTION, SOLUTION INTRAVENOUS at 12:38

## 2025-05-13 RX ADMIN — SODIUM CHLORIDE 250 ML: 0.9 INJECTION, SOLUTION INTRAVENOUS at 12:39

## 2025-05-13 NOTE — PROGRESS NOTES
"Infusion Nursing Note:  Krystal Virgen presents today for iron sucrose 200mg dose #3 of 5.    Patient seen by provider today: No   present during visit today: Not Applicable.    Note: Jody arrives A&Ox4 via borrowed w/c, accompanied by her sister. Jody confirms she is here for iron sucrose infusion and that she has tolerated previous doses without issues. She reports no questions or new concerns today   Pt has Hx asthma, states she is doing \"OK\" with the recent poor air quality, notes her O2 at home this morning was 92%. She reports no distress. Does not use O2 at home.    Intravenous Access:  Peripheral IV placed.    Treatment Conditions:  PIPO.      Post Infusion Assessment:  Patient tolerated infusion without incident.  Patient observed for 20min minutes post   Blood return noted pre and post infusion.  Access discontinued per protocol.       Discharge Plan:   Discharge instructions reviewed with: Patient.  Patient discharged in stable condition accompanied by: sister.  Departure Mode: Wheelchair.  Pt requested to have her BMP drawn per Dr Fairchild's orders, at her next infusion 5/15/25    Ana De La Rosa RN      " [address] [facility]   Phone: [phone]   Fax: [phone]      Physical Therapy Daily Treatment Note    Date: 2021  Patient Name: Violeta Wang  : 3/2/1930   MRN: [id number]  DOInjury: 2021   DOSx: NA  Referring Provider: Chikis Keith MD  [address] [place] [address]     Medical Diagnosis:     RLE knee OA pain    Outcome Measure: LEFS 51.25% Disability    S: The pt AMB into outpt PT clinic with SC while displaying reduced leticia & decreased step & stride length. O: please refer to PT Eval 2021  Time [id number]     Visit  Repeat outcome measure at mid point and end. Pain      Strength      Palpation      ROM      Modalities            Manual            Exercise      Standing heel raises x30 5lb cuff weight TA   Standing marching x30 5lb cuff weight TA   Standing leg curls  x30 R & L 5lb cuff weights NR   Standing RLE & LLE SLRs hip flex, abd, & ext x30 each 5lb cuff weights NR   Step-ups forward leading up with both RLE & LLE x30 (60 total) 4'' step  5lb cuff weights TA   Step-ups lateral R & L Lateral x30 each 4'' step  5lb cuff weights TA         A:  Tolerated well. Pt required railing for assistance & support during exercises. The pt resumed standing 5lb cuff weight resistive training without any abnormal difficulty. PT provided SBAx1 during all exercises. P: Continue with rehab plan & progress to resume X-band AMB.      Richie Maki, PT OHPT [id number]    Treatment Charges: Mins Units   Initial Evaluation     Re-Evaluation     Ther Exercise         TE     Manual Therapy     MT     Ther Activities        TA 25 2   Gait Training          GT     Neuro Re-education NR 15 1   Modalities     Non-Billable Service Time     Other     Total Time/Units 40 3

## 2025-05-19 ENCOUNTER — INFUSION THERAPY VISIT (OUTPATIENT)
Dept: INFUSION THERAPY | Facility: HOSPITAL | Age: 85
End: 2025-05-19
Attending: INTERNAL MEDICINE
Payer: COMMERCIAL

## 2025-05-19 VITALS
RESPIRATION RATE: 16 BRPM | OXYGEN SATURATION: 95 % | HEART RATE: 83 BPM | TEMPERATURE: 98 F | DIASTOLIC BLOOD PRESSURE: 79 MMHG | SYSTOLIC BLOOD PRESSURE: 143 MMHG

## 2025-05-19 DIAGNOSIS — R06.02 SOB (SHORTNESS OF BREATH): ICD-10-CM

## 2025-05-19 DIAGNOSIS — D50.0 IRON DEFICIENCY ANEMIA DUE TO CHRONIC BLOOD LOSS: Primary | ICD-10-CM

## 2025-05-19 LAB
ANION GAP SERPL CALCULATED.3IONS-SCNC: 11 MMOL/L (ref 7–15)
BUN SERPL-MCNC: 23.9 MG/DL (ref 8–23)
CALCIUM SERPL-MCNC: 9.6 MG/DL (ref 8.8–10.4)
CHLORIDE SERPL-SCNC: 106 MMOL/L (ref 98–107)
CREAT SERPL-MCNC: 1.37 MG/DL (ref 0.51–0.95)
EGFRCR SERPLBLD CKD-EPI 2021: 38 ML/MIN/1.73M2
GLUCOSE SERPL-MCNC: 96 MG/DL (ref 70–99)
HCO3 SERPL-SCNC: 24 MMOL/L (ref 22–29)
POTASSIUM SERPL-SCNC: 4.1 MMOL/L (ref 3.4–5.3)
SODIUM SERPL-SCNC: 141 MMOL/L (ref 135–145)

## 2025-05-19 PROCEDURE — 36415 COLL VENOUS BLD VENIPUNCTURE: CPT

## 2025-05-19 PROCEDURE — 250N000011 HC RX IP 250 OP 636: Performed by: INTERNAL MEDICINE

## 2025-05-19 PROCEDURE — 96374 THER/PROPH/DIAG INJ IV PUSH: CPT

## 2025-05-19 PROCEDURE — 258N000003 HC RX IP 258 OP 636: Performed by: INTERNAL MEDICINE

## 2025-05-19 PROCEDURE — 80048 BASIC METABOLIC PNL TOTAL CA: CPT

## 2025-05-19 RX ORDER — MEPERIDINE HYDROCHLORIDE 25 MG/ML
25 INJECTION INTRAMUSCULAR; INTRAVENOUS; SUBCUTANEOUS
Status: DISCONTINUED | OUTPATIENT
Start: 2025-05-19 | End: 2025-05-19 | Stop reason: HOSPADM

## 2025-05-19 RX ORDER — METHYLPREDNISOLONE SODIUM SUCCINATE 40 MG/ML
40 INJECTION INTRAMUSCULAR; INTRAVENOUS
Status: DISCONTINUED | OUTPATIENT
Start: 2025-05-19 | End: 2025-05-19 | Stop reason: HOSPADM

## 2025-05-19 RX ORDER — HEPARIN SODIUM,PORCINE 10 UNIT/ML
5-20 VIAL (ML) INTRAVENOUS DAILY PRN
OUTPATIENT
Start: 2025-05-21

## 2025-05-19 RX ORDER — ALBUTEROL SULFATE 90 UG/1
1-2 INHALANT RESPIRATORY (INHALATION)
Start: 2025-05-21

## 2025-05-19 RX ORDER — DIPHENHYDRAMINE HYDROCHLORIDE 50 MG/ML
50 INJECTION, SOLUTION INTRAMUSCULAR; INTRAVENOUS
Start: 2025-05-21

## 2025-05-19 RX ORDER — EPINEPHRINE 1 MG/ML
0.3 INJECTION, SOLUTION INTRAMUSCULAR; SUBCUTANEOUS EVERY 5 MIN PRN
Status: DISCONTINUED | OUTPATIENT
Start: 2025-05-19 | End: 2025-05-19 | Stop reason: HOSPADM

## 2025-05-19 RX ORDER — DIPHENHYDRAMINE HYDROCHLORIDE 50 MG/ML
25 INJECTION, SOLUTION INTRAMUSCULAR; INTRAVENOUS
Status: DISCONTINUED | OUTPATIENT
Start: 2025-05-19 | End: 2025-05-19 | Stop reason: HOSPADM

## 2025-05-19 RX ORDER — ALBUTEROL SULFATE 90 UG/1
1-2 INHALANT RESPIRATORY (INHALATION)
Status: DISCONTINUED | OUTPATIENT
Start: 2025-05-19 | End: 2025-05-19 | Stop reason: HOSPADM

## 2025-05-19 RX ORDER — DIPHENHYDRAMINE HYDROCHLORIDE 50 MG/ML
50 INJECTION, SOLUTION INTRAMUSCULAR; INTRAVENOUS
Status: DISCONTINUED | OUTPATIENT
Start: 2025-05-19 | End: 2025-05-19 | Stop reason: HOSPADM

## 2025-05-19 RX ORDER — ALBUTEROL SULFATE 0.83 MG/ML
2.5 SOLUTION RESPIRATORY (INHALATION)
OUTPATIENT
Start: 2025-05-21

## 2025-05-19 RX ORDER — EPINEPHRINE 1 MG/ML
0.3 INJECTION, SOLUTION INTRAMUSCULAR; SUBCUTANEOUS EVERY 5 MIN PRN
OUTPATIENT
Start: 2025-05-21

## 2025-05-19 RX ORDER — METHYLPREDNISOLONE SODIUM SUCCINATE 40 MG/ML
40 INJECTION INTRAMUSCULAR; INTRAVENOUS
Start: 2025-05-21

## 2025-05-19 RX ORDER — ALBUTEROL SULFATE 0.83 MG/ML
2.5 SOLUTION RESPIRATORY (INHALATION)
Status: DISCONTINUED | OUTPATIENT
Start: 2025-05-19 | End: 2025-05-19 | Stop reason: HOSPADM

## 2025-05-19 RX ORDER — MEPERIDINE HYDROCHLORIDE 25 MG/ML
25 INJECTION INTRAMUSCULAR; INTRAVENOUS; SUBCUTANEOUS
OUTPATIENT
Start: 2025-05-21

## 2025-05-19 RX ORDER — DIPHENHYDRAMINE HYDROCHLORIDE 50 MG/ML
25 INJECTION, SOLUTION INTRAMUSCULAR; INTRAVENOUS
Start: 2025-05-21

## 2025-05-19 RX ORDER — HEPARIN SODIUM (PORCINE) LOCK FLUSH IV SOLN 100 UNIT/ML 100 UNIT/ML
5 SOLUTION INTRAVENOUS
OUTPATIENT
Start: 2025-05-21

## 2025-05-19 RX ADMIN — IRON SUCROSE 200 MG: 20 INJECTION, SOLUTION INTRAVENOUS at 12:37

## 2025-05-19 RX ADMIN — SODIUM CHLORIDE 250 ML: 0.9 INJECTION, SOLUTION INTRAVENOUS at 12:37

## 2025-05-19 ASSESSMENT — PAIN SCALES - GENERAL: PAINLEVEL_OUTOF10: NO PAIN (0)

## 2025-05-19 NOTE — PROGRESS NOTES
Infusion Nursing Note:  Krystal Virgen presents today for 4/5 IV Venofer.    Patient seen by provider today: No   present during visit today: Not Applicable.    Note: Jody comes in today for her 4/5 dose of 200 mg of Venofer. Jody stated that she had no issues with with previous doses of Venofer. I went over the plan of care with Jody and she verbalized understanding. Venofer given over 7 minutes and then Jody was monitored for 15 minutes. Jody tolerated her infusion with no sign or symptoms of a reaction. PIV removed and covered with gauze and coban. Jody left via WC to the [facility] in stable condition and plans on returning on 06/2/2025      Intravenous Access:  Peripheral IV placed.    Treatment Conditions:  Labs drawn for her provider, not required for treatment.      Post Infusion Assessment:  Patient tolerated infusion without incident.  Blood return noted pre and post infusion.  No evidence of extravasations.  Access discontinued per protocol.       Discharge Plan:   Discharge instructions reviewed with: Patient.  Patient and/or family verbalized understanding of discharge instructions and all questions answered.  Patient discharged in stable condition accompanied by: self and sister.  Departure Mode: Wheelchair.      Ana Isaacs RN

## 2025-05-21 ENCOUNTER — RESULTS FOLLOW-UP (OUTPATIENT)
Dept: FAMILY MEDICINE | Facility: CLINIC | Age: 85
End: 2025-05-21

## 2025-06-02 ENCOUNTER — INFUSION THERAPY VISIT (OUTPATIENT)
Dept: INFUSION THERAPY | Facility: HOSPITAL | Age: 85
End: 2025-06-02
Attending: INTERNAL MEDICINE
Payer: COMMERCIAL

## 2025-06-02 VITALS
RESPIRATION RATE: 16 BRPM | HEART RATE: 97 BPM | SYSTOLIC BLOOD PRESSURE: 146 MMHG | DIASTOLIC BLOOD PRESSURE: 74 MMHG | OXYGEN SATURATION: 95 % | TEMPERATURE: 98.1 F

## 2025-06-02 DIAGNOSIS — N18.30 CHRONIC KIDNEY DISEASE, STAGE 3 UNSPECIFIED (H): ICD-10-CM

## 2025-06-02 DIAGNOSIS — D50.0 IRON DEFICIENCY ANEMIA DUE TO CHRONIC BLOOD LOSS: Primary | ICD-10-CM

## 2025-06-02 PROCEDURE — 250N000011 HC RX IP 250 OP 636: Performed by: INTERNAL MEDICINE

## 2025-06-02 PROCEDURE — 258N000003 HC RX IP 258 OP 636: Performed by: INTERNAL MEDICINE

## 2025-06-02 PROCEDURE — 96374 THER/PROPH/DIAG INJ IV PUSH: CPT

## 2025-06-02 RX ORDER — METHYLPREDNISOLONE SODIUM SUCCINATE 40 MG/ML
40 INJECTION INTRAMUSCULAR; INTRAVENOUS
Status: CANCELLED
Start: 2025-06-04

## 2025-06-02 RX ORDER — EPINEPHRINE 1 MG/ML
0.3 INJECTION, SOLUTION INTRAMUSCULAR; SUBCUTANEOUS EVERY 5 MIN PRN
Status: CANCELLED | OUTPATIENT
Start: 2025-06-04

## 2025-06-02 RX ORDER — ALBUTEROL SULFATE 0.83 MG/ML
2.5 SOLUTION RESPIRATORY (INHALATION)
Status: CANCELLED | OUTPATIENT
Start: 2025-06-04

## 2025-06-02 RX ORDER — ALBUTEROL SULFATE 90 UG/1
1-2 INHALANT RESPIRATORY (INHALATION)
Status: DISCONTINUED | OUTPATIENT
Start: 2025-06-02 | End: 2025-06-02

## 2025-06-02 RX ORDER — ALBUTEROL SULFATE 90 UG/1
1-2 INHALANT RESPIRATORY (INHALATION)
Status: CANCELLED
Start: 2025-06-04

## 2025-06-02 RX ORDER — DIPHENHYDRAMINE HYDROCHLORIDE 50 MG/ML
25 INJECTION, SOLUTION INTRAMUSCULAR; INTRAVENOUS
Status: CANCELLED
Start: 2025-06-04

## 2025-06-02 RX ORDER — ALBUTEROL SULFATE 0.83 MG/ML
2.5 SOLUTION RESPIRATORY (INHALATION)
Status: DISCONTINUED | OUTPATIENT
Start: 2025-06-02 | End: 2025-06-02

## 2025-06-02 RX ORDER — METHYLPREDNISOLONE SODIUM SUCCINATE 40 MG/ML
40 INJECTION INTRAMUSCULAR; INTRAVENOUS
Status: DISCONTINUED | OUTPATIENT
Start: 2025-06-02 | End: 2025-06-02

## 2025-06-02 RX ORDER — HEPARIN SODIUM (PORCINE) LOCK FLUSH IV SOLN 100 UNIT/ML 100 UNIT/ML
5 SOLUTION INTRAVENOUS
Status: CANCELLED | OUTPATIENT
Start: 2025-06-04

## 2025-06-02 RX ORDER — HEPARIN SODIUM,PORCINE 10 UNIT/ML
5-20 VIAL (ML) INTRAVENOUS DAILY PRN
Status: CANCELLED | OUTPATIENT
Start: 2025-06-04

## 2025-06-02 RX ORDER — MEPERIDINE HYDROCHLORIDE 25 MG/ML
25 INJECTION INTRAMUSCULAR; INTRAVENOUS; SUBCUTANEOUS
Status: CANCELLED | OUTPATIENT
Start: 2025-06-04

## 2025-06-02 RX ORDER — EPINEPHRINE 1 MG/ML
0.3 INJECTION, SOLUTION INTRAMUSCULAR; SUBCUTANEOUS EVERY 5 MIN PRN
Status: DISCONTINUED | OUTPATIENT
Start: 2025-06-02 | End: 2025-06-02

## 2025-06-02 RX ORDER — DIPHENHYDRAMINE HYDROCHLORIDE 50 MG/ML
50 INJECTION, SOLUTION INTRAMUSCULAR; INTRAVENOUS
Status: CANCELLED
Start: 2025-06-04

## 2025-06-02 RX ORDER — DIPHENHYDRAMINE HYDROCHLORIDE 50 MG/ML
50 INJECTION, SOLUTION INTRAMUSCULAR; INTRAVENOUS
Status: DISCONTINUED | OUTPATIENT
Start: 2025-06-02 | End: 2025-06-02

## 2025-06-02 RX ORDER — DIPHENHYDRAMINE HYDROCHLORIDE 50 MG/ML
25 INJECTION, SOLUTION INTRAMUSCULAR; INTRAVENOUS
Status: DISCONTINUED | OUTPATIENT
Start: 2025-06-02 | End: 2025-06-02

## 2025-06-02 RX ORDER — MEPERIDINE HYDROCHLORIDE 25 MG/ML
25 INJECTION INTRAMUSCULAR; INTRAVENOUS; SUBCUTANEOUS
Status: DISCONTINUED | OUTPATIENT
Start: 2025-06-02 | End: 2025-06-02

## 2025-06-02 RX ADMIN — IRON SUCROSE 200 MG: 20 INJECTION, SOLUTION INTRAVENOUS at 14:03

## 2025-06-02 RX ADMIN — SODIUM CHLORIDE 250 ML: 0.9 INJECTION, SOLUTION INTRAVENOUS at 14:02

## 2025-06-02 NOTE — PROGRESS NOTES
Infusion Nursing Note:  Krystal Virgen presents today for 5/5 Iron Sucrose.    Patient seen by provider today: No   present during visit today: Not Applicable.    Note: vs and assessment completed, pt has been tolerating infusions, infusion administered as ordered and pt was observed for 15 minutes post infusion.      Intravenous Access:  Peripheral IV placed.    Treatment Conditions:  Not Applicable.      Post Infusion Assessment:  Patient tolerated infusion without incident.  Blood return noted pre and post infusion.  No evidence of extravasations.  Access discontinued per protocol.       Discharge Plan:   Discharge instructions reviewed with: Patient.  Patient and/or family verbalized understanding of discharge instructions and all questions answered.  Patient discharged in stable condition accompanied by: self and sister.  Departure Mode: Wheelchair.      Ana Isaacs RN

## 2025-06-23 RX ORDER — FUROSEMIDE 20 MG/1
40 TABLET ORAL DAILY
COMMUNITY
Start: 2025-05-02 | End: 2026-05-02

## 2025-07-01 ENCOUNTER — OFFICE VISIT (OUTPATIENT)
Dept: FAMILY MEDICINE | Facility: CLINIC | Age: 85
End: 2025-07-01
Payer: COMMERCIAL

## 2025-07-01 VITALS
WEIGHT: 186 LBS | HEIGHT: 63 IN | DIASTOLIC BLOOD PRESSURE: 76 MMHG | RESPIRATION RATE: 28 BRPM | TEMPERATURE: 97.7 F | SYSTOLIC BLOOD PRESSURE: 128 MMHG | OXYGEN SATURATION: 95 % | BODY MASS INDEX: 32.96 KG/M2

## 2025-07-01 DIAGNOSIS — R06.02 SOB (SHORTNESS OF BREATH): ICD-10-CM

## 2025-07-01 DIAGNOSIS — J45.50 SEVERE PERSISTENT ASTHMA, UNSPECIFIED WHETHER COMPLICATED (H): ICD-10-CM

## 2025-07-01 DIAGNOSIS — I50.9 CHRONIC CONGESTIVE HEART FAILURE, UNSPECIFIED HEART FAILURE TYPE (H): Primary | ICD-10-CM

## 2025-07-01 DIAGNOSIS — D64.9 ANEMIA, UNSPECIFIED TYPE: ICD-10-CM

## 2025-07-01 DIAGNOSIS — I25.10 ATHEROSCLEROSIS OF NATIVE CORONARY ARTERY OF NATIVE HEART WITHOUT ANGINA PECTORIS: ICD-10-CM

## 2025-07-01 DIAGNOSIS — N18.32 STAGE 3B CHRONIC KIDNEY DISEASE (H): ICD-10-CM

## 2025-07-01 DIAGNOSIS — I51.7 CARDIOMEGALY: ICD-10-CM

## 2025-07-01 DIAGNOSIS — D50.0 IRON DEFICIENCY ANEMIA DUE TO CHRONIC BLOOD LOSS: ICD-10-CM

## 2025-07-01 LAB
ERYTHROCYTE [DISTWIDTH] IN BLOOD BY AUTOMATED COUNT: 18.4 % (ref 10–15)
HCT VFR BLD AUTO: 40.4 % (ref 35–47)
HGB BLD-MCNC: 12.5 G/DL (ref 11.7–15.7)
MCH RBC QN AUTO: 26.5 PG (ref 26.5–33)
MCHC RBC AUTO-ENTMCNC: 30.9 G/DL (ref 31.5–36.5)
MCV RBC AUTO: 86 FL (ref 78–100)
PLATELET # BLD AUTO: 326 10E3/UL (ref 150–450)
RBC # BLD AUTO: 4.72 10E6/UL (ref 3.8–5.2)
WBC # BLD AUTO: 9.3 10E3/UL (ref 4–11)

## 2025-07-01 PROCEDURE — 99214 OFFICE O/P EST MOD 30 MIN: CPT | Performed by: FAMILY MEDICINE

## 2025-07-01 PROCEDURE — 82728 ASSAY OF FERRITIN: CPT | Performed by: FAMILY MEDICINE

## 2025-07-01 PROCEDURE — 1126F AMNT PAIN NOTED NONE PRSNT: CPT | Performed by: FAMILY MEDICINE

## 2025-07-01 PROCEDURE — 3074F SYST BP LT 130 MM HG: CPT | Performed by: FAMILY MEDICINE

## 2025-07-01 PROCEDURE — 83550 IRON BINDING TEST: CPT | Performed by: FAMILY MEDICINE

## 2025-07-01 PROCEDURE — 85027 COMPLETE CBC AUTOMATED: CPT | Performed by: FAMILY MEDICINE

## 2025-07-01 PROCEDURE — 80048 BASIC METABOLIC PNL TOTAL CA: CPT | Performed by: FAMILY MEDICINE

## 2025-07-01 PROCEDURE — 3078F DIAST BP <80 MM HG: CPT | Performed by: FAMILY MEDICINE

## 2025-07-01 PROCEDURE — 36415 COLL VENOUS BLD VENIPUNCTURE: CPT | Performed by: FAMILY MEDICINE

## 2025-07-01 PROCEDURE — 83540 ASSAY OF IRON: CPT | Performed by: FAMILY MEDICINE

## 2025-07-01 PROCEDURE — 83880 ASSAY OF NATRIURETIC PEPTIDE: CPT | Performed by: FAMILY MEDICINE

## 2025-07-01 ASSESSMENT — PAIN SCALES - GENERAL: PAINLEVEL_OUTOF10: NO PAIN (0)

## 2025-07-01 NOTE — PROGRESS NOTES
"Krystal Virgen  /76   Temp 97.7  F (36.5  C)   Resp 28   Ht 1.6 m (5' 3\")   Wt 84.4 kg (186 lb)   SpO2 95%   BMI 32.95 kg/m       Assessment/Plan:                Jody was seen today for recheck medication.    Diagnoses and all orders for this visit:    Chronic congestive heart failure, unspecified heart failure type (H)  -     NT-proBNP; Future  -     Basic metabolic panel  (Ca, Cl, CO2, Creat, Gluc, K, Na, BUN); Future  -     Echocardiogram Complete; Future  -     NT-proBNP  -     Basic metabolic panel  (Ca, Cl, CO2, Creat, Gluc, K, Na, BUN)    Iron deficiency anemia due to chronic blood loss  -     CBC with platelets  -     Ferritin  -     Iron & Iron Binding Capacity    SOB (shortness of breath)    Stage 3b chronic kidney disease (H)    Anemia, unspecified type    Atherosclerosis of native coronary artery of native heart without angina pectoris    Cardiomegaly    Severe persistent asthma, unspecified whether complicated (H)         DISCUSSION  She likely has multifactorial dyspnea.  Needs further evaluation from a cardiac standpoint to determine most appropriate treatment course.  Will arrange for echocardiogram.  Will check BNP today.  Will check base metabolic profile given recent diuretic use with underlying chronic kidney disease.  She is due for recheck of anemia and her iron levels having received iron infusions.  She does have follow-up scheduled with hematology.  Subjective:     HPI:    Krystal Virgen is a 84 year old female has a complex medical history.  She is severe persistent asthma and likely immunodeficiency.  She has chronic iron deficiency anemia and has recently completed iron infusions.  She also has underlying atherosclerosis with coronary vascular disease, cardiomegaly on imaging and has had elevated BNP suggestive of volume overload although does not have any diagnosed history of congestive heart failure prior.    For several months has been struggling with significant " dyspnea with minimal activity.  She is consulted with her pulmonologist, hematologist and myself.  She had been on a course of prednisone and antibiotic with some minimal benefit for short-term.  She has been on more prolonged course of prednisone that initially seem to be somewhat helpful but now has been on prednisone for a longer period of time.  She has had a weight gain which is attributed by her to her use of prednisone but could be fluid.  She does not report distinct edema.  She does not have orthopnea.  She gets short of breath with walking about 10 feet.  She does not get hypoxic.  We measured this today.  She drops down to about 91-92%.  She does recover quickly.  She does not currently use home oxygen.  She has not seen cardiology for some time.  An attempt at using furosemide 40 mg daily for about a month per patient was not successful in alleviating symptoms to any extent.  She has stopped the medication about a month ago.  She has been off of prednisone for about 2 weeks by report.  She is frustrated by ongoing symptoms.  We discussed the likely multifactorial approach.  We also discussed the importance of evaluating from a cardiac standpoint more quickly.  She does have an appointment scheduled with cardiology but not until September.  We discussed obtaining an echocardiogram.  We discussed repeat BMP.  We discussed the potential for more aggressive diuresis.  She does have chronic kidney disease we will recheck kidney function today.  She is due for recheck of anemia and her iron levels.    In a positive note she does have a history of depression and insomnia is doing well with sertraline and mirtazapine.    ROS:  Complete review of systems is obtained.  Other than the specific considerations noted above complete review of systems is negative.    Objective:   Medications:  Current Outpatient Medications   Medication Sig Dispense Refill    acetaminophen (TYLENOL) 500 MG tablet Take 1,000 mg by mouth  every 8 hours as needed      albuterol (PROAIR HFA/PROVENTIL HFA/VENTOLIN HFA) 108 (90 Base) MCG/ACT inhaler USE 2 INHALATIONS ORALLY   EVERY 6 HOURS AS NEEDED 54 g 1    aspirin 81 MG EC tablet Take 1 tablet (81 mg) by mouth 2 times daily 60 tablet 0    budeson-glycopyrrol-formoterol (BREZTRI AEROSPHERE) 160-9-4.8 MCG/ACT AERO inhaler Inhale 2 puffs into the lungs 2 times daily.      budesonide-formoterol (SYMBICORT) 160-4.5 MCG/ACT inhaler Inhale 2 puffs into the lungs 2 times daily as needed      ferrous sulfate (FE TABS) 325 (65 Fe) MG EC tablet Take 1 tablet (325 mg) by mouth daily 30 tablet 3    fluticasone (FLONASE) 50 MCG/ACT nasal spray Spray 1-2 sprays into both nostrils daily as needed      fluticasone-salmeterol (ADVAIR) 250-50 MCG/ACT inhaler Inhale 1 puff into the lungs 2 times daily.      furosemide (LASIX) 20 MG tablet Take 40 mg by mouth daily.      guaiFENesin-codeine (ROBITUSSIN AC) 100-10 MG/5ML solution TAKE 10 ML BY MOUTH EVERY 4 HOURS AS NEEDED (COUGH). 473 mL 0    ipratropium - albuterol 0.5 mg/2.5 mg/3 mL (DUONEB) 0.5-2.5 (3) MG/3ML neb solution Inhale into the lungs.      LANsoprazole (PREVACID) 30 MG DR capsule Take 1 capsule (30 mg) by mouth 2 times daily. 180 capsule 1    mirtazapine (REMERON) 7.5 MG tablet Take 1 tablet (7.5 mg) by mouth at bedtime. 90 tablet 3    montelukast (SINGULAIR) 10 MG tablet Take 1 tablet (10 mg) by mouth at bedtime 90 tablet 2    nitroGLYcerin (NITROSTAT) 0.4 MG sublingual tablet For chest pain place 1 tablet under the tongue every 5 minutes for 3 doses. If symptoms persist 5 minutes after 1st dose call 911. 25 tablet 0    rosuvastatin (CRESTOR) 40 MG tablet Take 1 tablet (40 mg) by mouth daily.      senna-docusate (SENOKOT-S/PERICOLACE) 8.6-50 MG tablet Take 1-2 tablets by mouth 2 times daily Take while on oral narcotics to prevent or treat constipation. 15 tablet 0    sertraline (ZOLOFT) 100 MG tablet Take 1 tablet (100 mg) by mouth daily. 90 tablet 3     Vitamin D3 (CHOLECALCIFEROL) 125 MCG (5000 UT) tablet Take 1 tablet (125 mcg) by MOUTH daily 30 tablet 11    predniSONE (DELTASONE) 10 MG tablet 4 tablet daily, taper as instructed. (Patient not taking: Reported on 7/1/2025) 60 tablet 0     No current facility-administered medications for this visit.        Allergies:     Allergies   Allergen Reactions    Ticagrelor Other (See Comments)     Dyspnea    Penicillins      Amoxicillin doesn't work for her    Sulfa Antibiotics Rash        Social History     Socioeconomic History    Marital status:      Spouse name: Not on file    Number of children: Not on file    Years of education: Not on file    Highest education level: Not on file   Occupational History    Not on file   Tobacco Use    Smoking status: Never    Smokeless tobacco: Never   Vaping Use    Vaping status: Never Used   Substance and Sexual Activity    Alcohol use: No    Drug use: No    Sexual activity: Not on file   Other Topics Concern    Parent/sibling w/ CABG, MI or angioplasty before 65F 55M? Not Asked   Social History Narrative    Not on file     Social Drivers of Health     Financial Resource Strain: Low Risk  (10/20/2023)    Financial Resource Strain     Within the past 12 months, have you or your family members you live with been unable to get utilities (heat, electricity) when it was really needed?: No   Food Insecurity: Low Risk  (10/20/2023)    Food Insecurity     Within the past 12 months, did you worry that your food would run out before you got money to buy more?: No     Within the past 12 months, did the food you bought just not last and you didn t have money to get more?: No   Transportation Needs: Low Risk  (10/20/2023)    Transportation Needs     Within the past 12 months, has lack of transportation kept you from medical appointments, getting your medicines, non-medical meetings or appointments, work, or from getting things that you need?: No   Physical Activity: Not on file   Stress:  Not on file   Social Connections: Not on file   Interpersonal Safety: Not on file   Housing Stability: Low Risk  (10/20/2023)    Housing Stability     Do you have housing? : Yes     Are you worried about losing your housing?: No       Family History   Problem Relation Age of Onset    Breast Cancer Mother 78    Heart Disease Father     Macular Degeneration Father     Breast Cancer Sister 48    Heart Disease Brother     Breast Cancer Maternal Aunt 35        passed at 84y not of breast    Breast Cancer Paternal Aunt 48        passed away from breast        Most Recent Immunizations   Administered Date(s) Administered    COVID-19 12+ (Pfizer) 09/20/2024    COVID-19 Bivalent 18+ (Moderna) 10/25/2022    COVID-19 Monovalent 18+ (Moderna) 06/01/2022    Influenza (H1N1) 01/05/2010    Influenza (High Dose) Trivalent,PF (Fluzone) 09/20/2024    Influenza (IIV3) PF 10/17/2013    Influenza (prior to 2024) 10/01/2010    Influenza Vaccine 65+ (FLUAD) 11/01/2023    Influenza Vaccine 65+ (Fluzone HD) 11/01/2023    Influenza Vaccine, 6+MO IM (QUADRIVALENT W/PRESERVATIVES) 10/17/2013    Influenza, Split Virus, Trivalent, Pf (Fluzone\Fluarix) 10/01/2010    Mantoux Tuberculin Skin Test 04/13/2006    Pneumo Conj 13-V (2010&after) 09/11/2018    Pneumococcal 20 valent Conjugate (Prevnar 20) 11/13/2024    Pneumococcal 23 valent 10/01/2019    TDAP (Adacel,Boostrix) 04/28/2014    Zoster vaccine, live 10/06/2009        Wt Readings from Last 3 Encounters:   07/01/25 84.4 kg (186 lb)   04/30/25 82.1 kg (181 lb)   04/25/25 82.1 kg (181 lb)        BP Readings from Last 6 Encounters:   07/01/25 128/76   06/02/25 (!) 146/74   05/19/25 (!) 143/79   05/13/25 132/70   05/08/25 133/67   05/06/25 (!) 167/82        Hemoglobin A1C   Date Value Ref Range Status   01/08/2021 5.3 0 - 5.6 % Final     Comment:     Normal <5.7% Prediabetes 5.7-6.4%  Diabetes 6.5% or higher - adopted from ADA   consensus guidelines.     02/01/2013 5.8 4.2 - 6.1 % Final     "  PHYSICAL EXAM:    /76   Temp 97.7  F (36.5  C)   Resp 28   Ht 1.6 m (5' 3\")   Wt 84.4 kg (186 lb)   SpO2 95%   BMI 32.95 kg/m           General Appearance:    Alert, cooperative, no distress, becomes visually short of breath with walking about 15 feet.  Recovers quickly.   Eyes:   No scleral icterus or conjunctival irritation       Lungs:     Clear to auscultation bilaterally, respirations unlabored, no wheezes or crackles   Heart:    Regular rate and rhythm,  No murmur   Abdomen:    Soft, minimal distention     Extremities: No significant edema   Skin:  No concerning skin findings, no suspicious moles, no rashes   Neurologic:  On gross examination there is no motor or sensory deficit.  Patient walks with a normal gait                                     "

## 2025-07-02 LAB
ANION GAP SERPL CALCULATED.3IONS-SCNC: 10 MMOL/L (ref 7–15)
BUN SERPL-MCNC: 18 MG/DL (ref 8–23)
CALCIUM SERPL-MCNC: 9.7 MG/DL (ref 8.8–10.4)
CHLORIDE SERPL-SCNC: 106 MMOL/L (ref 98–107)
CREAT SERPL-MCNC: 1.52 MG/DL (ref 0.51–0.95)
EGFRCR SERPLBLD CKD-EPI 2021: 34 ML/MIN/1.73M2
FERRITIN SERPL-MCNC: 192 NG/ML (ref 11–328)
GLUCOSE SERPL-MCNC: 87 MG/DL (ref 70–99)
HCO3 SERPL-SCNC: 24 MMOL/L (ref 22–29)
IRON BINDING CAPACITY (ROCHE): 317 UG/DL (ref 240–430)
IRON SATN MFR SERPL: 19 % (ref 15–46)
IRON SERPL-MCNC: 60 UG/DL (ref 37–145)
NT-PROBNP SERPL-MCNC: 1929 PG/ML (ref 0–624)
POTASSIUM SERPL-SCNC: 4.8 MMOL/L (ref 3.4–5.3)
SODIUM SERPL-SCNC: 140 MMOL/L (ref 135–145)

## 2025-07-03 ENCOUNTER — RESULTS FOLLOW-UP (OUTPATIENT)
Dept: FAMILY MEDICINE | Facility: CLINIC | Age: 85
End: 2025-07-03

## 2025-07-11 ENCOUNTER — VIRTUAL VISIT (OUTPATIENT)
Dept: ONCOLOGY | Facility: HOSPITAL | Age: 85
End: 2025-07-11
Attending: INTERNAL MEDICINE
Payer: COMMERCIAL

## 2025-07-11 VITALS — BODY MASS INDEX: 31.89 KG/M2 | WEIGHT: 180 LBS | HEIGHT: 63 IN

## 2025-07-11 DIAGNOSIS — D50.0 IRON DEFICIENCY ANEMIA DUE TO CHRONIC BLOOD LOSS: Primary | ICD-10-CM

## 2025-07-11 DIAGNOSIS — K25.7: ICD-10-CM

## 2025-07-11 PROCEDURE — 1126F AMNT PAIN NOTED NONE PRSNT: CPT | Mod: 95 | Performed by: INTERNAL MEDICINE

## 2025-07-11 PROCEDURE — 98006 SYNCH AUDIO-VIDEO EST MOD 30: CPT | Performed by: INTERNAL MEDICINE

## 2025-07-11 ASSESSMENT — PAIN SCALES - GENERAL: PAINLEVEL_OUTOF10: NO PAIN (0)

## 2025-07-11 NOTE — PROGRESS NOTES
Virtual Visit Details    Type of service:  Video Visit   Video Start Time: 3:01 PM  Video End Time:3:07 PM    Originating Location (pt. Location): Home  {PROVIDER LOCATION On-site should be selected for visits conducted from your clinic location or adjoining [facility] hospital, academic office, or other nearby [facility] building. 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  History of microcytic anemia with iron deficiency.   She was getting IV iron periodically.  #.  Large hiatal hernia with John's erosion  #.  CKD-3     Colonoscopy on 11/8/17 showed diverticulosis without evidence of source of bleeding. EGD from 12/14/2017 showed a large hiatal hernia with John's erosion.      I reviewed her hospitalization in [facility] with anemia and GI bleeding and anemia with hemoglobin drop to 7.7 g/dL.  I reviewed the EGD (6/28/2023) and it showed findings of large hiatal hernia but no John lesions.  There was no evidence of blood or current active bleeding.  Colonoscopy (6/29/2023) showed old maroon clotted blood found in the rectal, in the sigmoid colon, in the descending colon and in the transverse colon.  There  are multiple diverticular without evidence of diverticulosis.  She was found 2 sessile polyps in the mid ascending colon and ileocecal valve.  She received 2 units of packed RBC on 6/29/2023 and 7/1/2023.     Today, I reviewed her labs which is c/w iron deficiency anemia. John Lesions are ikely contributing to the patient's iron deficiency, which may be exacerbating her breathing difficulties.    Plan:  - Arrange iron infusion appointments at [facility].  - Schedule follow-up labs in about two to three months to check iron levels.  - Monitor hemoglobin periodically to ensure it does not drop.  - Utilize virtual visits as needed for convenience. Follow up with me annual basis or as needed.       #.  History of invasive ductal carcinoma of the right breast.  Stage IA (pT1c, pN0, cM0), grade 1, ER/IA strongly positive, HER2/Uriel negative, with positive inferior margin. Associated DCIS. S/p right breast lumpectomy and right sentinel lymph node biopsy on 8/10/2017.  She declined reexcision or adjuvant radiation treatment.  She started anastrozole in October 2017, then switched to tamoxifen in December 2018 due to intolerable vasomotor symptoms and not to compromise her bone density.  Completed 5 years of adjuvant tamoxifen in October 2022.   I reviewed the annual screening mammogram from 9/2024 and it was benign.       Encounter Diagnoses:    Problem List Items Addressed This Visit    None           CC: Juan C Fairchild MD, MD   ______________________________________________________________________________    History of Present Illness    History of Present Illness-  Jody Virgen, an 84-year-old female, reports significant difficulty breathing, which she attributes to a history of pneumonia that she has been unable to fully recover from. She has been on prednisone since March 17, 2025, and has been tapering down to 10 mg as of today. Despite this, she continues to experience breathing difficulties.    She mentions  discovering that she has no iron in her blood, which she believes might be contributing to her current health issues. She has a history of John lesions, which have previously caused bleeding. A few years ago, she underwent a comprehensive workup that revealed significant blood loss at the time. She has not noticed any black-colored stools recently.    Her appetite has increased significantly, which she attributes to the prednisone, and she finds herself constantly thinking about food. She has previously received IV iron treatments and did not experience any adverse reactions. She expresses concern about a potential leak that might require further testing.    Jody feels extremely fatigued and weak after being sick for two months, to the point where she can hardly hold her head up. Her hemoglobin level was noted to be 10, which she feels is contributing to her breathing difficulties. She is eager for relief and is concerned about the burden of frequent visits on her friends who assist her.    She resides on [facility] and mentions that the [facility] clinic is closer to her home.     She primarily follows up with Dr. Juan C Fairchild for her checkups, as her pulmonologist is difficult to reach.     Review of systems  Apart from describing in HPI, the remainder of comprehensive ROS was negative.    Past History    Past Medical History:   Diagnosis Date    Anemia     Arthritis     Asthma     Asthma     Asthma with acute exacerbation 08/23/2021    Formatting of this note might be different from the original. Created by Conversion  Replacement Utility updated for latest IMO load    Breast cancer (H) 2017    Cardiac arrhythmia, unspecified 10/31/2023    Chronic bronchitis (H)     Chronic sinusitis     COPD (chronic obstructive pulmonary disease) (H)     Depression     Difficulty in walking, not elsewhere classified 10/31/2023    GERD (gastroesophageal reflux disease)     Hiatal hernia     Hypertension     Migraines      "Osteopenia     Osteoporosis     Overactive bladder     Pneumococcal infection     Pneumonia     PONV (postoperative nausea and vomiting)     Renal disease     Sinusitis     Spinal headache     ST elevation MI (STEMI) (H) 01/07/2021    Stented coronary artery        Past Surgical History:   Procedure Laterality Date    ARTHRODESIS FOOT  11/11/2011    Procedure:ARTHRODESIS FOOT; Right First Metatarsophalangeal Joint Fusion with Second and Third Clawtoe Repairs; Surgeon:SARAH CASTRO; Location:[facility] OR    ARTHROPLASTY HIP ANTERIOR Left 10/27/2023    Procedure: LEFT TOTAL HIP ARTHROPLASTY DIRECT ANTERIOR;  Surgeon: Jairo Cornejo MD;  Location: [facility] Main OR    BIOPSY BREAST      CHOLECYSTECTOMY      CHOLECYSTECTOMY      CV CORONARY ANGIOGRAM N/A 1/7/2021    Procedure: Coronary Angiogram;  Surgeon: Slade Yu MD;  Location:  [facility]    CV PCI STENT DRUG ELUTING N/A 1/7/2021    Procedure: Percutaneous Coronary Intervention Stent Drug Eluting;  Surgeon: Slade Yu MD;  [facility]    ENT SURGERY      sinus surgery x1    FOOT SURGERY      GYN SURGERY      benign hysterectomy age 43    HYSTERECTOMY  1984    IRRIGATION AND DEBRIDEMENT HIP, COMBINED Left 12/5/2023    Procedure: LEFT HIP IRRIGATION AND DEBRIDEMENT;  Surgeon: Jairo Cornejo MD;  Location: [facility] Main OR    LUMPECTOMY BREAST Right 2017    ORTHOPEDIC SURGERY      fusion of grest toe right    SINUS SURGERY         Physical Exam    Ht 1.6 m (5' 3\")   Wt 81.6 kg (180 lb)   BMI 31.89 kg/m      General: alert, awake, not in acute distress  HEENT: Head: Normal, normocephalic, atraumatic.  Eye: Normal external eye, conjunctiva, lids cornea, RINKU.  Pharynx: Normal buccal mucosa. Normal pharynx.  Neck / Thyroid: Supple, no masses, nodes, nodules or enlargement.  Abdomen: abdomen is soft without significant tenderness, masses, organomegaly or guarding  Extremities: normal strength, tone, and muscle " mass  Skin: normal. no rash or abnormalities  CNS: non focal.    Lab Results    No results found for this or any previous visit (from the past week).      Imaging    No results found.    The longitudinal plan of care for the diagnosis(es)/condition(s) as documented were addressed during this visit. Due to the added complexity in care, I will continue to support Jody in the subsequent management and with ongoing continuity of care.     30 minutes spent by me on the date of the encounter doing chart review, history and exam, documentation and further activities as noted above.    Consent was obtained from the patient to use an AI documentation tool in the creation of this note.    Signed by: Herbert Zavala MD

## 2025-07-11 NOTE — NURSING NOTE
Current patient location: [address]   [place] [address]    Is the patient currently in the state of [place]? YES    Visit mode: VIDEO    If the visit is dropped, the patient can be reconnected by:VIDEO VISIT: Text to cell phone:   Telephone Information:   Mobile [phone]       Will anyone else be joining the visit? NO  (If patient encounters technical issues they should call [phone])    Are changes needed to the allergy or medication list? No    Are refills needed on medications prescribed by this physician? NO    Rooming Documentation:  Questionnaire(s) not done per department protocol    Reason for visit: MAYE BOYD

## 2025-07-26 DIAGNOSIS — J45.40 MODERATE PERSISTENT ASTHMA WITHOUT COMPLICATION: ICD-10-CM

## 2025-07-28 ENCOUNTER — PATIENT OUTREACH (OUTPATIENT)
Dept: CARE COORDINATION | Facility: CLINIC | Age: 85
End: 2025-07-28
Payer: COMMERCIAL

## 2025-07-28 RX ORDER — MONTELUKAST SODIUM 10 MG/1
10 TABLET ORAL AT BEDTIME
Qty: 90 TABLET | Refills: 2 | Status: SHIPPED | OUTPATIENT
Start: 2025-07-28

## 2025-08-05 ENCOUNTER — PATIENT OUTREACH (OUTPATIENT)
Dept: CARE COORDINATION | Facility: CLINIC | Age: 85
End: 2025-08-05
Payer: COMMERCIAL

## 2025-08-07 ENCOUNTER — TELEPHONE (OUTPATIENT)
Dept: FAMILY MEDICINE | Facility: CLINIC | Age: 85
End: 2025-08-07
Payer: COMMERCIAL

## 2025-08-11 DIAGNOSIS — N39.0 URINARY TRACT INFECTION WITHOUT HEMATURIA, SITE UNSPECIFIED: ICD-10-CM

## 2025-08-11 RX ORDER — CIPROFLOXACIN 250 MG/1
250 TABLET, FILM COATED ORAL DAILY
Qty: 5 TABLET | Refills: 0 | Status: SHIPPED | OUTPATIENT
Start: 2025-08-11

## 2025-08-20 ENCOUNTER — HOSPITAL ENCOUNTER (OUTPATIENT)
Dept: CARDIOLOGY | Facility: HOSPITAL | Age: 85
Discharge: HOME | End: 2025-08-20
Attending: FAMILY MEDICINE
Payer: COMMERCIAL

## 2025-08-20 DIAGNOSIS — I50.9 CHRONIC CONGESTIVE HEART FAILURE, UNSPECIFIED HEART FAILURE TYPE (H): ICD-10-CM

## 2025-08-20 LAB — LVEF ECHO: NORMAL

## 2025-08-20 PROCEDURE — 999N000208 ECHOCARDIOGRAM COMPLETE

## 2025-08-20 PROCEDURE — 93306 TTE W/DOPPLER COMPLETE: CPT | Mod: 26 | Performed by: INTERNAL MEDICINE

## 2025-08-20 PROCEDURE — 255N000002 HC RX 255 OP 636: Performed by: FAMILY MEDICINE

## 2025-08-20 RX ADMIN — PERFLUTREN 3 ML (DILUTED): 6.52 INJECTION, SUSPENSION INTRAVENOUS at 14:10

## 2025-08-28 ENCOUNTER — TELEPHONE (OUTPATIENT)
Dept: FAMILY MEDICINE | Facility: CLINIC | Age: 85
End: 2025-08-28
Payer: COMMERCIAL

## 2025-09-02 ENCOUNTER — PATIENT OUTREACH (OUTPATIENT)
Dept: CARE COORDINATION | Facility: CLINIC | Age: 85
End: 2025-09-02
Payer: COMMERCIAL

## (undated) DEVICE — KIT DRAIN CLOSED WOUND SUCTION MED 400ML RESVR

## (undated) DEVICE — KIT PATIENT CARE HANA TABLE PROFX SUPINE 6855

## (undated) DEVICE — DRAPE IOBAN INCISE 36X23" 6651EZ

## (undated) DEVICE — MANIFOLD KIT ANGIO AUTOMATED 014613

## (undated) DEVICE — Device

## (undated) DEVICE — CAST PADDING 4" STERILE 9044S

## (undated) DEVICE — DRSG WND NEGATIVE PRESSURE PREVENA 20CM PRE1055US.S

## (undated) DEVICE — NEEDLE HYPO 21GA X 1-1/2 SAFETY 305917

## (undated) DEVICE — GLOVE SURG PI ULTRA TOUCH M SZ 7-1/2 LF

## (undated) DEVICE — GLOVE BIOGEL INDICATOR 7.5 LF 41675

## (undated) DEVICE — SOL NACL 0.9% INJ 1000ML BAG 2B1324X

## (undated) DEVICE — CANISTER PREVENA NEGATIVE PRESSURE 150ML PRE4095.S

## (undated) DEVICE — CUSTOM PACK TOTAL HIP SOP5BTHHEA

## (undated) DEVICE — SOL NACL 0.9% IRRIG 1000ML BOTTLE 2F7124

## (undated) DEVICE — SU DERMABOND ADVANCED .7ML DNX12

## (undated) DEVICE — CLOSURE ANGIOSEAL 6FR 610130

## (undated) DEVICE — SUTURE MONOCRYL 3-0 18 PS2 UND MCP497G

## (undated) DEVICE — CATH GUIDING BLUE YELLOW PTFE XB3.5 6FRX100CM 67005400

## (undated) DEVICE — SU STRATAFIX PDS PLUS 1 CT-1 18" SXPP1A404

## (undated) DEVICE — SU ETHIBOND 1 CT-1 30" X425H

## (undated) DEVICE — DRSG MEPILEX BORDER ADHS 10CMX20CM STRL 496405

## (undated) DEVICE — CATH ANGIO SUPERTORQUE PLUS JR4 6FRX100CM 533621

## (undated) DEVICE — SOL WATER IRRIG 1000ML BOTTLE 2F7114

## (undated) DEVICE — INTRO SHEATH MICRO PLATINUM TIP 4FRX40CM 7274

## (undated) DEVICE — GLOVE UNDER INDICATOR PI SZ 7.0 LF 41670

## (undated) DEVICE — DRSG GAUZE 4X4" TRAY 6939

## (undated) DEVICE — DRSG GAUZE 2X2" TRAY 1806

## (undated) DEVICE — GOWN IMPERVIOUS BREATHABLE SMART XLG 89045

## (undated) DEVICE — SUCTION IRR SYSTEM W/O TIP INTERPULSE HANDPIECE 0210-100-000

## (undated) DEVICE — DECANTER VIAL 2006S

## (undated) DEVICE — SUCTION MANIFOLD NEPTUNE 2 SYS 4 PORT 0702-020-000

## (undated) DEVICE — GLOVE BIOGEL PI SZ 7.0 40870

## (undated) DEVICE — BONE CLEANING TIP INTERPULSE  0210-010-000

## (undated) DEVICE — CUSTOM PACK ANTERIOR HIP SOP5BAHHED

## (undated) DEVICE — WIRE GUIDE HI-TRQ PILOT 50 JTIP 0.014"X190CM 1010480-HJ

## (undated) DEVICE — GLOVE BIOGEL PI SZ 8.5 40885

## (undated) DEVICE — DRSG TEGADERM 4X4 3/4" 1626W

## (undated) DEVICE — PLATE GROUNDING ADULT W/CORD 9165L

## (undated) DEVICE — INTRO SHEATH AVANTI 4FRX23CM 504604T

## (undated) DEVICE — TUBING PRESSURE 30"

## (undated) DEVICE — GLOVE BIOGEL PI INDICATOR 8.0 LF 41680

## (undated) DEVICE — SOL ISOPROPYL RUBBING ALCOHOL USP 70% 4OZ HDX-20 I0020

## (undated) DEVICE — SUTURE VICRYL+ 0 27IN CT-1 UND VCP260H

## (undated) DEVICE — SOL ADH LIQUID BENZOIN SWAB 0.6ML C1544

## (undated) DEVICE — SU ETHIBOND 5 V-37 4X30" MB66G

## (undated) DEVICE — STPL SKIN 35W 6.9MM  PXW35

## (undated) DEVICE — KIT HAND CONTROL ACIST 014644 AR-P54

## (undated) DEVICE — ESU ELEC BLADE 6" COATED E1450-6

## (undated) DEVICE — KIT ACCESSORY INTRO INFLATION SYS 20/30 PRIORITY 1000186-115

## (undated) DEVICE — MAT FLOOR SURGICAL 40X38 0702140238

## (undated) DEVICE — SUTURE VICRYL+ 2-0 27IN CT-1 UND VCP259H

## (undated) DEVICE — VALVE HEMOSTASIS .096" COPILOT MECH 1003331

## (undated) DEVICE — SUTURE VICRYL+ 1 27IN CT-1 UND VCP261H

## (undated) DEVICE — GLOVE BIOGEL PI SZ 8.0 40880

## (undated) DEVICE — SUCTION MANIFOLD NEPTUNE 2 SYS 1 PORT 702-025-000

## (undated) DEVICE — DRAPE IOBAN 2 C-SECTION 3M 6697

## (undated) DEVICE — INTRO SHEATH 6FRX25CM PINNACLE RSS606

## (undated) DEVICE — PACK HEART LEFT CUSTOM

## (undated) DEVICE — BLADE PRECISION 25X1.27X9 6725127090

## (undated) DEVICE — ELECTRODE PATIENT RETURN ADULT L10 FT 2 PLATE CORD 0855C

## (undated) DEVICE — CATH BALLOON EMERGE 2.0X20MM H7493918920200

## (undated) RX ORDER — FENTANYL CITRATE 50 UG/ML
INJECTION, SOLUTION INTRAMUSCULAR; INTRAVENOUS
Status: DISPENSED
Start: 2023-10-27

## (undated) RX ORDER — FENTANYL CITRATE 50 UG/ML
INJECTION, SOLUTION INTRAMUSCULAR; INTRAVENOUS
Status: DISPENSED
Start: 2023-12-05

## (undated) RX ORDER — DEXAMETHASONE SODIUM PHOSPHATE 10 MG/ML
INJECTION, EMULSION INTRAMUSCULAR; INTRAVENOUS
Status: DISPENSED
Start: 2023-10-27

## (undated) RX ORDER — FENTANYL CITRATE-0.9 % NACL/PF 10 MCG/ML
PLASTIC BAG, INJECTION (ML) INTRAVENOUS
Status: DISPENSED
Start: 2023-12-05

## (undated) RX ORDER — LIDOCAINE HYDROCHLORIDE 10 MG/ML
INJECTION, SOLUTION EPIDURAL; INFILTRATION; INTRACAUDAL; PERINEURAL
Status: DISPENSED
Start: 2023-12-05

## (undated) RX ORDER — ONDANSETRON 2 MG/ML
INJECTION INTRAMUSCULAR; INTRAVENOUS
Status: DISPENSED
Start: 2023-12-05

## (undated) RX ORDER — PROPOFOL 10 MG/ML
INJECTION, EMULSION INTRAVENOUS
Status: DISPENSED
Start: 2023-12-05

## (undated) RX ORDER — BUPIVACAINE HYDROCHLORIDE 5 MG/ML
INJECTION, SOLUTION EPIDURAL; INTRACAUDAL
Status: DISPENSED
Start: 2023-10-27

## (undated) RX ORDER — ONDANSETRON 2 MG/ML
INJECTION INTRAMUSCULAR; INTRAVENOUS
Status: DISPENSED
Start: 2023-10-27

## (undated) RX ORDER — DEXAMETHASONE SODIUM PHOSPHATE 10 MG/ML
INJECTION, EMULSION INTRAMUSCULAR; INTRAVENOUS
Status: DISPENSED
Start: 2023-12-05

## (undated) RX ORDER — PROPOFOL 10 MG/ML
INJECTION, EMULSION INTRAVENOUS
Status: DISPENSED
Start: 2023-10-27

## (undated) RX ORDER — GLYCOPYRROLATE 0.2 MG/ML
INJECTION, SOLUTION INTRAMUSCULAR; INTRAVENOUS
Status: DISPENSED
Start: 2023-10-27